# Patient Record
Sex: FEMALE | Race: WHITE | Employment: OTHER | URBAN - NONMETROPOLITAN AREA
[De-identification: names, ages, dates, MRNs, and addresses within clinical notes are randomized per-mention and may not be internally consistent; named-entity substitution may affect disease eponyms.]

---

## 2017-09-15 DIAGNOSIS — M54.12 CERVICAL RADICULOPATHY: ICD-10-CM

## 2017-09-15 DIAGNOSIS — F41.9 ANXIETY: ICD-10-CM

## 2017-09-15 DIAGNOSIS — Z78.0 MENOPAUSE: ICD-10-CM

## 2017-09-15 DIAGNOSIS — N28.1 CYST OF KIDNEY, ACQUIRED: ICD-10-CM

## 2017-09-15 DIAGNOSIS — M54.16 LUMBAR RADICULOPATHY: ICD-10-CM

## 2017-09-15 DIAGNOSIS — M51.36 DDD (DEGENERATIVE DISC DISEASE), LUMBAR: ICD-10-CM

## 2017-09-15 DIAGNOSIS — R73.03 PREDIABETES: ICD-10-CM

## 2017-09-15 DIAGNOSIS — I10 ESSENTIAL HYPERTENSION, BENIGN: ICD-10-CM

## 2017-09-15 DIAGNOSIS — E78.2 MIXED HYPERLIPIDEMIA: ICD-10-CM

## 2017-09-15 PROBLEM — M51.369 DDD (DEGENERATIVE DISC DISEASE), LUMBAR: Status: ACTIVE | Noted: 2017-09-15

## 2017-09-15 PROBLEM — M54.50 LOW BACK PAIN: Status: ACTIVE | Noted: 2017-09-15

## 2017-09-15 PROBLEM — F32.A DEPRESSION: Status: ACTIVE | Noted: 2017-09-15

## 2017-09-15 PROBLEM — M54.40 BILATERAL LOW BACK PAIN WITH SCIATICA: Status: ACTIVE | Noted: 2017-09-15

## 2017-09-15 PROBLEM — E66.9 OBESITY: Status: ACTIVE | Noted: 2017-09-15

## 2017-09-15 PROBLEM — K58.9 IBS (IRRITABLE BOWEL SYNDROME): Status: ACTIVE | Noted: 2017-09-15

## 2017-09-15 PROBLEM — M54.30 SCIATICA: Status: ACTIVE | Noted: 2017-09-15

## 2017-09-15 PROBLEM — K64.9 HEMORRHOID: Status: ACTIVE | Noted: 2017-09-15

## 2017-09-15 PROBLEM — M81.0 OSTEOPOROSIS: Status: ACTIVE | Noted: 2017-09-15

## 2017-09-15 RX ORDER — CELECOXIB 200 MG/1
200 CAPSULE ORAL DAILY
COMMUNITY
End: 2020-12-02

## 2017-09-15 RX ORDER — METAXALONE 800 MG/1
800 TABLET ORAL 3 TIMES DAILY
COMMUNITY
End: 2017-11-27 | Stop reason: ALTCHOICE

## 2017-10-27 RX ORDER — METOPROLOL TARTRATE 50 MG/1
TABLET, FILM COATED ORAL
Qty: 180 TABLET | Refills: 0 | Status: SHIPPED | OUTPATIENT
Start: 2017-10-27 | End: 2017-12-11 | Stop reason: SDUPTHER

## 2017-11-21 ENCOUNTER — TELEPHONE (OUTPATIENT)
Dept: FAMILY MEDICINE CLINIC | Age: 57
End: 2017-11-21

## 2017-11-21 DIAGNOSIS — E78.2 MIXED HYPERLIPIDEMIA: ICD-10-CM

## 2017-11-21 DIAGNOSIS — I10 HYPERTENSION, UNSPECIFIED TYPE: Primary | ICD-10-CM

## 2017-11-21 DIAGNOSIS — R73.02 GLUCOSE INTOLERANCE (IMPAIRED GLUCOSE TOLERANCE): ICD-10-CM

## 2017-11-21 DIAGNOSIS — R73.03 PREDIABETES: ICD-10-CM

## 2017-11-21 NOTE — TELEPHONE ENCOUNTER
----- Message from Carmina Schroeder sent at 11/15/2017  3:31 PM CST -----  Need lab orders for physical

## 2017-11-27 ENCOUNTER — OFFICE VISIT (OUTPATIENT)
Dept: URGENT CARE | Age: 57
End: 2017-11-27
Payer: COMMERCIAL

## 2017-11-27 VITALS
RESPIRATION RATE: 20 BRPM | HEART RATE: 69 BPM | OXYGEN SATURATION: 93 % | TEMPERATURE: 98 F | WEIGHT: 227.6 LBS | SYSTOLIC BLOOD PRESSURE: 122 MMHG | BODY MASS INDEX: 34.49 KG/M2 | HEIGHT: 68 IN | DIASTOLIC BLOOD PRESSURE: 79 MMHG

## 2017-11-27 DIAGNOSIS — R73.03 PREDIABETES: ICD-10-CM

## 2017-11-27 DIAGNOSIS — I10 HYPERTENSION, UNSPECIFIED TYPE: ICD-10-CM

## 2017-11-27 DIAGNOSIS — R73.02 GLUCOSE INTOLERANCE (IMPAIRED GLUCOSE TOLERANCE): ICD-10-CM

## 2017-11-27 DIAGNOSIS — E78.2 MIXED HYPERLIPIDEMIA: ICD-10-CM

## 2017-11-27 DIAGNOSIS — J01.10 ACUTE NON-RECURRENT FRONTAL SINUSITIS: Primary | ICD-10-CM

## 2017-11-27 LAB
ALBUMIN SERPL-MCNC: 4.1 G/DL (ref 3.5–5.2)
ALP BLD-CCNC: 66 U/L (ref 35–104)
ALT SERPL-CCNC: 19 U/L (ref 5–33)
ANION GAP SERPL CALCULATED.3IONS-SCNC: 15 MMOL/L (ref 7–19)
AST SERPL-CCNC: 20 U/L (ref 5–32)
BASOPHILS ABSOLUTE: 0.1 K/UL (ref 0–0.2)
BASOPHILS RELATIVE PERCENT: 0.8 % (ref 0–1)
BILIRUB SERPL-MCNC: <0.2 MG/DL (ref 0.2–1.2)
BILIRUBIN URINE: NEGATIVE
BLOOD, URINE: NEGATIVE
BUN BLDV-MCNC: 20 MG/DL (ref 6–20)
CALCIUM SERPL-MCNC: 9.4 MG/DL (ref 8.6–10)
CHLORIDE BLD-SCNC: 102 MMOL/L (ref 98–111)
CHOLESTEROL, TOTAL: 170 MG/DL (ref 160–199)
CLARITY: CLEAR
CO2: 27 MMOL/L (ref 22–29)
COLOR: YELLOW
CREAT SERPL-MCNC: 1 MG/DL (ref 0.5–0.9)
EOSINOPHILS ABSOLUTE: 0.3 K/UL (ref 0–0.6)
EOSINOPHILS RELATIVE PERCENT: 2.8 % (ref 0–5)
GFR NON-AFRICAN AMERICAN: 57
GLUCOSE BLD-MCNC: 103 MG/DL (ref 74–109)
GLUCOSE URINE: NEGATIVE MG/DL
HBA1C MFR BLD: 5.8 %
HCT VFR BLD CALC: 41.1 % (ref 37–47)
HDLC SERPL-MCNC: 50 MG/DL (ref 65–121)
HEMOGLOBIN: 12.9 G/DL (ref 12–16)
KETONES, URINE: NEGATIVE MG/DL
LDL CHOLESTEROL CALCULATED: 89 MG/DL
LEUKOCYTE ESTERASE, URINE: NEGATIVE
LYMPHOCYTES ABSOLUTE: 2.6 K/UL (ref 1.1–4.5)
LYMPHOCYTES RELATIVE PERCENT: 25.2 % (ref 20–40)
MCH RBC QN AUTO: 26.8 PG (ref 27–31)
MCHC RBC AUTO-ENTMCNC: 31.4 G/DL (ref 33–37)
MCV RBC AUTO: 85.3 FL (ref 81–99)
MONOCYTES ABSOLUTE: 0.8 K/UL (ref 0–0.9)
MONOCYTES RELATIVE PERCENT: 7.4 % (ref 0–10)
NEUTROPHILS ABSOLUTE: 6.6 K/UL (ref 1.5–7.5)
NEUTROPHILS RELATIVE PERCENT: 63.2 % (ref 50–65)
NITRITE, URINE: NEGATIVE
PDW BLD-RTO: 13.6 % (ref 11.5–14.5)
PH UA: 7.5
PLATELET # BLD: 274 K/UL (ref 130–400)
PMV BLD AUTO: 11.6 FL (ref 9.4–12.3)
POTASSIUM SERPL-SCNC: 4.1 MMOL/L (ref 3.5–5)
PROTEIN UA: NEGATIVE MG/DL
RBC # BLD: 4.82 M/UL (ref 4.2–5.4)
SODIUM BLD-SCNC: 144 MMOL/L (ref 136–145)
SPECIFIC GRAVITY UA: 1.02
T4 FREE: 1.2 NG/DL (ref 0.9–1.7)
TOTAL PROTEIN: 6.7 G/DL (ref 6.6–8.7)
TRIGL SERPL-MCNC: 153 MG/DL (ref 0–149)
UROBILINOGEN, URINE: 0.2 E.U./DL
WBC # BLD: 10.4 K/UL (ref 4.8–10.8)

## 2017-11-27 PROCEDURE — 99213 OFFICE O/P EST LOW 20 MIN: CPT | Performed by: PHYSICIAN ASSISTANT

## 2017-11-27 RX ORDER — AZITHROMYCIN 250 MG/1
TABLET, FILM COATED ORAL
Qty: 1 PACKET | Refills: 0 | Status: SHIPPED | OUTPATIENT
Start: 2017-11-27 | End: 2017-12-07

## 2017-11-27 RX ORDER — BENZONATATE 100 MG/1
100 CAPSULE ORAL 3 TIMES DAILY PRN
Qty: 30 CAPSULE | Refills: 1 | Status: SHIPPED | OUTPATIENT
Start: 2017-11-27 | End: 2017-12-07

## 2017-11-27 ASSESSMENT — ENCOUNTER SYMPTOMS
VOMITING: 0
SORE THROAT: 1
NAUSEA: 0
COUGH: 1
DIARRHEA: 0
RHINORRHEA: 1
ABDOMINAL PAIN: 0

## 2017-11-27 NOTE — PROGRESS NOTES
Subjective:      Patient ID: Js Werner is a 62 y.o. female. HPI    Dede Boyd presents today with headaches, sinus pressure, productive cough, and chest congestion. Symptoms developed 4 days ago. No fever noted. Has runny nose and nasal congestion. Has sore throat. Has right ear pain. Having frontal headaches. No abdominal pain or NVD. Has taken Mucinex and Coricidin HBP. Review of Systems   Constitutional: Positive for chills. Negative for fever. HENT: Positive for congestion, ear pain, rhinorrhea and sore throat. Respiratory: Positive for cough. Gastrointestinal: Negative for abdominal pain, diarrhea, nausea and vomiting. Neurological: Positive for headaches. Objective:   Physical Exam   Constitutional: She is oriented to person, place, and time. She appears well-developed and well-nourished. No distress. HENT:   Head: Normocephalic and atraumatic. Right Ear: External ear normal.   Left Ear: External ear normal.   Nose: Nose normal.   Mouth/Throat: No oropharyngeal exudate. Pharynx erythematous with PND   Eyes: Conjunctivae are normal. Right eye exhibits no discharge. Left eye exhibits no discharge. Neck: Normal range of motion. Neck supple. Cardiovascular: Normal rate, regular rhythm and normal heart sounds. No murmur heard. Pulmonary/Chest: Effort normal and breath sounds normal. No respiratory distress. She has no wheezes. She has no rales. Abdominal: Soft. Bowel sounds are normal. She exhibits no distension and no mass. There is no tenderness. There is no rebound and no guarding. Lymphadenopathy:     She has no cervical adenopathy. Neurological: She is alert and oriented to person, place, and time. Skin: Skin is warm and dry. No rash noted. No erythema. No pallor. Psychiatric: She has a normal mood and affect. Her behavior is normal. Judgment and thought content normal.   Nursing note and vitals reviewed.       Assessment:      Acute Sinusitis      Plan:      -

## 2017-12-11 ENCOUNTER — OFFICE VISIT (OUTPATIENT)
Dept: FAMILY MEDICINE CLINIC | Age: 57
End: 2017-12-11
Payer: COMMERCIAL

## 2017-12-11 VITALS
DIASTOLIC BLOOD PRESSURE: 78 MMHG | RESPIRATION RATE: 16 BRPM | SYSTOLIC BLOOD PRESSURE: 140 MMHG | WEIGHT: 231 LBS | OXYGEN SATURATION: 96 % | HEART RATE: 80 BPM | TEMPERATURE: 98.4 F | BODY MASS INDEX: 35.12 KG/M2

## 2017-12-11 DIAGNOSIS — Z12.31 SCREENING MAMMOGRAM, ENCOUNTER FOR: ICD-10-CM

## 2017-12-11 DIAGNOSIS — Z12.11 ENCOUNTER FOR SCREENING COLONOSCOPY: ICD-10-CM

## 2017-12-11 DIAGNOSIS — E78.2 MIXED HYPERLIPIDEMIA: ICD-10-CM

## 2017-12-11 DIAGNOSIS — M54.16 LUMBAR RADICULOPATHY: ICD-10-CM

## 2017-12-11 DIAGNOSIS — R73.03 PREDIABETES: ICD-10-CM

## 2017-12-11 DIAGNOSIS — I10 ESSENTIAL HYPERTENSION, BENIGN: Primary | ICD-10-CM

## 2017-12-11 DIAGNOSIS — K58.0 IRRITABLE BOWEL SYNDROME WITH DIARRHEA: ICD-10-CM

## 2017-12-11 PROCEDURE — 99396 PREV VISIT EST AGE 40-64: CPT | Performed by: NURSE PRACTITIONER

## 2017-12-11 RX ORDER — METOPROLOL TARTRATE 50 MG/1
50 TABLET, FILM COATED ORAL 2 TIMES DAILY
Qty: 180 TABLET | Refills: 1 | Status: SHIPPED | OUTPATIENT
Start: 2017-12-11 | End: 2018-07-23 | Stop reason: SDUPTHER

## 2017-12-11 RX ORDER — TRIAMTERENE AND HYDROCHLOROTHIAZIDE 37.5; 25 MG/1; MG/1
2 TABLET ORAL DAILY
Qty: 180 TABLET | Refills: 1 | Status: SHIPPED | OUTPATIENT
Start: 2017-12-11 | End: 2018-07-23 | Stop reason: SDUPTHER

## 2017-12-11 RX ORDER — SIMVASTATIN 40 MG
40 TABLET ORAL NIGHTLY
Qty: 90 TABLET | Refills: 1 | Status: SHIPPED | OUTPATIENT
Start: 2017-12-11 | End: 2018-03-29 | Stop reason: SDUPTHER

## 2017-12-11 RX ORDER — CHLORDIAZEPOXIDE HYDROCHLORIDE AND CLIDINIUM BROMIDE 5; 2.5 MG/1; MG/1
1 CAPSULE ORAL EVERY 6 HOURS PRN
Qty: 270 CAPSULE | Refills: 1 | Status: SHIPPED | OUTPATIENT
Start: 2017-12-11 | End: 2018-03-07 | Stop reason: SDUPTHER

## 2017-12-11 RX ORDER — DILTIAZEM HYDROCHLORIDE 240 MG/1
240 CAPSULE, COATED, EXTENDED RELEASE ORAL DAILY
Qty: 90 CAPSULE | Refills: 1 | Status: SHIPPED | OUTPATIENT
Start: 2017-12-11 | End: 2018-09-13 | Stop reason: SDUPTHER

## 2017-12-11 ASSESSMENT — ENCOUNTER SYMPTOMS
CHEST TIGHTNESS: 0
SORE THROAT: 0
WHEEZING: 0
NAUSEA: 0
DIARRHEA: 1
BACK PAIN: 1
ABDOMINAL PAIN: 0
SHORTNESS OF BREATH: 0
COUGH: 0

## 2017-12-11 ASSESSMENT — PATIENT HEALTH QUESTIONNAIRE - PHQ9
SUM OF ALL RESPONSES TO PHQ9 QUESTIONS 1 & 2: 0
2. FEELING DOWN, DEPRESSED OR HOPELESS: 0
SUM OF ALL RESPONSES TO PHQ QUESTIONS 1-9: 0
1. LITTLE INTEREST OR PLEASURE IN DOING THINGS: 0

## 2017-12-11 NOTE — PROGRESS NOTES
Ms.Betty Armida Murray is a 62 y.o. female who presents today for   Chief Complaint   Patient presents with    Annual Exam       HPI:  Here for annual exam.      BP has been stable at home on diltiazem and metoprolol. Borderline here today but she had a stressful event at work prior to her appointment. No recent chest pain or sob. She is tolerating her meds without adverse effect. IBS has been stable. She is taking librax daily typically in the morning and at night. Her chronic lumbar back pain is fairly stable. May be gradually worsening but she is able to tolerate the pain with otc ibuprofen. She does not feel she needs to see ortho yet. She has prediabetes and has cut back on carbs, working on diet. A1C is 5.8%. Lipids have improved with total 170, triglycerides 153, hdl 50, ldl 89. CMP and cbc stable. T4 normal.  TSH not drawn it appears. UA normal.  All results reviewed with her. Last mammogram in 2015, benign. She cannot recall last colonoscopy. Declines pap, breast exam today. Review of Systems   Constitutional: Negative for chills and fever. HENT: Negative for congestion, ear pain and sore throat. Respiratory: Negative for cough, chest tightness, shortness of breath and wheezing. Cardiovascular: Negative for chest pain. Gastrointestinal: Positive for diarrhea (chronic, stable). Negative for abdominal pain and nausea. Genitourinary: Negative for dysuria and frequency. Musculoskeletal: Positive for back pain (chronic, stable). Negative for arthralgias and myalgias. Skin: Negative for rash.        Past Medical History:   Diagnosis Date    Anal fissure     Anxiety 9/15/2017    Bronchitis     Cervical radiculopathy 9/15/2017    Chronic back pain     Chronic kidney disease     Cyst of kidney, acquired 9/15/2017    Depression 9/15/2017    Hyperlipidemia     Hypertension     IBS (irritable bowel syndrome) 9/15/2017    Irregular heartbeat     Obesity 9/15/2017  Diabetes Maternal Grandmother     Colon Cancer Maternal Grandmother     Heart Disease Maternal Grandfather     Cancer Paternal Grandmother      colon cancer    Alcohol Abuse Sister     Cirrhosis Sister     Stroke Paternal Aunt     Colon Cancer Maternal Cousin        BP (!) 140/78   Pulse 80   Temp 98.4 °F (36.9 °C) (Temporal)   Resp 16   Wt 231 lb (104.8 kg)   SpO2 96%   BMI 35.12 kg/m²     Physical Exam   Constitutional: She appears well-developed and well-nourished. HENT:   Head: Normocephalic. Right Ear: External ear normal.   Left Ear: External ear normal.   Nose: Nose normal.   Mouth/Throat: Oropharynx is clear and moist. No oropharyngeal exudate. Eyes: Conjunctivae and EOM are normal. Pupils are equal, round, and reactive to light. Neck: Normal range of motion. Neck supple. No JVD present. No tracheal deviation present. No thyromegaly present. Cardiovascular: Normal rate, regular rhythm, normal heart sounds and intact distal pulses. No murmur heard. Pulmonary/Chest: Effort normal and breath sounds normal. No respiratory distress. Abdominal: Soft. Bowel sounds are normal. She exhibits no distension and no mass. There is no tenderness. Musculoskeletal: Normal range of motion. She exhibits no edema. Lymphadenopathy:     She has no cervical adenopathy. Skin: Skin is warm and dry. No rash noted. Psychiatric: She has a normal mood and affect. Vitals reviewed. Assessment:    ICD-10-CM ICD-9-CM    1. Essential hypertension, benign I10 401.1    2. Irritable bowel syndrome with diarrhea K58.0 564.1    3. Lumbar radiculopathy M54.16 724.4    4. Prediabetes R73.03 790.29 Hemoglobin A1C   5. Mixed hyperlipidemia E78.2 272.2 Comprehensive Metabolic Panel      Lipid Panel   6. Screening mammogram, encounter for Z12.31 V76.12 SERAFIN DIGITAL SCREEN BILATERAL       Plan:  -Continue current meds, refilled.   Kevinelder called to pharmacy.  -Order given for mammogram.  She will schedule she questions regarding her treatment. Should her conditions worsen, she should return to office to be reassessed by SORIN Keenan. she Should to go the closest Emergency Department for any emergency. They verbalized understanding the above instructions. Return in about 6 months (around 6/11/2018).

## 2018-03-07 RX ORDER — CHLORDIAZEPOXIDE HYDROCHLORIDE AND CLIDINIUM BROMIDE 5; 2.5 MG/1; MG/1
1 CAPSULE ORAL EVERY 6 HOURS PRN
Qty: 270 CAPSULE | Refills: 1 | Status: SHIPPED | OUTPATIENT
Start: 2018-03-07 | End: 2018-11-26 | Stop reason: SDUPTHER

## 2018-03-29 RX ORDER — SIMVASTATIN 40 MG
40 TABLET ORAL NIGHTLY
Qty: 90 TABLET | Refills: 1 | Status: SHIPPED | OUTPATIENT
Start: 2018-03-29 | End: 2018-12-14 | Stop reason: SDUPTHER

## 2018-04-23 DIAGNOSIS — M54.5 LOW BACK PAIN, UNSPECIFIED BACK PAIN LATERALITY, UNSPECIFIED CHRONICITY, WITH SCIATICA PRESENCE UNSPECIFIED: Primary | ICD-10-CM

## 2018-04-24 ENCOUNTER — TELEPHONE (OUTPATIENT)
Dept: NEUROLOGY | Age: 58
End: 2018-04-24

## 2018-06-28 ENCOUNTER — APPOINTMENT (OUTPATIENT)
Dept: GENERAL RADIOLOGY | Age: 58
End: 2018-06-28
Payer: COMMERCIAL

## 2018-06-28 ENCOUNTER — HOSPITAL ENCOUNTER (EMERGENCY)
Age: 58
Discharge: HOME | End: 2018-06-28
Attending: EMERGENCY MEDICINE
Payer: COMMERCIAL

## 2018-06-28 VITALS
BODY MASS INDEX: 26.37 KG/M2 | HEART RATE: 62 BPM | DIASTOLIC BLOOD PRESSURE: 76 MMHG | HEIGHT: 68 IN | OXYGEN SATURATION: 97 % | WEIGHT: 174 LBS | SYSTOLIC BLOOD PRESSURE: 138 MMHG | RESPIRATION RATE: 18 BRPM | TEMPERATURE: 97.9 F

## 2018-06-28 DIAGNOSIS — S62.002A CLOSED DISPLACED FRACTURE OF SCAPHOID OF LEFT WRIST, UNSPECIFIED PORTION OF SCAPHOID, INITIAL ENCOUNTER: Primary | ICD-10-CM

## 2018-06-28 PROCEDURE — 73110 X-RAY EXAM OF WRIST: CPT

## 2018-06-28 PROCEDURE — 99283 EMERGENCY DEPT VISIT LOW MDM: CPT | Performed by: EMERGENCY MEDICINE

## 2018-06-28 PROCEDURE — 25622 CLTX CARPL SCPHD FX W/O MNPJ: CPT | Performed by: EMERGENCY MEDICINE

## 2018-06-28 PROCEDURE — 99283 EMERGENCY DEPT VISIT LOW MDM: CPT

## 2018-06-28 PROCEDURE — 73130 X-RAY EXAM OF HAND: CPT

## 2018-06-28 PROCEDURE — 29125 APPL SHORT ARM SPLINT STATIC: CPT

## 2018-06-28 ASSESSMENT — PAIN SCALES - GENERAL: PAINLEVEL_OUTOF10: 5

## 2018-06-28 NOTE — ED PROVIDER NOTES
DEPARTMENT COURSE and DIFFERENTIAL DIAGNOSIS/MDM:   Vitals:    Vitals:    06/28/18 1049   BP: (!) 148/84   Pulse: 66   Resp: 18   Temp: 97.9 °F (36.6 °C)   SpO2: 96%   Weight: 174 lb (78.9 kg)   Height: 5' 8\" (1.727 m)       MDM  X-ray indicates what radiologist suspects is likely an old fracture. Patient has tenderness in this area so she was placed in thumb spica. She'll be discharged and instructed follow-up with ortho for further evaluation. Told to return to the ER for change or worsening symptoms or new concerns. Patient agreeable plan. CONSULTS:  None    PROCEDURES:  Unless otherwise noted below, none     Ortho Injury  Date/Time: 6/28/2018 12:14 PM  Performed by: Carlota Leyva  Authorized by: Carlota Leyva   Consent: Verbal consent obtained. Risks and benefits: risks, benefits and alternatives were discussed  Consent given by: patient  Patient identity confirmed: verbally with patient  Injury location: wrist  Location details: left wrist  Injury type: fracture  Fracture type: scaphoid  Pre-procedure neurovascular assessment: neurovascularly intact  Pre-procedure distal perfusion: normal  Pre-procedure neurological function: normal  Pre-procedure range of motion: normal    Anesthesia:  Local anesthesia used: no    Sedation:  Patient sedated: no  Manipulation performed: no  Immobilization: splint  Splint type: thumb spica  Supplies used: Ortho-Glass  Post-procedure neurovascular assessment: post-procedure neurovascularly intact  Post-procedure distal perfusion: normal  Post-procedure neurological function: normal  Post-procedure range of motion: normal  Patient tolerance: Patient tolerated the procedure well with no immediate complications          FINAL IMPRESSION      1.  Closed displaced fracture of scaphoid of left wrist, unspecified portion of scaphoid, initial encounter          DISPOSITION/PLAN   DISPOSITION Decision To Discharge 06/28/2018 11:51:45 AM      PATIENT REFERRED TO:  L
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
## 2018-07-23 RX ORDER — METOPROLOL TARTRATE 50 MG/1
50 TABLET, FILM COATED ORAL 2 TIMES DAILY
Qty: 180 TABLET | Refills: 1 | Status: SHIPPED | OUTPATIENT
Start: 2018-07-23 | End: 2018-12-14 | Stop reason: SDUPTHER

## 2018-07-23 RX ORDER — TRIAMTERENE AND HYDROCHLOROTHIAZIDE 37.5; 25 MG/1; MG/1
2 TABLET ORAL DAILY
Qty: 180 TABLET | Refills: 1 | Status: SHIPPED | OUTPATIENT
Start: 2018-07-23 | End: 2018-12-14 | Stop reason: SDUPTHER

## 2018-07-24 RX ORDER — CHLORDIAZEPOXIDE HYDROCHLORIDE AND CLIDINIUM BROMIDE 5; 2.5 MG/1; MG/1
CAPSULE ORAL
Qty: 270 CAPSULE | Refills: 1 | OUTPATIENT
Start: 2018-07-24

## 2018-07-24 NOTE — TELEPHONE ENCOUNTER
I don't think I can fill her librax until she is seen if it is controlled given last appmt in office was 12/2017 and next appmt is next month.  Please send back the other two refill requests that are not controlled for me to fill

## 2018-09-13 DIAGNOSIS — Z76.0 ENCOUNTER FOR MEDICATION REFILL: Primary | ICD-10-CM

## 2018-09-13 RX ORDER — DILTIAZEM HYDROCHLORIDE 240 MG/1
240 CAPSULE, COATED, EXTENDED RELEASE ORAL DAILY
Qty: 90 CAPSULE | Refills: 1 | Status: SHIPPED | OUTPATIENT
Start: 2018-09-13 | End: 2018-12-14 | Stop reason: SDUPTHER

## 2018-11-08 RX ORDER — CHLORDIAZEPOXIDE HYDROCHLORIDE AND CLIDINIUM BROMIDE 5; 2.5 MG/1; MG/1
CAPSULE ORAL
Qty: 270 CAPSULE | Refills: 1 | OUTPATIENT
Start: 2018-11-08

## 2018-11-21 DIAGNOSIS — K58.2 IRRITABLE BOWEL SYNDROME WITH BOTH CONSTIPATION AND DIARRHEA: Primary | ICD-10-CM

## 2018-11-21 RX ORDER — CHLORDIAZEPOXIDE HYDROCHLORIDE AND CLIDINIUM BROMIDE 5; 2.5 MG/1; MG/1
1 CAPSULE ORAL 4 TIMES DAILY PRN
Qty: 45 CAPSULE | Refills: 0 | OUTPATIENT
Start: 2018-11-21 | End: 2018-12-21

## 2018-12-05 DIAGNOSIS — R73.03 PREDIABETES: ICD-10-CM

## 2018-12-05 DIAGNOSIS — E78.2 MIXED HYPERLIPIDEMIA: ICD-10-CM

## 2018-12-05 LAB
ALBUMIN SERPL-MCNC: 4 G/DL (ref 3.5–5.2)
ALP BLD-CCNC: 55 U/L (ref 35–104)
ALT SERPL-CCNC: 17 U/L (ref 5–33)
ANION GAP SERPL CALCULATED.3IONS-SCNC: 10 MMOL/L (ref 7–19)
AST SERPL-CCNC: 17 U/L (ref 5–32)
BILIRUB SERPL-MCNC: 0.3 MG/DL (ref 0.2–1.2)
BUN BLDV-MCNC: 27 MG/DL (ref 6–20)
CALCIUM SERPL-MCNC: 9.2 MG/DL (ref 8.6–10)
CHLORIDE BLD-SCNC: 104 MMOL/L (ref 98–111)
CHOLESTEROL, TOTAL: 150 MG/DL (ref 160–199)
CO2: 30 MMOL/L (ref 22–29)
CREAT SERPL-MCNC: 1.1 MG/DL (ref 0.5–0.9)
GFR NON-AFRICAN AMERICAN: 51
GLUCOSE BLD-MCNC: 115 MG/DL (ref 74–109)
HBA1C MFR BLD: 5.6 % (ref 4–6)
HDLC SERPL-MCNC: 51 MG/DL (ref 65–121)
LDL CHOLESTEROL CALCULATED: 76 MG/DL
POTASSIUM SERPL-SCNC: 3.8 MMOL/L (ref 3.5–5)
SODIUM BLD-SCNC: 144 MMOL/L (ref 136–145)
TOTAL PROTEIN: 6.3 G/DL (ref 6.6–8.7)
TRIGL SERPL-MCNC: 117 MG/DL (ref 0–149)

## 2018-12-14 ENCOUNTER — OFFICE VISIT (OUTPATIENT)
Dept: FAMILY MEDICINE CLINIC | Age: 58
End: 2018-12-14
Payer: COMMERCIAL

## 2018-12-14 VITALS
TEMPERATURE: 98 F | SYSTOLIC BLOOD PRESSURE: 112 MMHG | DIASTOLIC BLOOD PRESSURE: 72 MMHG | WEIGHT: 215.2 LBS | HEIGHT: 68 IN | HEART RATE: 77 BPM | BODY MASS INDEX: 32.61 KG/M2 | OXYGEN SATURATION: 97 %

## 2018-12-14 DIAGNOSIS — R73.9 HYPERGLYCEMIA: ICD-10-CM

## 2018-12-14 DIAGNOSIS — K58.0 IRRITABLE BOWEL SYNDROME WITH DIARRHEA: ICD-10-CM

## 2018-12-14 DIAGNOSIS — E66.09 CLASS 1 OBESITY DUE TO EXCESS CALORIES WITH SERIOUS COMORBIDITY AND BODY MASS INDEX (BMI) OF 32.0 TO 32.9 IN ADULT: ICD-10-CM

## 2018-12-14 DIAGNOSIS — Z00.00 ANNUAL PHYSICAL EXAM: Primary | ICD-10-CM

## 2018-12-14 DIAGNOSIS — Z12.11 SCREEN FOR COLON CANCER: ICD-10-CM

## 2018-12-14 DIAGNOSIS — E78.2 MIXED HYPERLIPIDEMIA: ICD-10-CM

## 2018-12-14 DIAGNOSIS — Z12.39 BREAST CANCER SCREENING: ICD-10-CM

## 2018-12-14 DIAGNOSIS — M54.40 CHRONIC MIDLINE LOW BACK PAIN WITH SCIATICA, SCIATICA LATERALITY UNSPECIFIED: ICD-10-CM

## 2018-12-14 DIAGNOSIS — G89.29 CHRONIC MIDLINE LOW BACK PAIN WITH SCIATICA, SCIATICA LATERALITY UNSPECIFIED: ICD-10-CM

## 2018-12-14 DIAGNOSIS — Z76.0 ENCOUNTER FOR MEDICATION REFILL: ICD-10-CM

## 2018-12-14 DIAGNOSIS — I10 ESSENTIAL HYPERTENSION, BENIGN: ICD-10-CM

## 2018-12-14 DIAGNOSIS — Z78.0 MENOPAUSE: ICD-10-CM

## 2018-12-14 PROBLEM — E66.811 CLASS 1 OBESITY DUE TO EXCESS CALORIES WITH SERIOUS COMORBIDITY AND BODY MASS INDEX (BMI) OF 32.0 TO 32.9 IN ADULT: Status: ACTIVE | Noted: 2017-09-15

## 2018-12-14 PROCEDURE — 99214 OFFICE O/P EST MOD 30 MIN: CPT | Performed by: INTERNAL MEDICINE

## 2018-12-14 PROCEDURE — 99396 PREV VISIT EST AGE 40-64: CPT | Performed by: INTERNAL MEDICINE

## 2018-12-14 RX ORDER — CHLORDIAZEPOXIDE HYDROCHLORIDE AND CLIDINIUM BROMIDE 5; 2.5 MG/1; MG/1
CAPSULE ORAL
Qty: 360 CAPSULE | Refills: 0 | Status: SHIPPED | OUTPATIENT
Start: 2018-12-14 | End: 2019-03-04 | Stop reason: SDUPTHER

## 2018-12-14 RX ORDER — DILTIAZEM HYDROCHLORIDE 240 MG/1
240 CAPSULE, COATED, EXTENDED RELEASE ORAL DAILY
Qty: 90 CAPSULE | Refills: 1 | Status: SHIPPED | OUTPATIENT
Start: 2018-12-14 | End: 2019-08-20 | Stop reason: SDUPTHER

## 2018-12-14 RX ORDER — IBUPROFEN 400 MG/1
400 TABLET ORAL 2 TIMES DAILY
COMMUNITY
End: 2020-12-02

## 2018-12-14 RX ORDER — SIMVASTATIN 40 MG
40 TABLET ORAL NIGHTLY
Qty: 90 TABLET | Refills: 1 | Status: SHIPPED | OUTPATIENT
Start: 2018-12-14 | End: 2019-09-12 | Stop reason: SDUPTHER

## 2018-12-14 RX ORDER — TRIAMTERENE AND HYDROCHLOROTHIAZIDE 37.5; 25 MG/1; MG/1
2 TABLET ORAL DAILY
Qty: 180 TABLET | Refills: 1 | Status: SHIPPED | OUTPATIENT
Start: 2018-12-14 | End: 2019-08-20 | Stop reason: SDUPTHER

## 2018-12-14 RX ORDER — METOPROLOL TARTRATE 50 MG/1
50 TABLET, FILM COATED ORAL 2 TIMES DAILY
Qty: 180 TABLET | Refills: 1 | Status: SHIPPED | OUTPATIENT
Start: 2018-12-14 | End: 2019-08-05 | Stop reason: SDUPTHER

## 2018-12-14 ASSESSMENT — ENCOUNTER SYMPTOMS
SORE THROAT: 0
EYE REDNESS: 0
EYE DISCHARGE: 0
BACK PAIN: 1
RHINORRHEA: 0
ABDOMINAL PAIN: 0
DIARRHEA: 1
SINUS PRESSURE: 0
WHEEZING: 0
VOICE CHANGE: 0
COLOR CHANGE: 0
CHEST TIGHTNESS: 0
SHORTNESS OF BREATH: 0
COUGH: 0
VOMITING: 0
BLOOD IN STOOL: 0
EYE PAIN: 0

## 2018-12-14 ASSESSMENT — PATIENT HEALTH QUESTIONNAIRE - PHQ9
SUM OF ALL RESPONSES TO PHQ QUESTIONS 1-9: 0
1. LITTLE INTEREST OR PLEASURE IN DOING THINGS: 0
2. FEELING DOWN, DEPRESSED OR HOPELESS: 0
SUM OF ALL RESPONSES TO PHQ9 QUESTIONS 1 & 2: 0
SUM OF ALL RESPONSES TO PHQ QUESTIONS 1-9: 0

## 2018-12-14 NOTE — PATIENT INSTRUCTIONS
your doctor if you are having problems. It's also a good idea to know your test results and keep a list of the medicines you take. How can you care for yourself at home? · Set realistic goals. Many people expect to lose much more weight than is likely. A weight loss of 5% to 10% of your body weight may be enough to improve your health. · Get family and friends involved to provide support. Talk to them about why you are trying to lose weight, and ask them to help. They can help by participating in exercise and having meals with you, even if they may be eating something different. · Find what works best for you. If you do not have time or do not like to cook, a program that offers meal replacement bars or shakes may be better for you. Or if you like to prepare meals, finding a plan that includes daily menus and recipes may be best.  · Ask your doctor about other health professionals who can help you achieve your weight loss goals. ? A dietitian can help you make healthy changes in your diet. ? An exercise specialist or  can help you develop a safe and effective exercise program.  ? A counselor or psychiatrist can help you cope with issues such as depression, anxiety, or family problems that can make it hard to focus on weight loss. · Consider joining a support group for people who are trying to lose weight. Your doctor can suggest groups in your area. Where can you learn more? Go to https://[employer]. org and sign in to your [employer] account. Enter S768 in the [facility] box to learn more about \"Starting a Weight Loss Plan: Care Instructions. \"     If you do not have an account, please click on the \"Sign Up Now\" link. Current as of: June 26, 2018  Content Version: 11.8  © 9061-3507 Healthwise, Incorporated. Care instructions adapted under license by [facility] [place] ([facility]).  If you have questions about a medical condition or this instruction, always ask your healthcare Care instructions adapted under license by [facility] ([facility]). If you have questions about a medical condition or this instruction, always ask your healthcare professional. [address] any warranty or liability for your use of this information. Patient Education        Learning About Breast Cancer Screening  What is breast cancer screening? Breast cancer occurs when cells that are not normal grow in one or both of your breasts. Screening tests can help find breast cancer early. Cancer is easier to treat when it's found early. Having concerns about breast cancer is common. That's why it's important to talk with your doctor about when to start and how often to get screened for breast cancer. How is breast cancer screening done? Several screening tests can be used to check for breast cancer. · Mammograms check for signs of cancer using X-rays. They can show tumors that are too small for you or your doctor to feel. During a mammogram, a machine squeezes your breasts to make them flatter and easier to X-ray. At least two pictures are taken of each breast. One is taken from the top and one from the side. · 3-D mammograms are also called digital breast tomosynthesis. Your breast is positioned on a flat plate. A top plate is pressed against your breast to keep it in position. The X-ray arm then moves in an arc above the breast and takes many pictures. A computer uses these X-rays to create a three-dimensional image. · Clinical breast exams are a doctor's exam. Your doctor carefully feels your breasts and under your arms to check for lumps or other changes. After the screening, your doctor will tell you the results. You will also be told if you need any follow-up tests. When should you get screened? Talk with your doctor about when you should start being tested for breast cancer. How often you get tested and the kind of tests you get will depend on your age and your risk.   The guidelines that follow are for women who have an average risk for breast cancer. If you have a higher risk for breast cancer, such as having a family history of breast cancer in multiple relatives or at a young age, your doctor may recommend different screening for you. · Ages 21 to 44: Some experts recommend that women have a clinical breast exam every 3 years, starting at age 21. Ask your doctor how often you should have this test. If you have a high risk for breast cancer, talk with your doctor about when to start yearly mammograms and other screening tests. · Ages 36 and older: Talk with your doctor about how often you should have mammograms and clinical breast exams. What is your risk for breast cancer? If you don't already know your risk of breast cancer, you can ask your doctor about it. You can also look it up at www.cancer.gov/bcrisktool/. If your doctor says that you have a high or very high risk, ask about ways to reduce your risk. These could include getting extra screening, taking medicine, or having surgery. If you have a strong family history of breast cancer, ask your doctor about genetic testing. What steps can you take to stay healthy? Some things that increase your risk of breast cancer, such as your age and being female, cannot be controlled. But you can do some things to stay as healthy as you can. · Learn what your breasts normally look and feel like. If you notice any changes, tell your doctor. · Drink alcohol wisely. Your risk goes up the more you drink. For the best health, women should have no more than 1 drink a day or 7 drinks a week. · If you smoke, quit. When you quit smoking, you lower your chances of getting many types of cancer. You can also do your best to eat well, be active, and stay at a healthy weight. Eating healthy foods and being active every day, as well as staying at a healthy weight, may help prevent cancer. Where can you learn more?   Go to

## 2018-12-14 NOTE — PROGRESS NOTES
ear normal.   Left Ear: External ear normal.   Nose: Nose normal.   Mouth/Throat: Oropharynx is clear and moist.   Eyes: Pupils are equal, round, and reactive to light. Conjunctivae and EOM are normal. No scleral icterus. Neck: Normal range of motion. Neck supple. No JVD present. No thyromegaly present. Cardiovascular: Normal rate, regular rhythm, normal heart sounds and intact distal pulses. Exam reveals no gallop and no friction rub. No murmur heard. Pulmonary/Chest: Effort normal and breath sounds normal. Right breast exhibits no inverted nipple, no mass, no nipple discharge, no skin change and no tenderness. Left breast exhibits no inverted nipple, no mass, no nipple discharge, no skin change and no tenderness. Abdominal: Soft. Bowel sounds are normal. She exhibits no distension and no mass. There is no tenderness. Musculoskeletal: Normal range of motion. She exhibits no edema. Lymphadenopathy:     She has no cervical adenopathy. Neurological: She is alert and oriented to person, place, and time. She has normal reflexes. No cranial nerve deficit. Coordination normal.   Skin: Skin is warm and dry. No rash noted. Psychiatric: She has a normal mood and affect. Her behavior is normal. Judgment and thought content normal.   Nursing note and vitals reviewed.       Lab Results   Component Value Date    WBC 10.4 11/27/2017    HGB 12.9 11/27/2017    HCT 41.1 11/27/2017    MCV 85.3 11/27/2017     11/27/2017    LABLYMP 3.35 02/22/2014    LYMPHOPCT 25.2 11/27/2017    RBC 4.82 11/27/2017    MCH 26.8 (L) 11/27/2017    MCHC 31.4 (L) 11/27/2017    RDW 13.6 11/27/2017     Lab Results   Component Value Date     12/05/2018    K 3.8 12/05/2018     12/05/2018    CO2 30 12/05/2018    BUN 27 12/05/2018    CREATININE 1.1 12/05/2018    GLUCOSE 115 12/05/2018    CALCIUM 9.2 12/05/2018     Lab Results   Component Value Date    CHOL 150 12/05/2018    TRIG 117 12/05/2018    HDL 51 12/05/2018    LDLCALC Future    Irritable bowel syndrome with diarrhea  -     chlordiazePOXIDE-clidinium (LIBRAX) 5-2.5 MG per capsule; TAKE ONE CAPSULE EVERY 6 HOURS. Hyperglycemia  -     Hemoglobin A1C; Future    Menopause    Class 1 obesity due to excess calories with serious comorbidity and body mass index (BMI) of 32.0 to 32.9 in adult    Chronic midline low back pain with sciatica, sciatica laterality unspecified    Encounter for medication refill    Screen for colon cancer    Breast cancer screening  -     SERAFIN DIGITAL SCREEN W CAD BILATERAL; Future    Problem Based Plannin. Labs reviewed with patient  2. Refills provided  3. HTN and mixed hyperlipidemia well controlled currently. No medication changes today. 4. Refill on librax for IBS-D. The current medical regimen is effective. Continue present plan and medications. UDS (screen negative but will need to be and controlled substance contract updated today. No unusual filling on current [facility] report. Tx continues to be medically necessary. 5. Fasting blood glucose elevated in pre-diabetes range but HbA1C is still normal. Counseled on need for low CHO diet and starting exercise routine or increasing steps/activity level to help with obesity and hyperglycemia. HM Based Plannin. Breast exam normal today. Order given to patient for screening mammogram and encouraged patient to go for test to help with Breast cancer screening. 7. Patient declines DEXA but in , she had moderate osteopenia of the lumbar spine and mild osteopenia of the femoral neck. Discussed increased risk of fractures and patient will consider testing later. Encouraged adequate calcium and vitamin D intake and resistance exercises to help with osteoporosis prevention. 8. Patient declines screening for colon cancer with colonoscopy but encouraged patient to at least complete FIT test/hemoccult cards which she will consider.   Discussed risks/benefits of cancer screening including risk of death tartrate (LOPRESSOR) 50 MG tablet REORDER    diltiazem (CARDIZEM CD) 240 MG extended release capsule REORDER    triamterene-hydrochlorothiazide (MAXZIDE-25) 37.5-25 MG per tablet REORDER    simvastatin (ZOCOR) 40 MG tablet REORDER    chlordiazePOXIDE-clidinium (LIBRAX) 5-2.5 MG per capsule REORDER     Patient Instructions       Patient Education        Well Visit, Women 50 to 72: Care Instructions  Your Care Instructions    Physical exams can help you stay healthy. Your doctor has checked your overall health and may have suggested ways to take good care of yourself. He or she also may have recommended tests. At home, you can help prevent illness with healthy eating, regular exercise, and other steps. Follow-up care is a key part of your treatment and safety. Be sure to make and go to all appointments, and call your doctor if you are having problems. It's also a good idea to know your test results and keep a list of the medicines you take. How can you care for yourself at home? · Reach and stay at a healthy weight. This will lower your risk for many problems, such as obesity, diabetes, heart disease, and high blood pressure. · Get at least 30 minutes of exercise on most days of the week. Walking is a good choice. You also may want to do other activities, such as running, swimming, cycling, or playing tennis or team sports. · Do not smoke. Smoking can make health problems worse. If you need help quitting, talk to your doctor about stop-smoking programs and medicines. These can increase your chances of quitting for good. · Protect your skin from too much sun. When you're outdoors from 10 a.m. to 4 p.m., stay in the shade or cover up with clothing and a hat with a wide brim. Wear sunglasses that block UV rays. Even when it's cloudy, put broad-spectrum sunscreen (SPF 30 or higher) on any exposed skin. · See a dentist one or two times a year for checkups and to have your teeth cleaned.   · Wear a seat belt in whether you have factors that may increase your risk for this disease. You may want to have this test before age 72. · Heart attack and stroke risk. At least every 4 to 6 years, you should have your risk for heart attack and stroke assessed. Your doctor uses factors such as your age, blood pressure, cholesterol, and whether you smoke or have diabetes to show what your risk for a heart attack or stroke is over the next 10 years. When should you call for help? Watch closely for changes in your health, and be sure to contact your doctor if you have any problems or symptoms that concern you. Where can you learn more? Go to https://[employer] technologypepiceweb.[employer]. org and sign in to your [employer]Central account. Enter I900 in the [employer] box to learn more about \"Well Visit, Women 50 to 72: Care Instructions. \"     If you do not have an account, please click on the \"Sign Up Now\" link. Current as of: March 29, 2018  Content Version: 11.8  © 5690-1678 [employer]. Care instructions adapted under license by [facility] ([facility]). If you have questions about a medical condition or this instruction, always ask your healthcare professional. [place] [address] any warranty or liability for your use of this information. Patient Education        Starting a Weight Loss Plan: Care Instructions  Your Care Instructions    If you are thinking about losing weight, it can be hard to know where to start. Your doctor can help you set up a weight loss plan that best meets your needs. You may want to take a class on nutrition or exercise, or join a weight loss support group. If you have questions about how to make changes to your eating or exercise habits, ask your doctor about seeing a registered dietitian or an exercise specialist.  It can be a big challenge to lose weight. But you do not have to make huge changes at once. Make small changes, and stick with them.  When those changes become habit, add a few more changes. If you do not think you are ready to make changes right now, try to pick a date in the future. Make an appointment to see your doctor to discuss whether the time is right for you to start a plan. Follow-up care is a key part of your treatment and safety. Be sure to make and go to all appointments, and call your doctor if you are having problems. It's also a good idea to know your test results and keep a list of the medicines you take. How can you care for yourself at home? · Set realistic goals. Many people expect to lose much more weight than is likely. A weight loss of 5% to 10% of your body weight may be enough to improve your health. · Get family and friends involved to provide support. Talk to them about why you are trying to lose weight, and ask them to help. They can help by participating in exercise and having meals with you, even if they may be eating something different. · Find what works best for you. If you do not have time or do not like to cook, a program that offers meal replacement bars or shakes may be better for you. Or if you like to prepare meals, finding a plan that includes daily menus and recipes may be best.  · Ask your doctor about other health professionals who can help you achieve your weight loss goals. ? A dietitian can help you make healthy changes in your diet. ? An exercise specialist or  can help you develop a safe and effective exercise program.  ? A counselor or psychiatrist can help you cope with issues such as depression, anxiety, or family problems that can make it hard to focus on weight loss. · Consider joining a support group for people who are trying to lose weight. Your doctor can suggest groups in your area. Where can you learn more? Go to https://keren.[employer]. org and sign in to your [employer] account.  Enter N480 in the PopUp box to learn more about \"Starting a Weight Loss Plan: Care week.  · If you smoke, quit. When you quit smoking, you lower your chances of getting many types of cancer. You can also do your best to eat well, be active, and stay at a healthy weight. Eating healthy foods and being active every day, as well as staying at a healthy weight, may help prevent cancer. Where can you learn more? Go to https://[employer] (Astilbe)pepicewResponse Genetics Inc..Flextrip. org and sign in to your [employer] account. Enter B123 in the [employer] box to learn more about \"Learning About Breast Cancer Screening. \"     If you do not have an account, please click on the \"Sign Up Now\" link. Current as of: March 28, 2018  Content Version: 11.8  © 3684-1514 Healthwise, ROSTR. Care instructions adapted under license by [facility] ([facility]). If you have questions about a medical condition or this instruction, always ask your healthcare professional. [place] [address] any warranty or liability for your use of this information. Patient voices understanding and agrees to plans along with risks and benefits of plan. Counseling:  Leidy YBARRA'P case, medications and options werediscussed in detail. Patient was instructed to call the office if she   questions regarding her treatment. Should her conditions worsen, she should return to office to be reassessed byDr. Christopher Menard. she  Should to go the closest Emergency Department for any emergency. They verbalized understanding the above instructions. Return in about 6 months (around 6/14/2019) for HTN, Controlled med refill, high cholesterol.

## 2019-01-13 PROBLEM — Z00.00 ANNUAL PHYSICAL EXAM: Status: RESOLVED | Noted: 2018-12-14 | Resolved: 2019-01-13

## 2019-01-29 ENCOUNTER — HOSPITAL ENCOUNTER (OUTPATIENT)
Dept: GENERAL RADIOLOGY | Age: 59
Discharge: HOME | End: 2019-01-29
Payer: COMMERCIAL

## 2019-01-29 ENCOUNTER — OFFICE VISIT (OUTPATIENT)
Dept: URGENT CARE | Age: 59
End: 2019-01-29
Payer: COMMERCIAL

## 2019-01-29 VITALS
SYSTOLIC BLOOD PRESSURE: 108 MMHG | RESPIRATION RATE: 18 BRPM | TEMPERATURE: 97.8 F | BODY MASS INDEX: 33.15 KG/M2 | HEART RATE: 73 BPM | WEIGHT: 218 LBS | DIASTOLIC BLOOD PRESSURE: 72 MMHG | OXYGEN SATURATION: 96 %

## 2019-01-29 DIAGNOSIS — M25.512 ACUTE PAIN OF LEFT SHOULDER: ICD-10-CM

## 2019-01-29 DIAGNOSIS — S86.911A KNEE STRAIN, RIGHT, INITIAL ENCOUNTER: ICD-10-CM

## 2019-01-29 DIAGNOSIS — M25.561 ACUTE PAIN OF RIGHT KNEE: ICD-10-CM

## 2019-01-29 DIAGNOSIS — S46.012A STRAIN OF MUSCLE(S) AND TENDON(S) OF THE ROTATOR CUFF OF LEFT SHOULDER, INITIAL ENCOUNTER: Primary | ICD-10-CM

## 2019-01-29 PROCEDURE — 99214 OFFICE O/P EST MOD 30 MIN: CPT | Performed by: SPECIALIST

## 2019-01-29 PROCEDURE — 73562 X-RAY EXAM OF KNEE 3: CPT

## 2019-01-29 PROCEDURE — 73030 X-RAY EXAM OF SHOULDER: CPT

## 2019-01-29 RX ORDER — METAXALONE 800 MG/1
800 TABLET ORAL 2 TIMES DAILY PRN
Qty: 20 TABLET | Refills: 0 | Status: SHIPPED | OUTPATIENT
Start: 2019-01-29 | End: 2019-02-08

## 2019-02-14 ENCOUNTER — TELEPHONE (OUTPATIENT)
Dept: FAMILY MEDICINE CLINIC | Age: 59
End: 2019-02-14

## 2019-02-18 ENCOUNTER — OFFICE VISIT (OUTPATIENT)
Dept: URGENT CARE | Age: 59
End: 2019-02-18
Payer: COMMERCIAL

## 2019-02-18 VITALS
WEIGHT: 216 LBS | SYSTOLIC BLOOD PRESSURE: 122 MMHG | OXYGEN SATURATION: 93 % | RESPIRATION RATE: 20 BRPM | BODY MASS INDEX: 32.84 KG/M2 | DIASTOLIC BLOOD PRESSURE: 79 MMHG | TEMPERATURE: 98.6 F | HEART RATE: 77 BPM

## 2019-02-18 DIAGNOSIS — R52 BODY ACHES: ICD-10-CM

## 2019-02-18 DIAGNOSIS — J02.9 SORE THROAT: ICD-10-CM

## 2019-02-18 DIAGNOSIS — J40 BRONCHITIS: Primary | ICD-10-CM

## 2019-02-18 DIAGNOSIS — R05.9 COUGH: ICD-10-CM

## 2019-02-18 LAB
INFLUENZA A ANTIBODY: NEGATIVE
INFLUENZA B ANTIBODY: NEGATIVE
S PYO AG THROAT QL: NORMAL

## 2019-02-18 PROCEDURE — 94640 AIRWAY INHALATION TREATMENT: CPT | Performed by: NURSE PRACTITIONER

## 2019-02-18 PROCEDURE — 96372 THER/PROPH/DIAG INJ SC/IM: CPT | Performed by: NURSE PRACTITIONER

## 2019-02-18 PROCEDURE — 87804 INFLUENZA ASSAY W/OPTIC: CPT | Performed by: NURSE PRACTITIONER

## 2019-02-18 PROCEDURE — 99213 OFFICE O/P EST LOW 20 MIN: CPT | Performed by: NURSE PRACTITIONER

## 2019-02-18 PROCEDURE — 87880 STREP A ASSAY W/OPTIC: CPT | Performed by: NURSE PRACTITIONER

## 2019-02-18 RX ORDER — AZITHROMYCIN 250 MG/1
TABLET, FILM COATED ORAL
Qty: 1 PACKET | Refills: 0 | Status: SHIPPED | OUTPATIENT
Start: 2019-02-18 | End: 2019-02-28

## 2019-02-18 RX ORDER — BENZONATATE 100 MG/1
100 CAPSULE ORAL 3 TIMES DAILY PRN
Qty: 30 CAPSULE | Refills: 0 | Status: SHIPPED | OUTPATIENT
Start: 2019-02-18 | End: 2019-02-25

## 2019-02-18 RX ORDER — DEXAMETHASONE SODIUM PHOSPHATE 10 MG/ML
5 INJECTION INTRAMUSCULAR; INTRAVENOUS ONCE
Status: COMPLETED | OUTPATIENT
Start: 2019-02-18 | End: 2019-02-18

## 2019-02-18 RX ORDER — ALBUTEROL SULFATE 2.5 MG/3ML
2.5 SOLUTION RESPIRATORY (INHALATION) ONCE
Status: COMPLETED | OUTPATIENT
Start: 2019-02-18 | End: 2019-02-18

## 2019-02-18 RX ADMIN — DEXAMETHASONE SODIUM PHOSPHATE 5 MG: 10 INJECTION INTRAMUSCULAR; INTRAVENOUS at 11:51

## 2019-02-18 RX ADMIN — ALBUTEROL SULFATE 2.5 MG: 2.5 SOLUTION RESPIRATORY (INHALATION) at 11:31

## 2019-02-18 ASSESSMENT — ENCOUNTER SYMPTOMS
SORE THROAT: 1
RHINORRHEA: 1
COUGH: 1

## 2019-03-04 DIAGNOSIS — K58.0 IRRITABLE BOWEL SYNDROME WITH DIARRHEA: ICD-10-CM

## 2019-03-04 RX ORDER — CHLORDIAZEPOXIDE HYDROCHLORIDE AND CLIDINIUM BROMIDE 5; 2.5 MG/1; MG/1
CAPSULE ORAL
Qty: 360 CAPSULE | Refills: 0 | Status: SHIPPED | OUTPATIENT
Start: 2019-03-04 | End: 2019-05-20 | Stop reason: SDUPTHER

## 2019-05-20 DIAGNOSIS — K58.0 IRRITABLE BOWEL SYNDROME WITH DIARRHEA: ICD-10-CM

## 2019-05-20 RX ORDER — CHLORDIAZEPOXIDE HYDROCHLORIDE AND CLIDINIUM BROMIDE 5; 2.5 MG/1; MG/1
CAPSULE ORAL
Qty: 360 CAPSULE | Refills: 0 | Status: SHIPPED | OUTPATIENT
Start: 2019-05-20 | End: 2019-08-20 | Stop reason: SDUPTHER

## 2019-08-05 DIAGNOSIS — I10 ESSENTIAL HYPERTENSION, BENIGN: ICD-10-CM

## 2019-08-05 RX ORDER — METOPROLOL TARTRATE 50 MG/1
TABLET, FILM COATED ORAL
Qty: 180 TABLET | Refills: 1 | Status: SHIPPED | OUTPATIENT
Start: 2019-08-05 | End: 2019-09-12 | Stop reason: SDUPTHER

## 2019-09-12 ENCOUNTER — OFFICE VISIT (OUTPATIENT)
Dept: FAMILY MEDICINE CLINIC | Age: 59
End: 2019-09-12
Payer: COMMERCIAL

## 2019-09-12 VITALS
OXYGEN SATURATION: 98 % | WEIGHT: 224 LBS | HEIGHT: 69 IN | SYSTOLIC BLOOD PRESSURE: 120 MMHG | DIASTOLIC BLOOD PRESSURE: 74 MMHG | TEMPERATURE: 97.8 F | BODY MASS INDEX: 33.18 KG/M2 | HEART RATE: 66 BPM

## 2019-09-12 DIAGNOSIS — J40 SINOBRONCHITIS: ICD-10-CM

## 2019-09-12 DIAGNOSIS — Z76.0 ENCOUNTER FOR MEDICATION REFILL: ICD-10-CM

## 2019-09-12 DIAGNOSIS — I10 ESSENTIAL HYPERTENSION, BENIGN: ICD-10-CM

## 2019-09-12 DIAGNOSIS — E78.2 MIXED HYPERLIPIDEMIA: ICD-10-CM

## 2019-09-12 DIAGNOSIS — K58.0 IRRITABLE BOWEL SYNDROME WITH DIARRHEA: ICD-10-CM

## 2019-09-12 DIAGNOSIS — K58.2 IRRITABLE BOWEL SYNDROME WITH BOTH CONSTIPATION AND DIARRHEA: Primary | ICD-10-CM

## 2019-09-12 DIAGNOSIS — J32.9 SINOBRONCHITIS: ICD-10-CM

## 2019-09-12 LAB
AMPHETAMINE SCREEN, URINE: NORMAL
BARBITURATE SCREEN, URINE: NORMAL
BENZODIAZEPINE SCREEN, URINE: NORMAL
BUPRENORPHINE URINE: NORMAL
COCAINE METABOLITE SCREEN URINE: NORMAL
GABAPENTIN SCREEN, URINE: NORMAL
MDMA URINE: NORMAL
METHADONE SCREEN, URINE: NORMAL
METHAMPHETAMINE, URINE: NORMAL
OPIATE SCREEN URINE: NORMAL
OXYCODONE SCREEN URINE: NORMAL
PHENCYCLIDINE SCREEN URINE: NORMAL
PROPOXYPHENE SCREEN, URINE: NORMAL
THC SCREEN, URINE: NORMAL
TRICYCLIC ANTIDEPRESSANTS, UR: NORMAL

## 2019-09-12 PROCEDURE — 99214 OFFICE O/P EST MOD 30 MIN: CPT | Performed by: INTERNAL MEDICINE

## 2019-09-12 PROCEDURE — 96372 THER/PROPH/DIAG INJ SC/IM: CPT | Performed by: INTERNAL MEDICINE

## 2019-09-12 PROCEDURE — 80305 DRUG TEST PRSMV DIR OPT OBS: CPT | Performed by: INTERNAL MEDICINE

## 2019-09-12 RX ORDER — SIMVASTATIN 40 MG
40 TABLET ORAL NIGHTLY
Qty: 90 TABLET | Refills: 3 | Status: SHIPPED | OUTPATIENT
Start: 2019-09-12 | End: 2020-12-18 | Stop reason: SDUPTHER

## 2019-09-12 RX ORDER — METOPROLOL TARTRATE 50 MG/1
TABLET, FILM COATED ORAL
Qty: 180 TABLET | Refills: 3 | Status: SHIPPED | OUTPATIENT
Start: 2019-09-12 | End: 2020-10-29

## 2019-09-12 RX ORDER — TRIAMTERENE AND HYDROCHLOROTHIAZIDE 37.5; 25 MG/1; MG/1
TABLET ORAL
Qty: 180 TABLET | Refills: 3 | Status: ON HOLD | OUTPATIENT
Start: 2019-09-12 | End: 2020-02-23 | Stop reason: SDUPTHER

## 2019-09-12 RX ORDER — CHLORDIAZEPOXIDE HYDROCHLORIDE AND CLIDINIUM BROMIDE 5; 2.5 MG/1; MG/1
CAPSULE ORAL
Qty: 360 CAPSULE | Refills: 1 | Status: SHIPPED | OUTPATIENT
Start: 2019-09-12 | End: 2020-03-04 | Stop reason: SDUPTHER

## 2019-09-12 RX ORDER — DILTIAZEM HYDROCHLORIDE 240 MG/1
CAPSULE, COATED, EXTENDED RELEASE ORAL
Qty: 90 CAPSULE | Refills: 3 | Status: SHIPPED | OUTPATIENT
Start: 2019-09-12 | End: 2020-03-04 | Stop reason: SDUPTHER

## 2019-09-12 RX ORDER — AZITHROMYCIN 250 MG/1
TABLET, FILM COATED ORAL
Qty: 6 TABLET | Refills: 0 | Status: SHIPPED | OUTPATIENT
Start: 2019-09-12 | End: 2019-12-16 | Stop reason: ALTCHOICE

## 2019-09-12 RX ORDER — METHYLPREDNISOLONE ACETATE 80 MG/ML
80 INJECTION, SUSPENSION INTRA-ARTICULAR; INTRALESIONAL; INTRAMUSCULAR; SOFT TISSUE ONCE
Status: COMPLETED | OUTPATIENT
Start: 2019-09-12 | End: 2019-09-12

## 2019-09-12 RX ADMIN — METHYLPREDNISOLONE ACETATE 80 MG: 80 INJECTION, SUSPENSION INTRA-ARTICULAR; INTRALESIONAL; INTRAMUSCULAR; SOFT TISSUE at 16:53

## 2019-09-12 ASSESSMENT — ENCOUNTER SYMPTOMS
SINUS PAIN: 1
SHORTNESS OF BREATH: 0
BLOOD IN STOOL: 0
VOICE CHANGE: 0
RHINORRHEA: 1
SINUS PRESSURE: 1
WHEEZING: 0
SORE THROAT: 1
BACK PAIN: 1
COLOR CHANGE: 0
ABDOMINAL PAIN: 0
EYE REDNESS: 0
CHEST TIGHTNESS: 0
EYE PAIN: 0
DIARRHEA: 1
EYE DISCHARGE: 0
COUGH: 1
VOMITING: 0

## 2019-09-12 NOTE — PROGRESS NOTES
with both constipation and diarrhea K58.2 chlordiazePOXIDE-clidinium (LIBRAX) 5-2.5 MG per capsule   2. Irritable bowel syndrome with diarrhea K58.0    3. Essential hypertension, benign I10 diltiazem (CARDIZEM CD) 240 MG extended release capsule     metoprolol tartrate (LOPRESSOR) 50 MG tablet     triamterene-hydrochlorothiazide (MAXZIDE-25) 37.5-25 MG per tablet   4. Mixed hyperlipidemia E78.2 simvastatin (ZOCOR) 40 MG tablet   5. Sinobronchitis J32.9 methylPREDNISolone acetate (DEPO-MEDROL) injection 80 mg    J40 azithromycin (ZITHROMAX) 250 MG tablet       Plan:  Lily Oates was seen today for follow-up, hypertension and hyperlipidemia. Diagnoses and all orders for this visit:    Irritable bowel syndrome with both constipation and diarrhea  -     chlordiazePOXIDE-clidinium (LIBRAX) 5-2.5 MG per capsule; Take 2 capsules in am and 1-2 capsule at night before bed    Irritable bowel syndrome with diarrhea    Essential hypertension, benign  -     diltiazem (CARDIZEM CD) 240 MG extended release capsule; TAKE 1 CAPSULE BY MOUTH ONE TIME DAILY  -     metoprolol tartrate (LOPRESSOR) 50 MG tablet; TAKE 1 TABLET BY MOUTH TWO TIMES A DAY  -     triamterene-hydrochlorothiazide (MAXZIDE-25) 37.5-25 MG per tablet; TAKE 2 TABLETS BY MOUTH ONE TIME DAILY    Mixed hyperlipidemia  -     simvastatin (ZOCOR) 40 MG tablet; Take 1 tablet by mouth nightly    Sinobronchitis  -     methylPREDNISolone acetate (DEPO-MEDROL) injection 80 mg  -     azithromycin (ZITHROMAX) 250 MG tablet; Take 2 tabs on day 1 and then 1 tab daily on days 2-5.      1. No new labs since last appmt 12/2018  2. Refills provided  3. HTN and mixed hyperlipidemia well controlled on previous lab work. No medication changes today. 4. Refill on librax for IBS-D. The current medical regimen is effective. Continue present plan and medications. UDS and controlled substance contract updated today. No unusual filling on current [facility] report.  Tx continues to be medically 240 MG extended release capsule REORDER    metoprolol tartrate (LOPRESSOR) 50 MG tablet REORDER    simvastatin (ZOCOR) 40 MG tablet REORDER    triamterene-hydrochlorothiazide (MAXZIDE-25) 37.5-25 MG per tablet REORDER     There are no Patient Instructions on file for this visit. Patient voices understanding and agrees to plans along with risks and benefits of plan. Counseling:  Lev NINO'Q case, medications and options werediscussed in detail. Patient was instructed to call the office if she   questions regarding her treatment. Should her conditions worsen, she should return to office to be reassessed byDr. Alina Lemons. she  Should to go the closest Emergency Department for any emergency. They verbalized understanding the above instructions. Return if symptoms worsen or fail to improve.

## 2019-12-12 DIAGNOSIS — R73.9 HYPERGLYCEMIA: ICD-10-CM

## 2019-12-12 DIAGNOSIS — I10 ESSENTIAL HYPERTENSION, BENIGN: ICD-10-CM

## 2019-12-12 DIAGNOSIS — E78.2 MIXED HYPERLIPIDEMIA: ICD-10-CM

## 2019-12-12 DIAGNOSIS — Z00.00 ANNUAL PHYSICAL EXAM: ICD-10-CM

## 2019-12-12 LAB
ALBUMIN SERPL-MCNC: 4.1 G/DL (ref 3.5–5.2)
ALP BLD-CCNC: 62 U/L (ref 35–104)
ALT SERPL-CCNC: 20 U/L (ref 5–33)
ANION GAP SERPL CALCULATED.3IONS-SCNC: 9 MMOL/L (ref 7–19)
AST SERPL-CCNC: 17 U/L (ref 5–32)
BASOPHILS ABSOLUTE: 0.1 K/UL (ref 0–0.2)
BASOPHILS RELATIVE PERCENT: 0.5 % (ref 0–1)
BILIRUB SERPL-MCNC: 0.3 MG/DL (ref 0.2–1.2)
BILIRUBIN URINE: NEGATIVE
BLOOD, URINE: NEGATIVE
BUN BLDV-MCNC: 18 MG/DL (ref 6–20)
CALCIUM SERPL-MCNC: 9.5 MG/DL (ref 8.6–10)
CHLORIDE BLD-SCNC: 99 MMOL/L (ref 98–111)
CHOLESTEROL, TOTAL: 158 MG/DL (ref 160–199)
CLARITY: ABNORMAL
CO2: 30 MMOL/L (ref 22–29)
COLOR: YELLOW
CREAT SERPL-MCNC: 1 MG/DL (ref 0.5–0.9)
EOSINOPHILS ABSOLUTE: 0.2 K/UL (ref 0–0.6)
EOSINOPHILS RELATIVE PERCENT: 2.1 % (ref 0–5)
GFR NON-AFRICAN AMERICAN: 57
GLUCOSE BLD-MCNC: 110 MG/DL (ref 74–109)
GLUCOSE URINE: NEGATIVE MG/DL
HBA1C MFR BLD: 6 % (ref 4–6)
HCT VFR BLD CALC: 42.5 % (ref 37–47)
HDLC SERPL-MCNC: 57 MG/DL (ref 65–121)
HEMOGLOBIN: 12.9 G/DL (ref 12–16)
IMMATURE GRANULOCYTES #: 0 K/UL
KETONES, URINE: NEGATIVE MG/DL
LDL CHOLESTEROL CALCULATED: 66 MG/DL
LEUKOCYTE ESTERASE, URINE: NEGATIVE
LYMPHOCYTES ABSOLUTE: 2.3 K/UL (ref 1.1–4.5)
LYMPHOCYTES RELATIVE PERCENT: 22.6 % (ref 20–40)
MCH RBC QN AUTO: 26.1 PG (ref 27–31)
MCHC RBC AUTO-ENTMCNC: 30.4 G/DL (ref 33–37)
MCV RBC AUTO: 86 FL (ref 81–99)
MONOCYTES ABSOLUTE: 0.8 K/UL (ref 0–0.9)
MONOCYTES RELATIVE PERCENT: 8 % (ref 0–10)
NEUTROPHILS ABSOLUTE: 6.8 K/UL (ref 1.5–7.5)
NEUTROPHILS RELATIVE PERCENT: 66.4 % (ref 50–65)
NITRITE, URINE: NEGATIVE
PDW BLD-RTO: 13.4 % (ref 11.5–14.5)
PH UA: 7.5 (ref 5–8)
PLATELET # BLD: 246 K/UL (ref 130–400)
PMV BLD AUTO: 10.8 FL (ref 9.4–12.3)
POTASSIUM SERPL-SCNC: 4.4 MMOL/L (ref 3.5–5)
PROTEIN UA: NEGATIVE MG/DL
RBC # BLD: 4.94 M/UL (ref 4.2–5.4)
SODIUM BLD-SCNC: 138 MMOL/L (ref 136–145)
SPECIFIC GRAVITY UA: 1.02 (ref 1–1.03)
TOTAL PROTEIN: 6.7 G/DL (ref 6.6–8.7)
TRIGL SERPL-MCNC: 174 MG/DL (ref 0–149)
URINE REFLEX TO CULTURE: ABNORMAL
UROBILINOGEN, URINE: 0.2 E.U./DL
WBC # BLD: 10.2 K/UL (ref 4.8–10.8)

## 2019-12-16 ENCOUNTER — OFFICE VISIT (OUTPATIENT)
Dept: FAMILY MEDICINE CLINIC | Age: 59
End: 2019-12-16
Payer: COMMERCIAL

## 2019-12-16 VITALS
HEIGHT: 69 IN | WEIGHT: 224.6 LBS | DIASTOLIC BLOOD PRESSURE: 82 MMHG | OXYGEN SATURATION: 97 % | BODY MASS INDEX: 33.27 KG/M2 | TEMPERATURE: 98.5 F | HEART RATE: 86 BPM | SYSTOLIC BLOOD PRESSURE: 122 MMHG

## 2019-12-16 DIAGNOSIS — K58.2 IRRITABLE BOWEL SYNDROME WITH BOTH CONSTIPATION AND DIARRHEA: ICD-10-CM

## 2019-12-16 DIAGNOSIS — B37.0 ORAL THRUSH: ICD-10-CM

## 2019-12-16 DIAGNOSIS — M54.40 CHRONIC MIDLINE LOW BACK PAIN WITH SCIATICA, SCIATICA LATERALITY UNSPECIFIED: ICD-10-CM

## 2019-12-16 DIAGNOSIS — I10 ESSENTIAL HYPERTENSION, BENIGN: ICD-10-CM

## 2019-12-16 DIAGNOSIS — Z12.39 BREAST SCREENING: ICD-10-CM

## 2019-12-16 DIAGNOSIS — G89.29 CHRONIC MIDLINE LOW BACK PAIN WITH SCIATICA, SCIATICA LATERALITY UNSPECIFIED: ICD-10-CM

## 2019-12-16 DIAGNOSIS — Z00.00 ANNUAL PHYSICAL EXAM: Primary | ICD-10-CM

## 2019-12-16 DIAGNOSIS — E66.09 CLASS 1 OBESITY DUE TO EXCESS CALORIES WITH SERIOUS COMORBIDITY AND BODY MASS INDEX (BMI) OF 33.0 TO 33.9 IN ADULT: ICD-10-CM

## 2019-12-16 DIAGNOSIS — Z78.0 MENOPAUSE: ICD-10-CM

## 2019-12-16 DIAGNOSIS — M85.88 OSTEOPENIA OF LUMBAR SPINE: ICD-10-CM

## 2019-12-16 DIAGNOSIS — E78.2 MIXED HYPERLIPIDEMIA: ICD-10-CM

## 2019-12-16 PROBLEM — E66.811 CLASS 1 OBESITY DUE TO EXCESS CALORIES WITH SERIOUS COMORBIDITY AND BODY MASS INDEX (BMI) OF 32.0 TO 32.9 IN ADULT: Status: RESOLVED | Noted: 2017-09-15 | Resolved: 2019-12-16

## 2019-12-16 PROBLEM — E66.811 CLASS 1 OBESITY DUE TO EXCESS CALORIES WITH SERIOUS COMORBIDITY AND BODY MASS INDEX (BMI) OF 33.0 TO 33.9 IN ADULT: Status: ACTIVE | Noted: 2019-12-16

## 2019-12-16 PROCEDURE — 99396 PREV VISIT EST AGE 40-64: CPT | Performed by: INTERNAL MEDICINE

## 2019-12-16 RX ORDER — FLUCONAZOLE 100 MG/1
100 TABLET ORAL DAILY
Qty: 14 TABLET | Refills: 0 | Status: SHIPPED | OUTPATIENT
Start: 2019-12-16 | End: 2019-12-30

## 2019-12-16 ASSESSMENT — ENCOUNTER SYMPTOMS
COLOR CHANGE: 0
SINUS PAIN: 0
BACK PAIN: 1
EYE DISCHARGE: 0
SINUS PRESSURE: 0
ABDOMINAL PAIN: 0
VOMITING: 0
WHEEZING: 0
VOICE CHANGE: 0
CHEST TIGHTNESS: 0
EYE PAIN: 0
SHORTNESS OF BREATH: 0
BLOOD IN STOOL: 0
COUGH: 0
EYE REDNESS: 0

## 2019-12-30 ASSESSMENT — ENCOUNTER SYMPTOMS
DIARRHEA: 1
CONSTIPATION: 1

## 2020-02-10 ENCOUNTER — OFFICE VISIT (OUTPATIENT)
Dept: URGENT CARE | Age: 60
End: 2020-02-10
Payer: COMMERCIAL

## 2020-02-10 ENCOUNTER — HOSPITAL ENCOUNTER (EMERGENCY)
Age: 60
Discharge: HOME | End: 2020-02-10
Attending: EMERGENCY MEDICINE
Payer: COMMERCIAL

## 2020-02-10 ENCOUNTER — APPOINTMENT (OUTPATIENT)
Dept: CT IMAGING | Age: 60
End: 2020-02-10
Payer: COMMERCIAL

## 2020-02-10 VITALS
BODY MASS INDEX: 29.86 KG/M2 | HEIGHT: 68 IN | RESPIRATION RATE: 18 BRPM | HEART RATE: 78 BPM | WEIGHT: 197 LBS | OXYGEN SATURATION: 91 % | TEMPERATURE: 98.4 F

## 2020-02-10 VITALS
DIASTOLIC BLOOD PRESSURE: 64 MMHG | RESPIRATION RATE: 18 BRPM | TEMPERATURE: 98.2 F | BODY MASS INDEX: 30.31 KG/M2 | HEART RATE: 72 BPM | HEIGHT: 68 IN | SYSTOLIC BLOOD PRESSURE: 116 MMHG | OXYGEN SATURATION: 96 % | WEIGHT: 200 LBS

## 2020-02-10 LAB
ALBUMIN SERPL-MCNC: 4.2 G/DL (ref 3.5–5.2)
ALP BLD-CCNC: 65 U/L (ref 35–104)
ALT SERPL-CCNC: 19 U/L (ref 5–33)
ANION GAP SERPL CALCULATED.3IONS-SCNC: 16 MMOL/L (ref 7–19)
APPEARANCE FLUID: NORMAL
AST SERPL-CCNC: 19 U/L (ref 5–32)
BASOPHILS ABSOLUTE: 0.1 K/UL (ref 0–0.2)
BASOPHILS RELATIVE PERCENT: 0.4 % (ref 0–1)
BILIRUB SERPL-MCNC: 0.4 MG/DL (ref 0.2–1.2)
BILIRUBIN, POC: NEGATIVE
BLOOD URINE, POC: NEGATIVE
BUN BLDV-MCNC: 13 MG/DL (ref 6–20)
C DIFF TOXIN/ANTIGEN: ABNORMAL
CALCIUM SERPL-MCNC: 9.2 MG/DL (ref 8.6–10)
CHLORIDE BLD-SCNC: 97 MMOL/L (ref 98–111)
CLARITY, POC: CLEAR
CO2: 27 MMOL/L (ref 22–29)
COLOR, POC: YELLOW
CREAT SERPL-MCNC: 1.5 MG/DL (ref 0.5–0.9)
EOSINOPHILS ABSOLUTE: 0.2 K/UL (ref 0–0.6)
EOSINOPHILS RELATIVE PERCENT: 1 % (ref 0–5)
GFR NON-AFRICAN AMERICAN: 36
GLUCOSE BLD-MCNC: 121 MG/DL (ref 74–109)
GLUCOSE URINE, POC: NEGATIVE
HCT VFR BLD CALC: 42.6 % (ref 37–47)
HEMOGLOBIN: 13.4 G/DL (ref 12–16)
IMMATURE GRANULOCYTES #: 0.1 K/UL
INFLUENZA A ANTIBODY: NEGATIVE
INFLUENZA B ANTIBODY: NEGATIVE
INR BLD: 1.08 (ref 0.88–1.18)
KETONES, POC: NEGATIVE
LEUKOCYTE EST, POC: NEGATIVE
LIPASE: 33 U/L (ref 13–60)
LYMPHOCYTES ABSOLUTE: 2.1 K/UL (ref 1.1–4.5)
LYMPHOCYTES RELATIVE PERCENT: 12.4 % (ref 20–40)
MCH RBC QN AUTO: 26.1 PG (ref 27–31)
MCHC RBC AUTO-ENTMCNC: 31.5 G/DL (ref 33–37)
MCV RBC AUTO: 83 FL (ref 81–99)
MONOCYTES ABSOLUTE: 1.2 K/UL (ref 0–0.9)
MONOCYTES RELATIVE PERCENT: 7.1 % (ref 0–10)
NEUTROPHILS ABSOLUTE: 13.4 K/UL (ref 1.5–7.5)
NEUTROPHILS RELATIVE PERCENT: 78.6 % (ref 50–65)
NITRITE, POC: NEGATIVE
PDW BLD-RTO: 13.2 % (ref 11.5–14.5)
PH, POC: 5.5
PLATELET # BLD: 272 K/UL (ref 130–400)
PMV BLD AUTO: 11.2 FL (ref 9.4–12.3)
POTASSIUM SERPL-SCNC: 3.8 MMOL/L (ref 3.5–5)
PROTEIN, POC: NEGATIVE
PROTHROMBIN TIME: 13.4 SEC (ref 12–14.6)
RBC # BLD: 5.13 M/UL (ref 4.2–5.4)
SODIUM BLD-SCNC: 140 MMOL/L (ref 136–145)
SPECIFIC GRAVITY, POC: 1.02
TOTAL PROTEIN: 6.7 G/DL (ref 6.6–8.7)
UROBILINOGEN, POC: 0.2
WBC # BLD: 17.1 K/UL (ref 4.8–10.8)

## 2020-02-10 PROCEDURE — 87324 CLOSTRIDIUM AG IA: CPT

## 2020-02-10 PROCEDURE — 6360000004 HC RX CONTRAST MEDICATION: Performed by: EMERGENCY MEDICINE

## 2020-02-10 PROCEDURE — 87015 SPECIMEN INFECT AGNT CONCNTJ: CPT

## 2020-02-10 PROCEDURE — 96366 THER/PROPH/DIAG IV INF ADDON: CPT

## 2020-02-10 PROCEDURE — 96375 TX/PRO/DX INJ NEW DRUG ADDON: CPT

## 2020-02-10 PROCEDURE — 87045 FECES CULTURE AEROBIC BACT: CPT

## 2020-02-10 PROCEDURE — 81002 URINALYSIS NONAUTO W/O SCOPE: CPT | Performed by: NURSE PRACTITIONER

## 2020-02-10 PROCEDURE — 87449 NOS EACH ORGANISM AG IA: CPT

## 2020-02-10 PROCEDURE — 36415 COLL VENOUS BLD VENIPUNCTURE: CPT

## 2020-02-10 PROCEDURE — 2580000003 HC RX 258: Performed by: EMERGENCY MEDICINE

## 2020-02-10 PROCEDURE — 87899 AGENT NOS ASSAY W/OPTIC: CPT

## 2020-02-10 PROCEDURE — 6360000002 HC RX W HCPCS: Performed by: EMERGENCY MEDICINE

## 2020-02-10 PROCEDURE — 87427 SHIGA-LIKE TOXIN AG IA: CPT

## 2020-02-10 PROCEDURE — 74177 CT ABD & PELVIS W/CONTRAST: CPT

## 2020-02-10 PROCEDURE — 96365 THER/PROPH/DIAG IV INF INIT: CPT

## 2020-02-10 PROCEDURE — 87804 INFLUENZA ASSAY W/OPTIC: CPT | Performed by: NURSE PRACTITIONER

## 2020-02-10 PROCEDURE — 99284 EMERGENCY DEPT VISIT MOD MDM: CPT

## 2020-02-10 PROCEDURE — 83690 ASSAY OF LIPASE: CPT

## 2020-02-10 PROCEDURE — 36415 COLL VENOUS BLD VENIPUNCTURE: CPT | Performed by: NURSE PRACTITIONER

## 2020-02-10 PROCEDURE — 99213 OFFICE O/P EST LOW 20 MIN: CPT | Performed by: NURSE PRACTITIONER

## 2020-02-10 PROCEDURE — 85610 PROTHROMBIN TIME: CPT

## 2020-02-10 RX ORDER — ONDANSETRON 2 MG/ML
4 INJECTION INTRAMUSCULAR; INTRAVENOUS ONCE
Status: COMPLETED | OUTPATIENT
Start: 2020-02-10 | End: 2020-02-10

## 2020-02-10 RX ORDER — SODIUM CHLORIDE, SODIUM LACTATE, POTASSIUM CHLORIDE, CALCIUM CHLORIDE 600; 310; 30; 20 MG/100ML; MG/100ML; MG/100ML; MG/100ML
1000 INJECTION, SOLUTION INTRAVENOUS ONCE
Status: COMPLETED | OUTPATIENT
Start: 2020-02-10 | End: 2020-02-10

## 2020-02-10 RX ORDER — ONDANSETRON 4 MG/1
4 TABLET, ORALLY DISINTEGRATING ORAL EVERY 8 HOURS PRN
Qty: 30 TABLET | Refills: 0 | Status: SHIPPED | OUTPATIENT
Start: 2020-02-10 | End: 2020-12-02

## 2020-02-10 RX ORDER — CIPROFLOXACIN 500 MG/1
500 TABLET, FILM COATED ORAL ONCE
Status: DISCONTINUED | OUTPATIENT
Start: 2020-02-10 | End: 2020-02-10

## 2020-02-10 RX ORDER — METRONIDAZOLE 500 MG/1
500 TABLET ORAL ONCE
Status: DISCONTINUED | OUTPATIENT
Start: 2020-02-10 | End: 2020-02-10

## 2020-02-10 RX ADMIN — SODIUM CHLORIDE, POTASSIUM CHLORIDE, SODIUM LACTATE AND CALCIUM CHLORIDE 1000 ML: 600; 310; 30; 20 INJECTION, SOLUTION INTRAVENOUS at 14:02

## 2020-02-10 RX ADMIN — VANCOMYCIN HYDROCHLORIDE 125 MG: 1 INJECTION, POWDER, LYOPHILIZED, FOR SOLUTION INTRAVENOUS at 15:45

## 2020-02-10 RX ADMIN — ONDANSETRON 4 MG: 2 INJECTION INTRAMUSCULAR; INTRAVENOUS at 10:15

## 2020-02-10 RX ADMIN — SODIUM CHLORIDE, POTASSIUM CHLORIDE, SODIUM LACTATE AND CALCIUM CHLORIDE 1000 ML: 600; 310; 30; 20 INJECTION, SOLUTION INTRAVENOUS at 10:04

## 2020-02-10 RX ADMIN — IOPAMIDOL 90 ML: 755 INJECTION, SOLUTION INTRAVENOUS at 13:04

## 2020-02-10 ASSESSMENT — ENCOUNTER SYMPTOMS
CHEST TIGHTNESS: 0
NAUSEA: 1
SORE THROAT: 0
SHORTNESS OF BREATH: 0
STRIDOR: 0
VOICE CHANGE: 0
EYES NEGATIVE: 1
TROUBLE SWALLOWING: 0
SINUS PRESSURE: 0
RHINORRHEA: 0
WHEEZING: 0
COLOR CHANGE: 0
CHOKING: 0
COUGH: 1

## 2020-02-10 ASSESSMENT — PAIN SCALES - GENERAL: PAINLEVEL_OUTOF10: 3

## 2020-02-10 NOTE — PATIENT INSTRUCTIONS
Advised ER follow up with CBC results of WBC count 17. Pt agreed to plan. Left in stable condition.  Refused ambulance

## 2020-02-10 NOTE — PROGRESS NOTES
Maternal Grandfather     Cancer Paternal Grandmother         colon cancer    Alcohol Abuse Sister     Cirrhosis Sister     Stroke Paternal Aunt     Colon Cancer Maternal Cousin        Social History     Tobacco Use    Smoking status: Never Smoker    Smokeless tobacco: Never Used   Substance Use Topics    Alcohol use: No      Current Outpatient Medications   Medication Sig Dispense Refill    Estriol POWD TAKE 1 CAPSULE BY MOUTH DAILY AT BEDTIME. 30 g 0    diltiazem (CARDIZEM CD) 240 MG extended release capsule TAKE 1 CAPSULE BY MOUTH ONE TIME DAILY 90 capsule 3    metoprolol tartrate (LOPRESSOR) 50 MG tablet TAKE 1 TABLET BY MOUTH TWO TIMES A  tablet 3    simvastatin (ZOCOR) 40 MG tablet Take 1 tablet by mouth nightly 90 tablet 3    triamterene-hydrochlorothiazide (MAXZIDE-25) 37.5-25 MG per tablet TAKE 2 TABLETS BY MOUTH ONE TIME DAILY 180 tablet 3    ibuprofen (ADVIL;MOTRIN) 400 MG tablet Take 400 mg by mouth 2 times daily      Estradiol-Estriol-Progesterone (BIEST/PROGESTERONE TD) Take 1 capsule by mouth nightly      celecoxib (CELEBREX) 200 MG capsule Take 200 mg by mouth daily      Multiple Vitamins-Minerals (WOMENS MULTIVITAMIN PO) Take 1 tablet by mouth daily      calcium-vitamin D (OSCAL-500) 500-200 MG-UNIT per tablet Take 1 tablet by mouth daily      chlordiazePOXIDE-clidinium (LIBRAX) 5-2.5 MG per capsule Take 2 capsules in am and 1-2 capsule at night before bed 360 capsule 1     No current facility-administered medications for this visit.       Allergies   Allergen Reactions    Latex      Other reaction(s): Unknown    Amoxicillin Shortness Of Breath and Nausea And Vomiting    Anaprox [Naproxen Sodium] Shortness Of Breath and Nausea And Vomiting    Aloe Vera      Other reaction(s): Unknown    Augmentin [Amoxicillin-Pot Clavulanate]            Buspar [Buspirone]     Nortriptyline Hcl     Vibramycin [Doxycycline Calcium]        Health Maintenance   Topic Date Due    Procedures    CBC Auto Differential    Comprehensive Metabolic Panel    POCT Influenza A/B    POCT Urinalysis no Micro     No orders of the defined types were placed in this encounter. Patient given educationalmaterials - see patient instructions. Discussed use, benefit, and side effectsof prescribed medications. All patient questions answered. Pt voiced understanding. Reviewed health maintenance. Instructed to continue current medications, diet andexercise. Patient agreed with treatment plan. Follow up as directed. Patient Instructions   Advised ER follow up with CBC results of WBC count 17. Pt agreed to plan. Left in stable condition.  Refused ambulance        Electronically signed by SORIN Roldan CNP on 2/10/2020 at 9:32 AM

## 2020-02-11 ASSESSMENT — ENCOUNTER SYMPTOMS
DIARRHEA: 1
BACK PAIN: 0
SHORTNESS OF BREATH: 0
VOMITING: 0
BLOOD IN STOOL: 0
COUGH: 0
NAUSEA: 1
ABDOMINAL PAIN: 1

## 2020-02-11 NOTE — ED PROVIDER NOTES
[facility]  eMERGENCY dEPARTMENT eNCOUnter      Pt Name: Manuel Diaz  MRN: [id number]  [place] 1960  Date of evaluation: 2/10/2020  Provider: Sukhdeep Allen MD    [address] [place] [address]       Chief Complaint   Patient presents with    Abdominal Pain    Diarrhea         HISTORY OF PRESENT ILLNESS   (Location/Symptom, Timing/Onset,Context/Setting, Quality, Duration, Modifying Factors, Severity)  Note limiting factors. Manuel Diaz is a 61 y.o. female who presents to the emergency department with 1 week of abd pain and diarrhea, watery. Pt works security at [employer]. No fevers, mild nausea, no vomtiing, able to eat, pain is more L sided, had labs done and sent to er, wbc is 17 creat 1.5. Pt is otherwise rather healthy. crampy pain low down in pelvis when has BM as well. No blood in stool. No recent abx use. The history is provided by the patient. NursingNotes were reviewed. REVIEW OF SYSTEMS    (2-9 systems for level 4, 10 or more for level 5)     Review of Systems   Constitutional: Negative for fever. Respiratory: Negative for cough and shortness of breath. Cardiovascular: Negative for chest pain. Gastrointestinal: Positive for abdominal pain, diarrhea and nausea. Negative for blood in stool and vomiting. Genitourinary: Negative for dysuria. Musculoskeletal: Negative for back pain. Neurological: Negative for syncope. Psychiatric/Behavioral: Negative for confusion. A complete review of systems was performed and is negative except as noted above in the HPI.        PAST MEDICAL HISTORY     Past Medical History:   Diagnosis Date    Anal fissure     Anxiety 9/15/2017    Cervical radiculopathy 9/15/2017    Chronic back pain     Chronic kidney disease     Class 1 obesity due to excess calories with serious comorbidity and body mass index (BMI) of 32.0 to 32.9 in adult 9/15/2017    Cyst of kidney, acquired 9/15/2017    Depression 9/15/2017    History of oral surgery     up-to-date on   colonoscopy. 2. No evidence of perforation or abscess formation. 3. Hepatic steatosis. 4. Atherosclerotic disease. Signed by Dr Carolyn Barnhart on 2/10/2020 1:31 PM            ED BEDSIDE ULTRASOUND:   Performed by ED Physician - none    LABS:  Labs Reviewed   C DIFF TOXIN/ANTIGEN - Abnormal; Notable for the following components:       Result Value    C.diff Toxin/Antigen   (*)     Value: Result: POSITIVE for Toxigenic C. difficile by EIA  CONTACT PRECAUTIONS INDICATED  Normal Range: Negative      All other components within normal limits    Narrative:     ORDER#: [id number]                          ORDERED BY: SHEILA VELIZ  SOURCE: Stool Stool                        COLLECTED:  02/10/20 11:13  ANTIBIOTICS AT [facility].:                      RECEIVED :  02/10/20 11:20  Collect in clean container   CULTURE STOOL   LIPASE   PROTIME-INR       All other labs were within normal range or not returned as of this dictation. EMERGENCY DEPARTMENT COURSE and DIFFERENTIALDIAGNOSIS/MDM:   Vitals:    Vitals:    02/10/20 1100 02/10/20 1200 02/10/20 1300 02/10/20 1400   BP: 118/62 114/60 118/62 116/64   Pulse: 72 76 74 72   Resp: 18 17 18 18   Temp:       SpO2: 95% 94% 95% 96%   Weight:       Height:           MDM  Number of Diagnoses or Management Options  C. difficile colitis:   Diagnosis management comments: Very pleasant  at hospital. No abx use recently. Pt stool sent C diff came back + along with colitis on ct scan    c diff colitis    Wbc is 17 abd exam rather benign. 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

## 2020-02-12 LAB
CAMPYLOBACTER ANTIGEN: NORMAL
CULTURE, STOOL: NORMAL
E COLI SHIGA TOXIN ASSAY: NORMAL

## 2020-02-16 ENCOUNTER — APPOINTMENT (OUTPATIENT)
Dept: GENERAL RADIOLOGY | Age: 60
DRG: 372 | End: 2020-02-16
Payer: COMMERCIAL

## 2020-02-16 ENCOUNTER — APPOINTMENT (OUTPATIENT)
Dept: CT IMAGING | Age: 60
DRG: 372 | End: 2020-02-16
Payer: COMMERCIAL

## 2020-02-16 ENCOUNTER — HOSPITAL ENCOUNTER (INPATIENT)
Age: 60
LOS: 7 days | Discharge: HOME | DRG: 372 | End: 2020-02-23
Attending: EMERGENCY MEDICINE | Admitting: HOSPITALIST
Payer: COMMERCIAL

## 2020-02-16 PROBLEM — Z78.9 FAILURE OF OUTPATIENT TREATMENT: Status: ACTIVE | Noted: 2020-02-16

## 2020-02-16 PROBLEM — R11.2 NAUSEA VOMITING AND DIARRHEA: Status: ACTIVE | Noted: 2020-02-16

## 2020-02-16 PROBLEM — E86.0 DEHYDRATION: Status: ACTIVE | Noted: 2020-02-16

## 2020-02-16 PROBLEM — A04.72 CLOSTRIDIUM DIFFICILE COLITIS: Status: ACTIVE | Noted: 2020-02-16

## 2020-02-16 PROBLEM — E87.6 HYPOKALEMIA: Status: ACTIVE | Noted: 2020-02-16

## 2020-02-16 PROBLEM — E87.1 HYPONATREMIA: Status: ACTIVE | Noted: 2020-02-16

## 2020-02-16 PROBLEM — R19.7 NAUSEA VOMITING AND DIARRHEA: Status: ACTIVE | Noted: 2020-02-16

## 2020-02-16 LAB
ALBUMIN SERPL-MCNC: 2.8 G/DL (ref 3.5–5.2)
ALP BLD-CCNC: 88 U/L (ref 35–104)
ALT SERPL-CCNC: 25 U/L (ref 5–33)
ANION GAP SERPL CALCULATED.3IONS-SCNC: 15 MMOL/L (ref 7–19)
AST SERPL-CCNC: 35 U/L (ref 5–32)
BACTERIA: NORMAL /HPF
BASE EXCESS VENOUS: 11 MMOL/L
BASOPHILS ABSOLUTE: 0.1 K/UL (ref 0–0.2)
BASOPHILS RELATIVE PERCENT: 0.6 % (ref 0–1)
BILIRUB SERPL-MCNC: 0.3 MG/DL (ref 0.2–1.2)
BILIRUBIN URINE: NEGATIVE
BLOOD, URINE: ABNORMAL
BUN BLDV-MCNC: 14 MG/DL (ref 6–20)
CALCIUM IONIZED: 0.94 MMOL/L (ref 1.12–1.32)
CALCIUM SERPL-MCNC: 7.7 MG/DL (ref 8.6–10)
CARBOXYHEMOGLOBIN: 2.3 %
CHLORIDE BLD-SCNC: 82 MMOL/L (ref 98–111)
CLARITY: CLEAR
CO2: 30 MMOL/L (ref 22–29)
COLOR: YELLOW
CREAT SERPL-MCNC: 1.1 MG/DL (ref 0.5–0.9)
EOSINOPHILS ABSOLUTE: 0.1 K/UL (ref 0–0.6)
EOSINOPHILS RELATIVE PERCENT: 0.4 % (ref 0–5)
EPITHELIAL CELLS, UA: NORMAL /HPF
GFR NON-AFRICAN AMERICAN: 51
GLUCOSE BLD-MCNC: 140 MG/DL (ref 74–109)
GLUCOSE URINE: NEGATIVE MG/DL
HCO3 VENOUS: 36 MMOL/L (ref 23–29)
HCT VFR BLD CALC: 40.3 % (ref 37–47)
HEMOGLOBIN: 13.8 G/DL (ref 12–16)
IMMATURE GRANULOCYTES #: 0.8 K/UL
INR BLD: 1.25 (ref 0.88–1.18)
KETONES, URINE: NEGATIVE MG/DL
LACTIC ACID: 1.2 MMOL/L (ref 0.5–1.9)
LEUKOCYTE ESTERASE, URINE: NEGATIVE
LIPASE: 40 U/L (ref 13–60)
LYMPHOCYTES ABSOLUTE: 1.9 K/UL (ref 1.1–4.5)
LYMPHOCYTES RELATIVE PERCENT: 8.4 % (ref 20–40)
MAGNESIUM: 1.9 MG/DL (ref 1.6–2.6)
MCH RBC QN AUTO: 26.4 PG (ref 27–31)
MCHC RBC AUTO-ENTMCNC: 34.2 G/DL (ref 33–37)
MCV RBC AUTO: 77.2 FL (ref 81–99)
METHEMOGLOBIN VENOUS: 1.1 %
MONOCYTES ABSOLUTE: 1.5 K/UL (ref 0–0.9)
MONOCYTES RELATIVE PERCENT: 6.7 % (ref 0–10)
NEUTROPHILS ABSOLUTE: 17.9 K/UL (ref 1.5–7.5)
NEUTROPHILS RELATIVE PERCENT: 80.3 % (ref 50–65)
NITRITE, URINE: NEGATIVE
O2 CONTENT, VEN: 10 ML/DL
O2 SAT, VEN: 52 %
PCO2, VEN: 47 MMHG (ref 40–50)
PDW BLD-RTO: 12.6 % (ref 11.5–14.5)
PH UA: 6.5 (ref 5–8)
PH VENOUS: 7.49 (ref 7.35–7.45)
PLATELET # BLD: 404 K/UL (ref 130–400)
PMV BLD AUTO: 9.9 FL (ref 9.4–12.3)
PO2, VEN: 25 MMHG
POTASSIUM SERPL-SCNC: 2.9 MMOL/L (ref 3.5–5)
PROTEIN UA: NEGATIVE MG/DL
PROTHROMBIN TIME: 15.1 SEC (ref 12–14.6)
RBC # BLD: 5.22 M/UL (ref 4.2–5.4)
RBC UA: NORMAL /HPF (ref 0–2)
SODIUM BLD-SCNC: 127 MMOL/L (ref 136–145)
SPECIFIC GRAVITY UA: 1.02 (ref 1–1.03)
TOTAL PROTEIN: 5.9 G/DL (ref 6.6–8.7)
TROPONIN: <0.01 NG/ML (ref 0–0.03)
URINE REFLEX TO CULTURE: ABNORMAL
UROBILINOGEN, URINE: 0.2 E.U./DL
WBC # BLD: 22.3 K/UL (ref 4.8–10.8)
WBC UA: NORMAL /HPF (ref 0–5)

## 2020-02-16 PROCEDURE — 2580000003 HC RX 258: Performed by: EMERGENCY MEDICINE

## 2020-02-16 PROCEDURE — 82330 ASSAY OF CALCIUM: CPT

## 2020-02-16 PROCEDURE — 83735 ASSAY OF MAGNESIUM: CPT

## 2020-02-16 PROCEDURE — 70450 CT HEAD/BRAIN W/O DYE: CPT

## 2020-02-16 PROCEDURE — 87040 BLOOD CULTURE FOR BACTERIA: CPT

## 2020-02-16 PROCEDURE — 96375 TX/PRO/DX INJ NEW DRUG ADDON: CPT

## 2020-02-16 PROCEDURE — 83605 ASSAY OF LACTIC ACID: CPT

## 2020-02-16 PROCEDURE — 74177 CT ABD & PELVIS W/CONTRAST: CPT

## 2020-02-16 PROCEDURE — 99285 EMERGENCY DEPT VISIT HI MDM: CPT

## 2020-02-16 PROCEDURE — 6360000002 HC RX W HCPCS: Performed by: HOSPITALIST

## 2020-02-16 PROCEDURE — 96365 THER/PROPH/DIAG IV INF INIT: CPT

## 2020-02-16 PROCEDURE — 6370000000 HC RX 637 (ALT 250 FOR IP): Performed by: EMERGENCY MEDICINE

## 2020-02-16 PROCEDURE — 6360000002 HC RX W HCPCS: Performed by: EMERGENCY MEDICINE

## 2020-02-16 PROCEDURE — 93005 ELECTROCARDIOGRAM TRACING: CPT | Performed by: EMERGENCY MEDICINE

## 2020-02-16 PROCEDURE — 84484 ASSAY OF TROPONIN QUANT: CPT

## 2020-02-16 PROCEDURE — 82803 BLOOD GASES ANY COMBINATION: CPT

## 2020-02-16 PROCEDURE — 36600 WITHDRAWAL OF ARTERIAL BLOOD: CPT

## 2020-02-16 PROCEDURE — 74022 RADEX COMPL AQT ABD SERIES: CPT

## 2020-02-16 PROCEDURE — 81001 URINALYSIS AUTO W/SCOPE: CPT

## 2020-02-16 PROCEDURE — 6370000000 HC RX 637 (ALT 250 FOR IP): Performed by: HOSPITALIST

## 2020-02-16 PROCEDURE — 85610 PROTHROMBIN TIME: CPT

## 2020-02-16 PROCEDURE — 85025 COMPLETE CBC W/AUTO DIFF WBC: CPT

## 2020-02-16 PROCEDURE — 2140000000 HC CCU INTERMEDIATE R&B

## 2020-02-16 PROCEDURE — 80053 COMPREHEN METABOLIC PANEL: CPT

## 2020-02-16 PROCEDURE — 6360000004 HC RX CONTRAST MEDICATION: Performed by: EMERGENCY MEDICINE

## 2020-02-16 PROCEDURE — 36415 COLL VENOUS BLD VENIPUNCTURE: CPT

## 2020-02-16 PROCEDURE — 2580000003 HC RX 258: Performed by: HOSPITALIST

## 2020-02-16 PROCEDURE — 2500000003 HC RX 250 WO HCPCS: Performed by: HOSPITALIST

## 2020-02-16 PROCEDURE — 83690 ASSAY OF LIPASE: CPT

## 2020-02-16 RX ORDER — PROCHLORPERAZINE EDISYLATE 5 MG/ML
10 INJECTION INTRAMUSCULAR; INTRAVENOUS EVERY 6 HOURS PRN
Status: DISCONTINUED | OUTPATIENT
Start: 2020-02-16 | End: 2020-02-23 | Stop reason: HOSPADM

## 2020-02-16 RX ORDER — MAGNESIUM SULFATE 1 G/100ML
1 INJECTION INTRAVENOUS PRN
Status: DISCONTINUED | OUTPATIENT
Start: 2020-02-16 | End: 2020-02-23 | Stop reason: HOSPADM

## 2020-02-16 RX ORDER — ACETAMINOPHEN 325 MG/1
650 TABLET ORAL EVERY 4 HOURS PRN
Status: DISCONTINUED | OUTPATIENT
Start: 2020-02-16 | End: 2020-02-23 | Stop reason: HOSPADM

## 2020-02-16 RX ORDER — ONDANSETRON 2 MG/ML
4 INJECTION INTRAMUSCULAR; INTRAVENOUS ONCE
Status: COMPLETED | OUTPATIENT
Start: 2020-02-16 | End: 2020-02-16

## 2020-02-16 RX ORDER — SODIUM CHLORIDE, SODIUM LACTATE, POTASSIUM CHLORIDE, CALCIUM CHLORIDE 600; 310; 30; 20 MG/100ML; MG/100ML; MG/100ML; MG/100ML
1000 INJECTION, SOLUTION INTRAVENOUS ONCE
Status: COMPLETED | OUTPATIENT
Start: 2020-02-16 | End: 2020-02-16

## 2020-02-16 RX ORDER — SODIUM CHLORIDE 0.9 % (FLUSH) 0.9 %
10 SYRINGE (ML) INJECTION PRN
Status: DISCONTINUED | OUTPATIENT
Start: 2020-02-16 | End: 2020-02-23 | Stop reason: HOSPADM

## 2020-02-16 RX ORDER — SODIUM CHLORIDE 0.9 % (FLUSH) 0.9 %
10 SYRINGE (ML) INJECTION EVERY 12 HOURS SCHEDULED
Status: DISCONTINUED | OUTPATIENT
Start: 2020-02-16 | End: 2020-02-23 | Stop reason: HOSPADM

## 2020-02-16 RX ORDER — POTASSIUM CHLORIDE 7.45 MG/ML
10 INJECTION INTRAVENOUS ONCE
Status: COMPLETED | OUTPATIENT
Start: 2020-02-16 | End: 2020-02-16

## 2020-02-16 RX ORDER — POTASSIUM CHLORIDE 7.45 MG/ML
10 INJECTION INTRAVENOUS PRN
Status: DISCONTINUED | OUTPATIENT
Start: 2020-02-16 | End: 2020-02-23 | Stop reason: HOSPADM

## 2020-02-16 RX ORDER — POTASSIUM CHLORIDE 20 MEQ/1
40 TABLET, EXTENDED RELEASE ORAL PRN
Status: DISCONTINUED | OUTPATIENT
Start: 2020-02-16 | End: 2020-02-23 | Stop reason: HOSPADM

## 2020-02-16 RX ORDER — SODIUM CHLORIDE AND POTASSIUM CHLORIDE .9; .15 G/100ML; G/100ML
SOLUTION INTRAVENOUS CONTINUOUS
Status: DISCONTINUED | OUTPATIENT
Start: 2020-02-16 | End: 2020-02-21

## 2020-02-16 RX ADMIN — PROCHLORPERAZINE EDISYLATE 10 MG: 5 INJECTION INTRAMUSCULAR; INTRAVENOUS at 19:00

## 2020-02-16 RX ADMIN — IOPAMIDOL 90 ML: 755 INJECTION, SOLUTION INTRAVENOUS at 12:14

## 2020-02-16 RX ADMIN — SODIUM CHLORIDE, PRESERVATIVE FREE 10 ML: 5 INJECTION INTRAVENOUS at 22:04

## 2020-02-16 RX ADMIN — ONDANSETRON 4 MG: 2 INJECTION INTRAMUSCULAR; INTRAVENOUS at 11:10

## 2020-02-16 RX ADMIN — SODIUM CHLORIDE, POTASSIUM CHLORIDE, SODIUM LACTATE AND CALCIUM CHLORIDE 1000 ML: 600; 310; 30; 20 INJECTION, SOLUTION INTRAVENOUS at 11:10

## 2020-02-16 RX ADMIN — FAMOTIDINE 20 MG: 10 INJECTION INTRAVENOUS at 22:04

## 2020-02-16 RX ADMIN — ACETAMINOPHEN 650 MG: 325 TABLET ORAL at 22:04

## 2020-02-16 RX ADMIN — FIDAXOMICIN 200 MG: 200 TABLET, FILM COATED ORAL at 22:57

## 2020-02-16 RX ADMIN — POTASSIUM CHLORIDE AND SODIUM CHLORIDE: 900; 150 INJECTION, SOLUTION INTRAVENOUS at 17:15

## 2020-02-16 RX ADMIN — FIDAXOMICIN 200 MG: 200 TABLET, FILM COATED ORAL at 14:03

## 2020-02-16 RX ADMIN — POTASSIUM CHLORIDE 10 MEQ: 7.46 INJECTION, SOLUTION INTRAVENOUS at 12:47

## 2020-02-16 ASSESSMENT — ENCOUNTER SYMPTOMS
NAUSEA: 1
DIARRHEA: 1
ABDOMINAL PAIN: 1
SHORTNESS OF BREATH: 1

## 2020-02-16 ASSESSMENT — PAIN SCALES - GENERAL
PAINLEVEL_OUTOF10: 0

## 2020-02-16 NOTE — ED PROVIDER NOTES
[facility] EMERGENCY DEPT  eMERGENCY dEPARTMENT eNCOUnter      Pt Name: Raffi Meyer  MRN: [id number]  [place] 1960  Date of evaluation: 2/16/2020  Provider: Jarod Castaneda MD    CHIEF COMPLAINT       Chief Complaint   Patient presents with    Shortness of Breath     presents with SOA and chest pain with N/V/D, recent tx for c diff         HISTORY OF PRESENT ILLNESS   (Location/Symptom, Timing/Onset,Context/Setting, Quality, Duration, Modifying Factors, Severity)  Note limiting factors. Raffi Meyer is a 61 y.o. female who presents to the emergency department with shortness of breath chest pain nausea vomiting diarrhea and known C. difficile infection. She has been on oral vancomycin 4 times a day since Monday. Patient has been drinking fluids but not eating well. She continues to have diarrhea. There is no blood in the stool. Her abdominal pain is not that severe. She seems somewhat confused today the family states that she is weak and worsening. I called the patient to check on her earlier this week she did not answer return my phone call. The patient works in security here at the hospital she had no recent antibiotic use prior to her C. difficile. The history is provided by the patient, medical records and a relative. NursingNotes were reviewed. REVIEW OF SYSTEMS    (2-9 systems for level 4, 10 or more for level 5)     Review of Systems   Constitutional: Positive for fatigue. Negative for fever. Respiratory: Positive for shortness of breath. Cardiovascular: Positive for chest pain. Gastrointestinal: Positive for abdominal pain, diarrhea and nausea. Neurological: Negative for seizures and syncope. Psychiatric/Behavioral: Positive for confusion. A complete review of systems was performed and is negative except as noted above in the HPI.        PAST MEDICAL HISTORY     Past Medical History:   Diagnosis Date    Anal fissure     Anxiety 9/15/2017    Cervical radiculopathy 9/15/2017    Chronic back pain     Chronic kidney disease     Class 1 obesity due to excess calories with serious comorbidity and body mass index (BMI) of 32.0 to 32.9 in adult 9/15/2017    Cyst of kidney, acquired 9/15/2017    Depression 9/15/2017    History of oral surgery     IBS (irritable bowel syndrome) 9/15/2017    Irregular heartbeat     Obesity 9/15/2017    Osteoarthritis     Retinal detachment          SURGICAL HISTORY       Past Surgical History:   Procedure Laterality Date    APPENDECTOMY       SECTION      EYE SURGERY      HYSTERECTOMY      HYSTERECTOMY, TOTAL ABDOMINAL      RETINAL DETACHMENT SURGERY           CURRENT MEDICATIONS       Previous Medications    CALCIUM-VITAMIN D (OSCAL-500) 500-200 MG-UNIT PER TABLET    Take 1 tablet by mouth daily    CELECOXIB (CELEBREX) 200 MG CAPSULE    Take 200 mg by mouth daily    CHLORDIAZEPOXIDE-CLIDINIUM (LIBRAX) 5-2.5 MG PER CAPSULE    Take 2 capsules in am and 1-2 capsule at night before bed    DILTIAZEM (CARDIZEM CD) 240 MG EXTENDED RELEASE CAPSULE    TAKE 1 CAPSULE BY MOUTH ONE TIME DAILY    ESTRADIOL-ESTRIOL-PROGESTERONE (BIEST/PROGESTERONE TD)    Take 1 capsule by mouth nightly    ESTRIOL POWD    TAKE 1 CAPSULE BY MOUTH DAILY AT BEDTIME. IBUPROFEN (ADVIL;MOTRIN) 400 MG TABLET    Take 400 mg by mouth 2 times daily    METOPROLOL TARTRATE (LOPRESSOR) 50 MG TABLET    TAKE 1 TABLET BY MOUTH TWO TIMES A DAY    MULTIPLE VITAMINS-MINERALS (WOMENS MULTIVITAMIN PO)    Take 1 tablet by mouth daily    ONDANSETRON (ZOFRAN ODT) 4 MG DISINTEGRATING TABLET    Take 1 tablet by mouth every 8 hours as needed for Nausea or Vomiting    SIMVASTATIN (ZOCOR) 40 MG TABLET    Take 1 tablet by mouth nightly    TRIAMTERENE-HYDROCHLOROTHIAZIDE (MAXZIDE-25) 37.5-25 MG PER TABLET    TAKE 2 TABLETS BY MOUTH ONE TIME DAILY       ALLERGIES     Latex; Amoxicillin; Anaprox [naproxen sodium]; Aloe vera; Augmentin [amoxicillin-pot clavulanate];  Buspar [buspirone]; Nortriptyline hcl; and Vibramycin [doxycycline calcium]    FAMILY HISTORY       Family History   Problem Relation Age of Onset    Diabetes Mother     High Blood Pressure Mother     Heart Attack Mother     Kidney Disease Father     Stroke Father     Other Father         renal failure    Diabetes Maternal Grandmother     Colon Cancer Maternal Grandmother     Heart Disease Maternal Grandfather     Cancer Paternal Grandmother         colon cancer    Alcohol Abuse Sister     Cirrhosis Sister     Stroke Paternal Aunt     Colon Cancer Maternal Cousin           SOCIAL HISTORY       Social History     Socioeconomic History    Marital status: Single     Spouse name: Not on file    Number of children: Not on file    Years of education: Not on file    Highest education level: Not on file   Occupational History    Not on file   Social Needs    Financial resource strain: Not on file    Food insecurity:     Worry: Not on file     Inability: Not on file    Transportation needs:     Medical: Not on file     Non-medical: Not on file   Tobacco Use    Smoking status: Never Smoker    Smokeless tobacco: Never Used   Substance and Sexual Activity    Alcohol use: No    Drug use: No    Sexual activity: Not on file   Lifestyle    Physical activity:     Days per week: Not on file     Minutes per session: Not on file    Stress: Not on file   Relationships    Social connections:     Talks on phone: Not on file     Gets together: Not on file     Attends Quaker service: Not on file     Active member of club or organization: Not on file     Attends meetings of clubs or organizations: Not on file     Relationship status: Not on file    Intimate partner violence:     Fear of current or ex partner: Not on file     Emotionally abused: Not on file     Physically abused: Not on file     Forced sexual activity: Not on file   Other Topics Concern    Not on file   Social History Narrative    Not on file       SCREENINGS PHYSICAL EXAM    (up to 7 for level 4, 8 or more for level 5)     ED Triage Vitals   BP Temp Temp Source Pulse Resp SpO2 Height Weight   02/16/20 1003 02/16/20 1003 02/16/20 1219 02/16/20 1003 02/16/20 1003 02/16/20 1003 -- --   129/79 98.3 °F (36.8 °C) Oral 102 13 93 %         Physical Exam  Vitals signs and nursing note reviewed. Constitutional:       Appearance: She is ill-appearing. She is not diaphoretic. Comments: Fatigued and weak   HENT:      Head: Normocephalic and atraumatic. Nose: Nose normal.      Mouth/Throat:      Mouth: Mucous membranes are moist.   Eyes:      Extraocular Movements: Extraocular movements intact. Pupils: Pupils are equal, round, and reactive to light. Neck:      Musculoskeletal: Normal range of motion and neck supple. Cardiovascular:      Rate and Rhythm: Regular rhythm. Tachycardia present. Pulses: Normal pulses. Pulmonary:      Effort: Pulmonary effort is normal. No respiratory distress. Comments: Decreased at the right base  Abdominal:      General: Abdomen is flat. Tenderness: Tenderness: mild. There is no guarding or rebound. Musculoskeletal: Normal range of motion. General: No swelling or tenderness. Skin:     General: Skin is warm and dry. Neurological:      Mental Status: She is alert. Comments: Patient is answering all my questions.   She seems slow to respond but she has a GCS of 15   Psychiatric:         Mood and Affect: Mood normal.         Behavior: Behavior normal.         DIAGNOSTIC RESULTS     EKG: All EKG's are interpreted by the Emergency Department Physician who either signs or Co-signs this chart in the absence of a cardiologist.    EKG heart rate 101 sinus tachycardia no acute ischemia     RADIOLOGY:   Non-plain film images such as CT, Ultrasound and MRI are read by the radiologist. Plainradiographic images are visualized and preliminarily interpreted by the emergency physician with the below findings:        Interpretation per the Radiologist below, if available at the time of this note:    CT ABDOMEN PELVIS W IV CONTRAST Additional Contrast? None   Final Result   Impression:   1. Pancolitis compatible with history of C. difficile infection. Reactive mesenteric inflammation and free fluid. This has   significantly progressed when compared to the previous exam. No   evidence of perforation or abscess formation. 2. New small right pleural effusion. 3. Atherosclerotic disease. Signed by Dr Maryanne Wright on 2/16/2020 1:14 PM      CT Head WO Contrast   Final Result   Impression:   No acute intracranial findings. Signed by Dr Maryanne Wright on 2/16/2020 12:42 PM      XR Acute Abd Series Chest 1 VW   Final Result   1. Small right pleural effusion. 2. No acute findings in the abdomen.    Signed by Dr Maryanne Wright on 2/16/2020 12:08 PM            ED BEDSIDE ULTRASOUND:   Performed by ED Physician - none    LABS:  Labs Reviewed   COMPREHENSIVE METABOLIC PANEL - Abnormal; Notable for the following components:       Result Value    Sodium 127 (*)     Potassium 2.9 (*)     Chloride 82 (*)     CO2 30 (*)     Glucose 140 (*)     CREATININE 1.1 (*)     GFR Non-African American 51 (*)     Calcium 7.7 (*)     Total Protein 5.9 (*)     Alb 2.8 (*)     AST 35 (*)     All other components within normal limits   CBC WITH AUTO DIFFERENTIAL - Abnormal; Notable for the following components:    WBC 22.3 (*)     MCV 77.2 (*)     MCH 26.4 (*)     Platelets 661 (*)     Neutrophils % 80.3 (*)     Lymphocytes % 8.4 (*)     Neutrophils Absolute 17.9 (*)     Monocytes Absolute 1.50 (*)     All other components within normal limits   PROTIME-INR - Abnormal; Notable for the following components:    Protime 15.1 (*)     INR 1.25 (*)     All other components within normal limits   URINE RT REFLEX TO CULTURE - Abnormal; Notable for the following components:    Blood, Urine TRACE-LYSED (*)     All other components within normal limits   VENOUS BLD GAS - Abnormal; Notable for the following components:    pH, Maxi 7.49 (*)     HCO3, Venous 36 (*)     All other components within normal limits   CALCIUM, IONIZED - Abnormal; Notable for the following components:    Calcium, Ion 0.94 (*)     All other components within normal limits   CULTURE BLOOD #1   CULTURE BLOOD #2   LIPASE   TROPONIN   LACTIC ACID, PLASMA   MAGNESIUM   MICROSCOPIC URINALYSIS   CALCIUM, IONIZED       All other labs were within normal range or not returned as of this dictation. EMERGENCY DEPARTMENT COURSE and DIFFERENTIALDIAGNOSIS/MDM:   Vitals:    Vitals:    02/16/20 1003 02/16/20 1219 02/16/20 1402 02/16/20 1541   BP: 129/79 125/76 127/78 132/82   Pulse: 102 105 79 78   Resp: 13 20 20 20   Temp: 98.3 °F (36.8 °C) 99.1 °F (37.3 °C) 98 °F (36.7 °C) 98.3 °F (36.8 °C)   TempSrc:  Oral Oral Oral   SpO2: 93% 94% 94%        MDM  Number of Diagnoses or Management Options  C. difficile colitis: new and requires workup  Confusion: new and requires workup  Failure of outpatient treatment:   Hypocalcemia: new and requires workup  Hypokalemia: new and requires workup  Hyponatremia: new and requires workup  Pleural effusion: new and requires workup  Diagnosis management comments: Patient presents with worsening after being treated with oral vancomycin this past week for C. difficile colitis. The patient seems somewhat confused. Head CT was negative. Her drop in sodium may be contributing to this. Is 127 at this time. It was 140. She has been taking and free water being well. The patient has been taking oral vancomycin. Her abdominal exam is really not that severe. However her white counts increased as well. The patient continues to have 3 bowel movements that are nonbloody a day. Patient renal function has improved actually. She does have hypokalemia hyponatremia hypocalcemia. Replete potassium at this time.   IV bolus of lactated Ringer's was given given that she is dehydrated clinically. The patient will be admitted to the medicine service a GI consult was performed. Dr. Lizett Gutiérrez and I discussed the pace. We started new antibiotics dificid. I have updated the family on the plan of care and the patient. Admit to the hospitalist service on the floor at this time. She does have a pleural effusion that is likely reactive as well. She will require supplemental oxygen. Peer somewhat improved after IV fluids and some oxygen. Amount and/or Complexity of Data Reviewed  Clinical lab tests: ordered and reviewed  Tests in the radiology section of CPT®: ordered and reviewed  Decide to obtain previous medical records or to obtain history from someone other than the patient: yes  Obtain history from someone other than the patient: yes  Discuss the patient with other providers: yes  Independent visualization of images, tracings, or specimens: yes    Patient Progress  Patient progress: stable        CONSULTS:  IP CONSULT TO GI    PROCEDURES:  Unless otherwise notedbelow, none     Procedures    FINAL IMPRESSION     1. C. difficile colitis    2. Hyponatremia    3. Hypokalemia    4. Confusion    5. Failure of outpatient treatment    6. Pleural effusion    7.  Hypocalcemia          DISPOSITION/PLAN   DISPOSITION  admit      PATIENT REFERRED TO:  Jerilyn Danielson MD  [address]  [phone]            DISCHARGE MEDICATIONS:  New Prescriptions    No medications on file          (Please note that portions of this note were completed with a voice recognition program.  Efforts were made to edit the dictations butoccasionally words are mis-transcribed.)    Ish Burgos MD (electronically signed)  AttendingEmergency Physician         Ish Burgos MD  02/16/20 [id number]

## 2020-02-16 NOTE — H&P
[address] [facility]  [place], [place], [place] [address]    DEPARTMENT OF HOSPITALIST MEDICINE        HISTORY & PHYSICAL:          REASON FOR ADMISSION:  Chief Complaint   Patient presents with    Shortness of Breath     presents with SOA and chest pain with N/V/D, recent tx for c diff        HISTORY OF PRESENT ILLNESS:  Chase Ma is an 61 y.o. female. 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  She is complaining of weakness tiredness fatigue with nausea and vomiting and diarrhea for the last week. She was seen in the ER 6 days ago, was diagnosed with a C. difficile colitis and was given an option to be admitted or discharged home on p.o. antibiotics. She was discharged home on p.o. vancomycin on GI recommendations. She is brought back to the ER for evaluation as she is getting progressively weak tired and confused as per the family and her weakness is worsening. Work-up was done in the ER which showed dehydration with multiple electrolyte imbalance as well as worsening C. difficile colitis as per the CT scan of the abdomen and pelvis. GI on-call was consulted who started the patient on p.o. Dificid as she has failed p.o. vancomycin and due to her worsening clinical condition. She was started on aggressive IV hydration and potassium management in the ER which we will continue on the floor. She will be admitted for medical management, PO Dificid, IV hydration and close medical management.     PAST MEDICAL HISTORY:  Past Medical History:   Diagnosis Date    Anal fissure     Anxiety 9/15/2017    Cervical radiculopathy 9/15/2017    Chronic back pain     Chronic kidney disease     Class 1 obesity due to excess calories with serious comorbidity and body mass index (BMI) of 32.0 to 32.9 in adult 9/15/2017    Cyst of kidney, acquired 9/15/2017    Depression 9/15/2017    History of oral surgery     IBS (irritable bowel syndrome) 9/15/2017    Irregular heartbeat  Obesity 9/15/2017    Osteoarthritis     Retinal detachment          PAST SURGICAL HISTORY:  Past Surgical History:   Procedure Laterality Date    APPENDECTOMY       SECTION      EYE SURGERY      HYSTERECTOMY      HYSTERECTOMY, TOTAL ABDOMINAL      RETINAL DETACHMENT SURGERY          SOCIAL HISTORY:  Social History     Socioeconomic History    Marital status: Single     Spouse name: Not on file    Number of children: Not on file    Years of education: Not on file    Highest education level: Not on file   Occupational History    Not on file   Social Needs    Financial resource strain: Not on file    Food insecurity:     Worry: Not on file     Inability: Not on file    Transportation needs:     Medical: Not on file     Non-medical: Not on file   Tobacco Use    Smoking status: Never Smoker    Smokeless tobacco: Never Used   Substance and Sexual Activity    Alcohol use: No    Drug use: No    Sexual activity: Not on file   Lifestyle    Physical activity:     Days per week: Not on file     Minutes per session: Not on file    Stress: Not on file   Relationships    Social connections:     Talks on phone: Not on file     Gets together: Not on file     Attends Mosque service: Not on file     Active member of club or organization: Not on file     Attends meetings of clubs or organizations: Not on file     Relationship status: Not on file    Intimate partner violence:     Fear of current or ex partner: Not on file     Emotionally abused: Not on file     Physically abused: Not on file     Forced sexual activity: Not on file   Other Topics Concern    Not on file   Social History Narrative    Not on file        FAMILY HISTORY:  Family History   Problem Relation Age of Onset    Diabetes Mother     High Blood Pressure Mother     Heart Attack Mother     Kidney Disease Father     Stroke Father     Other Father         renal failure    Diabetes Maternal Grandmother     Colon Cancer Ears:  No acute hearing loss, earaches   Nose: No nasal discharge, epistaxis   Neck: No new hoarseness, voice change, or new masses   Lungs:   No hemoptysis, pleurisy   Heart:  No chest pressure with exertion, palpitations,    Abdomen:   No new masses, no bright red blood per rectum, + lower abdominal pain, + foul smelling diarrhea   Extremities: +difficulty while ambulating due to weakness and abdominal pain, no new lesions   Skin: + dry skin, no rashes or lesions   Neurologic: No new motor or sensory changes       PHYSICAL EXAM:  /86   Pulse 86   Temp 97.2 °F (36.2 °C) (Temporal)   Resp 16   Ht 5' 8\" (1.727 m)   Wt 218 lb 9.6 oz (99.2 kg)   SpO2 94%   BMI 33.24 kg/m²   I/O this shift:  In: 100 [IV Piggyback:100]  Out: 100 [Urine:100]      PHYSICAL EXAMINATION:    Vital Signs: Please see the chart   KRYSTLE:  Awake, alert, oriented x 3, patient appears very tired and fatigued   Head/Eyes:  Normocephalic, atraumatic, EOMI and PERRLA bilaterally   ENT: +DRY  mucous membranes, nasal passages clear   Neck: Supple, full range of motion, no carotid bruit, trachea midline   Respiratory:   Bilateral fair air entry in both lung fields, mild B/L crackles, symmetric expansion of chest   Cardiovascular:  Regular rate and rhythm, S1+S2+0, no murmurs/rubs   Urology: No bilateral CVA tenderness, no suprapubic tenderness   Abdomen:   Soft, + abdominal tenderness on palpation, bowel sounds +ve, no organomegaly   Muscle/Joints: Moves all,decreased range of motion, no muscle spasms   Extremities: No clubbing, no cyanosis, no calf tenderness, no edema   Pulses: 2+ bilaterally, symmetrical   Skin: Warm, ++dry skin, no pallor/cyanosis/jaundice, no rashes/lesions   Neurologic: Awake, alert, oriented x 3, cranial nerves II-XII intact, no focal neurological deficits, sensory system intact   Psychiatric: Normal mood, non-suicidal         LABORATORY DATA:        CBC:  Recent Labs     02/16/20  1015   WBC 22.3*   HGB 13.8   HCT was created with electronic voice recognition which does have occasional errors. If you have any questions regarding the content within the note please do not hesitate to contact me. .. Thanks.

## 2020-02-17 LAB
ANION GAP SERPL CALCULATED.3IONS-SCNC: 13 MMOL/L (ref 7–19)
BASOPHILS ABSOLUTE: 0.1 K/UL (ref 0–0.2)
BASOPHILS RELATIVE PERCENT: 0.8 % (ref 0–1)
BUN BLDV-MCNC: 11 MG/DL (ref 6–20)
CALCIUM IONIZED: 1.02 MMOL/L (ref 1.12–1.32)
CALCIUM SERPL-MCNC: 7.8 MG/DL (ref 8.6–10)
CHLORIDE BLD-SCNC: 87 MMOL/L (ref 98–111)
CO2: 30 MMOL/L (ref 22–29)
CREAT SERPL-MCNC: 0.9 MG/DL (ref 0.5–0.9)
EOSINOPHILS ABSOLUTE: 0.1 K/UL (ref 0–0.6)
EOSINOPHILS RELATIVE PERCENT: 0.7 % (ref 0–5)
GFR NON-AFRICAN AMERICAN: >60
GLUCOSE BLD-MCNC: 105 MG/DL (ref 74–109)
HCT VFR BLD CALC: 37.3 % (ref 37–47)
HEMOGLOBIN: 12.1 G/DL (ref 12–16)
IMMATURE GRANULOCYTES #: 1 K/UL
LYMPHOCYTES ABSOLUTE: 1.4 K/UL (ref 1.1–4.5)
LYMPHOCYTES RELATIVE PERCENT: 7.8 % (ref 20–40)
MAGNESIUM: 2 MG/DL (ref 1.6–2.6)
MCH RBC QN AUTO: 25.6 PG (ref 27–31)
MCHC RBC AUTO-ENTMCNC: 32.4 G/DL (ref 33–37)
MCV RBC AUTO: 79 FL (ref 81–99)
MONOCYTES ABSOLUTE: 1.6 K/UL (ref 0–0.9)
MONOCYTES RELATIVE PERCENT: 8.7 % (ref 0–10)
NEUTROPHILS ABSOLUTE: 14 K/UL (ref 1.5–7.5)
NEUTROPHILS RELATIVE PERCENT: 76.5 % (ref 50–65)
PDW BLD-RTO: 12.9 % (ref 11.5–14.5)
PHOSPHORUS: 2 MG/DL (ref 2.5–4.5)
PLATELET # BLD: 370 K/UL (ref 130–400)
PMV BLD AUTO: 9.9 FL (ref 9.4–12.3)
POTASSIUM SERPL-SCNC: 2.9 MMOL/L (ref 3.5–5)
RBC # BLD: 4.72 M/UL (ref 4.2–5.4)
SODIUM BLD-SCNC: 130 MMOL/L (ref 136–145)
WBC # BLD: 18.2 K/UL (ref 4.8–10.8)

## 2020-02-17 PROCEDURE — 36415 COLL VENOUS BLD VENIPUNCTURE: CPT

## 2020-02-17 PROCEDURE — 6360000002 HC RX W HCPCS: Performed by: HOSPITALIST

## 2020-02-17 PROCEDURE — 82330 ASSAY OF CALCIUM: CPT

## 2020-02-17 PROCEDURE — 6370000000 HC RX 637 (ALT 250 FOR IP): Performed by: HOSPITALIST

## 2020-02-17 PROCEDURE — 80048 BASIC METABOLIC PNL TOTAL CA: CPT

## 2020-02-17 PROCEDURE — 87449 NOS EACH ORGANISM AG IA: CPT

## 2020-02-17 PROCEDURE — 99253 IP/OBS CNSLTJ NEW/EST LOW 45: CPT | Performed by: INTERNAL MEDICINE

## 2020-02-17 PROCEDURE — 2140000000 HC CCU INTERMEDIATE R&B

## 2020-02-17 PROCEDURE — 83735 ASSAY OF MAGNESIUM: CPT

## 2020-02-17 PROCEDURE — 87324 CLOSTRIDIUM AG IA: CPT

## 2020-02-17 PROCEDURE — 2500000003 HC RX 250 WO HCPCS: Performed by: HOSPITALIST

## 2020-02-17 PROCEDURE — 85025 COMPLETE CBC W/AUTO DIFF WBC: CPT

## 2020-02-17 PROCEDURE — 84100 ASSAY OF PHOSPHORUS: CPT

## 2020-02-17 PROCEDURE — 2580000003 HC RX 258: Performed by: HOSPITALIST

## 2020-02-17 RX ADMIN — SODIUM CHLORIDE, PRESERVATIVE FREE 10 ML: 5 INJECTION INTRAVENOUS at 20:23

## 2020-02-17 RX ADMIN — PROCHLORPERAZINE EDISYLATE 10 MG: 5 INJECTION INTRAMUSCULAR; INTRAVENOUS at 18:02

## 2020-02-17 RX ADMIN — SODIUM CHLORIDE, PRESERVATIVE FREE 10 ML: 5 INJECTION INTRAVENOUS at 09:02

## 2020-02-17 RX ADMIN — ACETAMINOPHEN 650 MG: 325 TABLET ORAL at 14:22

## 2020-02-17 RX ADMIN — PROCHLORPERAZINE EDISYLATE 10 MG: 5 INJECTION INTRAMUSCULAR; INTRAVENOUS at 06:30

## 2020-02-17 RX ADMIN — ACETAMINOPHEN 650 MG: 325 TABLET ORAL at 06:54

## 2020-02-17 RX ADMIN — FIDAXOMICIN 200 MG: 200 TABLET, FILM COATED ORAL at 20:26

## 2020-02-17 RX ADMIN — POTASSIUM CHLORIDE 10 MEQ: 7.46 INJECTION, SOLUTION INTRAVENOUS at 09:02

## 2020-02-17 RX ADMIN — POTASSIUM CHLORIDE AND SODIUM CHLORIDE: 900; 150 INJECTION, SOLUTION INTRAVENOUS at 17:59

## 2020-02-17 RX ADMIN — POTASSIUM PHOSPHATE, MONOBASIC AND POTASSIUM PHOSPHATE, DIBASIC 30 MMOL: 224; 236 INJECTION, SOLUTION, CONCENTRATE INTRAVENOUS at 14:22

## 2020-02-17 RX ADMIN — FIDAXOMICIN 200 MG: 200 TABLET, FILM COATED ORAL at 10:46

## 2020-02-17 RX ADMIN — POTASSIUM CHLORIDE AND SODIUM CHLORIDE: 900; 150 INJECTION, SOLUTION INTRAVENOUS at 05:47

## 2020-02-17 RX ADMIN — FAMOTIDINE 20 MG: 10 INJECTION INTRAVENOUS at 09:02

## 2020-02-17 RX ADMIN — FAMOTIDINE 20 MG: 10 INJECTION INTRAVENOUS at 20:22

## 2020-02-17 ASSESSMENT — PAIN SCALES - GENERAL
PAINLEVEL_OUTOF10: 5
PAINLEVEL_OUTOF10: 2
PAINLEVEL_OUTOF10: 3
PAINLEVEL_OUTOF10: 0
PAINLEVEL_OUTOF10: 5

## 2020-02-17 ASSESSMENT — PAIN DESCRIPTION - LOCATION
LOCATION: BACK
LOCATION: HEAD
LOCATION: HEAD
LOCATION: BACK

## 2020-02-17 ASSESSMENT — PAIN DESCRIPTION - PAIN TYPE
TYPE: ACUTE PAIN
TYPE: CHRONIC PAIN
TYPE: CHRONIC PAIN
TYPE: ACUTE PAIN

## 2020-02-17 NOTE — PROGRESS NOTES
Nutrition Assessment    Type and Reason for Visit: Initial, Positive Nutrition Screen    Nutrition Recommendations: follow for increasing diet and po intake and tolerance    Nutrition Assessment: Pt appears adequately nourished AEB subcutaneous fat and muscle mass. However pt is at risk for nutritional compromise d/t n/v/d and decreased po intake on liquid diet. Malnutrition Assessment:  · Malnutrition Status: No malnutrition  · Context: Acute illness or injury  · Findings of the 6 clinical characteristics of malnutrition (Minimum of 2 out of 6 clinical characteristics is required to make the diagnosis of moderate or severe Protein Calorie Malnutrition based on AND/ASPEN Guidelines):  1. Energy Intake-Less than or equal to 50% of estimated energy requirement, Greater than or equal to 5 days    2. Weight Loss- ,    3. Fat Loss-No significant subcutaneous fat loss,    4. Muscle Loss-No significant muscle mass loss,    5. Fluid Accumulation-No significant fluid accumulation,    6.  Strength-Not measured    Nutrition Risk Level:  Moderate    Nutrition Diagnosis:   · Problem: Inadequate oral intake  · Etiology: related to Acute injury/trauma, Alteration in GI function     Signs and symptoms:  as evidenced by Intake 0-25%, Nausea, Vomiting, Diarrhea    Objective Information:  · Nutrition-Focused Physical Findings: well nourished  · Wound Type: None  · Current Nutrition Therapies:  · Oral Diet Orders: Clear Liquid   · Oral Diet intake: 1-25%  · Oral Nutrition Supplement (ONS) Orders: None    · Anthropometric Measures:  · Ht: 5' 8\" (172.7 cm)   · Current Body Wt: 220 lb 4 oz (99.9 kg)  · Admission Body Wt: 218 lb 9 oz (99.1 kg)  · Usual Body Wt: 224 lb (101.6 kg)(9/2019)  · Ideal Body Wt: 140 lb (63.5 kg), % Ideal Body 157.4%  · BMI Classification: BMI 30.0 - 34.9 Obese Class I    Nutrition Interventions:   Continue current diet  Continued Inpatient Monitoring    Nutrition Evaluation:   · Evaluation: Goals set

## 2020-02-17 NOTE — CONSULTS
Alcohol use: No     Family History:   Family History   Problem Relation Age of Onset    Diabetes Mother     High Blood Pressure Mother     Heart Attack Mother     Kidney Disease Father     Stroke Father     Other Father         renal failure    Diabetes Maternal Grandmother     Colon Cancer Maternal Grandmother     Heart Disease Maternal Grandfather     Cancer Paternal Grandmother         colon cancer    Alcohol Abuse Sister     Cirrhosis Sister     Stroke Paternal Aunt     Colon Cancer Maternal Cousin      Home Meds:  Prior to Admission medications    Medication Sig Start Date End Date Taking?  Authorizing Provider   Charlie BOURNE TAKE 1 CAPSULE BY MOUTH DAILY AT BEDTIME. 1/23/20  Yes SORIN Nguyen   diltiazem (CARDIZEM CD) 240 MG extended release capsule TAKE 1 CAPSULE BY MOUTH ONE TIME DAILY 9/12/19  Yes Sarah Vera MD   metoprolol tartrate (LOPRESSOR) 50 MG tablet TAKE 1 TABLET BY MOUTH TWO TIMES A DAY 9/12/19  Yes Sarah Vera MD   simvastatin (ZOCOR) 40 MG tablet Take 1 tablet by mouth nightly 9/12/19 6/10/20 Yes Sarah Vera MD   triamterene-hydrochlorothiazide (MAXZIDE-25) 37.5-25 MG per tablet TAKE 2 TABLETS BY MOUTH ONE TIME DAILY 9/12/19  Yes Sarah Vera MD   ibuprofen (ADVIL;MOTRIN) 400 MG tablet Take 400 mg by mouth 2 times daily   Yes Historical Provider, MD   Estradiol-Estriol-Progesterone (BIEST/PROGESTERONE TD) Take 1 capsule by mouth nightly   Yes Historical Provider, MD   Multiple Vitamins-Minerals (WOMENS MULTIVITAMIN PO) Take 1 tablet by mouth daily   Yes Historical Provider, MD   calcium-vitamin D (OSCAL-500) 500-200 MG-UNIT per tablet Take 1 tablet by mouth daily   Yes Historical Provider, MD   ondansetron (ZOFRAN ODT) 4 MG disintegrating tablet Take 1 tablet by mouth every 8 hours as needed for Nausea or Vomiting 2/10/20   Snidy Vann MD   chlordiazePOXIDE-clidinium (LIBRAX) 5-2.5 MG per capsule Take 2 capsules in am and Allergy/Immunological/Lymphatic/Endocrine: [] anemia, [] rashes, [] polyuria, [] polydypsia      Physical Exam:  Vitals:    02/17/20 0730 02/17/20 0740 02/17/20 1325 02/17/20 1501   BP:  124/70  114/62   Pulse:  101  95   Resp:  16  16   Temp:  99.4 °F (37.4 °C) 96.8 °F (36 °C) 98.9 °F (37.2 °C)   TempSrc:  Oral  Oral   SpO2:  91%  92%   Weight: 220 lb 4 oz (99.9 kg)      Height:           Constitutional: [x] NAD, [x] of stated age, [x] weak appearing  Eyes: [x] conjunctiva clear, [x] Non inflamed irises, [x] no scleral icterus  ENT/Mouth: [x]  Nares patent with pink mucosa, [x] oropharynx clear without exudates or erythema, [x] hearing grossly normal  Head/Neck: [x] symmetrical, [x] supple  Lungs: [x] respirations non labored with good effort, [x] CTA bilaterally, [x] no respiratory distress  Heart: [x] normal S1S2, [x]  Regular rate, [x] pedal pulses preserved 2/4 bilaterally, [x] no LE edema  Abdomen: [x] +BSx4, [x] ND, [x] soft, [x] no guarding, [x] no peritoneal signs, [x] generalized tenderness to palpation   Muscuoskeletal: [x] Normal gait and station, [x]  Normal nails and digits bilaterally, [x] no muscle atrophy  Skin/SubQ: [x] No jaundice, [x] warm, dry skin, [x] no rashes on inspection  Neurologic: [x]  Sensation grossly intact, [x] no slurred speech, [x]  No focal deficits  Psychiatric: [x]  Orientated to person, place, and time; [x] mood and affect unremarkable, [x] memory recent and remote intact    Labs:     Recent Labs     02/16/20  1015 02/17/20  0624   WBC 22.3* 18.2*   RBC 5.22 4.72   HGB 13.8 12.1   HCT 40.3 37.3   MCV 77.2* 79.0*   MCH 26.4* 25.6*   MCHC 34.2 32.4*   * 370     Recent Labs     02/16/20  1015 02/17/20  0624   * 130*   K 2.9* 2.9*   ANIONGAP 15 13   CL 82* 87*   CO2 30* 30*   BUN 14 11   CREATININE 1.1* 0.9   GLUCOSE 140* 105   CALCIUM 7.7* 7.8*     Recent Labs     02/16/20  1015 02/17/20  0624   MG 1.9 2.0   PHOS  --  2.0*     Recent Labs     02/16/20  1015   AST 35*   ALT 25   BILITOT 0.3   ALKPHOS 88     HgBA1c:  No components found for: HGBA1C  FLP:    Lab Results   Component Value Date    TRIG 174 12/12/2019    HDL 57 12/12/2019    LDLCALC 66 12/12/2019     TSH:    Lab Results   Component Value Date    TSH 1.32 02/22/2014     Troponin T:   Recent Labs     02/16/20  1015   TROPONINI <0.01     INR:   Recent Labs     02/16/20  1015   INR 1.25*       Recent Labs     02/16/20  1015   LIPASE 40       Radiology:  Xr Acute Abd Series Chest 1 Vw    Result Date: 2/16/2020  EXAMINATION: XR ACUTE ABD SERIES CHEST 1 VW 2/16/2020 12:07 PM HISTORY: Worsening C. difficile COMPARISON: 10/1/2010 FINDINGS: There is elevation of the right hemidiaphragm. There is atelectasis in the right lung base. There is trace pleural fluid on the right. No intraperitoneal free air is identified. The bowel gas pattern is nonspecific and nonobstructive. No pathologic calcifications are identified. There is sclerosis at the pubic symphysis. 1. Small right pleural effusion. 2. No acute findings in the abdomen. Signed by Dr Meghan Lawton on 2/16/2020 12:08 PM    Ct Head Wo Contrast    Result Date: 2/16/2020  EXAMINATION: CT HEAD WO CONTRAST 2/16/2020 12:41 PM HISTORY: Confusion Comparison: None Technique: Sequential imaging was performed from the vertex through the base of the skull without the use of IV contrast. Sagittal and coronal reformations were made from the original source data and reviewed. Automated exposure control was utilized for radiation dose reduction. Radiation dose:  mGy-cm Findings: There is no evidence of acute intracranial hemorrhage, mass, or midline shift. There is no evidence of acute territorial infarct. Ventricles appear normal in configuration. Basilar cisterns are patent. Posterior fossa appears grossly normal. Calvarium is intact. Visualized paranasal sinuses and mastoid air cells appear clear. Impression: No acute intracranial findings.  Signed by Dr Meghan Lawton on hyperemia with surrounding inflammatory change and reactive free fluid. This extends from cecum to rectum. There is no evidence of perforation or abscess formation. No free air is seen in the abdomen. Urinary bladder is grossly normal in appearance. The uterus is not visualized and may be surgically absent. No pelvic or inguinal lymphadenopathy is identified. Reactive free fluid extends into the pelvis. Review of the visualized osseous structures demonstrates no acute or aggressive lesions. Degenerative changes are noted in the spine. There is sclerosis of the pubic symphysis. Impression: 1. Pancolitis compatible with history of C. difficile infection. Reactive mesenteric inflammation and free fluid. This has significantly progressed when compared to the previous exam. No evidence of perforation or abscess formation. 2. New small right pleural effusion. 3. Atherosclerotic disease. Signed by Dr Meghan Lawton on 2/16/2020 1:14 PM    Ct Abdomen Pelvis W Iv Contrast Additional Contrast? Oral    Result Date: 2/10/2020  EXAMINATION: CT ABDOMEN PELVIS W IV CONTRAST 2/10/2020 1:19 PM HISTORY: Severe abdominal pain and diarrhea Comparison: None Technique: Sequential imaging was performed from the lung bases through the pubic symphysis after the administration of IV contrast. Sagittal and coronal reformations were made from the original source data and reviewed. Automated exposure control was utilized for radiation dose reduction. Radiation dose: DLP 1676 mGy-cm Findings: The visualized heart appears normal in size. Calcified granulomas are noted in the lung bases. There is diffuse fatty infiltration of the liver, which is otherwise unremarkable. The gallbladder, spleen, pancreas, and adrenal glands appear grossly normal. Small hypodense lesions are noted in the kidneys which are too small to accurately characterize. Large right renal cysts are present. The ureters are decompressed bilaterally.  The main portal and splenic contacted by the ER physician, recommended immediate initiation of Dificid therapy. Today, the patient's abdominal pain has improved and her leukocytosis has also improved as well. Will need 10 day regimen of Dificid. Pt is tolerating a liquid diet. Will consider advancement in two days if continued improvement.  Counseled patient and her family ar bedside that all contacts should wash their hands with soap and water before and after contact with each other as well as before and after eating.  - will continue to monitor closely      Avila Magallanes DO

## 2020-02-18 PROBLEM — E83.39 HYPOPHOSPHATEMIA: Status: ACTIVE | Noted: 2020-02-18

## 2020-02-18 LAB
ANION GAP SERPL CALCULATED.3IONS-SCNC: 13 MMOL/L (ref 7–19)
BASOPHILS ABSOLUTE: 0.1 K/UL (ref 0–0.2)
BASOPHILS RELATIVE PERCENT: 0.6 % (ref 0–1)
BUN BLDV-MCNC: 8 MG/DL (ref 6–20)
C DIFF TOXIN/ANTIGEN: NORMAL
CALCIUM SERPL-MCNC: 7.7 MG/DL (ref 8.6–10)
CHLORIDE BLD-SCNC: 91 MMOL/L (ref 98–111)
CO2: 27 MMOL/L (ref 22–29)
CREAT SERPL-MCNC: 0.8 MG/DL (ref 0.5–0.9)
EKG P AXIS: 57 DEGREES
EKG P-R INTERVAL: 100 MS
EKG Q-T INTERVAL: 366 MS
EKG QRS DURATION: 96 MS
EKG QTC CALCULATION (BAZETT): 437 MS
EKG T AXIS: 52 DEGREES
EOSINOPHILS ABSOLUTE: 0.2 K/UL (ref 0–0.6)
EOSINOPHILS RELATIVE PERCENT: 0.9 % (ref 0–5)
GFR NON-AFRICAN AMERICAN: >60
GLUCOSE BLD-MCNC: 123 MG/DL (ref 70–99)
GLUCOSE BLD-MCNC: 128 MG/DL (ref 74–109)
HCT VFR BLD CALC: 33.3 % (ref 37–47)
HEMOGLOBIN: 10.8 G/DL (ref 12–16)
IMMATURE GRANULOCYTES #: 1.2 K/UL
LYMPHOCYTES ABSOLUTE: 1.2 K/UL (ref 1.1–4.5)
LYMPHOCYTES RELATIVE PERCENT: 6 % (ref 20–40)
MCH RBC QN AUTO: 25.8 PG (ref 27–31)
MCHC RBC AUTO-ENTMCNC: 32.4 G/DL (ref 33–37)
MCV RBC AUTO: 79.7 FL (ref 81–99)
MONOCYTES ABSOLUTE: 1.7 K/UL (ref 0–0.9)
MONOCYTES RELATIVE PERCENT: 8.3 % (ref 0–10)
NEUTROPHILS ABSOLUTE: 16.1 K/UL (ref 1.5–7.5)
NEUTROPHILS RELATIVE PERCENT: 78.4 % (ref 50–65)
PDW BLD-RTO: 13.2 % (ref 11.5–14.5)
PERFORMED ON: ABNORMAL
PHOSPHORUS: 3.1 MG/DL (ref 2.5–4.5)
PLATELET # BLD: 345 K/UL (ref 130–400)
PMV BLD AUTO: 10.1 FL (ref 9.4–12.3)
POTASSIUM SERPL-SCNC: 3.1 MMOL/L (ref 3.5–5)
RBC # BLD: 4.18 M/UL (ref 4.2–5.4)
SODIUM BLD-SCNC: 131 MMOL/L (ref 136–145)
WBC # BLD: 20.6 K/UL (ref 4.8–10.8)

## 2020-02-18 PROCEDURE — 87427 SHIGA-LIKE TOXIN AG IA: CPT

## 2020-02-18 PROCEDURE — 93010 ELECTROCARDIOGRAM REPORT: CPT | Performed by: INTERNAL MEDICINE

## 2020-02-18 PROCEDURE — 84100 ASSAY OF PHOSPHORUS: CPT

## 2020-02-18 PROCEDURE — 2580000003 HC RX 258: Performed by: HOSPITALIST

## 2020-02-18 PROCEDURE — 6360000002 HC RX W HCPCS: Performed by: HOSPITALIST

## 2020-02-18 PROCEDURE — 99233 SBSQ HOSP IP/OBS HIGH 50: CPT | Performed by: INTERNAL MEDICINE

## 2020-02-18 PROCEDURE — 36415 COLL VENOUS BLD VENIPUNCTURE: CPT

## 2020-02-18 PROCEDURE — 87045 FECES CULTURE AEROBIC BACT: CPT

## 2020-02-18 PROCEDURE — 87899 AGENT NOS ASSAY W/OPTIC: CPT

## 2020-02-18 PROCEDURE — 2500000003 HC RX 250 WO HCPCS: Performed by: HOSPITALIST

## 2020-02-18 PROCEDURE — 82948 REAGENT STRIP/BLOOD GLUCOSE: CPT

## 2020-02-18 PROCEDURE — 2140000000 HC CCU INTERMEDIATE R&B

## 2020-02-18 PROCEDURE — 87015 SPECIMEN INFECT AGNT CONCNTJ: CPT

## 2020-02-18 PROCEDURE — 6370000000 HC RX 637 (ALT 250 FOR IP): Performed by: HOSPITALIST

## 2020-02-18 PROCEDURE — 80048 BASIC METABOLIC PNL TOTAL CA: CPT

## 2020-02-18 PROCEDURE — 85025 COMPLETE CBC W/AUTO DIFF WBC: CPT

## 2020-02-18 RX ORDER — POTASSIUM CHLORIDE 7.45 MG/ML
10 INJECTION INTRAVENOUS
Status: DISPENSED | OUTPATIENT
Start: 2020-02-18 | End: 2020-02-18

## 2020-02-18 RX ADMIN — SODIUM CHLORIDE, PRESERVATIVE FREE 10 ML: 5 INJECTION INTRAVENOUS at 23:00

## 2020-02-18 RX ADMIN — ACETAMINOPHEN 650 MG: 325 TABLET ORAL at 01:41

## 2020-02-18 RX ADMIN — FIDAXOMICIN 200 MG: 200 TABLET, FILM COATED ORAL at 22:59

## 2020-02-18 RX ADMIN — SODIUM CHLORIDE, PRESERVATIVE FREE 10 ML: 5 INJECTION INTRAVENOUS at 08:41

## 2020-02-18 RX ADMIN — FAMOTIDINE 20 MG: 10 INJECTION INTRAVENOUS at 23:00

## 2020-02-18 RX ADMIN — POTASSIUM CHLORIDE 10 MEQ: 7.46 INJECTION, SOLUTION INTRAVENOUS at 16:32

## 2020-02-18 RX ADMIN — PROCHLORPERAZINE EDISYLATE 10 MG: 5 INJECTION INTRAMUSCULAR; INTRAVENOUS at 22:57

## 2020-02-18 RX ADMIN — ACETAMINOPHEN 650 MG: 325 TABLET ORAL at 17:28

## 2020-02-18 RX ADMIN — POTASSIUM CHLORIDE 10 MEQ: 7.46 INJECTION, SOLUTION INTRAVENOUS at 15:12

## 2020-02-18 RX ADMIN — FIDAXOMICIN 200 MG: 200 TABLET, FILM COATED ORAL at 08:41

## 2020-02-18 RX ADMIN — ACETAMINOPHEN 650 MG: 325 TABLET ORAL at 22:59

## 2020-02-18 RX ADMIN — POTASSIUM CHLORIDE AND SODIUM CHLORIDE: 900; 150 INJECTION, SOLUTION INTRAVENOUS at 12:07

## 2020-02-18 RX ADMIN — ACETAMINOPHEN 650 MG: 325 TABLET ORAL at 09:06

## 2020-02-18 RX ADMIN — POTASSIUM CHLORIDE 40 MEQ: 1500 TABLET, EXTENDED RELEASE ORAL at 09:06

## 2020-02-18 RX ADMIN — FAMOTIDINE 20 MG: 10 INJECTION INTRAVENOUS at 08:41

## 2020-02-18 ASSESSMENT — PAIN DESCRIPTION - LOCATION
LOCATION: HEAD

## 2020-02-18 ASSESSMENT — PAIN DESCRIPTION - PAIN TYPE
TYPE: ACUTE PAIN

## 2020-02-18 ASSESSMENT — PAIN SCALES - GENERAL
PAINLEVEL_OUTOF10: 0
PAINLEVEL_OUTOF10: 9
PAINLEVEL_OUTOF10: 2
PAINLEVEL_OUTOF10: 4
PAINLEVEL_OUTOF10: 0

## 2020-02-18 ASSESSMENT — PAIN - FUNCTIONAL ASSESSMENT
PAIN_FUNCTIONAL_ASSESSMENT: ACTIVITIES ARE NOT PREVENTED
PAIN_FUNCTIONAL_ASSESSMENT: ACTIVITIES ARE NOT PREVENTED

## 2020-02-18 ASSESSMENT — PAIN DESCRIPTION - ONSET: ONSET: ON-GOING

## 2020-02-18 ASSESSMENT — PAIN DESCRIPTION - DESCRIPTORS
DESCRIPTORS: ACHING;DULL
DESCRIPTORS: DULL;CONSTANT
DESCRIPTORS: DULL;CONSTANT

## 2020-02-18 ASSESSMENT — PAIN DESCRIPTION - ORIENTATION
ORIENTATION: MID
ORIENTATION: RIGHT

## 2020-02-18 ASSESSMENT — PAIN DESCRIPTION - FREQUENCY: FREQUENCY: CONTINUOUS

## 2020-02-18 ASSESSMENT — PAIN DESCRIPTION - PROGRESSION: CLINICAL_PROGRESSION: GRADUALLY WORSENING

## 2020-02-18 NOTE — PROGRESS NOTES
appears tired and fatigued   Head/Eyes:  Normocephalic, atraumatic, EOMI and PERRLA bilaterally   Respiratory:   Bilateral fair air entry in both lung fields, mild B/L crackles, symmetric expansion of chest   Cardiovascular:  Regular rate and rhythm, S1+S2+0, no murmurs/rubs   Abdomen:   Soft, + abdominal tenderness on palpation, bowel sounds +ve, no organomegaly   Extremities: Moves all, decreased range of motion, no edema   Neurologic: Awake, alert, oriented x 3, cranial nerves II-XII intact, no focal neurological deficits, sensory system intact   Psychiatric: Normal mood, non-suicidal       CURRENT MEDICATIONS:  Scheduled:   Fidaxomicin  200 mg Oral BID    sodium chloride flush  10 mL Intravenous 2 times per day    famotidine (PEPCID) injection  20 mg Intravenous BID        PRN:  sodium chloride flush, 10 mL, PRN  potassium chloride, 40 mEq, PRN    Or  potassium alternative oral replacement, 40 mEq, PRN    Or  potassium chloride, 10 mEq, PRN  magnesium sulfate, 1 g, PRN  sodium phosphate IVPB, 0.16 mmol/kg, PRN    Or  sodium phosphate IVPB, 0.32 mmol/kg, PRN  acetaminophen, 650 mg, Q4H PRN  prochlorperazine, 10 mg, Q6H PRN        Infusions:   0.9% NaCl with KCl 20 mEq 100 mL/hr at 02/17/20 1759       Laboratory Data:  Recent Labs     02/16/20  1015 02/17/20  0624   WBC 22.3* 18.2*   HGB 13.8 12.1   * 370     Recent Labs     02/16/20  1015 02/17/20  0624   * 130*   K 2.9* 2.9*   CL 82* 87*   CO2 30* 30*   BUN 14 11   CREATININE 1.1* 0.9   GLUCOSE 140* 105     Recent Labs     02/16/20  1015   AST 35*   ALT 25   BILITOT 0.3   ALKPHOS 88     Troponin T:   Recent Labs     02/16/20  1015   TROPONINI <0.01     Pro-BNP: No results for input(s): BNP in the last 72 hours.   INR:   Recent Labs     02/16/20  1015   INR 1.25*     UA:  Recent Labs     02/16/20  1212   COLORU Yellow   PHUR 6.5   WBCUA 2-4   RBCUA 2-4   BACTERIA NONE   CLARITYU Clear   SPECGRAV 1.020   LEUKOCYTESUR Negative   UROBILINOGEN 0.2 BILIRUBINUR Negative   BLOODU TRACE-LYSED*   GLUCOSEU Negative     A1C: No results for input(s): LABA1C in the last 72 hours. ABG:No results for input(s): PHART, PBK8SZF, PO2ART, VIC3HZQ, BEART, HGBAE, K8IVOKJW, CARBOXHGBART in the last 72 hours. Imaging:    Xr Acute Abd Series Chest 1 Vw    Result Date: 2/16/2020  1. Small right pleural effusion. 2. No acute findings in the abdomen. Signed by Dr Segundo Ayala on 2/16/2020 12:08 PM    Ct Head Wo Contrast    Result Date: 2/16/2020  Impression: No acute intracranial findings. Signed by Dr Segundo Ayala on 2/16/2020 12:42 PM    Ct Abdomen Pelvis W Iv Contrast Additional Contrast? None    Result Date: 2/16/2020  Impression: 1. Pancolitis compatible with history of C. difficile infection. Reactive mesenteric inflammation and free fluid. This has significantly progressed when compared to the previous exam. No evidence of perforation or abscess formation. 2. New small right pleural effusion. 3. Atherosclerotic disease. Signed by Dr Segundo Ayala on 2/16/2020 1:14 PM       ASSESSMENT & PLAN:    Principal Problem:    Clostridium difficile colitis  Active Problems:    Nausea vomiting and diarrhea    Failure of outpatient treatment    Dehydration    Hypokalemia    Hyponatremia    Obesity due to excess calories    Anxiety    DDD (degenerative disc disease), lumbar    Depression    Essential hypertension, benign    IBS (irritable bowel syndrome)    Lumbar radiculopathy    Mixed hyperlipidemia    Osteoporosis  Resolved Problems:    * No resolved hospital problems. *    Principal Problem:    Clostridium difficile colitis  Active Problems:    Nausea vomiting and diarrhea    Failure of outpatient treatment  Patient is admitted to PCU under full inpatient status   Patient started on p.o.  Dificid by GI which we will continue o  Patient had positive C. difficile antigen stool test from last week  Patient failed outpatient vancomycin p.o. therapy  Continue with C. difficile

## 2020-02-18 NOTE — PROGRESS NOTES
Patient spiked fever of 100.6 oral. Prn Tylenol given to patient per orders. Will continue to assess. Patient resting in bed with eyes closed and son at bedside at this time.

## 2020-02-18 NOTE — PROGRESS NOTES
GI  - PROGRESS NOTE    Subjective:   Admit Date: 2/16/2020  PCP: Micah Mcdaniels MD    CC: C. Diff colitis with failed vancomycin treatment    Pt seen and examined. Pt's abdominal pain has improved and her diarrhea has slightly improved as well. She denies hematemesis, melena, hematochezia, and BRBPR. Medications:  Scheduled Meds:   Fidaxomicin  200 mg Oral BID    sodium chloride flush  10 mL Intravenous 2 times per day    famotidine (PEPCID) injection  20 mg Intravenous BID       Continuous Infusions:   0.9% NaCl with KCl 20 mEq 100 mL/hr at 02/17/20 1759       PRN Meds:.sodium chloride flush, potassium chloride **OR** potassium alternative oral replacement **OR** potassium chloride, magnesium sulfate, sodium phosphate IVPB **OR** sodium phosphate IVPB, acetaminophen, prochlorperazine    Allergies: Latex; Amoxicillin; Anaprox [naproxen sodium]; Aloe vera; Augmentin [amoxicillin-pot clavulanate]; Buspar [buspirone];  Nortriptyline hcl; and Vibramycin [doxycycline calcium]    Labs:     Recent Labs     02/16/20  1015 02/17/20  0624 02/18/20  0303   WBC 22.3* 18.2* 20.6*   RBC 5.22 4.72 4.18*   HGB 13.8 12.1 10.8*   HCT 40.3 37.3 33.3*   MCV 77.2* 79.0* 79.7*   MCH 26.4* 25.6* 25.8*   MCHC 34.2 32.4* 32.4*   * 370 345     Recent Labs     02/16/20  1015 02/17/20  0624 02/18/20  0303   * 130* 131*   K 2.9* 2.9* 3.1*   ANIONGAP 15 13 13   CL 82* 87* 91*   CO2 30* 30* 27   BUN 14 11 8   CREATININE 1.1* 0.9 0.8   GLUCOSE 140* 105 128*   CALCIUM 7.7* 7.8* 7.7*     Recent Labs     02/16/20  1015 02/17/20  0624 02/18/20  0303   MG 1.9 2.0  --    PHOS  --  2.0* 3.1     Recent Labs     02/16/20  1015   AST 35*   ALT 25   BILITOT 0.3   ALKPHOS 88     HgBA1c:  No components found for: HGBA1C  FLP:    Lab Results   Component Value Date    TRIG 174 12/12/2019    HDL 57 12/12/2019    LDLCALC 66 12/12/2019     TSH:    Lab Results   Component Value Date    TSH 1.32 02/22/2014     Troponin T:   Recent Labs     02/16/20  1015   TROPONINI <0.01     INR:   Recent Labs     02/16/20  1015   INR 1.25*       Recent Labs     02/16/20  1015   LIPASE 40     -----------------------------------------------------------------  RAD:   Xr Acute Abd Series Chest 1 Vw    Result Date: 2/16/2020  EXAMINATION: XR ACUTE ABD SERIES CHEST 1 VW 2/16/2020 12:07 PM HISTORY: Worsening C. difficile COMPARISON: 10/1/2010 FINDINGS: There is elevation of the right hemidiaphragm. There is atelectasis in the right lung base. There is trace pleural fluid on the right. No intraperitoneal free air is identified. The bowel gas pattern is nonspecific and nonobstructive. No pathologic calcifications are identified. There is sclerosis at the pubic symphysis. 1. Small right pleural effusion. 2. No acute findings in the abdomen. Signed by Dr Ruben Tello on 2/16/2020 12:08 PM    Ct Head Wo Contrast    Result Date: 2/16/2020  EXAMINATION: CT HEAD WO CONTRAST 2/16/2020 12:41 PM HISTORY: Confusion Comparison: None Technique: Sequential imaging was performed from the vertex through the base of the skull without the use of IV contrast. Sagittal and coronal reformations were made from the original source data and reviewed. Automated exposure control was utilized for radiation dose reduction. Radiation dose:  mGy-cm Findings: There is no evidence of acute intracranial hemorrhage, mass, or midline shift. There is no evidence of acute territorial infarct. Ventricles appear normal in configuration. Basilar cisterns are patent. Posterior fossa appears grossly normal. Calvarium is intact. Visualized paranasal sinuses and mastoid air cells appear clear. Impression: No acute intracranial findings. Signed by Dr Ruben Tello on 2/16/2020 12:42 PM    Ct Abdomen Pelvis W Iv Contrast Additional Contrast? None    Result Date: 2/16/2020  EXAMINATION: CT ABDOMEN PELVIS W IV CONTRAST 2/16/2020 12:43 PM HISTORY: C.  Difficile infection, worsening white count, evaluate for abscess or perforation Comparison: CT abdomen and pelvis contrast 2/10/2020 Technique: Sequential imaging was performed from the lung bases through the pubic symphysis after the administration of IV contrast. Sagittal and coronal reformations were made from the original source data and reviewed. Automated exposure control was utilized for radiation dose reduction. Radiation dose: DLP 2874 mGy-cm Findings: The visualized heart appears normal in size. A right pleural effusion is present. A large calcified granuloma is seen in the right lung base. Fluid tracks along the right major fissure. The liver, gallbladder, spleen, pancreas, and adrenal glands are unremarkable. Multiple tiny hypodensities are seen in the kidneys which are too small to characterize. A large septated cyst versus 2 adjacent cysts are noted in the right kidney. The kidneys otherwise enhance symmetrically and appear grossly normal. The ureters are decompressed bilaterally. The main portal and splenic veins and IVC appear grossly normal. The abdominal aorta appears normal in caliber. The origins of the celiac axis, superior mesenteric artery, and inferior mesenteric artery enhance normally. There are scattered atherosclerotic calcifications of the aorta and its branch vessels. No pathologically enlarged central mesenteric or retroperitoneal lymph nodes are identified. Numerous tiny reactive lymph nodes are evident. There is a small hiatal hernia. The stomach is otherwise unremarkable. The small bowel is decompressed without evidence of obstruction. There is a markedly abnormal appearance diffusely throughout the large bowel, compatible with history of C. difficile infection. There is marked wall thickening and hyperemia with surrounding inflammatory change and reactive free fluid. This extends from cecum to rectum. There is no evidence of perforation or abscess formation. No free air is seen in the abdomen.  Urinary bladder is grossly normal in appearance. The uterus is not visualized and may be surgically absent. No pelvic or inguinal lymphadenopathy is identified. Reactive free fluid extends into the pelvis. Review of the visualized osseous structures demonstrates no acute or aggressive lesions. Degenerative changes are noted in the spine. There is sclerosis of the pubic symphysis. Impression: 1. Pancolitis compatible with history of C. difficile infection. Reactive mesenteric inflammation and free fluid. This has significantly progressed when compared to the previous exam. No evidence of perforation or abscess formation. 2. New small right pleural effusion. 3. Atherosclerotic disease. Signed by Dr Cecile Castañeda on 2/16/2020 1:14 PM    Ct Abdomen Pelvis W Iv Contrast Additional Contrast? Oral    Result Date: 2/10/2020  EXAMINATION: CT ABDOMEN PELVIS W IV CONTRAST 2/10/2020 1:19 PM HISTORY: Severe abdominal pain and diarrhea Comparison: None Technique: Sequential imaging was performed from the lung bases through the pubic symphysis after the administration of IV contrast. Sagittal and coronal reformations were made from the original source data and reviewed. Automated exposure control was utilized for radiation dose reduction. Radiation dose: DLP 1676 mGy-cm Findings: The visualized heart appears normal in size. Calcified granulomas are noted in the lung bases. There is diffuse fatty infiltration of the liver, which is otherwise unremarkable. The gallbladder, spleen, pancreas, and adrenal glands appear grossly normal. Small hypodense lesions are noted in the kidneys which are too small to accurately characterize. Large right renal cysts are present. The ureters are decompressed bilaterally. The main portal and splenic veins and IVC appear grossly normal. The abdominal aorta appears normal in caliber. The origins of the celiac axis, superior mesenteric artery, and inferior mesenteric artery enhance normally.  Minimal scattered atherosclerotic ml   Net 140 ml     Constitutional: [x] NAD, [x] of stated age, [x] weak appearing  Eyes: [x] conjunctiva clear, [x] Non inflamed irises, [x] no scleral icterus  ENT/Mouth: [x]  Nares patent with pink mucosa, [x] oropharynx clear without exudates or erythema, [x] hearing grossly normal  Head/Neck: [x] symmetrical, [x] supple  Lungs: [x] respirations non labored with good effort, [x] CTA bilaterally, [x] no respiratory distress  Heart: [x] normal S1S2, [x]  Regular rate, [x] pedal pulses preserved 2/4 bilaterally, [x] no LE edema  Abdomen: [x] +BSx4, [x] ND, [x] soft, [x] no guarding, [x] no peritoneal signs, [x] generalized tenderness to palpation   Muscuoskeletal: [x] Normal gait and station, [x]  Normal nails and digits bilaterally, [x] no muscle atrophy  Skin/SubQ: [x] No jaundice, [x] warm, dry skin, [x] no rashes on inspection  Psychiatric: [x]  Orientated to person, place, and time; [x] mood and affect unremarkable, [x] memory recent and remote intact      Impression:       1. C-diff colitis  2. IBS  3. Diarrhea    Plan:   - Pt's most recent c-diff test was negative on 2/17/20, though on 2/10/20 she did indeed test positive. In addition, with the significant inflammatory changes found on CT abdomen continue to recommend treatment with Dificid. Her BMs have a characteristic C. Diff odor as well. On stool collected on 2/10/20, the normal stool cultures were negative. A repeat stool culture ordered for today to ensure other pathogens are not involved. Pt clinically looks improved and her abdominal tenderness has improved as well. Will monitor closely for improvement and advance diet when she can tolerate it.      Jannie Gill,

## 2020-02-19 LAB
ANION GAP SERPL CALCULATED.3IONS-SCNC: 13 MMOL/L (ref 7–19)
BASOPHILS ABSOLUTE: 0.1 K/UL (ref 0–0.2)
BASOPHILS RELATIVE PERCENT: 0.5 % (ref 0–1)
BUN BLDV-MCNC: 7 MG/DL (ref 6–20)
CALCIUM SERPL-MCNC: 8 MG/DL (ref 8.6–10)
CHLORIDE BLD-SCNC: 94 MMOL/L (ref 98–111)
CO2: 27 MMOL/L (ref 22–29)
CREAT SERPL-MCNC: 0.8 MG/DL (ref 0.5–0.9)
EOSINOPHILS ABSOLUTE: 0.3 K/UL (ref 0–0.6)
EOSINOPHILS RELATIVE PERCENT: 1.1 % (ref 0–5)
GFR NON-AFRICAN AMERICAN: >60
GLUCOSE BLD-MCNC: 107 MG/DL (ref 74–109)
HCT VFR BLD CALC: 34.5 % (ref 37–47)
HEMOGLOBIN: 11.2 G/DL (ref 12–16)
IMMATURE GRANULOCYTES #: 1.6 K/UL
LYMPHOCYTES ABSOLUTE: 1.3 K/UL (ref 1.1–4.5)
LYMPHOCYTES RELATIVE PERCENT: 5.6 % (ref 20–40)
MCH RBC QN AUTO: 26 PG (ref 27–31)
MCHC RBC AUTO-ENTMCNC: 32.5 G/DL (ref 33–37)
MCV RBC AUTO: 80.2 FL (ref 81–99)
MONOCYTES ABSOLUTE: 1.7 K/UL (ref 0–0.9)
MONOCYTES RELATIVE PERCENT: 7.3 % (ref 0–10)
NEUTROPHILS ABSOLUTE: 18.8 K/UL (ref 1.5–7.5)
NEUTROPHILS RELATIVE PERCENT: 78.9 % (ref 50–65)
PDW BLD-RTO: 13.5 % (ref 11.5–14.5)
PLATELET # BLD: 346 K/UL (ref 130–400)
PMV BLD AUTO: 9.9 FL (ref 9.4–12.3)
POTASSIUM SERPL-SCNC: 3.6 MMOL/L (ref 3.5–5)
RBC # BLD: 4.3 M/UL (ref 4.2–5.4)
SODIUM BLD-SCNC: 134 MMOL/L (ref 136–145)
WBC # BLD: 23.9 K/UL (ref 4.8–10.8)

## 2020-02-19 PROCEDURE — 2500000003 HC RX 250 WO HCPCS: Performed by: HOSPITALIST

## 2020-02-19 PROCEDURE — 97165 OT EVAL LOW COMPLEX 30 MIN: CPT

## 2020-02-19 PROCEDURE — 6360000002 HC RX W HCPCS: Performed by: INTERNAL MEDICINE

## 2020-02-19 PROCEDURE — 99233 SBSQ HOSP IP/OBS HIGH 50: CPT | Performed by: INTERNAL MEDICINE

## 2020-02-19 PROCEDURE — 97161 PT EVAL LOW COMPLEX 20 MIN: CPT

## 2020-02-19 PROCEDURE — 97535 SELF CARE MNGMENT TRAINING: CPT

## 2020-02-19 PROCEDURE — 97530 THERAPEUTIC ACTIVITIES: CPT

## 2020-02-19 PROCEDURE — 80048 BASIC METABOLIC PNL TOTAL CA: CPT

## 2020-02-19 PROCEDURE — 85025 COMPLETE CBC W/AUTO DIFF WBC: CPT

## 2020-02-19 PROCEDURE — 36415 COLL VENOUS BLD VENIPUNCTURE: CPT

## 2020-02-19 PROCEDURE — 6370000000 HC RX 637 (ALT 250 FOR IP): Performed by: HOSPITALIST

## 2020-02-19 PROCEDURE — 2580000003 HC RX 258: Performed by: HOSPITALIST

## 2020-02-19 PROCEDURE — 2580000003 HC RX 258: Performed by: INTERNAL MEDICINE

## 2020-02-19 PROCEDURE — 97116 GAIT TRAINING THERAPY: CPT

## 2020-02-19 PROCEDURE — 2140000000 HC CCU INTERMEDIATE R&B

## 2020-02-19 RX ORDER — AZITHROMYCIN 250 MG/1
500 TABLET, FILM COATED ORAL DAILY
Status: DISCONTINUED | OUTPATIENT
Start: 2020-02-19 | End: 2020-02-19

## 2020-02-19 RX ADMIN — SODIUM CHLORIDE, PRESERVATIVE FREE 10 ML: 5 INJECTION INTRAVENOUS at 10:17

## 2020-02-19 RX ADMIN — FIDAXOMICIN 200 MG: 200 TABLET, FILM COATED ORAL at 10:15

## 2020-02-19 RX ADMIN — FAMOTIDINE 20 MG: 10 INJECTION INTRAVENOUS at 21:19

## 2020-02-19 RX ADMIN — FAMOTIDINE 20 MG: 10 INJECTION INTRAVENOUS at 10:17

## 2020-02-19 RX ADMIN — AZITHROMYCIN MONOHYDRATE 500 MG: 500 INJECTION, POWDER, LYOPHILIZED, FOR SOLUTION INTRAVENOUS at 11:37

## 2020-02-19 RX ADMIN — POTASSIUM CHLORIDE AND SODIUM CHLORIDE: 900; 150 INJECTION, SOLUTION INTRAVENOUS at 11:37

## 2020-02-19 RX ADMIN — ACETAMINOPHEN 650 MG: 325 TABLET ORAL at 21:18

## 2020-02-19 RX ADMIN — SODIUM CHLORIDE, PRESERVATIVE FREE 10 ML: 5 INJECTION INTRAVENOUS at 21:19

## 2020-02-19 RX ADMIN — FIDAXOMICIN 200 MG: 200 TABLET, FILM COATED ORAL at 21:18

## 2020-02-19 ASSESSMENT — PAIN SCALES - GENERAL
PAINLEVEL_OUTOF10: 0

## 2020-02-19 NOTE — PROGRESS NOTES
Spoke with Dr Clau Miramontes and Dr Field Forward regarding patient. WBC count trending up. Respirations 32. HR 120s. Running low grade temp. MEWS score 5. Campylobacter positive. Patient states she feels better than yesterday; just really tired. Will continue to monitor patient.    Electronically signed by Alyse Huffman RN on 2/19/2020 at 10:09 AM

## 2020-02-19 NOTE — PROGRESS NOTES
Occupational Therapy   Occupational Therapy Initial Assessment  Date: 2020   Patient Name: Elizabeth Rodriguez  MRN: [id number]     : 1960    Date of Service: 2020    Discharge Recommendations:  Home independently       Assessment   Performance deficits / Impairments: Decreased functional mobility   Assessment: Pt has been Supervision for functional mobility, and ADL tasks with pt able to use toilet frequently. Educated pt on getting up and to be cautious of dizziness due to weakness. Physical Therapy to follow pt for mobility and balance considerations at this time. Pt demonstrates Supervision for all ADL tasks with plan to discontinue OT this date. Please re-consult OT if change in status. Prognosis: Good  Decision Making: Low Complexity  REQUIRES OT FOLLOW UP: No  Activity Tolerance  Activity Tolerance: Patient Tolerated treatment well  Safety Devices  Safety Devices in place: Yes  Type of devices: Call light within reach;Nurse notified           Patient Diagnosis(es): The primary encounter diagnosis was C. difficile colitis. Diagnoses of Hyponatremia, Hypokalemia, Confusion, Failure of outpatient treatment, Pleural effusion, and Hypocalcemia were also pertinent to this visit. has a past medical history of Anal fissure, Anxiety, Cervical radiculopathy, Chronic back pain, Chronic kidney disease, Class 1 obesity due to excess calories with serious comorbidity and body mass index (BMI) of 32.0 to 32.9 in adult, Cyst of kidney, acquired, Depression, History of oral surgery, IBS (irritable bowel syndrome), Irregular heartbeat, Obesity, Osteoarthritis, and Retinal detachment. has a past surgical history that includes  section; Appendectomy; Hysterectomy; eye surgery; Hysterectomy, total abdominal; and Retinal detachment surgery.          Restrictions  Restrictions/Precautions  Restrictions/Precautions: Fall Risk, Contact Precautions    Subjective   General  Additional Pertinent Hx: Plan  Times per week: 1x treatment and evaluation   Current Treatment Recommendations: Self-Care / ADL        Goals  Short term goals  Time Frame for Short term goals: 1 week  Short term goal 1: Pt will be Supervision for toileting task/transfer-  met   Patient Goals   Patient goals : to go home       Electronically signed by Mag Thomas OT on 2/19/2020 at [address], OT

## 2020-02-19 NOTE — PROGRESS NOTES
Progress Note    Date:2/19/2020       Room:0714/714-02  Patient Name:Viviana Connor     YOB: 1960     Age:59 y.o. Subjective   Interval History Status: Not changed    Patient was seen this morning her room, denies any acute overnight event, says she has a bowel movement today and changes between watery and formed stool. She denies any nausea or vomiting. Denies any chest pain or shortness of breath. Review of Systems   Review of Systems  12 point system reviewed and negative except listed above. Medications   Scheduled Meds:    azithromycin  500 mg Intravenous Q24H    Fidaxomicin  200 mg Oral BID    sodium chloride flush  10 mL Intravenous 2 times per day    famotidine (PEPCID) injection  20 mg Intravenous BID     Continuous Infusions:    0.9% NaCl with KCl 20 mEq 60 mL/hr at 02/19/20 1137     PRN Meds: sodium chloride flush, potassium chloride **OR** potassium alternative oral replacement **OR** potassium chloride, magnesium sulfate, sodium phosphate IVPB **OR** sodium phosphate IVPB, acetaminophen, prochlorperazine    Past History    Past Medical History:   has a past medical history of Anal fissure, Anxiety, Cervical radiculopathy, Chronic back pain, Chronic kidney disease, Class 1 obesity due to excess calories with serious comorbidity and body mass index (BMI) of 32.0 to 32.9 in adult, Cyst of kidney, acquired, Depression, History of oral surgery, IBS (irritable bowel syndrome), Irregular heartbeat, Obesity, Osteoarthritis, and Retinal detachment. Social History:   reports that she has never smoked. She has never used smokeless tobacco. She reports that she does not drink alcohol or use drugs.      Family History:   Family History   Problem Relation Age of Onset    Diabetes Mother     High Blood Pressure Mother     Heart Attack Mother     Kidney Disease Father     Stroke Father     Other Father         renal failure    Diabetes Maternal Grandmother     Colon Cancer difficile colitis 2/16/2020 Yes    Hypophosphatemia 2/18/2020 Yes    Anxiety 2/16/2020 Yes    DDD (degenerative disc disease), lumbar 2/16/2020 Yes    Depression 2/16/2020 Yes    Essential hypertension, benign 2/16/2020 Yes    IBS (irritable bowel syndrome) 2/16/2020 Yes    Lumbar radiculopathy 2/16/2020 Yes    Mixed hyperlipidemia 2/16/2020 Yes    Osteoporosis 2/16/2020 Yes    Obesity due to excess calories 2/16/2020 Yes    Nausea vomiting and diarrhea 2/16/2020 Yes    Failure of outpatient treatment 2/16/2020 Yes    Dehydration 2/16/2020 Yes    Hypokalemia 2/16/2020 Yes    Hyponatremia 2/16/2020 Yes          Plan:          Clostridium difficile colitis with Failure of outpatient treatment  Campylobacter jejuni antigen positive  Continue with p.o. Dificid by GI   Patient had positive C. difficile antigen stool test from last week  Stool cultures positive for Campylobacter jejuni  Azithromycin 500 mg IV daily. Continue with C. difficile precautions on the floor     Dehydration; Hypokalemia; Hyponatremia  Decrease fluids to 60 cc/h.   Strict I's and O's  Monitor electrolytes in a.m. and replace as warranted     Hypophosphatemia; repleted  Resolved with aggressive IV potassium phosphate supplementation     Obesity due to excess calories  Strictly advised patient to watch diet, exercise regularly and lose a few pounds     Anxiety  DDD (degenerative disc disease), lumbar  Depression  Essential hypertension, benign  IBS (irritable bowel syndrome)  Lumbar radiculopathy  Mixed hyperlipidemia  Osteoporosis        Code: Full  Diet: Full liquid advance as tolerated  DVT prophylaxis: Ambulate  Disposition: Pending improvement in clinical status.           Electronically signed by Nessa King MD on 2/19/20 at 2:59 PM

## 2020-02-19 NOTE — PROGRESS NOTES
Physical Therapy    Facility/Department: [facility] PROGRESSIVE CARE  Initial Assessment    NAME: Enedelia Brady  : 1960  MRN: [id number]    Date of Service: 2020    Discharge Recommendations:  Continue to assess pending progress        Assessment   Body structures, Functions, Activity limitations: Decreased functional mobility ; Decreased ADL status; Decreased balance;Decreased endurance  Assessment: Pt ON CDIFF PRECAUTIONS, AMB IN ROOM SLOWLY WITH CLOSE SUPERVISION. UNSTEADY OVERALL, WILL BENEFIT FROM PROGRESSIVE AMB WHILE INPATIENT. PT Education: PT Role;General Safety  REQUIRES PT FOLLOW UP: Yes  Activity Tolerance  Activity Tolerance: Patient limited by fatigue       Patient Diagnosis(es): The primary encounter diagnosis was C. difficile colitis. Diagnoses of Hyponatremia, Hypokalemia, Confusion, Failure of outpatient treatment, Pleural effusion, and Hypocalcemia were also pertinent to this visit. has a past medical history of Anal fissure, Anxiety, Cervical radiculopathy, Chronic back pain, Chronic kidney disease, Class 1 obesity due to excess calories with serious comorbidity and body mass index (BMI) of 32.0 to 32.9 in adult, Cyst of kidney, acquired, Depression, History of oral surgery, IBS (irritable bowel syndrome), Irregular heartbeat, Obesity, Osteoarthritis, and Retinal detachment. has a past surgical history that includes  section; Appendectomy; Hysterectomy; eye surgery; Hysterectomy, total abdominal; and Retinal detachment surgery.     Restrictions  Restrictions/Precautions  Restrictions/Precautions: Fall Risk, Contact Precautions  Vision/Hearing        Subjective  General  Diagnosis: CDIFF  Subjective  Subjective: Pt WILLING TO WALK IN ROOM, THEN NEEDED BR          Orientation     Social/Functional History  Social/Functional History  Lives With: Alone  Type of Home: House  ADL Assistance: Independent  Homemaking Assistance: Independent  Ambulation Assistance: Independent  Transfer Assistance: Independent  Active : Yes  Mode of Transportation: Car  Cognition        Objective          AROM RLE (degrees)  RLE AROM: WNL  AROM LLE (degrees)  LLE AROM : WNL  Strength RLE  Comment: GROSSLY +4/5  Strength LLE  Comment: GROSSLY +4/5        Bed mobility  Supine to Sit: Supervision  Sit to Supine: Supervision  Transfers  Sit to Stand: Contact guard assistance  Stand to sit: Contact guard assistance  Bed to Chair: Contact guard assistance  Ambulation 1  Device: No Device  Assistance: Contact guard assistance  Quality of Gait: SLIGHTLY FLEXED POSTURE, UNSTEADY OVERALL  Gait Deviations: Slow Lorenza;Decreased step length;Decreased step height  Distance: 25'     Balance  Sitting - Dynamic: Good  Standing - Dynamic: Fair;+        Plan   Plan  Times per week: AT LEAST 5-6  Current Treatment Recommendations: Strengthening, Balance Training, Functional Mobility Training, Transfer Training, Gait Training, Patient/Caregiver Education & Training, Safety Education & Training  Safety Devices  Type of devices: (LEFT IN BR WITH NSG)    G-Code       OutComes Score                                                  AM-PAC Score             Goals  Short term goals  Time Frame for Short term goals: 14 DAYS  Short term goal 1: BED MOB INDEPENDENT  Short term goal 2: TRANSFERS INDEPENDENT  Short term goal 3: ' SUP-IND       Therapy Time   Individual Concurrent Group Co-treatment   Time In           Time Out           Minutes                   Chaya Mart, PT

## 2020-02-19 NOTE — PROGRESS NOTES
GI  - PROGRESS NOTE    Subjective:   Admit Date: 2/16/2020  PCP: Leonidas Zuleta MD    CC: C. Diff colitis with failed vancomycin treatment    Pt seen and examined. Pt states that she continues to feel better with less diarrhea, though she did have fever today. She denies hematemesis, melena, hematochezia, and BRBPR. Medications:  Scheduled Meds:   azithromycin  500 mg Intravenous Q24H    Fidaxomicin  200 mg Oral BID    sodium chloride flush  10 mL Intravenous 2 times per day    famotidine (PEPCID) injection  20 mg Intravenous BID       Continuous Infusions:   0.9% NaCl with KCl 20 mEq 60 mL/hr at 02/19/20 1137       PRN Meds:.sodium chloride flush, potassium chloride **OR** potassium alternative oral replacement **OR** potassium chloride, magnesium sulfate, sodium phosphate IVPB **OR** sodium phosphate IVPB, acetaminophen, prochlorperazine    Allergies: Latex; Amoxicillin; Anaprox [naproxen sodium]; Aloe vera; Augmentin [amoxicillin-pot clavulanate]; Buspar [buspirone]; Nortriptyline hcl; and Vibramycin [doxycycline calcium]    Labs:     Recent Labs     02/17/20 0624 02/18/20  0303 02/19/20  0432   WBC 18.2* 20.6* 23.9*   RBC 4.72 4.18* 4.30   HGB 12.1 10.8* 11.2*   HCT 37.3 33.3* 34.5*   MCV 79.0* 79.7* 80.2*   MCH 25.6* 25.8* 26.0*   MCHC 32.4* 32.4* 32.5*    345 346     Recent Labs     02/17/20 0624 02/18/20  0303 02/19/20  0432   * 131* 134*   K 2.9* 3.1* 3.6   ANIONGAP 13 13 13   CL 87* 91* 94*   CO2 30* 27 27   BUN 11 8 7   CREATININE 0.9 0.8 0.8   GLUCOSE 105 128* 107   CALCIUM 7.8* 7.7* 8.0*     Recent Labs     02/17/20 0624 02/18/20  0303   MG 2.0  --    PHOS 2.0* 3.1     No results for input(s): AST, ALT, ALB, BILITOT, ALKPHOS, ALB in the last 72 hours.   HgBA1c:  No components found for: HGBA1C  FLP:    Lab Results   Component Value Date    TRIG 174 12/12/2019    HDL 57 12/12/2019    LDLCALC 66 12/12/2019     TSH:    Lab Results   Component Value Date    TSH 1.32 02/22/2014     Troponin T:   No results for input(s): TROPONINI in the last 72 hours. INR:   No results for input(s): INR in the last 72 hours. No results for input(s): LIPASE in the last 72 hours. -----------------------------------------------------------------  RAD:   Xr Acute Abd Series Chest 1 Vw    Result Date: 2/16/2020  EXAMINATION: XR ACUTE ABD SERIES CHEST 1 VW 2/16/2020 12:07 PM HISTORY: Worsening C. difficile COMPARISON: 10/1/2010 FINDINGS: There is elevation of the right hemidiaphragm. There is atelectasis in the right lung base. There is trace pleural fluid on the right. No intraperitoneal free air is identified. The bowel gas pattern is nonspecific and nonobstructive. No pathologic calcifications are identified. There is sclerosis at the pubic symphysis. 1. Small right pleural effusion. 2. No acute findings in the abdomen. Signed by Dr Harrison Puente on 2/16/2020 12:08 PM    Ct Head Wo Contrast    Result Date: 2/16/2020  EXAMINATION: CT HEAD WO CONTRAST 2/16/2020 12:41 PM HISTORY: Confusion Comparison: None Technique: Sequential imaging was performed from the vertex through the base of the skull without the use of IV contrast. Sagittal and coronal reformations were made from the original source data and reviewed. Automated exposure control was utilized for radiation dose reduction. Radiation dose:  mGy-cm Findings: There is no evidence of acute intracranial hemorrhage, mass, or midline shift. There is no evidence of acute territorial infarct. Ventricles appear normal in configuration. Basilar cisterns are patent. Posterior fossa appears grossly normal. Calvarium is intact. Visualized paranasal sinuses and mastoid air cells appear clear. Impression: No acute intracranial findings.  Signed by Dr Harrison Puente on 2/16/2020 12:42 PM    Ct Abdomen Pelvis W Iv Contrast Additional Contrast? None    Result Date: 2/16/2020  EXAMINATION: CT ABDOMEN PELVIS W IV CONTRAST 2/16/2020 12:43 PM HISTORY: C. Difficile infection, worsening white count, evaluate for abscess or perforation Comparison: CT abdomen and pelvis contrast 2/10/2020 Technique: Sequential imaging was performed from the lung bases through the pubic symphysis after the administration of IV contrast. Sagittal and coronal reformations were made from the original source data and reviewed. Automated exposure control was utilized for radiation dose reduction. Radiation dose: DLP 2874 mGy-cm Findings: The visualized heart appears normal in size. A right pleural effusion is present. A large calcified granuloma is seen in the right lung base. Fluid tracks along the right major fissure. The liver, gallbladder, spleen, pancreas, and adrenal glands are unremarkable. Multiple tiny hypodensities are seen in the kidneys which are too small to characterize. A large septated cyst versus 2 adjacent cysts are noted in the right kidney. The kidneys otherwise enhance symmetrically and appear grossly normal. The ureters are decompressed bilaterally. The main portal and splenic veins and IVC appear grossly normal. The abdominal aorta appears normal in caliber. The origins of the celiac axis, superior mesenteric artery, and inferior mesenteric artery enhance normally. There are scattered atherosclerotic calcifications of the aorta and its branch vessels. No pathologically enlarged central mesenteric or retroperitoneal lymph nodes are identified. Numerous tiny reactive lymph nodes are evident. There is a small hiatal hernia. The stomach is otherwise unremarkable. The small bowel is decompressed without evidence of obstruction. There is a markedly abnormal appearance diffusely throughout the large bowel, compatible with history of C. difficile infection. There is marked wall thickening and hyperemia with surrounding inflammatory change and reactive free fluid. This extends from cecum to rectum. There is no evidence of perforation or abscess formation.  No free air is seen in the abdomen. Urinary bladder is grossly normal in appearance. The uterus is not visualized and may be surgically absent. No pelvic or inguinal lymphadenopathy is identified. Reactive free fluid extends into the pelvis. Review of the visualized osseous structures demonstrates no acute or aggressive lesions. Degenerative changes are noted in the spine. There is sclerosis of the pubic symphysis. Impression: 1. Pancolitis compatible with history of C. difficile infection. Reactive mesenteric inflammation and free fluid. This has significantly progressed when compared to the previous exam. No evidence of perforation or abscess formation. 2. New small right pleural effusion. 3. Atherosclerotic disease. Signed by Dr Carolyn Barnhart on 2/16/2020 1:14 PM    Ct Abdomen Pelvis W Iv Contrast Additional Contrast? Oral    Result Date: 2/10/2020  EXAMINATION: CT ABDOMEN PELVIS W IV CONTRAST 2/10/2020 1:19 PM HISTORY: Severe abdominal pain and diarrhea Comparison: None Technique: Sequential imaging was performed from the lung bases through the pubic symphysis after the administration of IV contrast. Sagittal and coronal reformations were made from the original source data and reviewed. Automated exposure control was utilized for radiation dose reduction. Radiation dose: DLP 1676 mGy-cm Findings: The visualized heart appears normal in size. Calcified granulomas are noted in the lung bases. There is diffuse fatty infiltration of the liver, which is otherwise unremarkable. The gallbladder, spleen, pancreas, and adrenal glands appear grossly normal. Small hypodense lesions are noted in the kidneys which are too small to accurately characterize. Large right renal cysts are present. The ureters are decompressed bilaterally. The main portal and splenic veins and IVC appear grossly normal. The abdominal aorta appears normal in caliber.  The origins of the celiac axis, superior mesenteric artery, and inferior mesenteric artery enhance normally. Minimal scattered atherosclerotic calcifications are seen within the aorta and its branch vessels. No pathologically enlarged central mesenteric or retroperitoneal lymph nodes are identified. There is a small hiatal hernia. The stomach and small bowel appear normal in caliber. There is colonic wall thickening and surrounding inflammatory change beginning at the splenic flexure and extending to the rectum. There is a focal area of abnormal wall thickening in the large bowel at the level of the splenic flexure. Apparent wall thickening at the cecum is felt to be related to underdistention, as there is no surrounding inflammatory change. No free air or free fluid is seen in the abdomen. Urinary bladder is grossly normal in appearance. The uterus is not visualized and may be surgically absent. No pelvic mass is appreciated. No pelvic or inguinal lymphadenopathy is identified. Review of the visualized osseous structures demonstrates no acute or aggressive lesions. There is grade 1 anterolisthesis of L4 on L5. Impression: 1. Evidence of colitis extending from the splenic flexure through the rectum with surrounding inflammatory change and wall thickening. Focal area of abnormal thickening is noted at the splenic flexure. Underlying mass not excluded. Please ensure patient is up-to-date on colonoscopy. 2. No evidence of perforation or abscess formation. 3. Hepatic steatosis. 4. Atherosclerotic disease.  Signed by Dr Jorge Luis Manzo on 2/10/2020 1:31 PM      Physical Exam:     Vitals:    02/19/20 0636 02/19/20 0637 02/19/20 0931 02/19/20 1059   BP: (!) 156/88  136/86 130/78   Pulse: 125  122 108   Resp: 16  (!) 32 16   Temp: 100 °F (37.8 °C)  100.5 °F (38.1 °C) 99.8 °F (37.7 °C)   TempSrc: Oral  Oral Oral   SpO2: 91%  92% 94%   Weight:  224 lb 2 oz (101.7 kg)     Height:         24HR INTAKE/OUTPUT:      Intake/Output Summary (Last 24 hours) at 2/19/2020 1241  Last data filed at 2/19/2020 1043  Gross per 24

## 2020-02-19 NOTE — PROGRESS NOTES
Pro-BNP: No results for input(s): BNP in the last 72 hours. INR:   Recent Labs     02/16/20  1015   INR 1.25*     UA:  Recent Labs     02/16/20  1212   COLORU Yellow   PHUR 6.5   WBCUA 2-4   RBCUA 2-4   BACTERIA NONE   CLARITYU Clear   SPECGRAV 1.020   LEUKOCYTESUR Negative   UROBILINOGEN 0.2   BILIRUBINUR Negative   BLOODU TRACE-LYSED*   GLUCOSEU Negative     A1C: No results for input(s): LABA1C in the last 72 hours. ABG:No results for input(s): PHART, NOR5HBH, PO2ART, OQI7YQH, BEART, HGBAE, L2AOEKUT, CARBOXHGBART in the last 72 hours. Imaging:    Xr Acute Abd Series Chest 1 Vw    Result Date: 2/16/2020  1. Small right pleural effusion. 2. No acute findings in the abdomen. Signed by Dr Leanne Sever on 2/16/2020 12:08 PM    Ct Head Wo Contrast    Result Date: 2/16/2020  Impression: No acute intracranial findings. Signed by Dr Leanne Sever on 2/16/2020 12:42 PM    Ct Abdomen Pelvis W Iv Contrast Additional Contrast? None    Result Date: 2/16/2020  Impression: 1. Pancolitis compatible with history of C. difficile infection. Reactive mesenteric inflammation and free fluid. This has significantly progressed when compared to the previous exam. No evidence of perforation or abscess formation. 2. New small right pleural effusion. 3. Atherosclerotic disease. Signed by Dr Leanne Sever on 2/16/2020 1:14 PM       ASSESSMENT & PLAN:    Principal Problem:    Clostridium difficile colitis  Active Problems:    Nausea vomiting and diarrhea    Failure of outpatient treatment    Dehydration    Hypokalemia    Hyponatremia    Hypophosphatemia    Obesity due to excess calories    Anxiety    DDD (degenerative disc disease), lumbar    Depression    Essential hypertension, benign    IBS (irritable bowel syndrome)    Lumbar radiculopathy    Mixed hyperlipidemia    Osteoporosis  Resolved Problems:    * No resolved hospital problems.  *    Principal Problem:    Clostridium difficile colitis  Active Problems:    Nausea vomiting

## 2020-02-20 LAB
ANION GAP SERPL CALCULATED.3IONS-SCNC: 15 MMOL/L (ref 7–19)
BUN BLDV-MCNC: 7 MG/DL (ref 6–20)
CALCIUM SERPL-MCNC: 8.2 MG/DL (ref 8.6–10)
CAMPYLOBACTER ANTIGEN: ABNORMAL
CAMPYLOBACTER ANTIGEN: ABNORMAL
CHLORIDE BLD-SCNC: 92 MMOL/L (ref 98–111)
CO2: 26 MMOL/L (ref 22–29)
CREAT SERPL-MCNC: 0.8 MG/DL (ref 0.5–0.9)
CULTURE, STOOL: ABNORMAL
E COLI SHIGA TOXIN ASSAY: ABNORMAL
GFR NON-AFRICAN AMERICAN: >60
GLUCOSE BLD-MCNC: 103 MG/DL (ref 74–109)
HCT VFR BLD CALC: 35.7 % (ref 37–47)
HEMOGLOBIN: 11.6 G/DL (ref 12–16)
MCH RBC QN AUTO: 26.1 PG (ref 27–31)
MCHC RBC AUTO-ENTMCNC: 32.5 G/DL (ref 33–37)
MCV RBC AUTO: 80.2 FL (ref 81–99)
ORGANISM: ABNORMAL
PDW BLD-RTO: 13.8 % (ref 11.5–14.5)
PLATELET # BLD: 356 K/UL (ref 130–400)
PMV BLD AUTO: 9.2 FL (ref 9.4–12.3)
POTASSIUM REFLEX MAGNESIUM: 3.9 MMOL/L (ref 3.5–5)
RBC # BLD: 4.45 M/UL (ref 4.2–5.4)
SODIUM BLD-SCNC: 133 MMOL/L (ref 136–145)
WBC # BLD: 27 K/UL (ref 4.8–10.8)

## 2020-02-20 PROCEDURE — 6370000000 HC RX 637 (ALT 250 FOR IP): Performed by: HOSPITALIST

## 2020-02-20 PROCEDURE — 36415 COLL VENOUS BLD VENIPUNCTURE: CPT

## 2020-02-20 PROCEDURE — 80048 BASIC METABOLIC PNL TOTAL CA: CPT

## 2020-02-20 PROCEDURE — 6360000002 HC RX W HCPCS: Performed by: INTERNAL MEDICINE

## 2020-02-20 PROCEDURE — 2500000003 HC RX 250 WO HCPCS: Performed by: HOSPITALIST

## 2020-02-20 PROCEDURE — 85027 COMPLETE CBC AUTOMATED: CPT

## 2020-02-20 PROCEDURE — 87040 BLOOD CULTURE FOR BACTERIA: CPT

## 2020-02-20 PROCEDURE — 2140000000 HC CCU INTERMEDIATE R&B

## 2020-02-20 PROCEDURE — 2580000003 HC RX 258: Performed by: HOSPITALIST

## 2020-02-20 PROCEDURE — 2580000003 HC RX 258: Performed by: INTERNAL MEDICINE

## 2020-02-20 RX ADMIN — SODIUM CHLORIDE, PRESERVATIVE FREE 10 ML: 5 INJECTION INTRAVENOUS at 21:27

## 2020-02-20 RX ADMIN — BENZOCAINE 6 MG-MENTHOL 10 MG LOZENGES 1 LOZENGE: at 16:02

## 2020-02-20 RX ADMIN — ACETAMINOPHEN 650 MG: 325 TABLET ORAL at 15:01

## 2020-02-20 RX ADMIN — FAMOTIDINE 20 MG: 10 INJECTION INTRAVENOUS at 21:26

## 2020-02-20 RX ADMIN — ACETAMINOPHEN 650 MG: 325 TABLET ORAL at 10:24

## 2020-02-20 RX ADMIN — ACETAMINOPHEN 650 MG: 325 TABLET ORAL at 21:26

## 2020-02-20 RX ADMIN — FAMOTIDINE 20 MG: 10 INJECTION INTRAVENOUS at 08:46

## 2020-02-20 RX ADMIN — POTASSIUM CHLORIDE AND SODIUM CHLORIDE: 900; 150 INJECTION, SOLUTION INTRAVENOUS at 08:46

## 2020-02-20 RX ADMIN — FIDAXOMICIN 200 MG: 200 TABLET, FILM COATED ORAL at 21:26

## 2020-02-20 RX ADMIN — AZITHROMYCIN MONOHYDRATE 500 MG: 500 INJECTION, POWDER, LYOPHILIZED, FOR SOLUTION INTRAVENOUS at 10:18

## 2020-02-20 RX ADMIN — SODIUM CHLORIDE, PRESERVATIVE FREE 10 ML: 5 INJECTION INTRAVENOUS at 08:46

## 2020-02-20 RX ADMIN — FIDAXOMICIN 200 MG: 200 TABLET, FILM COATED ORAL at 08:46

## 2020-02-20 ASSESSMENT — PAIN DESCRIPTION - ORIENTATION: ORIENTATION: UPPER

## 2020-02-20 ASSESSMENT — PAIN DESCRIPTION - ONSET: ONSET: PROGRESSIVE

## 2020-02-20 ASSESSMENT — PAIN DESCRIPTION - PAIN TYPE
TYPE: ACUTE PAIN

## 2020-02-20 ASSESSMENT — PAIN SCALES - GENERAL
PAINLEVEL_OUTOF10: 3
PAINLEVEL_OUTOF10: 0
PAINLEVEL_OUTOF10: 0
PAINLEVEL_OUTOF10: 3
PAINLEVEL_OUTOF10: 1
PAINLEVEL_OUTOF10: 3

## 2020-02-20 ASSESSMENT — PAIN - FUNCTIONAL ASSESSMENT: PAIN_FUNCTIONAL_ASSESSMENT: ACTIVITIES ARE NOT PREVENTED

## 2020-02-20 ASSESSMENT — PAIN DESCRIPTION - PROGRESSION: CLINICAL_PROGRESSION: GRADUALLY WORSENING

## 2020-02-20 ASSESSMENT — PAIN DESCRIPTION - LOCATION
LOCATION: THROAT
LOCATION: HEAD
LOCATION: THROAT

## 2020-02-20 ASSESSMENT — PAIN DESCRIPTION - FREQUENCY: FREQUENCY: CONTINUOUS

## 2020-02-20 ASSESSMENT — PAIN DESCRIPTION - DESCRIPTORS
DESCRIPTORS: ACHING;BURNING
DESCRIPTORS: HEADACHE

## 2020-02-20 NOTE — PROGRESS NOTES
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 HEENT: Atraumatic normocephalic  Cardiac: Normal S1-S2 no murmurs rub or gallop. Respiratory: clear To auscultation bilaterally, no rhonchi or rales  Abdomen: nontender to palpation, no organomegaly noted. Extremities: no tenderness, + lower extremity edema and hand swelling, moves all extremities  Psych: Affect normal and good eye contact        Labs/Imaging/Diagnostics   Labs:  CBC:  Recent Labs     20  0432 20  0349   WBC 20.6* 23.9* 27.0*   RBC 4.18* 4.30 4.45   HGB 10.8* 11.2* 11.6*   HCT 33.3* 34.5* 35.7*   MCV 79.7* 80.2* 80.2*   RDW 13.2 13.5 13.8    346 356     CHEMISTRIES:  Recent Labs     20  0432 20  0349   * 134* 133*   K 3.1* 3.6 3.9   CL 91* 94* 92*   CO2 27 27 26   BUN 8 7 7   CREATININE 0.8 0.8 0.8   GLUCOSE 128* 107 103   PHOS 3.1  --   --      PT/INR:No results for input(s): PROTIME, INR in the last 72 hours. APTT:No results for input(s): APTT in the last 72 hours.   LIVER PROFILE:No results for input(s): AST, ALT, BILIDIR, BILITOT, ALKPHOS in the last 72 hours. Imaging Last 24 Hours:  No results found. Assessment        Hospital Problems           Last Modified POA    * (Principal) Clostridium difficile colitis 2/16/2020 Yes    Hypophosphatemia 2/18/2020 Yes    Anxiety 2/16/2020 Yes    DDD (degenerative disc disease), lumbar 2/16/2020 Yes    Depression 2/16/2020 Yes    Essential hypertension, benign 2/16/2020 Yes    IBS (irritable bowel syndrome) 2/16/2020 Yes    Lumbar radiculopathy 2/16/2020 Yes    Mixed hyperlipidemia 2/16/2020 Yes    Osteoporosis 2/16/2020 Yes    Obesity due to excess calories 2/16/2020 Yes    Nausea vomiting and diarrhea 2/16/2020 Yes    Failure of outpatient treatment 2/16/2020 Yes    Dehydration 2/16/2020 Yes    Hypokalemia 2/16/2020 Yes    Hyponatremia 2/16/2020 Yes          Plan:          Clostridium difficile colitis with Failure of outpatient treatment  Campylobacter jejuni antigen positive  Continue with p.o. Dificid by GI   Patient had positive C. difficile antigen stool test from last week  Stool cultures positive for Campylobacter jejuni  Azithromycin 500 mg IV daily for 7 days total  Continue with C. difficile precautions on the floor     Dehydration; Hypokalemia; Hyponatremia  Decrease fluids to 60 cc/h.   Strict I's and O's  Monitor electrolytes in a.m. and replace as warranted     Hypophosphatemia; repleted  Resolved with aggressive IV potassium phosphate supplementation     Obesity due to excess calories  Strictly advised patient to watch diet, exercise regularly and lose a few pounds       Anxiety  DDD (degenerative disc disease), lumbar  Depression  Essential hypertension, benign  IBS (irritable bowel syndrome)  Lumbar radiculopathy  Mixed hyperlipidemia  Osteoporosis        Code: Full  Diet: Full liquid advance as tolerated  DVT prophylaxis: Ambulate  Disposition: Pending improvement in clinical status.           Electronically signed by   Sahil Bobo

## 2020-02-20 NOTE — CONSULTS
26   ANIONGAP 13 13 15   GLUCOSE 128* 107 103   BUN 8 7 7   CREATININE 0.8 0.8 0.8   LABGLOM >60 >60 >60   CALCIUM 7.7* 8.0* 8.2*          Culture:   Recent Labs     02/18/20  1011   ORG Campylobacter Antigen DETECTED*             IMAGING:   Xr Acute Abd Series Chest 1 Vw    Result Date: 2/16/2020  EXAMINATION: XR ACUTE ABD SERIES CHEST 1 VW 2/16/2020 12:07 PM HISTORY: Worsening C. difficile COMPARISON: 10/1/2010 FINDINGS: There is elevation of the right hemidiaphragm. There is atelectasis in the right lung base. There is trace pleural fluid on the right. No intraperitoneal free air is identified. The bowel gas pattern is nonspecific and nonobstructive. No pathologic calcifications are identified. There is sclerosis at the pubic symphysis. 1. Small right pleural effusion. 2. No acute findings in the abdomen. Signed by Dr Ashley Cage on 2/16/2020 12:08 PM    Ct Head Wo Contrast    Result Date: 2/16/2020  EXAMINATION: CT HEAD WO CONTRAST 2/16/2020 12:41 PM HISTORY: Confusion Comparison: None Technique: Sequential imaging was performed from the vertex through the base of the skull without the use of IV contrast. Sagittal and coronal reformations were made from the original source data and reviewed. Automated exposure control was utilized for radiation dose reduction. Radiation dose:  mGy-cm Findings: There is no evidence of acute intracranial hemorrhage, mass, or midline shift. There is no evidence of acute territorial infarct. Ventricles appear normal in configuration. Basilar cisterns are patent. Posterior fossa appears grossly normal. Calvarium is intact. Visualized paranasal sinuses and mastoid air cells appear clear. Impression: No acute intracranial findings. Signed by Dr Ashley Cage on 2/16/2020 12:42 PM    Ct Abdomen Pelvis W Iv Contrast Additional Contrast? None    Result Date: 2/16/2020  EXAMINATION: CT ABDOMEN PELVIS W IV CONTRAST 2/16/2020 12:43 PM HISTORY: C.  Difficile infection, worsening white count, evaluate for abscess or perforation Comparison: CT abdomen and pelvis contrast 2/10/2020 Technique: Sequential imaging was performed from the lung bases through the pubic symphysis after the administration of IV contrast. Sagittal and coronal reformations were made from the original source data and reviewed. Automated exposure control was utilized for radiation dose reduction. Radiation dose: DLP 2874 mGy-cm Findings: The visualized heart appears normal in size. A right pleural effusion is present. A large calcified granuloma is seen in the right lung base. Fluid tracks along the right major fissure. The liver, gallbladder, spleen, pancreas, and adrenal glands are unremarkable. Multiple tiny hypodensities are seen in the kidneys which are too small to characterize. A large septated cyst versus 2 adjacent cysts are noted in the right kidney. The kidneys otherwise enhance symmetrically and appear grossly normal. The ureters are decompressed bilaterally. The main portal and splenic veins and IVC appear grossly normal. The abdominal aorta appears normal in caliber. The origins of the celiac axis, superior mesenteric artery, and inferior mesenteric artery enhance normally. There are scattered atherosclerotic calcifications of the aorta and its branch vessels. No pathologically enlarged central mesenteric or retroperitoneal lymph nodes are identified. Numerous tiny reactive lymph nodes are evident. There is a small hiatal hernia. The stomach is otherwise unremarkable. The small bowel is decompressed without evidence of obstruction. There is a markedly abnormal appearance diffusely throughout the large bowel, compatible with history of C. difficile infection. There is marked wall thickening and hyperemia with surrounding inflammatory change and reactive free fluid. This extends from cecum to rectum. There is no evidence of perforation or abscess formation. No free air is seen in the abdomen.  Urinary bladder is grossly normal in appearance. The uterus is not visualized and may be surgically absent. No pelvic or inguinal lymphadenopathy is identified. Reactive free fluid extends into the pelvis. Review of the visualized osseous structures demonstrates no acute or aggressive lesions. Degenerative changes are noted in the spine. There is sclerosis of the pubic symphysis. Impression: 1. Pancolitis compatible with history of C. difficile infection. Reactive mesenteric inflammation and free fluid. This has significantly progressed when compared to the previous exam. No evidence of perforation or abscess formation. 2. New small right pleural effusion. 3. Atherosclerotic disease. Signed by Dr Maryanne Wright on 2/16/2020 1:14 PM          ASSESSMENT AND PLAN     Patient Active Problem List   Diagnosis    Anxiety    Cervical radiculopathy    Cyst of kidney, acquired    DDD (degenerative disc disease), lumbar    Depression    Essential hypertension, benign    Hemorrhoid    IBS (irritable bowel syndrome)    Bilateral low back pain with sciatica    Lumbar radiculopathy    Menopause    Mixed hyperlipidemia    Osteoporosis    Prediabetes    Sciatica    Obesity due to excess calories    Clostridium difficile colitis    Nausea vomiting and diarrhea    Failure of outpatient treatment    Dehydration    Hypokalemia    Hyponatremia    Hypophosphatemia   Leukocytosis-trending up in a patient who reports she is clinically getting somewhat better from the standpoint of decreased abdominal pain, tolerating a regular diet, decreased volume of stool and less pain with defecation. Patient has not received steroids this admission. Review of systems essentially unremarkable for any additional or healthcare associated processes, however did not get to examine patient well because she was in the bathroom.      Repeat CBC in a.m. with a manual differential    C. difficile colitis based on testing February 2 appear to failed outpatient treatment with oral vancomycin. She does report taking the appropriate dose 4 times a day. She did have a significant worsening in her CT scan. Her repeat C. difficile was negative. This begs the question of whether the initial C. difficile was a false positive and the patient reports no previous antibiotic use, this C. difficile is negative as patients will intermittently excrete the antigen or if there is another process such as a Campylobacter caused her to worsen. The dayshift for micro is gone so it is unclear to me if a stool culture has been set up for Campylobacter is there are definitely false positives associated with a Campylobacter antigen testing. Agree with the treatment of fidaxomicin (pancolitis secondary to Campylobacter would be unlikely)         Agree with treatment for Campylobacter given patient's clinical scenario.          We will check with microbiology tomorrow to make sure that they are setting up stool culture to verify Campylobacter      Discussed  with Dr. Osvaldo Light    Thank you Nate Marie MD for allowing me to participate in this patient's care  Electronically signed by Bozena Medina MD on 2/20/2020 at 3:13 PM

## 2020-02-21 LAB
ANION GAP SERPL CALCULATED.3IONS-SCNC: 12 MMOL/L (ref 7–19)
BANDED NEUTROPHILS RELATIVE PERCENT: 3 % (ref 0–5)
BASOPHILS ABSOLUTE: 0 K/UL (ref 0–0.2)
BASOPHILS RELATIVE PERCENT: 0 % (ref 0–1)
BLOOD CULTURE, ROUTINE: NORMAL
BUN BLDV-MCNC: 8 MG/DL (ref 6–20)
CALCIUM SERPL-MCNC: 8 MG/DL (ref 8.6–10)
CHLORIDE BLD-SCNC: 97 MMOL/L (ref 98–111)
CO2: 27 MMOL/L (ref 22–29)
CREAT SERPL-MCNC: 0.8 MG/DL (ref 0.5–0.9)
CULTURE, BLOOD 2: NORMAL
EOSINOPHILS ABSOLUTE: 0.22 K/UL (ref 0–0.6)
EOSINOPHILS RELATIVE PERCENT: 1 % (ref 0–5)
GFR NON-AFRICAN AMERICAN: >60
GLUCOSE BLD-MCNC: 118 MG/DL (ref 74–109)
HCT VFR BLD CALC: 32.6 % (ref 37–47)
HEMOGLOBIN: 10.5 G/DL (ref 12–16)
LYMPHOCYTES ABSOLUTE: 2.2 K/UL (ref 1.1–4.5)
LYMPHOCYTES RELATIVE PERCENT: 10 % (ref 20–40)
MCH RBC QN AUTO: 26.1 PG (ref 27–31)
MCHC RBC AUTO-ENTMCNC: 32.2 G/DL (ref 33–37)
MCV RBC AUTO: 80.9 FL (ref 81–99)
MONOCYTES ABSOLUTE: 1.8 K/UL (ref 0–0.9)
MONOCYTES RELATIVE PERCENT: 8 % (ref 0–10)
NEUTROPHILS ABSOLUTE: 17.8 K/UL (ref 1.5–7.5)
NEUTROPHILS RELATIVE PERCENT: 78 % (ref 50–65)
OVALOCYTES: ABNORMAL
PDW BLD-RTO: 14.1 % (ref 11.5–14.5)
PLATELET # BLD: 351 K/UL (ref 130–400)
PLATELET SLIDE REVIEW: ABNORMAL
PMV BLD AUTO: 9.2 FL (ref 9.4–12.3)
POTASSIUM REFLEX MAGNESIUM: 3.9 MMOL/L (ref 3.5–5)
RBC # BLD: 4.03 M/UL (ref 4.2–5.4)
SODIUM BLD-SCNC: 136 MMOL/L (ref 136–145)
WBC # BLD: 22 K/UL (ref 4.8–10.8)

## 2020-02-21 PROCEDURE — 2500000003 HC RX 250 WO HCPCS: Performed by: HOSPITALIST

## 2020-02-21 PROCEDURE — 6370000000 HC RX 637 (ALT 250 FOR IP): Performed by: HOSPITALIST

## 2020-02-21 PROCEDURE — 2580000003 HC RX 258: Performed by: HOSPITALIST

## 2020-02-21 PROCEDURE — 6360000002 HC RX W HCPCS: Performed by: INTERNAL MEDICINE

## 2020-02-21 PROCEDURE — 1210000000 HC MED SURG R&B

## 2020-02-21 PROCEDURE — 85027 COMPLETE CBC AUTOMATED: CPT

## 2020-02-21 PROCEDURE — 85007 BL SMEAR W/DIFF WBC COUNT: CPT

## 2020-02-21 PROCEDURE — 80048 BASIC METABOLIC PNL TOTAL CA: CPT

## 2020-02-21 PROCEDURE — 2580000003 HC RX 258: Performed by: INTERNAL MEDICINE

## 2020-02-21 PROCEDURE — 36415 COLL VENOUS BLD VENIPUNCTURE: CPT

## 2020-02-21 RX ORDER — AZITHROMYCIN 250 MG/1
500 TABLET, FILM COATED ORAL DAILY
Status: DISCONTINUED | OUTPATIENT
Start: 2020-02-22 | End: 2020-02-23 | Stop reason: HOSPADM

## 2020-02-21 RX ADMIN — AZITHROMYCIN MONOHYDRATE 500 MG: 500 INJECTION, POWDER, LYOPHILIZED, FOR SOLUTION INTRAVENOUS at 11:14

## 2020-02-21 RX ADMIN — FIDAXOMICIN 200 MG: 200 TABLET, FILM COATED ORAL at 21:13

## 2020-02-21 RX ADMIN — SODIUM CHLORIDE, PRESERVATIVE FREE 10 ML: 5 INJECTION INTRAVENOUS at 09:20

## 2020-02-21 RX ADMIN — FAMOTIDINE 20 MG: 10 INJECTION INTRAVENOUS at 09:20

## 2020-02-21 RX ADMIN — POTASSIUM CHLORIDE AND SODIUM CHLORIDE: 900; 150 INJECTION, SOLUTION INTRAVENOUS at 11:14

## 2020-02-21 RX ADMIN — SODIUM CHLORIDE, PRESERVATIVE FREE 10 ML: 5 INJECTION INTRAVENOUS at 21:14

## 2020-02-21 RX ADMIN — FIDAXOMICIN 200 MG: 200 TABLET, FILM COATED ORAL at 09:20

## 2020-02-21 RX ADMIN — FAMOTIDINE 20 MG: 10 INJECTION INTRAVENOUS at 21:13

## 2020-02-21 RX ADMIN — ACETAMINOPHEN 650 MG: 325 TABLET ORAL at 07:16

## 2020-02-21 ASSESSMENT — PAIN DESCRIPTION - PAIN TYPE: TYPE: ACUTE PAIN

## 2020-02-21 ASSESSMENT — PAIN DESCRIPTION - DESCRIPTORS: DESCRIPTORS: HEADACHE

## 2020-02-21 ASSESSMENT — PAIN SCALES - GENERAL
PAINLEVEL_OUTOF10: 5
PAINLEVEL_OUTOF10: 0

## 2020-02-21 ASSESSMENT — PAIN DESCRIPTION - LOCATION: LOCATION: HEAD

## 2020-02-21 NOTE — PROGRESS NOTES
Nutrition Assessment    Type and Reason for Visit: Reassess    Nutrition Recommendations: continue current POC    Nutrition Assessment: PO intake remains the same 25-75% on low fiber diet. Aware diarrhea episodes are decreasing. Malnutrition Assessment:  · Malnutrition Status: No malnutrition  · Context: Acute illness or injury  · Findings of the 6 clinical characteristics of malnutrition (Minimum of 2 out of 6 clinical characteristics is required to make the diagnosis of moderate or severe Protein Calorie Malnutrition based on AND/ASPEN Guidelines):  1. Energy Intake-Less than or equal to 75% of estimated energy requirement, Greater than or equal to 5 days    2. Weight Loss-1-2% loss, (5 days)  3. Fat Loss-No significant subcutaneous fat loss,    4. Muscle Loss-No significant muscle mass loss,    5. Fluid Accumulation-Moderate to severe fluid accumulation, Extremities  6.  Strength-Not measured    Nutrition Risk Level:  Moderate    Nutrition Diagnosis:   · Problem: Inadequate oral intake  · Etiology: related to Acute injury/trauma, Alteration in GI function     Signs and symptoms:  as evidenced by Intake 0-25%, Intake 25-50%, Diarrhea, Nausea    Objective Information:  · Nutrition-Focused Physical Findings: well nourished  · Wound Type: None  · Current Nutrition Therapies:  · Oral Diet Orders: Low Fiber   · Oral Diet intake: 26-50%, 51-75%  · Oral Nutrition Supplement (ONS) Orders: Standard High Calorie Oral Supplement  · ONS intake: 26-50%, 51-75%, %     · Anthropometric Measures:  · Ht: 5' 8\" (172.7 cm)   · Current Body Wt: 225 lb 3 oz (102.1 kg)  · Admission Body Wt: 218 lb 9 oz (99.1 kg)  · Usual Body Wt: 224 lb (101.6 kg)(9/2019)  · Ideal Body Wt: 140 lb (63.5 kg), % Ideal Body 157.4%  · BMI Classification: BMI 30.0 - 34.9 Obese Class I    Nutrition Interventions:   Continue current diet, Continue current ONS  Continued Inpatient Monitoring    Nutrition Evaluation:   · Evaluation: Progressing toward goals   · Goals: decreased d/n/v, po intake 50% or greater of advanced diet    · Monitoring: Meal Intake, Diet Tolerance, Skin Integrity, Diarrhea, Weight, Pertinent Labs      Electronically signed by Tony Wright MS, RD, LD on 2/21/20 at 1:28 PM    Contact Number: [phone]

## 2020-02-21 NOTE — PROGRESS NOTES
Called and spoke with microbiology. They verified that stool sent for Campylobacter culture had an antigen that was not positive.

## 2020-02-21 NOTE — PROGRESS NOTES
11:13 AM [address] [facility] [place] [facility]   Result: POSITIVE for Toxigenic C. difficile by EIA   CONTACT PRECAUTIONS INDICATED   Normal Range: Negative          RADIOLOGY      Xr Acute Abd Series Chest 1 Vw    Result Date: 2/16/2020  EXAMINATION: XR ACUTE ABD SERIES CHEST 1 VW 2/16/2020 12:07 PM HISTORY: Worsening C. difficile COMPARISON: 10/1/2010 FINDINGS: There is elevation of the right hemidiaphragm. There is atelectasis in the right lung base. There is trace pleural fluid on the right. No intraperitoneal free air is identified. The bowel gas pattern is nonspecific and nonobstructive. No pathologic calcifications are identified. There is sclerosis at the pubic symphysis. 1. Small right pleural effusion. 2. No acute findings in the abdomen. Signed by Dr Maryanne Wright on 2/16/2020 12:08 PM    Ct Head Wo Contrast    Result Date: 2/16/2020  EXAMINATION: CT HEAD WO CONTRAST 2/16/2020 12:41 PM HISTORY: Confusion Comparison: None Technique: Sequential imaging was performed from the vertex through the base of the skull without the use of IV contrast. Sagittal and coronal reformations were made from the original source data and reviewed. Automated exposure control was utilized for radiation dose reduction. Radiation dose:  mGy-cm Findings: There is no evidence of acute intracranial hemorrhage, mass, or midline shift. There is no evidence of acute territorial infarct. Ventricles appear normal in configuration. Basilar cisterns are patent. Posterior fossa appears grossly normal. Calvarium is intact. Visualized paranasal sinuses and mastoid air cells appear clear. Impression: No acute intracranial findings. Signed by Dr Maryanne Wright on 2/16/2020 12:42 PM    Ct Abdomen Pelvis W Iv Contrast Additional Contrast? None    Result Date: 2/16/2020  EXAMINATION: CT ABDOMEN PELVIS W IV CONTRAST 2/16/2020 12:43 PM HISTORY: C.  Difficile infection, worsening white count, evaluate for abscess or perforation Comparison: CT abdomen and pelvis contrast 2/10/2020 Technique: Sequential imaging was performed from the lung bases through the pubic symphysis after the administration of IV contrast. Sagittal and coronal reformations were made from the original source data and reviewed. Automated exposure control was utilized for radiation dose reduction. Radiation dose: DLP 2874 mGy-cm Findings: The visualized heart appears normal in size. A right pleural effusion is present. A large calcified granuloma is seen in the right lung base. Fluid tracks along the right major fissure. The liver, gallbladder, spleen, pancreas, and adrenal glands are unremarkable. Multiple tiny hypodensities are seen in the kidneys which are too small to characterize. A large septated cyst versus 2 adjacent cysts are noted in the right kidney. The kidneys otherwise enhance symmetrically and appear grossly normal. The ureters are decompressed bilaterally. The main portal and splenic veins and IVC appear grossly normal. The abdominal aorta appears normal in caliber. The origins of the celiac axis, superior mesenteric artery, and inferior mesenteric artery enhance normally. There are scattered atherosclerotic calcifications of the aorta and its branch vessels. No pathologically enlarged central mesenteric or retroperitoneal lymph nodes are identified. Numerous tiny reactive lymph nodes are evident. There is a small hiatal hernia. The stomach is otherwise unremarkable. The small bowel is decompressed without evidence of obstruction. There is a markedly abnormal appearance diffusely throughout the large bowel, compatible with history of C. difficile infection. There is marked wall thickening and hyperemia with surrounding inflammatory change and reactive free fluid. This extends from cecum to rectum. There is no evidence of perforation or abscess formation. No free air is seen in the abdomen. Urinary bladder is grossly normal in appearance.  The uterus is not visualized and may be surgically absent. No pelvic or inguinal lymphadenopathy is identified. Reactive free fluid extends into the pelvis. Review of the visualized osseous structures demonstrates no acute or aggressive lesions. Degenerative changes are noted in the spine. There is sclerosis of the pubic symphysis. Impression: 1. Pancolitis compatible with history of C. difficile infection. Reactive mesenteric inflammation and free fluid. This has significantly progressed when compared to the previous exam. No evidence of perforation or abscess formation. 2. New small right pleural effusion. 3. Atherosclerotic disease. Signed by Dr Justine Willis on 2/16/2020 1:14 PM                  Patient Active Problem List   Diagnosis    Anxiety    Cervical radiculopathy    Cyst of kidney, acquired    DDD (degenerative disc disease), lumbar    Depression    Essential hypertension, benign    Hemorrhoid    IBS (irritable bowel syndrome)    Bilateral low back pain with sciatica    Lumbar radiculopathy    Menopause    Mixed hyperlipidemia    Osteoporosis    Prediabetes    Sciatica    Obesity due to excess calories    Clostridium difficile colitis    Nausea vomiting and diarrhea    Failure of outpatient treatment    Dehydration    Hypokalemia    Hyponatremia    Hypophosphatemia       IMPRESSION:    Leukocytosis-improved      C. difficile colitis based on testing February 2 appear to failed outpatient treatment with oral vancomycin. She does report taking the appropriate dose 4 times a day. She did have a significant worsening in her CT scan. Her repeat C. difficile was negative. This begs the question of whether the initial C. difficile was a false positive and the patient reports no previous antibiotic use, this C. difficile is negative as patients will intermittently excrete the antigen or if there is another process such as a Campylobacter caused her to worsen.   The dayshift for micro is gone so it is unclear to me if a more recent states not detected. It is not clear. Discontinue IV fluids. Explained to patient that she will definitely have to maintain hydration with p.o. intake in order to be off IV antibiotics      D/w Ronald Leggett RN re: change to po azithro and dc IVF at patient requests  Will see again Monday.       Maira Rick MD

## 2020-02-21 NOTE — PROGRESS NOTES
4 Eyes Skin Assessment    Siobhan Row is being assessed upon: Transfer to New Unit    I agree that I, Bethene Free, along with Homa Romreo (either 2 RN's or 1 LPN and 1 RN) have performed a thorough Head to Toe Skin Assessment on the patient. ALL assessment sites listed below have been assessed. Areas assessed by both nurses:     [x]   Head, Face, and Ears   [x]   Shoulders, Back, and Chest  [x]   Arms, Elbows, and Hands   [x]   Coccyx, Sacrum, and Ischium  [x]   Legs, Feet, and Heels    Does the Patient have Skin Breakdown?  No    Chandler Prevention initiated: Yes  Wound Care Orders initiated: No    WOC nurse consulted for Pressure Injury (Stage 3,4, Unstageable, DTI, NWPT, and Complex wounds) and New or Established Ostomies: NA        Primary Nurse eSignature: Sharlene Schrader RN on 2/21/2020 at 11:49 AM      Co-Signer eSignature: Electronically signed by Homa Romero RN on 2/21/20 at 1:53 PM

## 2020-02-22 LAB
ANION GAP SERPL CALCULATED.3IONS-SCNC: 13 MMOL/L (ref 7–19)
BUN BLDV-MCNC: 6 MG/DL (ref 6–20)
CALCIUM SERPL-MCNC: 8.1 MG/DL (ref 8.6–10)
CHLORIDE BLD-SCNC: 99 MMOL/L (ref 98–111)
CO2: 26 MMOL/L (ref 22–29)
CREAT SERPL-MCNC: 0.7 MG/DL (ref 0.5–0.9)
GFR NON-AFRICAN AMERICAN: >60
GLUCOSE BLD-MCNC: 107 MG/DL (ref 74–109)
HCT VFR BLD CALC: 33.2 % (ref 37–47)
HEMOGLOBIN: 10.9 G/DL (ref 12–16)
MAGNESIUM: 1.4 MG/DL (ref 1.6–2.6)
MCH RBC QN AUTO: 26.2 PG (ref 27–31)
MCHC RBC AUTO-ENTMCNC: 32.8 G/DL (ref 33–37)
MCV RBC AUTO: 79.8 FL (ref 81–99)
PDW BLD-RTO: 14 % (ref 11.5–14.5)
PLATELET # BLD: 358 K/UL (ref 130–400)
PMV BLD AUTO: 9.7 FL (ref 9.4–12.3)
POTASSIUM REFLEX MAGNESIUM: 3.5 MMOL/L (ref 3.5–5)
RBC # BLD: 4.16 M/UL (ref 4.2–5.4)
SODIUM BLD-SCNC: 138 MMOL/L (ref 136–145)
WBC # BLD: 19.6 K/UL (ref 4.8–10.8)

## 2020-02-22 PROCEDURE — 36415 COLL VENOUS BLD VENIPUNCTURE: CPT

## 2020-02-22 PROCEDURE — 85027 COMPLETE CBC AUTOMATED: CPT

## 2020-02-22 PROCEDURE — 80048 BASIC METABOLIC PNL TOTAL CA: CPT

## 2020-02-22 PROCEDURE — 6370000000 HC RX 637 (ALT 250 FOR IP): Performed by: INTERNAL MEDICINE

## 2020-02-22 PROCEDURE — 99232 SBSQ HOSP IP/OBS MODERATE 35: CPT | Performed by: INTERNAL MEDICINE

## 2020-02-22 PROCEDURE — 1210000000 HC MED SURG R&B

## 2020-02-22 PROCEDURE — 2580000003 HC RX 258: Performed by: HOSPITALIST

## 2020-02-22 PROCEDURE — 2500000003 HC RX 250 WO HCPCS: Performed by: HOSPITALIST

## 2020-02-22 PROCEDURE — 6370000000 HC RX 637 (ALT 250 FOR IP): Performed by: HOSPITALIST

## 2020-02-22 PROCEDURE — 83735 ASSAY OF MAGNESIUM: CPT

## 2020-02-22 RX ADMIN — FAMOTIDINE 20 MG: 10 INJECTION INTRAVENOUS at 20:52

## 2020-02-22 RX ADMIN — SODIUM CHLORIDE, PRESERVATIVE FREE 10 ML: 5 INJECTION INTRAVENOUS at 09:00

## 2020-02-22 RX ADMIN — FAMOTIDINE 20 MG: 10 INJECTION INTRAVENOUS at 09:24

## 2020-02-22 RX ADMIN — SODIUM CHLORIDE, PRESERVATIVE FREE 10 ML: 5 INJECTION INTRAVENOUS at 20:53

## 2020-02-22 RX ADMIN — ACETAMINOPHEN 650 MG: 325 TABLET ORAL at 17:45

## 2020-02-22 RX ADMIN — AZITHROMYCIN MONOHYDRATE 500 MG: 250 TABLET ORAL at 11:47

## 2020-02-22 RX ADMIN — FIDAXOMICIN 200 MG: 200 TABLET, FILM COATED ORAL at 20:52

## 2020-02-22 RX ADMIN — FIDAXOMICIN 200 MG: 200 TABLET, FILM COATED ORAL at 09:00

## 2020-02-22 ASSESSMENT — PAIN DESCRIPTION - DESCRIPTORS
DESCRIPTORS: HEADACHE
DESCRIPTORS: HEADACHE

## 2020-02-22 ASSESSMENT — PAIN DESCRIPTION - ONSET
ONSET: ON-GOING
ONSET: ON-GOING

## 2020-02-22 ASSESSMENT — PAIN DESCRIPTION - LOCATION
LOCATION: HEAD
LOCATION: HEAD

## 2020-02-22 ASSESSMENT — PAIN DESCRIPTION - PROGRESSION
CLINICAL_PROGRESSION: GRADUALLY IMPROVING
CLINICAL_PROGRESSION: GRADUALLY IMPROVING

## 2020-02-22 ASSESSMENT — PAIN DESCRIPTION - PAIN TYPE
TYPE: ACUTE PAIN
TYPE: ACUTE PAIN

## 2020-02-22 ASSESSMENT — PAIN SCALES - GENERAL
PAINLEVEL_OUTOF10: 4
PAINLEVEL_OUTOF10: 3

## 2020-02-22 ASSESSMENT — PAIN DESCRIPTION - FREQUENCY
FREQUENCY: INTERMITTENT
FREQUENCY: INTERMITTENT

## 2020-02-22 NOTE — PROGRESS NOTES
Disease Maternal Grandfather     Cancer Paternal Grandmother         colon cancer    Alcohol Abuse Sister     Cirrhosis Sister     Stroke Paternal Aunt     Colon Cancer Maternal Cousin        Physical Examination      Vitals:  /72   Pulse 108   Temp 97.8 °F (36.6 °C) (Temporal)   Resp 15   Ht 5' 8\" (1.727 m)   Wt 225 lb 3.2 oz (102.2 kg)   SpO2 96%   BMI 34.24 kg/m²   Temp (24hrs), Av.3 °F (36.3 °C), Min:96.4 °F (35.8 °C), Max:97.8 °F (36.6 °C)      I/O (24Hr): No intake or output data in the 24 hours ending 20 1255    Physical Exam  General: No acute distress, sitting on the edge of the bed. HEENT: Atraumatic normocephalic  Cardiac: Normal S1-S2 no murmurs rub or gallop. Respiratory: clear To auscultation bilaterally, no rhonchi or rales  Abdomen: nontender to palpation, no organomegaly noted. Extremities: no tenderness, + lower extremity edema and hand swelling, moves all extremities  Psych: Affect normal and good eye contact        Labs/Imaging/Diagnostics   Labs:  CBC:  Recent Labs     20  0504   WBC 27.0* 22.0* 19.6*   RBC 4.45 4.03* 4.16*   HGB 11.6* 10.5* 10.9*   HCT 35.7* 32.6* 33.2*   MCV 80.2* 80.9* 79.8*   RDW 13.8 14.1 14.0    351 358     CHEMISTRIES:  Recent Labs     20  03420  02320  0504   * 136 138   K 3.9 3.9 3.5   CL 92* 97* 99   CO2 26 27 26   BUN 7 8 6   CREATININE 0.8 0.8 0.7   GLUCOSE 103 118* 107   MG  --   --  1.4*     PT/INR:No results for input(s): PROTIME, INR in the last 72 hours. APTT:No results for input(s): APTT in the last 72 hours. LIVER PROFILE:No results for input(s): AST, ALT, BILIDIR, BILITOT, ALKPHOS in the last 72 hours. Imaging Last 24 Hours:  No results found.       Assessment        Hospital Problems           Last Modified POA    * (Principal) Clostridium difficile colitis 2020 Yes    Hypophosphatemia 2020 Yes    Anxiety 2020 Yes    DDD (degenerative

## 2020-02-23 VITALS
WEIGHT: 229 LBS | RESPIRATION RATE: 18 BRPM | HEART RATE: 106 BPM | TEMPERATURE: 97.7 F | HEIGHT: 68 IN | BODY MASS INDEX: 34.71 KG/M2 | DIASTOLIC BLOOD PRESSURE: 70 MMHG | SYSTOLIC BLOOD PRESSURE: 117 MMHG | OXYGEN SATURATION: 91 %

## 2020-02-23 LAB
ANION GAP SERPL CALCULATED.3IONS-SCNC: 10 MMOL/L (ref 7–19)
BUN BLDV-MCNC: 6 MG/DL (ref 6–20)
CALCIUM SERPL-MCNC: 8.2 MG/DL (ref 8.6–10)
CHLORIDE BLD-SCNC: 99 MMOL/L (ref 98–111)
CO2: 28 MMOL/L (ref 22–29)
CREAT SERPL-MCNC: 0.8 MG/DL (ref 0.5–0.9)
GFR NON-AFRICAN AMERICAN: >60
GLUCOSE BLD-MCNC: 148 MG/DL (ref 74–109)
HCT VFR BLD CALC: 31.8 % (ref 37–47)
HEMOGLOBIN: 10.2 G/DL (ref 12–16)
MCH RBC QN AUTO: 25.9 PG (ref 27–31)
MCHC RBC AUTO-ENTMCNC: 32.1 G/DL (ref 33–37)
MCV RBC AUTO: 80.7 FL (ref 81–99)
PDW BLD-RTO: 13.8 % (ref 11.5–14.5)
PLATELET # BLD: 373 K/UL (ref 130–400)
PMV BLD AUTO: 8.8 FL (ref 9.4–12.3)
POTASSIUM REFLEX MAGNESIUM: 3.7 MMOL/L (ref 3.5–5)
RBC # BLD: 3.94 M/UL (ref 4.2–5.4)
SODIUM BLD-SCNC: 137 MMOL/L (ref 136–145)
WBC # BLD: 17.7 K/UL (ref 4.8–10.8)

## 2020-02-23 PROCEDURE — 80048 BASIC METABOLIC PNL TOTAL CA: CPT

## 2020-02-23 PROCEDURE — 6370000000 HC RX 637 (ALT 250 FOR IP): Performed by: INTERNAL MEDICINE

## 2020-02-23 PROCEDURE — 36415 COLL VENOUS BLD VENIPUNCTURE: CPT

## 2020-02-23 PROCEDURE — 6370000000 HC RX 637 (ALT 250 FOR IP): Performed by: HOSPITALIST

## 2020-02-23 PROCEDURE — 85027 COMPLETE CBC AUTOMATED: CPT

## 2020-02-23 RX ORDER — AZITHROMYCIN 500 MG/1
500 TABLET, FILM COATED ORAL DAILY
Qty: 3 TABLET | Refills: 0 | Status: SHIPPED | OUTPATIENT
Start: 2020-02-23 | End: 2020-02-26

## 2020-02-23 RX ORDER — TRIAMTERENE AND HYDROCHLOROTHIAZIDE 37.5; 25 MG/1; MG/1
TABLET ORAL
Qty: 180 TABLET | Refills: 3
Start: 2020-02-23 | End: 2020-03-04 | Stop reason: SDUPTHER

## 2020-02-23 RX ORDER — AZITHROMYCIN 500 MG/1
500 TABLET, FILM COATED ORAL DAILY
Qty: 3 TABLET | Refills: 0 | Status: SHIPPED | OUTPATIENT
Start: 2020-02-23 | End: 2020-02-23

## 2020-02-23 RX ORDER — UREA 10 %
2 LOTION (ML) TOPICAL NIGHTLY PRN
Status: DISCONTINUED | OUTPATIENT
Start: 2020-02-23 | End: 2020-02-23 | Stop reason: SDUPTHER

## 2020-02-23 RX ORDER — UREA 10 %
2 LOTION (ML) TOPICAL NIGHTLY PRN
Status: DISCONTINUED | OUTPATIENT
Start: 2020-02-23 | End: 2020-02-23 | Stop reason: HOSPADM

## 2020-02-23 RX ADMIN — Medication 2 MG: at 01:54

## 2020-02-23 RX ADMIN — FIDAXOMICIN 200 MG: 200 TABLET, FILM COATED ORAL at 10:03

## 2020-02-23 RX ADMIN — AZITHROMYCIN MONOHYDRATE 500 MG: 250 TABLET ORAL at 10:03

## 2020-02-23 ASSESSMENT — PAIN DESCRIPTION - PROGRESSION
CLINICAL_PROGRESSION: GRADUALLY IMPROVING

## 2020-02-23 NOTE — DISCHARGE SUMMARY
02/23/2020     02/23/2020     BMP:    Lab Results   Component Value Date    GLUCOSE 148 02/23/2020     02/23/2020    K 3.7 02/23/2020    CL 99 02/23/2020    CO2 28 02/23/2020    ANIONGAP 10 02/23/2020    BUN 6 02/23/2020    CREATININE 0.8 02/23/2020    CALCIUM 8.2 02/23/2020    LABGLOM >60 02/23/2020       Radiology Last 7 Days:  Xr Acute Abd Series Chest 1 Vw    Result Date: 2/16/2020  1. Small right pleural effusion. 2. No acute findings in the abdomen. Signed by Dr Carolyn Barnhart on 2/16/2020 12:08 PM    Ct Head Wo Contrast    Result Date: 2/16/2020  Impression: No acute intracranial findings. Signed by Dr Carolyn Barnhart on 2/16/2020 12:42 PM    Ct Abdomen Pelvis W Iv Contrast Additional Contrast? None    Result Date: 2/16/2020  Impression: 1. Pancolitis compatible with history of C. difficile infection. Reactive mesenteric inflammation and free fluid. This has significantly progressed when compared to the previous exam. No evidence of perforation or abscess formation. 2. New small right pleural effusion. 3. Atherosclerotic disease. Signed by Dr Carolyn Barnhart on 2/16/2020 1:14 PM      Discharge Plan   Disposition: Home    Provider Follow-Up:   Kristin Ellison MD  [address] [address]    Call on 2/24/2020  Call and schedule a hospital follow up for one week.      Brigitte Ignacio MD  [address] [place] [address]  [phone]      As needed       Patient Instructions   Diet: regular diet    Activity: activity as tolerated      Discharge Medications         Medication List      START taking these medications    azithromycin 500 MG tablet  Commonly known as:  ZITHROMAX  Take 1 tablet by mouth daily for 3 days     Fidaxomicin 200 MG Tabs tablet  Commonly known as:  DIFICID  Take 200 mg by mouth 2 times daily for 3 days        CHANGE how you take these medications    triamterene-hydrochlorothiazide 37.5-25 MG per tablet  Commonly known as: MAXZIDE-25  TAKE 2 TABLETS BY MOUTH ONE TIME DAILY.  DO NOT RESUME IF BP LESS THAN 100/70  What changed:  additional instructions        CONTINUE taking these medications    BIEST/PROGESTERONE TD     calcium-vitamin D 500-200 MG-UNIT per tablet  Commonly known as:  OSCAL-500     celecoxib 200 MG capsule  Commonly known as:  CELEBREX     chlordiazePOXIDE-clidinium 5-2.5 MG per capsule  Commonly known as:  LIBRAX  Take 2 capsules in am and 1-2 capsule at night before bed     dilTIAZem 240 MG extended release capsule  Commonly known as:  CARDIZEM CD  TAKE 1 CAPSULE BY MOUTH ONE TIME DAILY     Estriol Powd  TAKE 1 CAPSULE BY MOUTH DAILY AT BEDTIME.     ibuprofen 400 MG tablet  Commonly known as:  ADVIL;MOTRIN     metoprolol tartrate 50 MG tablet  Commonly known as:  LOPRESSOR  TAKE 1 TABLET BY MOUTH TWO TIMES A DAY     ondansetron 4 MG disintegrating tablet  Commonly known as:  Zofran ODT  Take 1 tablet by mouth every 8 hours as needed for Nausea or Vomiting     simvastatin 40 MG tablet  Commonly known as:  ZOCOR  Take 1 tablet by mouth nightly     WOMENS MULTIVITAMIN PO           Where to Get Your Medications      These medications were sent to [facility] [address] [place] [address]    Phone:  [phone]   · azithromycin 500 MG tablet  · Fidaxomicin 200 MG Tabs tablet     Information about where to get these medications is not yet available    Ask your nurse or doctor about these medications  · triamterene-hydrochlorothiazide 37.5-25 MG per tablet         Electronically signed by Gabino Lewis MD on 2/23/20 at 10:45 AM

## 2020-02-23 NOTE — PROGRESS NOTES
Pt was given discharge papers. She was also provided a 1x dose of Dificid to take tonight since her pharmacy will not be able to get it in stock before tomorrow. A price check from [employer] said her medications were just over $50 and the pt states that she can afford that. She is currently waiting on a wheelchair for discharge.

## 2020-02-24 ENCOUNTER — CARE COORDINATION (OUTPATIENT)
Dept: CASE MANAGEMENT | Age: 60
End: 2020-02-24

## 2020-02-24 NOTE — CARE COORDINATION
Alayna 45 Transitions Initial Follow Up Call    Call within 2 business days of discharge: Yes    Patient: Tobi Morris Patient : 1960   MRN: [id number]    Discharge Date: 20 RARS: Readmission Risk Score: 12      Last Discharge [facility]       Complaint Diagnosis Description Type Department Provider    20 Shortness of Breath C. difficile colitis . .. ED to Hosp-Admission (Discharged) (ADMITTED) ALYSE Vega MD; Rose Holden,... Spoke with: N/A    Facility: [place] [address]      Non-face-to-face services provided:  Reviewed encounter information for continuity of care prior to follow up phone call - chart notes, consults, progress notes, test results, med list, appointments, AVS, other information. Care Transitions 24 Hour Call    Care Transitions Interventions                                 Follow Up: Attempted to make contact with patient for an initial follow up call post discharge without success. Left a message regarding intent of call and call back information. Will try again at a later time.         Future Appointments   Date Time Provider Jeb Cm   2020  2:30 PM SORIN Rudd [facility]   3/24/2020  2:40 PM Marco Zabala MD [facility] Agusto Tesfaye [facility]   2020  2:30 PM [address] MD [facility]       Dandre Davis RN

## 2020-02-25 ENCOUNTER — CARE COORDINATION (OUTPATIENT)
Dept: CASE MANAGEMENT | Age: 60
End: 2020-02-25

## 2020-02-25 LAB — BLOOD CULTURE, ROUTINE: NORMAL

## 2020-02-25 NOTE — CARE COORDINATION
Alayna 45 Transitions Initial Follow Up Call    Call within 2 business days of discharge: Yes    Patient: Flip Zavaleta Patient : 1960   MRN: [id number]    Discharge Date: 20 RARS: Readmission Risk Score: 12      Last Discharge [facility]       Complaint Diagnosis Description Type Department Provider    20 Shortness of Breath C. difficile colitis . .. ED to Hosp-Admission (Discharged) (ADMITTED) ALYSE Dael MD; Bia Pearce,... Spoke with: [address] [facility] [address] [facility] [address]: Kaleb Kaern Galarza [address]    Non-face-to-face services provided:  Reviewed encounter information for continuity of care prior to follow up phone call - chart notes, consults, progress notes, test results, med list, appointments, AVS, other information. Care Transitions 24 Hour Call    Do you have any ongoing symptoms?:  No  Do you have a copy of your discharge instructions?:  Yes  Do you have all of your prescriptions and are they filled?:  Yes  Have you been contacted by a [employer] [address]?:  No  Have you scheduled your follow up appointment?:  Yes  How are you going to get to your appointment?:  Car - drive self  Were you discharged with any Home Care or Post Acute Services:  No  Do you feel like you have everything you need to keep you well at home?:  Yes  Care Transitions Interventions                                 Follow Up: Placed a call to the patient for a follow up post discharge. She reported that she is doing some better. She said she is still quite weak and tires out easily. She reported that she is taking her antibiotics and they are causing some ongoing diarrhea. She said it is not often, but enough to be noticed. She is eating small, frequent meals, snack like meals. She said she is drinking plenty. Denied any pain, nausea or other symptoms. Did a med review. She is aware of her hospital follow up appointments.   She did say that she has been having some swelling in her arms and lower legs. She said the arms have gone down, but she is still having some in her legs. Did encourage her to keep her legs elevated above the level of her heart when sitting or lying. Voiced understanding. Denied any shortness of breath, cough, congestion or other symptoms. To call prn any issues. Instructed on further follow up as indicated. Voiced understanding.      Plan for next call - assess overall status, CP status, edema, GI status, bowel patterns, appetite, abnormal signs and symptoms, effectiveness of medications, activity level and tolerance, falls/other unexpected events, findings from follow up appointments, medication/treatment changes, any additional teaching needs, etc.      Future Appointments   Date Time Provider Jeb Cm   2/27/2020  2:30 PM SORIN Blackman [facility]-[place]   3/24/2020  2:40 PM Ivelisse Cooper MD [facility] Tawana Deluna [facility]   6/17/2020  2:30 PM Alyce Cordova MD [facility]       Patricia Luke RN

## 2020-02-27 ENCOUNTER — OFFICE VISIT (OUTPATIENT)
Dept: FAMILY MEDICINE CLINIC | Age: 60
End: 2020-02-27
Payer: COMMERCIAL

## 2020-02-27 VITALS
SYSTOLIC BLOOD PRESSURE: 122 MMHG | HEART RATE: 99 BPM | BODY MASS INDEX: 32.54 KG/M2 | OXYGEN SATURATION: 99 % | TEMPERATURE: 99 F | DIASTOLIC BLOOD PRESSURE: 86 MMHG | WEIGHT: 214 LBS

## 2020-02-27 DIAGNOSIS — A04.72 CLOSTRIDIUM DIFFICILE COLITIS: ICD-10-CM

## 2020-02-27 LAB
ALBUMIN SERPL-MCNC: 3.1 G/DL (ref 3.5–5.2)
ALP BLD-CCNC: 64 U/L (ref 35–104)
ALT SERPL-CCNC: 52 U/L (ref 5–33)
ANION GAP SERPL CALCULATED.3IONS-SCNC: 18 MMOL/L (ref 7–19)
AST SERPL-CCNC: 34 U/L (ref 5–32)
BASOPHILS ABSOLUTE: 0.1 K/UL (ref 0–0.2)
BASOPHILS RELATIVE PERCENT: 0.9 % (ref 0–1)
BILIRUB SERPL-MCNC: 0.3 MG/DL (ref 0.2–1.2)
BUN BLDV-MCNC: 11 MG/DL (ref 6–20)
CALCIUM SERPL-MCNC: 9.2 MG/DL (ref 8.6–10)
CHLORIDE BLD-SCNC: 88 MMOL/L (ref 98–111)
CO2: 23 MMOL/L (ref 22–29)
CREAT SERPL-MCNC: 0.8 MG/DL (ref 0.5–0.9)
EOSINOPHILS ABSOLUTE: 0.1 K/UL (ref 0–0.6)
EOSINOPHILS RELATIVE PERCENT: 0.9 % (ref 0–5)
GFR NON-AFRICAN AMERICAN: >60
GLUCOSE BLD-MCNC: 109 MG/DL (ref 74–109)
HCT VFR BLD CALC: 36.6 % (ref 37–47)
HEMOGLOBIN: 11.4 G/DL (ref 12–16)
IMMATURE GRANULOCYTES #: 0.1 K/UL
LYMPHOCYTES ABSOLUTE: 1.8 K/UL (ref 1.1–4.5)
LYMPHOCYTES RELATIVE PERCENT: 18 % (ref 20–40)
MCH RBC QN AUTO: 25.9 PG (ref 27–31)
MCHC RBC AUTO-ENTMCNC: 31.1 G/DL (ref 33–37)
MCV RBC AUTO: 83 FL (ref 81–99)
MONOCYTES ABSOLUTE: 1.1 K/UL (ref 0–0.9)
MONOCYTES RELATIVE PERCENT: 10.9 % (ref 0–10)
NEUTROPHILS ABSOLUTE: 6.7 K/UL (ref 1.5–7.5)
NEUTROPHILS RELATIVE PERCENT: 68.7 % (ref 50–65)
PDW BLD-RTO: 14 % (ref 11.5–14.5)
PLATELET # BLD: 407 K/UL (ref 130–400)
PMV BLD AUTO: 11.1 FL (ref 9.4–12.3)
POTASSIUM SERPL-SCNC: 3.7 MMOL/L (ref 3.5–5)
RBC # BLD: 4.41 M/UL (ref 4.2–5.4)
SODIUM BLD-SCNC: 129 MMOL/L (ref 136–145)
TOTAL PROTEIN: 6.7 G/DL (ref 6.6–8.7)
WBC # BLD: 9.8 K/UL (ref 4.8–10.8)

## 2020-02-27 PROCEDURE — 99496 TRANSJ CARE MGMT HIGH F2F 7D: CPT | Performed by: CLINICAL NURSE SPECIALIST

## 2020-02-27 ASSESSMENT — ENCOUNTER SYMPTOMS
WHEEZING: 0
EYE REDNESS: 0
FACIAL SWELLING: 0
VOMITING: 0
DIARRHEA: 1
ABDOMINAL DISTENTION: 0
ABDOMINAL PAIN: 0
EYE DISCHARGE: 0
TROUBLE SWALLOWING: 0
CONSTIPATION: 0
SORE THROAT: 0
BACK PAIN: 0
COLOR CHANGE: 0
COUGH: 0
NAUSEA: 0
SINUS PRESSURE: 0
CHEST TIGHTNESS: 0
SHORTNESS OF BREATH: 0
EYE PAIN: 0

## 2020-02-27 NOTE — PROGRESS NOTES
Family History   Problem Relation Age of Onset    Diabetes Mother     High Blood Pressure Mother     Heart Attack Mother     Kidney Disease Father     Stroke Father     Other Father         renal failure    Diabetes Maternal Grandmother     Colon Cancer Maternal Grandmother     Heart Disease Maternal Grandfather     Cancer Paternal Grandmother         colon cancer    Alcohol Abuse Sister     Cirrhosis Sister     Stroke Paternal Aunt     Colon Cancer Maternal Cousin      Social History     Tobacco Use    Smoking status: Never Smoker    Smokeless tobacco: Never Used   Substance Use Topics    Alcohol use: No     Current Outpatient Medications   Medication Sig Dispense Refill    triamterene-hydrochlorothiazide (MAXZIDE-25) 37.5-25 MG per tablet TAKE 2 TABLETS BY MOUTH ONE TIME DAILY. DO NOT RESUME IF BP LESS THAN 100/70 180 tablet 3    ondansetron (ZOFRAN ODT) 4 MG disintegrating tablet Take 1 tablet by mouth every 8 hours as needed for Nausea or Vomiting 30 tablet 0    Estriol POWD TAKE 1 CAPSULE BY MOUTH DAILY AT BEDTIME.  30 g 0    diltiazem (CARDIZEM CD) 240 MG extended release capsule TAKE 1 CAPSULE BY MOUTH ONE TIME DAILY 90 capsule 3    metoprolol tartrate (LOPRESSOR) 50 MG tablet TAKE 1 TABLET BY MOUTH TWO TIMES A  tablet 3    simvastatin (ZOCOR) 40 MG tablet Take 1 tablet by mouth nightly 90 tablet 3    ibuprofen (ADVIL;MOTRIN) 400 MG tablet Take 400 mg by mouth 2 times daily      Estradiol-Estriol-Progesterone (BIEST/PROGESTERONE TD) Take 1 capsule by mouth nightly      celecoxib (CELEBREX) 200 MG capsule Take 200 mg by mouth daily      Multiple Vitamins-Minerals (WOMENS MULTIVITAMIN PO) Take 1 tablet by mouth daily      calcium-vitamin D (OSCAL-500) 500-200 MG-UNIT per tablet Take 1 tablet by mouth daily      chlordiazePOXIDE-clidinium (LIBRAX) 5-2.5 MG per capsule Take 2 capsules in am and 1-2 capsule at night before bed 360 capsule 1     No current Right eye: No discharge. Left eye: No discharge. Conjunctiva/sclera: Conjunctivae normal.      Pupils: Pupils are equal, round, and reactive to light. Neck:      Musculoskeletal: Normal range of motion and neck supple. Thyroid: No thyromegaly. Trachea: No tracheal deviation. Cardiovascular:      Rate and Rhythm: Normal rate and regular rhythm. Heart sounds: No murmur. Pulmonary:      Effort: Pulmonary effort is normal. No respiratory distress. Breath sounds: Normal breath sounds. No wheezing or rales. Abdominal:      General: Bowel sounds are normal. There is no distension. Palpations: Abdomen is soft. Tenderness: There is no abdominal tenderness. There is no rebound. Genitourinary:     Vagina: Normal.   Musculoskeletal: Normal range of motion. General: No swelling, tenderness or deformity. Lymphadenopathy:      Cervical: No cervical adenopathy. Skin:     General: Skin is warm and dry. Findings: No erythema or rash. Neurological:      General: No focal deficit present. Mental Status: She is alert and oriented to person, place, and time. Mental status is at baseline. Psychiatric:         Mood and Affect: Mood normal.         Behavior: Behavior normal.         Thought Content: Thought content normal.         ASSESSMENT/PLAN:  1. Clostridium difficile colitis  Improved  Hospital records reviewed  She has completed rx, has follow up with GI  DIPTI, activity as tolerated  Plan to return to work light duty 3/9  - CBC Auto Differential; Future  - Comprehensive Metabolic Panel; Future          Return for already scheduled.

## 2020-02-28 ENCOUNTER — CARE COORDINATION (OUTPATIENT)
Dept: OTHER | Facility: CLINIC | Age: 60
End: 2020-02-28

## 2020-02-28 ENCOUNTER — TELEPHONE (OUTPATIENT)
Dept: FAMILY MEDICINE CLINIC | Age: 60
End: 2020-02-28

## 2020-02-28 NOTE — CARE COORDINATION
Alayna 45 Transitions Follow Up Call    2020    Patient: Ash Martinez  Patient : 1960   MRN: [id number]  Reason for Admission: C Difficile   Discharge Date: 20 RARS: Readmission Risk Score: 12         Spoke with: Timo Fournier Transitions Subsequent and Final Call    Subsequent and Final Calls  Have your medications changed?:  No  Do you have any questions related to your medications?:  No  Do you have any needs or concerns that I can assist you with?:  No  Care Transitions Interventions                          Other Interventions: Follow Up : Spoke briefly with patient she was napping when I called. Pt said she is doing better today and she did have her follow up appt yesterday. Pt agreed for me to call her next week.    Future Appointments   Date Time Provider Jeb Cm   3/24/2020  2:40 PM Humberto Matute MD [facility] Cindy [facility]   2020  2:30 PM Dave Lee MD [facility] JAMIL HORN [facility]       Alfred Lal, RN

## 2020-02-28 NOTE — TELEPHONE ENCOUNTER
Result Notes for Comprehensive Metabolic Panel     Notes recorded by Stephani Bautista LPN on 2/03/8258 at 90:34 AM CST  Notified patient of these results and treatment plan. Patient voiced understanding.  ------    Notes recorded by SORIN Call on 2/28/2020 at 8:51 AM CST  Her white count is back to normal and anemia improved.  Her sodium is slightly low, I would just make sure not overly hydrating with water.  If she develops any tremors or muscle weakness or other neuro symptoms let us know, but this will most likely come up with improved diet and fluid intake back to normal ratio.

## 2020-03-03 ENCOUNTER — TELEPHONE (OUTPATIENT)
Dept: FAMILY MEDICINE CLINIC | Age: 60
End: 2020-03-03

## 2020-03-03 NOTE — TELEPHONE ENCOUNTER
Attempted to return call to lucia. Enoc Crocker states that she will be out of the office for the next 4-6 weeks and that Aquilino Batista will be covering for her. Sent an email to Murali Brown requesting a return call regarding an employees return to work form.

## 2020-03-03 NOTE — TELEPHONE ENCOUNTER
Lola Bryan with home health is needing clarification on the patients return to work form with light duty restrictions. Lola Bryan needs to know if there is a limit on the number of pounds the patient can push/pull or lift and/or if the patient could participate in a code violet or not. Please advise. Lola Bryan can be reached at ext. [phone].

## 2020-03-03 NOTE — TELEPHONE ENCOUNTER
Clay Whaley called to request a refill on her medication. Last office visit : 2/27/2020   Next office visit : 6/17/2020     Requested Prescriptions     Pending Prescriptions Disp Refills    chlordiazePOXIDE-clidinium (LIBRAX) 5-2.5 MG per capsule 360 capsule 1     Sig: Take 2 capsules in am and 1-2 capsule at night before bed    dilTIAZem (CARDIZEM CD) 240 MG extended release capsule 90 capsule 3     Sig: TAKE 1 CAPSULE BY MOUTH ONE TIME DAILY    triamterene-hydrochlorothiazide (MAXZIDE-25) 37.5-25 MG per tablet 180 tablet 3     Sig: TAKE 2 TABLETS BY MOUTH ONE TIME DAILY.  DO NOT RESUME IF BP LESS THAN 100/70            Stevan Williamson LPN

## 2020-03-03 NOTE — TELEPHONE ENCOUNTER
I would recommend while on light duty, no lifting or pushing or pulling over 20lb, no code violet while on light duty

## 2020-03-04 ENCOUNTER — CARE COORDINATION (OUTPATIENT)
Dept: OTHER | Facility: CLINIC | Age: 60
End: 2020-03-04

## 2020-03-04 ENCOUNTER — TELEPHONE (OUTPATIENT)
Dept: FAMILY MEDICINE CLINIC | Age: 60
End: 2020-03-04

## 2020-03-04 RX ORDER — TRIAMTERENE AND HYDROCHLOROTHIAZIDE 37.5; 25 MG/1; MG/1
TABLET ORAL
Qty: 180 TABLET | Refills: 3 | Status: SHIPPED | OUTPATIENT
Start: 2020-03-04 | End: 2021-03-30 | Stop reason: SDUPTHER

## 2020-03-04 RX ORDER — CHLORDIAZEPOXIDE HYDROCHLORIDE AND CLIDINIUM BROMIDE 5; 2.5 MG/1; MG/1
CAPSULE ORAL
Qty: 360 CAPSULE | Refills: 1 | Status: SHIPPED | OUTPATIENT
Start: 2020-03-04 | End: 2020-08-06

## 2020-03-04 RX ORDER — DILTIAZEM HYDROCHLORIDE 240 MG/1
CAPSULE, COATED, EXTENDED RELEASE ORAL
Qty: 90 CAPSULE | Refills: 3 | Status: SHIPPED | OUTPATIENT
Start: 2020-03-04 | End: 2021-02-23 | Stop reason: SDUPTHER

## 2020-03-04 NOTE — TELEPHONE ENCOUNTER
Patient called to ask if her short term disability papers have been faxed here to the office and if her \"hormone pill\" had been called in. I returned the call to inform her that the medication was called in yesterday and I let her know that nothing was received on her short term disability.

## 2020-03-06 ENCOUNTER — TELEPHONE (OUTPATIENT)
Dept: FAMILY MEDICINE CLINIC | Age: 60
End: 2020-03-06

## 2020-03-06 NOTE — TELEPHONE ENCOUNTER
The patient had left a voicemail stating that she had been released to go back to work but her release form needed information about her restrictions. I spoke with the patient and she said that the employee nurse called and she could return to work on 3/09/2020 so this was resolved.

## 2020-03-10 ENCOUNTER — CARE COORDINATION (OUTPATIENT)
Dept: OTHER | Facility: CLINIC | Age: 60
End: 2020-03-10

## 2020-03-10 NOTE — CARE COORDINATION
ACM attempted to reach patient for Care Transitions call. HIPAA compliant message left requesting a return phone call at patients convenience. Will continue to follow.      Misty MEZA, RN- [facility]   Associate Care Manager  [phone]

## 2020-03-14 ENCOUNTER — OFFICE VISIT (OUTPATIENT)
Dept: URGENT CARE | Age: 60
End: 2020-03-14
Payer: COMMERCIAL

## 2020-03-14 VITALS
WEIGHT: 210 LBS | RESPIRATION RATE: 16 BRPM | BODY MASS INDEX: 31.83 KG/M2 | DIASTOLIC BLOOD PRESSURE: 72 MMHG | SYSTOLIC BLOOD PRESSURE: 105 MMHG | HEART RATE: 85 BPM | HEIGHT: 68 IN | TEMPERATURE: 99.9 F | OXYGEN SATURATION: 96 %

## 2020-03-14 LAB
INFLUENZA A ANTIBODY: NORMAL
INFLUENZA B ANTIBODY: NEGATIVE

## 2020-03-14 PROCEDURE — 99213 OFFICE O/P EST LOW 20 MIN: CPT | Performed by: PHYSICIAN ASSISTANT

## 2020-03-14 PROCEDURE — 87804 INFLUENZA ASSAY W/OPTIC: CPT | Performed by: PHYSICIAN ASSISTANT

## 2020-03-14 RX ORDER — AZITHROMYCIN 250 MG/1
TABLET, FILM COATED ORAL
Qty: 6 TABLET | Refills: 0 | Status: SHIPPED | OUTPATIENT
Start: 2020-03-14 | End: 2020-03-14 | Stop reason: SDUPTHER

## 2020-03-14 RX ORDER — AZITHROMYCIN 250 MG/1
TABLET, FILM COATED ORAL
Qty: 6 TABLET | Refills: 0 | Status: SHIPPED | OUTPATIENT
Start: 2020-03-14 | End: 2020-07-22 | Stop reason: ALTCHOICE

## 2020-03-14 RX ORDER — BENZONATATE 100 MG/1
100-200 CAPSULE ORAL 3 TIMES DAILY PRN
Qty: 60 CAPSULE | Refills: 0 | Status: SHIPPED | OUTPATIENT
Start: 2020-03-14 | End: 2020-03-24

## 2020-03-14 RX ORDER — BENZONATATE 100 MG/1
100-200 CAPSULE ORAL 3 TIMES DAILY PRN
Qty: 60 CAPSULE | Refills: 0 | Status: SHIPPED | OUTPATIENT
Start: 2020-03-14 | End: 2020-03-14 | Stop reason: SDUPTHER

## 2020-03-14 NOTE — LETTER
[id number] [facility]  [address] [place] [id number]  Phone: [phone]    Gurwinder Guthrie        March 14, 2020     Patient: Lizeth Farfan   YOB: 1960   Date of Visit: 3/14/2020       To Whom it May Concern:    Beena Del Rio was seen in my clinic on 3/14/2020. She may return to work on 3/18/2020. If you have any questions or concerns, please don't hesitate to call.     Sincerely,         Israel Morgan PA-C

## 2020-03-17 ENCOUNTER — TELEPHONE (OUTPATIENT)
Dept: FAMILY MEDICINE CLINIC | Age: 60
End: 2020-03-17

## 2020-03-17 PROBLEM — E86.0 DEHYDRATION: Status: RESOLVED | Noted: 2020-02-16 | Resolved: 2020-03-17

## 2020-03-18 ASSESSMENT — ENCOUNTER SYMPTOMS
SINUS PRESSURE: 1
SINUS PAIN: 1
SORE THROAT: 1
RHINORRHEA: 1
COUGH: 1

## 2020-03-18 NOTE — PROGRESS NOTES
Subjective:      Patient ID: Isabel Acevedo is a 61 y.o. female. HPI  Ms. Bruner presents with cough, fever, nasal congestion for the last few days. She states a coworker came in with the same symptoms and was coughing and sneezing everywhere prior to her getting sick. Results for orders placed or performed in visit on 03/14/20   POCT Influenza A/B   Result Value Ref Range    Influenza A Ab negtive     Influenza B Ab negative      Isabel Acevedo is a 61 y.o. female with the following history as recorded in [facility]:  Patient Active Problem List    Diagnosis Date Noted    Hypophosphatemia 02/18/2020     Priority: Medium    Clostridium difficile colitis 02/16/2020    Nausea vomiting and diarrhea 02/16/2020    Failure of outpatient treatment 02/16/2020    Hypokalemia 02/16/2020    Hyponatremia 02/16/2020    Obesity due to excess calories 12/16/2019    Anxiety 09/15/2017    Cervical radiculopathy 09/15/2017    Cyst of kidney, acquired 09/15/2017    DDD (degenerative disc disease), lumbar 09/15/2017    Depression 09/15/2017    Essential hypertension, benign 09/15/2017    Hemorrhoid 09/15/2017    IBS (irritable bowel syndrome) 09/15/2017    Bilateral low back pain with sciatica 09/15/2017    Lumbar radiculopathy 09/15/2017    Menopause 09/15/2017    Mixed hyperlipidemia 09/15/2017    Osteoporosis 09/15/2017    Prediabetes 09/15/2017    Sciatica 09/15/2017     Current Outpatient Medications   Medication Sig Dispense Refill    benzonatate (TESSALON) 100 MG capsule Take 1-2 capsules by mouth 3 times daily as needed for Cough 60 capsule 0    azithromycin (ZITHROMAX) 250 MG tablet Take 2 tablets by mouth day one.   Then take 1 po daily for 4 days 6 tablet 0    chlordiazePOXIDE-clidinium (LIBRAX) 5-2.5 MG per capsule Take 2 capsules in am and 1-2 capsule at night before bed 360 capsule 1    dilTIAZem (CARDIZEM CD) 240 MG extended release capsule TAKE 1 CAPSULE BY MOUTH ONE TIME DAILY 90 capsule 3 Family History   Problem Relation Age of Onset    Diabetes Mother     High Blood Pressure Mother     Heart Attack Mother     Kidney Disease Father     Stroke Father     Other Father         renal failure    Diabetes Maternal Grandmother     Colon Cancer Maternal Grandmother     Heart Disease Maternal Grandfather     Cancer Paternal Grandmother         colon cancer    Alcohol Abuse Sister     Cirrhosis Sister     Stroke Paternal [de-identified]     Colon Cancer Maternal Cousin      Social History     Tobacco Use    Smoking status: Never Smoker    Smokeless tobacco: Never Used   Substance Use Topics    Alcohol use: No       Review of Systems   Constitutional: Positive for fever. HENT: Positive for congestion, postnasal drip, rhinorrhea, sinus pressure, sinus pain, sneezing and sore throat. Respiratory: Positive for cough. All other systems reviewed and are negative. Objective:   Physical Exam  Vitals signs reviewed. Constitutional:       Appearance: She is ill-appearing. HENT:      Right Ear: Tympanic membrane, ear canal and external ear normal.      Left Ear: Tympanic membrane, ear canal and external ear normal.      Nose: Congestion present. Mouth/Throat:      Mouth: Mucous membranes are moist.      Pharynx: Posterior oropharyngeal erythema present. Eyes:      Extraocular Movements: Extraocular movements intact. Conjunctiva/sclera: Conjunctivae normal.      Pupils: Pupils are equal, round, and reactive to light. Cardiovascular:      Rate and Rhythm: Normal rate and regular rhythm. Neurological:      General: No focal deficit present. Mental Status: She is alert and oriented to person, place, and time. Psychiatric:         Mood and Affect: Mood normal.         Behavior: Behavior normal.         Thought Content:  Thought content normal.         Judgment: Judgment normal.         Assessment:      Likely viral nasopharyngitis      Plan:      I encouraged increased fluids and rest as well as OTC cold medications. I prescribed tessalon for her cough. Pt requested a z pack. I explained to her this is likely viral.  I did prescribe a z pack, but instructed her not to start it unless her symptoms persisted more than a week. I instructed her to return if her symptoms worsened or failed to improve.           Maris Dubon PA-C

## 2020-03-24 ENCOUNTER — CARE COORDINATION (OUTPATIENT)
Dept: OTHER | Facility: CLINIC | Age: 60
End: 2020-03-24

## 2020-03-31 ENCOUNTER — CARE COORDINATION (OUTPATIENT)
Dept: OTHER | Facility: CLINIC | Age: 60
End: 2020-03-31

## 2020-03-31 SDOH — ECONOMIC STABILITY: TRANSPORTATION INSECURITY
IN THE PAST 12 MONTHS, HAS THE LACK OF TRANSPORTATION KEPT YOU FROM MEDICAL APPOINTMENTS OR FROM GETTING MEDICATIONS?: NO

## 2020-03-31 SDOH — ECONOMIC STABILITY: TRANSPORTATION INSECURITY
IN THE PAST 12 MONTHS, HAS LACK OF TRANSPORTATION KEPT YOU FROM MEETINGS, WORK, OR FROM GETTING THINGS NEEDED FOR DAILY LIVING?: NO

## 2020-03-31 SDOH — ECONOMIC STABILITY: INCOME INSECURITY: HOW HARD IS IT FOR YOU TO PAY FOR THE VERY BASICS LIKE FOOD, HOUSING, MEDICAL CARE, AND HEATING?: NOT VERY HARD

## 2020-03-31 NOTE — CARE COORDINATION
Ambulatory Care Coordination Note  3/31/2020  CM Risk Score: 4  Charlson 10 Year Mortality Risk Score: 4%     ACC: Semaj Mejia, RN    Summary Note: Spoke with patient who is back to work now. She is taking a daily probiotic and said that has really helped with her bowel movements being more normal. Pt said she is feeling pretty good, and said she did not complete the round of Z pack because it caused diarrhea and she has no fever, or cough or sore throat. Pt is agreeable to participate in the Associate Care Program. Welcome letter mailed to patient. Will develop our goals over the next several calls. Ambulatory Care Coordination Assessment    Care Coordination Protocol  Program Enrollment:  Complex Care  Referral from Primary Care Provider:  No  Week 1 - Initial Assessment     Do you have all of your prescriptions and are they filled?:  Yes     Do you have Home O2 Therapy?:  No      Ability to seek help/take action for Emergent Urgent situations i.e. fire, crime, inclement weather or health crisis. :  Independent  Ability to ambulate to restroom:  Independent  Ability handle personal hygeine needs (bathing/dressing/grooming): Independent  Ability to manage Medications: Independent  Ability to prepare Food Preparation:  Independent  Ability to maintain home (clean home, laundry): Independent  Ability to drive and/or has transportation:  Independent  Ability to do shopping:  Independent  Ability to manage finances: Independent  Is patient able to live independently?:  Yes     Current Housing:  Private Residence        Per the Fall Risk Screening, did the patient have 2 or more falls or 1 fall with injury in the past year?:  No           Suggested Interventions and Community Resources                  Prior to Admission medications    Medication Sig Start Date End Date Taking?  Authorizing Provider   Charlie BOURNE TAKE 1 CAPSULE BY MOUTH AT BEDTIME. 3/30/20   Ryan Longoria MD   azithromycin

## 2020-03-31 NOTE — LETTER
If you have any questions or concerns, please don't hesitate to call.     Sincerely,        Juana Stinson RN

## 2020-04-16 ENCOUNTER — CARE COORDINATION (OUTPATIENT)
Dept: OTHER | Facility: CLINIC | Age: 60
End: 2020-04-16

## 2020-05-01 ENCOUNTER — CARE COORDINATION (OUTPATIENT)
Dept: OTHER | Facility: CLINIC | Age: 60
End: 2020-05-01

## 2020-05-01 NOTE — CARE COORDINATION
Ambulatory Care Coordination Note  5/1/2020  CM Risk Score: 4  Charlson 10 Year Mortality Risk Score: 4%     ACC: Susan Vo, RN    Summary Note: Spoke briefly with patient who said she was just getting off work but she is checking her blood pressure at home like she is supposed to, she did not have the readings with her as she was not home but agreed for us to review on next call. Pt said work is good but a little stressful, they are all wearing masks and gloves. Denies any problem with her bowels. Will follow up in more detail on next call    Elia Brooks BSN, [address]  [phone]          Care Coordination Interventions    Program Enrollment:  Complex Care  Referral from Primary Care Provider:  No  Suggested Interventions and Community Resources         Goals Addressed    None         Prior to Admission medications    Medication Sig Start Date End Date Taking? Authorizing Provider   Charlie BOURNE TAKE 1 CAPSULE BY MOUTH AT BEDTIME. 3/30/20   Ty Walls MD   azithromycin (ZITHROMAX) 250 MG tablet Take 2 tablets by mouth day one. Then take 1 po daily for 4 days 3/14/20   Carol Garcia PA-C   chlordiazePOXIDE-clidinium (LIBRAX) 5-2.5 MG per capsule Take 2 capsules in am and 1-2 capsule at night before bed 3/4/20 6/6/20  Anice Dines, APRFRANKI   dilTIAZem (CARDIZEM CD) 240 MG extended release capsule TAKE 1 CAPSULE BY MOUTH ONE TIME DAILY 3/4/20   Anice Dines, APRN   triamterene-hydrochlorothiazide (MAXZIDE-25) 37.5-25 MG per tablet TAKE 2 TABLETS BY MOUTH ONE TIME DAILY.  DO NOT RESUME IF BP LESS THAN 100/70 3/4/20   Anice Dines, APRN   ondansetron (ZOFRAN ODT) 4 MG disintegrating tablet Take 1 tablet by mouth every 8 hours as needed for Nausea or Vomiting 2/10/20   Justin Welch MD   metoprolol tartrate (LOPRESSOR) 50 MG tablet TAKE 1 TABLET BY MOUTH TWO TIMES A DAY 9/12/19   Ty Walls MD   simvastatin (ZOCOR) 40 MG tablet Take 1 tablet by

## 2020-05-15 ENCOUNTER — CARE COORDINATION (OUTPATIENT)
Dept: OTHER | Facility: CLINIC | Age: 60
End: 2020-05-15

## 2020-05-15 NOTE — CARE COORDINATION
Ambulatory Care Coordination Note  5/15/2020  CM Risk Score: 4  Charlson 10 Year Mortality Risk Score: 4%     ACC: Susan Vo, RN    Summary Note: ACM attempted to reach patient for Care Transitions call. HIPAA compliant message left requesting a return phone call at patients convenience. Will continue to follow. Care Coordination Interventions    Program Enrollment:  Complex Care  Referral from Primary Care Provider:  No  Suggested Interventions and Community Resources         Goals Addressed    None         Prior to Admission medications    Medication Sig Start Date End Date Taking? Authorizing Provider   Charlie BOURNE TAKE 1 CAPSULE BY MOUTH AT BEDTIME. 3/30/20   Ty Walls MD   azithromycin (ZITHROMAX) 250 MG tablet Take 2 tablets by mouth day one. Then take 1 po daily for 4 days 3/14/20   Carol Garcia PA-C   chlordiazePOXIDE-clidinium (LIBRAX) 5-2.5 MG per capsule Take 2 capsules in am and 1-2 capsule at night before bed 3/4/20 6/6/20  Anice Dines, APRN   dilTIAZem (CARDIZEM CD) 240 MG extended release capsule TAKE 1 CAPSULE BY MOUTH ONE TIME DAILY 3/4/20   Anice Dines, APRN   triamterene-hydrochlorothiazide (MAXZIDE-25) 37.5-25 MG per tablet TAKE 2 TABLETS BY MOUTH ONE TIME DAILY.  DO NOT RESUME IF BP LESS THAN 100/70 3/4/20   Anice Dines, APRN   ondansetron (ZOFRAN ODT) 4 MG disintegrating tablet Take 1 tablet by mouth every 8 hours as needed for Nausea or Vomiting 2/10/20   Justin Welch MD   metoprolol tartrate (LOPRESSOR) 50 MG tablet TAKE 1 TABLET BY MOUTH TWO TIMES A DAY 9/12/19   Ty Walls MD   simvastatin (ZOCOR) 40 MG tablet Take 1 tablet by mouth nightly 9/12/19 6/10/20  Ty Walls MD   ibuprofen (ADVIL;MOTRIN) 400 MG tablet Take 400 mg by mouth 2 times daily    Historical Provider, MD   Estradiol-Estriol-Progesterone (BIEST/PROGESTERONE TD) Take 1 capsule by mouth nightly    Historical Provider, MD   celecoxib (CELEBREX) 200 MG capsule Take 200 mg by mouth daily    Historical Provider, MD   Multiple Vitamins-Minerals (WOMENS MULTIVITAMIN PO) Take 1 tablet by mouth daily    Historical Provider, MD   calcium-vitamin D (OSCAL-500) 500-200 MG-UNIT per tablet Take 1 tablet by mouth daily    Historical Provider, MD       Future Appointments   Date Time Provider Jeb Cm   6/17/2020  2:30 PM [facility]

## 2020-06-02 ENCOUNTER — CARE COORDINATION (OUTPATIENT)
Dept: OTHER | Facility: CLINIC | Age: 60
End: 2020-06-02

## 2020-06-02 NOTE — PATIENT INSTRUCTIONS
\"reduced-sodium\" and \"light sodium\" may still have too much sodium. ? Flavor your food with garlic, lemon juice, onion, vinegar, herbs, and spices instead of salt. Do not use soy sauce, steak sauce, onion salt, garlic salt, mustard, or ketchup on your food. ? Use less salt (or none) when recipes call for it. You can often use half the salt a recipe calls for without losing flavor. · Be physically active. Get at least 30 minutes of exercise on most days of the week. Walking is a good choice. You also may want to do other activities, such as running, swimming, cycling, or playing tennis or team sports. · Limit alcohol to 2 drinks a day for men and 1 drink a day for women. · Eat plenty of fruits, vegetables, and low-fat dairy products. Eat less saturated and total fats. How is high blood pressure treated? · Your doctor will suggest making lifestyle changes to help your heart. For example, your doctor may ask you to eat healthy foods, quit smoking, lose extra weight, and be more active. · If lifestyle changes don't help enough, your doctor may recommend that you take medicine. · When blood pressure is very high, medicines are needed to lower it. Follow-up care is a key part of your treatment and safety. Be sure to make and go to all appointments, and call your doctor if you are having problems. It's also a good idea to know your test results and keep a list of the medicines you take. Where can you learn more? Go to [email or web address] org and sign in to your [employer] account. Enter P501 in the Chimerix box to learn more about \"Learning About High Blood Pressure. \"     If you do not have an account, please click on the \"Sign Up Now\" link. Current as of: December 16, 2019               Content Version: 12.5  © 8224-9461 Healthwise, Incorporated. Care instructions adapted under license by [facility] ([place]).  If you have questions about a medical condition or this instruction, always ask your healthcare professional. [place] [address] any warranty or liability for your use of this information.

## 2020-06-11 ENCOUNTER — HOSPITAL ENCOUNTER (OUTPATIENT)
Dept: WOMENS IMAGING | Age: 60
Discharge: HOME | End: 2020-06-11
Payer: COMMERCIAL

## 2020-06-11 PROCEDURE — 77080 DXA BONE DENSITY AXIAL: CPT

## 2020-06-16 ENCOUNTER — TELEPHONE (OUTPATIENT)
Dept: FAMILY MEDICINE CLINIC | Age: 60
End: 2020-06-16

## 2020-06-17 ENCOUNTER — CARE COORDINATION (OUTPATIENT)
Dept: OTHER | Facility: CLINIC | Age: 60
End: 2020-06-17

## 2020-06-17 NOTE — CARE COORDINATION
Ambulatory Care Coordination Note  6/17/2020  CM Risk Score: 4  Charlson 10 Year Mortality Risk Score: 4%     ACC: Stacey Zuñiga, RN    Summary Note: Spoke briefly with patient, said she was still at work, said she wants me to call her one day next week so she has longer to talk, said she had her dexascan done and said she has been feeling pretty good lately. We did not have time to review goals and b/p but will discuss on future call. Care Coordination Interventions    Program Enrollment:  Complex Care  Referral from Primary Care Provider:  No  Suggested Interventions and Community Resources         Goals Addressed    None         Prior to Admission medications    Medication Sig Start Date End Date Taking? Authorizing Provider   Charlie BOURNE TAKE 1 CAPSULE BY MOUTH AT BEDTIME. 3/30/20   Colette Hernadez MD   azithromycin (ZITHROMAX) 250 MG tablet Take 2 tablets by mouth day one. Then take 1 po daily for 4 days 3/14/20   Deirdre Garcia PA-C   chlordiazePOXIDE-clidinium (LIBRAX) 5-2.5 MG per capsule Take 2 capsules in am and 1-2 capsule at night before bed 3/4/20 6/6/20  SORIN Hernandez   dilTIAZem (CARDIZEM CD) 240 MG extended release capsule TAKE 1 CAPSULE BY MOUTH ONE TIME DAILY 3/4/20   SORIN Hernandez   triamterene-hydrochlorothiazide (MAXZIDE-25) 37.5-25 MG per tablet TAKE 2 TABLETS BY MOUTH ONE TIME DAILY.  DO NOT RESUME IF BP LESS THAN 100/70 3/4/20   SORIN Hernandez   ondansetron (ZOFRAN ODT) 4 MG disintegrating tablet Take 1 tablet by mouth every 8 hours as needed for Nausea or Vomiting 2/10/20   Leo Bolanos MD   metoprolol tartrate (LOPRESSOR) 50 MG tablet TAKE 1 TABLET BY MOUTH TWO TIMES A DAY 9/12/19   Colette Hernadez MD   simvastatin (ZOCOR) 40 MG tablet Take 1 tablet by mouth nightly 9/12/19 6/10/20  Colette Hernadez MD   ibuprofen (ADVIL;MOTRIN) 400 MG tablet Take 400 mg by mouth 2 times daily    Historical Provider, MD Estradiol-Estriol-Progesterone (BIEST/PROGESTERONE TD) Take 1 capsule by mouth nightly    Historical Provider, MD   celecoxib (CELEBREX) 200 MG capsule Take 200 mg by mouth daily    Historical Provider, MD   Multiple Vitamins-Minerals (WOMENS MULTIVITAMIN PO) Take 1 tablet by mouth daily    Historical Provider, MD   calcium-vitamin D (OSCAL-500) 500-200 MG-UNIT per tablet Take 1 tablet by mouth daily    Historical Provider, MD       No future appointments.

## 2020-06-23 ENCOUNTER — CARE COORDINATION (OUTPATIENT)
Dept: OTHER | Facility: CLINIC | Age: 60
End: 2020-06-23

## 2020-06-23 NOTE — CARE COORDINATION
daily    Historical Provider, MD   Estradiol-Estriol-Progesterone (BIEST/PROGESTERONE TD) Take 1 capsule by mouth nightly    Historical Provider, MD   celecoxib (CELEBREX) 200 MG capsule Take 200 mg by mouth daily    Historical Provider, MD   Multiple Vitamins-Minerals (WOMENS MULTIVITAMIN PO) Take 1 tablet by mouth daily    Historical Provider, MD   calcium-vitamin D (OSCAL-500) 500-200 MG-UNIT per tablet Take 1 tablet by mouth daily    Historical Provider, MD       No future appointments. Care Coordination Interventions    Program Enrollment:  Complex Care  Referral from Primary Care Provider:  No  Suggested Interventions and Community Resources         Goals Addressed    None         Prior to Admission medications    Medication Sig Start Date End Date Taking? Authorizing Provider   Charlie AISHWARYAD TAKE 1 CAPSULE BY MOUTH AT BEDTIME. 3/30/20   Lisandro Lemos MD   azithromycin (ZITHROMAX) 250 MG tablet Take 2 tablets by mouth day one. Then take 1 po daily for 4 days 3/14/20   Pablo Garcia PA-C   chlordiazePOXIDE-clidinium (LIBRAX) 5-2.5 MG per capsule Take 2 capsules in am and 1-2 capsule at night before bed 3/4/20 6/6/20  SORIN Langston   dilTIAZem (CARDIZEM CD) 240 MG extended release capsule TAKE 1 CAPSULE BY MOUTH ONE TIME DAILY 3/4/20   SORIN Langston   triamterene-hydrochlorothiazide (MAXZIDE-25) 37.5-25 MG per tablet TAKE 2 TABLETS BY MOUTH ONE TIME DAILY.  DO NOT RESUME IF BP LESS THAN 100/70 3/4/20   SORIN Langston   ondansetron (ZOFRAN ODT) 4 MG disintegrating tablet Take 1 tablet by mouth every 8 hours as needed for Nausea or Vomiting 2/10/20   Jeremias España MD   metoprolol tartrate (LOPRESSOR) 50 MG tablet TAKE 1 TABLET BY MOUTH TWO TIMES A DAY 9/12/19   Lisandro Lemos MD   simvastatin (ZOCOR) 40 MG tablet Take 1 tablet by mouth nightly 9/12/19 6/10/20  Lisandro Lemos MD   ibuprofen (ADVIL;MOTRIN) 400 MG tablet Take 400 mg by mouth 2 times

## 2020-07-08 ENCOUNTER — CARE COORDINATION (OUTPATIENT)
Dept: OTHER | Facility: CLINIC | Age: 60
End: 2020-07-08

## 2020-07-08 NOTE — CARE COORDINATION
celecoxib (CELEBREX) 200 MG capsule Take 200 mg by mouth daily    Historical Provider, MD   Multiple Vitamins-Minerals (WOMENS MULTIVITAMIN PO) Take 1 tablet by mouth daily    Historical Provider, MD   calcium-vitamin D (OSCAL-500) 500-200 MG-UNIT per tablet Take 1 tablet by mouth daily    Historical Provider, MD       No future appointments.

## 2020-07-20 ENCOUNTER — TELEPHONE (OUTPATIENT)
Dept: FAMILY MEDICINE CLINIC | Age: 60
End: 2020-07-20

## 2020-07-20 NOTE — TELEPHONE ENCOUNTER
The patient called and said that she would like an order for an MRI of her back. She said that she has been seen in this office for back pain previously.

## 2020-07-21 NOTE — TELEPHONE ENCOUNTER
I have not done a visit with her since 12/2019. Either me or one of the other providers will need to do a VV for this issue so that we can get insurance to cover the MRI.

## 2020-07-22 ENCOUNTER — VIRTUAL VISIT (OUTPATIENT)
Dept: FAMILY MEDICINE CLINIC | Age: 60
End: 2020-07-22
Payer: COMMERCIAL

## 2020-07-22 ENCOUNTER — CARE COORDINATION (OUTPATIENT)
Dept: OTHER | Facility: CLINIC | Age: 60
End: 2020-07-22

## 2020-07-22 PROCEDURE — 99213 OFFICE O/P EST LOW 20 MIN: CPT | Performed by: INTERNAL MEDICINE

## 2020-07-22 ASSESSMENT — ENCOUNTER SYMPTOMS
COLOR CHANGE: 0
CHEST TIGHTNESS: 0
VOMITING: 0
BLOOD IN STOOL: 0
ABDOMINAL PAIN: 0
DIARRHEA: 0
WHEEZING: 0
VOICE CHANGE: 0
SINUS PRESSURE: 0
SORE THROAT: 0
EYE PAIN: 0
EYE DISCHARGE: 0
EYE REDNESS: 0
RHINORRHEA: 0
COUGH: 0
SHORTNESS OF BREATH: 0
BACK PAIN: 1

## 2020-07-22 NOTE — CARE COORDINATION
Ambulatory Care Coordination Note  7/22/2020  CM Risk Score: 4  Charlson 10 Year Mortality Risk Score: 4%     ACC: Marylin Campa, RN    Summary Note: ACM attempted 4th and final call to reach patient for follow Associate Care Management up call. HIPAA compliant message left requesting a return phone call at patients convenience. Lost to follow up letter mailed to patient's home     Patient will be discharged if no return call. Care Coordination Interventions    Program Enrollment:  Complex Care  Referral from Primary Care Provider:  No  Suggested Interventions and Community Resources         Goals Addressed    None         Prior to Admission medications    Medication Sig Start Date End Date Taking? Authorizing Provider   Charlie BOURNE TAKE 1 CAPSULE BY MOUTH AT BEDTIME. 3/30/20   May Eugene MD   azithromycin (ZITHROMAX) 250 MG tablet Take 2 tablets by mouth day one. Then take 1 po daily for 4 days 3/14/20   Kristina Garcia PA-C   chlordiazePOXIDE-clidinium (LIBRAX) 5-2.5 MG per capsule Take 2 capsules in am and 1-2 capsule at night before bed 3/4/20 6/6/20  SORIN Hernandez   dilTIAZem (CARDIZEM CD) 240 MG extended release capsule TAKE 1 CAPSULE BY MOUTH ONE TIME DAILY 3/4/20   SORIN Hernandez   triamterene-hydrochlorothiazide (MAXZIDE-25) 37.5-25 MG per tablet TAKE 2 TABLETS BY MOUTH ONE TIME DAILY.  DO NOT RESUME IF BP LESS THAN 100/70 3/4/20   SORIN Hernandez   ondansetron (ZOFRAN ODT) 4 MG disintegrating tablet Take 1 tablet by mouth every 8 hours as needed for Nausea or Vomiting 2/10/20   Sarita Isaac MD   metoprolol tartrate (LOPRESSOR) 50 MG tablet TAKE 1 TABLET BY MOUTH TWO TIMES A DAY 9/12/19   May Eugene MD   simvastatin (ZOCOR) 40 MG tablet Take 1 tablet by mouth nightly 9/12/19 6/10/20  May Eugene MD   ibuprofen (ADVIL;MOTRIN) 400 MG tablet Take 400 mg by mouth 2 times daily    Historical Provider, MD

## 2020-07-22 NOTE — PROGRESS NOTES
see if she has any signs of spinal stenosis or herniated disc. 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 Last MRI was in 2016 of her lumbar spine that showed 2 level degenerative disc disease with mild bilateral inferior neural foraminal narrowing in several levels. She also had a slight anterolisthesis of L4 on L5 and L5 on S1 and bulging of the disc at the L4-L5 level. Review of Systems   Constitutional: Negative for appetite change, chills, fatigue and fever. HENT: Negative for ear pain, rhinorrhea, sinus pressure, sore throat and voice change. Eyes: Negative for pain, discharge and redness. Respiratory: Negative for cough, chest tightness, shortness of breath and wheezing. Cardiovascular: Negative for chest pain and palpitations. Gastrointestinal: Negative for abdominal pain, blood in stool, diarrhea and vomiting. Endocrine: Negative for cold intolerance, heat intolerance and polydipsia. Genitourinary: Negative for dysuria, enuresis and hematuria. Musculoskeletal: Positive for back pain, gait problem, neck pain and neck stiffness. Negative for arthralgias and myalgias. See HPI   Skin: Negative for color change and rash. Neurological: Positive for weakness and numbness. Negative for dizziness, tremors, syncope, speech difficulty and headaches. See HPI   Hematological: Negative for adenopathy. Does not bruise/bleed easily. Psychiatric/Behavioral: Negative for confusion, dysphoric mood and sleep disturbance. The patient is not nervous/anxious. All other systems reviewed and are negative. Prior to Visit Medications    Medication Sig Taking? Authorizing Provider   Charlie BOURNE TAKE 1 CAPSULE BY MOUTH AT BEDTIME.   Atul Espinosa MD   chlordiazePOXIDE-clidinium (LIBRAX) 5-2.5 MG per capsule Take 2 capsules in am and 1-2 capsule at night before bed  SORIN Fritz   dilTIAZem (CARDIZEM CD) 240 MG extended release capsule TAKE 1 CAPSULE BY MOUTH ONE TIME DAILY  SORIN Lopez   triamterene-hydrochlorothiazide (DWHJEFK-05) 37.5-25 MG per tablet TAKE 2 TABLETS BY MOUTH ONE TIME DAILY.  DO NOT RESUME IF BP LESS THAN 100/70  SORIN Lopez   ondansetron (ZOFRAN ODT) 4 MG disintegrating tablet Take 1 tablet by mouth every 8 hours as needed for Nausea or Vomiting  Citlali Monroe MD   metoprolol tartrate (LOPRESSOR) 50 MG tablet TAKE 1 TABLET BY MOUTH TWO TIMES A DAY  Archuleta Dose, MD   simvastatin (ZOCOR) 40 MG tablet Take 1 tablet by mouth nightly  Archuleta Dose, MD   ibuprofen (ADVIL;MOTRIN) 400 MG tablet Take 400 mg by mouth 2 times daily  Historical Provider, MD   Estradiol-Estriol-Progesterone (BIEST/PROGESTERONE TD) Take 1 capsule by mouth nightly  Historical Provider, MD   celecoxib (CELEBREX) 200 MG capsule Take 200 mg by mouth daily  Historical Provider, MD   Multiple Vitamins-Minerals (WOMENS MULTIVITAMIN PO) Take 1 tablet by mouth daily  Historical Provider, MD   calcium-vitamin D (OSCAL-500) 500-200 MG-UNIT per tablet Take 1 tablet by mouth daily  Historical Provider, MD       Allergies   Allergen Reactions    Latex      Other reaction(s): Unknown    Amoxicillin Shortness Of Breath and Nausea And Vomiting    Anaprox [Naproxen Sodium] Shortness Of Breath and Nausea And Vomiting    Aloe Vera      Other reaction(s): Unknown    Augmentin [Amoxicillin-Pot Clavulanate]            Buspar [Buspirone]     Nortriptyline Hcl     Vibramycin [Doxycycline Calcium]    ,   Past Medical History:   Diagnosis Date    Anal fissure     Anxiety 9/15/2017    Cervical radiculopathy 9/15/2017    Chronic back pain     Chronic kidney disease     Class 1 obesity due to excess calories with serious comorbidity and body mass index (BMI) of 32.0 to 32.9 in adult 9/15/2017    Cyst of kidney, acquired 9/15/2017    approves. No orders of the defined types were placed in this encounter. Return if symptoms worsen or fail to improve. The time that was spent with the family/patient addressing care on this video call was 20 minutes. An  electronic signature was used to authenticate this note. --Filiberto Fabian MD on 7/22/2020 at 4:46 PM    Over 50% of the total visit time of 20 minutes was spent on counseling and/or coordination of care of:   1. Chronic midline low back pain with bilateral sciatica    2. DDD (degenerative disc disease), lumbar    3. Balance problems      19}    I affirm this is a Patient Initiated Episode with an Established Patient who has not had a related appointment within my department in the past 7 days or scheduled within the next 24 hours. Pursuant to the emergency declaration under the [facility], [facility] waiver authority and the [employer] and [employer] Act, this Virtual  Visit was conducted, with patient's consent, to reduce the patient's risk of exposure to COVID-19 and provide continuity of care for an established patient. Services were provided through a video synchronous discussion virtually to substitute for in-person clinic visit.

## 2020-07-22 NOTE — LETTER
[facility]  No information on file. Edita Corbin RN        July 22, 2020    [address]      Dear Mary Tobias:    I have been trying to reach you for a follow up call for services with our Associate Care Management Program. Your wellbeing is very important to us. With continued partnership in the [facility] program, we hope to work with you to optimize your health and increase your quality of life. I look forward to continuing to work with you. Please contact me at your convenience and we can schedule a time that works best for you. Sincerely,    Omar MEZA, RN- [facility]   Associate Care Manager  [phone]      If you have any questions or concerns, please don't hesitate to call.     Sincerely,        Edita Corbin, RN

## 2020-07-22 NOTE — PATIENT INSTRUCTIONS
Patient Education        Learning About Relief for Back Pain  What is back strain? Back strain is an injury that happens when you overstretch, or pull, a muscle in your back. You may hurt your back in an accident or when you exercise or lift something. Most back pain gets better with rest and time. You can take care of yourself at home to help your back heal.  What can you do first to relieve back pain? When you first feel back pain, try these steps:  · Walk. Take a short walk (10 to 20 minutes) on a level surface (no slopes, hills, or stairs) every 2 to 3 hours. Walk only distances you can manage without pain, especially leg pain. · Relax. Find a comfortable position for rest. Some people are comfortable on the floor or a medium-firm bed with a small pillow under their head and another under their knees. Some people prefer to lie on their side with a pillow between their knees. Don't stay in one position for too long. · Try heat or ice. Try using a heating pad on a low or medium setting, or take a warm shower, for 15 to 20 minutes every 2 to 3 hours. Or you can buy single-use heat wraps that last up to 8 hours. You can also try an ice pack for 10 to 15 minutes every 2 to 3 hours. You can use an ice pack or a bag of frozen vegetables wrapped in a thin towel. There is not strong evidence that either heat or ice will help, but you can try them to see if they help. You may also want to try switching between heat and cold. · Take pain medicine exactly as directed. ? If the doctor gave you a prescription medicine for pain, take it as prescribed. ? If you are not taking a prescription pain medicine, ask your doctor if you can take an over-the-counter medicine. What else can you do? · Stretch and exercise. Exercises that increase flexibility may relieve your pain and make it easier for your muscles to keep your spine in a good, neutral position. And don't forget to keep walking. · Do self-massage.  You can use self-massage to unwind after work or school or to energize yourself in the morning. You can easily massage your feet, hands, or neck. Self-massage works best if you are in comfortable clothes and are sitting or lying in a comfortable position. Use oil or lotion to massage bare skin. · Reduce stress. Back pain can lead to a vicious [place]: Distress about the pain tenses the muscles in your back, which in turn causes more pain. Learn how to relax your mind and your muscles to lower your stress. Where can you learn more? Go to https://VISuppeXiu.comeb.Sendmail. org and sign in to your MindJolt account. Enter H124 in the Jumpido box to learn more about \"Learning About Relief for Back Pain. \"     If you do not have an account, please click on the \"Sign Up Now\" link. Current as of: March 2, 2020               Content Version: 12.5  © 4835-4110 [employer]. Care instructions adapted under license by [address] If you have questions about a medical condition or this instruction, always ask your healthcare professional. [place] [address] any warranty or liability for your use of this information. Patient Education        Low Back Pain: Exercises  Introduction  Here are some examples of exercises for you to try. The exercises may be suggested for a condition or for rehabilitation. Start each exercise slowly. Ease off the exercises if you start to have pain. You will be told when to start these exercises and which ones will work best for you. How to do the exercises  Press-up   1. Lie on your stomach, supporting your body with your forearms. 2. Press your elbows down into the floor to raise your upper back. As you do this, relax your stomach muscles and allow your back to arch without using your back muscles. As your press up, do not let your hips or pelvis come off the floor. 3. Hold for 15 to 30 seconds, then relax. 4. Repeat 2 to 4 times.     Alternate arm and leg (bird dog) exercise   Do this exercise slowly. Try to keep your body straight at all times, and do not let one hip drop lower than the other. 1. Start on the floor, on your hands and knees. 2. Tighten your belly muscles. 3. Raise one leg off the floor, and hold it straight out behind you. Be careful not to let your hip drop down, because that will twist your trunk. 4. Hold for about 6 seconds, then lower your leg and switch to the other leg. 5. Repeat 8 to 12 times on each leg. 6. Over time, work up to holding for 10 to 30 seconds each time. 7. If you feel stable and secure with your leg raised, try raising the opposite arm straight out in front of you at the same time. Knee-to-chest exercise   1. Lie on your back with your knees bent and your feet flat on the floor. 2. Bring one knee to your chest, keeping the other foot flat on the floor (or keeping the other leg straight, whichever feels better on your lower back). 3. Keep your lower back pressed to the floor. Hold for at least 15 to 30 seconds. 4. Relax, and lower the knee to the starting position. 5. Repeat with the other leg. Repeat 2 to 4 times with each leg. 6. To get more stretch, put your other leg flat on the floor while pulling your knee to your chest.    Curl-ups   1. Lie on the floor on your back with your knees bent at a 90-degree angle. Your feet should be flat on the floor, about 12 inches from your buttocks. 2. Cross your arms over your chest. If this bothers your neck, try putting your hands behind your neck (not your head), with your elbows spread apart. 3. Slowly tighten your belly muscles and raise your shoulder blades off the floor. 4. Keep your head in line with your body, and do not press your chin to your chest.  5. Hold this position for 1 or 2 seconds, then slowly lower yourself back down to the floor. 6. Repeat 8 to 12 times. Pelvic tilt exercise   1. Lie on your back with your knees bent. 2.  \"Brace\" your stomach. This means to tighten your muscles by pulling in and imagining your belly button moving toward your spine. You should feel like your back is pressing to the floor and your hips and pelvis are rocking back. 3. Hold for about 6 seconds while you breathe smoothly. 4. Repeat 8 to 12 times. Heel dig bridging   1. Lie on your back with both knees bent and your ankles bent so that only your heels are digging into the floor. Your knees should be bent about 90 degrees. 2. Then push your heels into the floor, squeeze your buttocks, and lift your hips off the floor until your shoulders, hips, and knees are all in a straight line. 3. Hold for about 6 seconds as you continue to breathe normally, and then slowly lower your hips back down to the floor and rest for up to 10 seconds. 4. Do 8 to 12 repetitions. Hamstring stretch in doorway   1. Lie on your back in a doorway, with one leg through the open door. 2. Slide your leg up the wall to straighten your knee. You should feel a gentle stretch down the back of your leg. 3. Hold the stretch for at least 15 to 30 seconds. Do not arch your back, point your toes, or bend either knee. Keep one heel touching the floor and the other heel touching the wall. 4. Repeat with your other leg. 5. Do 2 to 4 times for each leg. Hip flexor stretch   1. Kneel on the floor with one knee bent and one leg behind you. Place your forward knee over your foot. Keep your other knee touching the floor. 2. Slowly push your hips forward until you feel a stretch in the upper thigh of your rear leg. 3. Hold the stretch for at least 15 to 30 seconds. Repeat with your other leg. 4. Do 2 to 4 times on each side. Wall sit   1. Stand with your back 10 to 12 inches away from a wall. 2. Lean into the wall until your back is flat against it. 3. Slowly slide down until your knees are slightly bent, pressing your lower back into the wall.   4. Hold for about 6 seconds, then slide back up the wall. 5. Repeat 8 to 12 times. Follow-up care is a key part of your treatment and safety. Be sure to make and go to all appointments, and call your doctor if you are having problems. It's also a good idea to know your test results and keep a list of the medicines you take. Where can you learn more? Go to https://chpepiceweb.Common Curriculum. org and sign in to your Wee Web account. Enter D601 in the En[employer] Systems box to learn more about \"Low Back Pain: Exercises. \"     If you do not have an account, please click on the \"Sign Up Now\" link. Current as of: March 2, 2020               Content Version: 12.5  © 2006-2020 [employer]. Care instructions adapted under license by [facility] ([facility]). If you have questions about a medical condition or this instruction, always ask your healthcare professional. [place] [address] any warranty or liability for your use of this information. Patient Education        Back Pain, Emergency or Urgent Symptoms: Care Instructions  Your Care Instructions     Many people have back pain at one time or another. In most cases, pain gets better with self-care that includes over-the-counter pain medicine, ice, heat, and exercises. Unless you have symptoms of a severe injury or heart attack, you may be able to give yourself a few days before you call a doctor. But some back problems are very serious. Do not ignore symptoms that need to be checked right away. Follow-up care is a key part of your treatment and safety. Be sure to make and go to all appointments, and call your doctor if you are having problems. It's also a good idea to know your test results and keep a list of the medicines you take. How can you care for yourself at home? · Sit or lie in positions that are most comfortable and that reduce your pain. Try one of these positions when you lie down:  ?  Lie on your back with your knees bent and supported by large pillows. ? Lie on the floor with your legs on the seat of a sofa or chair. ? Lie on your side with your knees and hips bent and a pillow between your legs. ? Lie on your stomach if it does not make pain worse. · Do not sit up in bed, and avoid soft couches and twisted positions. Bed rest can help relieve pain at first, but it delays healing. Avoid bed rest after the first day. · Change positions every 30 minutes. If you must sit for long periods of time, take breaks from sitting. Get up and walk around, or lie flat. · Try using a heating pad on a low or medium setting, for 15 to 20 minutes every 2 or 3 hours. Try a warm shower in place of one session with the heating pad. You can also buy single-use heat wraps that last up to 8 hours. You can also try ice or cold packs on your back for 10 to 20 minutes at a time, several times a day. (Put a thin cloth between the ice pack and your skin.) This reduces pain and makes it easier to be active and exercise. · Take pain medicines exactly as directed. ? If the doctor gave you a prescription medicine for pain, take it as prescribed. ? If you are not taking a prescription pain medicine, ask your doctor if you can take an over-the-counter medicine. When should you call for help? [id number] anytime you think you may need emergency care. For example, call if:  · You are unable to move a leg at all. · You have back pain with severe belly pain. · You have symptoms of a heart attack. These may include:  ? Chest pain or pressure, or a strange feeling in the chest.  ? Sweating. ? Shortness of breath. ? Nausea or vomiting. ? Pain, pressure, or a strange feeling in the back, neck, jaw, or upper belly or in one or both shoulders or arms. ? Lightheadedness or sudden weakness. ? A fast or irregular heartbeat. After you call 911, the  may tell you to chew 1 adult-strength or 2 to 4 low-dose aspirin. Wait for an ambulance. Do not try to drive yourself.   Call your

## 2020-07-23 ENCOUNTER — CARE COORDINATION (OUTPATIENT)
Dept: OTHER | Facility: CLINIC | Age: 60
End: 2020-07-23

## 2020-07-23 NOTE — CARE COORDINATION
Ambulatory Care Coordination Note  7/23/2020  CM Risk Score: 4  Charlson 10 Year Mortality Risk Score: 4%     ACC: Edita Corbin, RN    Summary Note: Patient returned my call from earlier voice message. She said she is checking her blood pressure, she did not provide me with results said she is writing them down, but she was waiting on a call back from her PCP about getting an MRI for her back. Pt said her back has been bothering her a lot lately. Pt did not want to talk long, said she was actually going to call the pcp office so she needed to go. Pt is agreeable to continued calls. Care Coordination Interventions    Program Enrollment:  Complex Care  Referral from Primary Care Provider:  No  Suggested Interventions and Community Resources         Goals Addressed                 This Visit's Progress     Patient Stated (pt-stated)   On track     Pt will continue to monitor blood pressure and take her medication accordingly     Barriers: none  Plan for overcoming my barriers: n/a  Confidence: 7/10  Anticipated Goal Completion Date: 4/1/2020 and ongoing       Patient Stated (pt-stated)   On track     Pt will attend pcp follow visits and annual physical assessments     Barriers: none  Plan for overcoming my barriers: N/A  Confidence: 7/10  Anticipated Goal Completion Date: 7/1/2020 and ongoing            Prior to Admission medications    Medication Sig Start Date End Date Taking? Authorizing Provider   Charlie BOURNE TAKE 1 CAPSULE BY MOUTH AT BEDTIME. 3/30/20   Mildred Gilbert MD   chlordiazePOXIDE-clidinium (LIBRAX) 5-2.5 MG per capsule Take 2 capsules in am and 1-2 capsule at night before bed 3/4/20 6/6/20  SORIN Ospina   dilTIAZem (CARDIZEM CD) 240 MG extended release capsule TAKE 1 CAPSULE BY MOUTH ONE TIME DAILY 3/4/20   SORIN Lopez   triamterene-hydrochlorothiazide (MAXZIDE-25) 37.5-25 MG per tablet TAKE 2 TABLETS BY MOUTH ONE TIME DAILY.  DO NOT RESUME IF BP LESS THAN 100/70 3/4/20   SORIN Pak   ondansetron (ZOFRAN ODT) 4 MG disintegrating tablet Take 1 tablet by mouth every 8 hours as needed for Nausea or Vomiting 2/10/20   Keisha Jessica MD   metoprolol tartrate (LOPRESSOR) 50 MG tablet TAKE 1 TABLET BY MOUTH TWO TIMES A DAY 9/12/19   Jerry Camarillo MD   simvastatin (ZOCOR) 40 MG tablet Take 1 tablet by mouth nightly 9/12/19 6/10/20  Jerry Camarillo MD   ibuprofen (ADVIL;MOTRIN) 400 MG tablet Take 400 mg by mouth 2 times daily    Historical Provider, MD   Estradiol-Estriol-Progesterone (BIEST/PROGESTERONE TD) Take 1 capsule by mouth nightly    Historical Provider, MD   celecoxib (CELEBREX) 200 MG capsule Take 200 mg by mouth daily    Historical Provider, MD   Multiple Vitamins-Minerals (WOMENS MULTIVITAMIN PO) Take 1 tablet by mouth daily    Historical Provider, MD   calcium-vitamin D (OSCAL-500) 500-200 MG-UNIT per tablet Take 1 tablet by mouth daily    Historical Provider, MD       No future appointments.

## 2020-07-24 ENCOUNTER — TELEPHONE (OUTPATIENT)
Dept: FAMILY MEDICINE CLINIC | Age: 60
End: 2020-07-24

## 2020-07-24 NOTE — TELEPHONE ENCOUNTER
I called the patient and informed her about her MRI appointment at the [address] [place] [address]. August 7th, 2020 at 4:15.

## 2020-08-04 ENCOUNTER — HOSPITAL ENCOUNTER (OUTPATIENT)
Dept: MRI IMAGING | Age: 60
Discharge: HOME | End: 2020-08-04
Payer: COMMERCIAL

## 2020-08-04 PROCEDURE — 72148 MRI LUMBAR SPINE W/O DYE: CPT

## 2020-08-06 RX ORDER — CHLORDIAZEPOXIDE HYDROCHLORIDE AND CLIDINIUM BROMIDE 5; 2.5 MG/1; MG/1
CAPSULE ORAL
Qty: 360 CAPSULE | Refills: 1 | Status: SHIPPED | OUTPATIENT
Start: 2020-08-06 | End: 2021-01-11

## 2020-08-11 ENCOUNTER — CARE COORDINATION (OUTPATIENT)
Dept: OTHER | Facility: CLINIC | Age: 60
End: 2020-08-11

## 2020-08-11 NOTE — CARE COORDINATION
Ambulatory Care Coordination Note  8/11/2020  CM Risk Score: 4  Charlson 10 Year Mortality Risk Score: 4%     ACC: Carolina Samayoa, MERLY    Summary Note: ACM attempted to reach patient for follow up call regarding follow up on MRI and blood pressures . HIPAA compliant message left requesting a return phone call at patients convenience. Will continue to follow. Care Coordination Interventions    Program Enrollment:  Complex Care  Referral from Primary Care Provider:  No  Suggested Interventions and Community Resources         Goals Addressed    None         Prior to Admission medications    Medication Sig Start Date End Date Taking? Authorizing Provider   chlordiazePOXIDE-clidinium (LIBRAX) 5-2.5 MG per capsule TAKE TWO CAPSULES EVERY MORNING AND TAKE 1 TO 2 CAPSULE BY MOUTH AT BEDTIME 8/6/20   Tamiko Hou MD   Estriol POWD TAKE 1 CAPSULE BY MOUTH AT BEDTIME. 3/30/20   Tamiko Hou MD   dilTIAZem (CARDIZEM CD) 240 MG extended release capsule TAKE 1 CAPSULE BY MOUTH ONE TIME DAILY 3/4/20   SORIN Lopez   triamterene-hydrochlorothiazide (KYNBDOU-11) 37.5-25 MG per tablet TAKE 2 TABLETS BY MOUTH ONE TIME DAILY.  DO NOT RESUME IF BP LESS THAN 100/70 3/4/20   SORIN Mijares   ondansetron (ZOFRAN ODT) 4 MG disintegrating tablet Take 1 tablet by mouth every 8 hours as needed for Nausea or Vomiting 2/10/20   Felisha Luo MD   metoprolol tartrate (LOPRESSOR) 50 MG tablet TAKE 1 TABLET BY MOUTH TWO TIMES A DAY 9/12/19   Tamiko Hou MD   simvastatin (ZOCOR) 40 MG tablet Take 1 tablet by mouth nightly 9/12/19 6/10/20  Tamiko Hou MD   ibuprofen (ADVIL;MOTRIN) 400 MG tablet Take 400 mg by mouth 2 times daily    Historical Provider, MD   Estradiol-Estriol-Progesterone (BIEST/PROGESTERONE TD) Take 1 capsule by mouth nightly    Historical Provider, MD   celecoxib (CELEBREX) 200 MG capsule Take 200 mg by mouth daily    Historical Provider, MD   Multiple Vitamins-Minerals (WOMENS MULTIVITAMIN PO) Take 1 tablet by mouth daily    Historical Provider, MD   calcium-vitamin D (OSCAL-500) 500-200 MG-UNIT per tablet Take 1 tablet by mouth daily    Historical Provider, MD       No future appointments.

## 2020-08-20 ENCOUNTER — CARE COORDINATION (OUTPATIENT)
Dept: OTHER | Facility: CLINIC | Age: 60
End: 2020-08-20

## 2020-08-20 NOTE — CARE COORDINATION
PO) Take 1 tablet by mouth daily    Historical Provider, MD   calcium-vitamin D (OSCAL-500) 500-200 MG-UNIT per tablet Take 1 tablet by mouth daily    Historical Provider, MD       No future appointments.

## 2020-08-31 ENCOUNTER — TELEPHONE (OUTPATIENT)
Dept: NEUROSURGERY | Age: 60
End: 2020-08-31

## 2020-08-31 NOTE — TELEPHONE ENCOUNTER
Called and left VM to reschedule the patient. The provider will not be in clinic the morning of 9/8/2020. Advised patient to call the office back as soon as she can.

## 2020-09-11 ENCOUNTER — TELEPHONE (OUTPATIENT)
Dept: NEUROSURGERY | Age: 60
End: 2020-09-11

## 2020-09-14 ENCOUNTER — TELEPHONE (OUTPATIENT)
Dept: NEUROSURGERY | Age: 60
End: 2020-09-14

## 2020-09-14 ENCOUNTER — OFFICE VISIT (OUTPATIENT)
Dept: NEUROSURGERY | Age: 60
End: 2020-09-14
Payer: COMMERCIAL

## 2020-09-14 ENCOUNTER — HOSPITAL ENCOUNTER (OUTPATIENT)
Dept: GENERAL RADIOLOGY | Age: 60
Discharge: HOME | End: 2020-09-14
Payer: COMMERCIAL

## 2020-09-14 VITALS
BODY MASS INDEX: 30.16 KG/M2 | WEIGHT: 199 LBS | DIASTOLIC BLOOD PRESSURE: 88 MMHG | SYSTOLIC BLOOD PRESSURE: 127 MMHG | HEIGHT: 68 IN | OXYGEN SATURATION: 94 % | HEART RATE: 76 BPM

## 2020-09-14 PROCEDURE — 99204 OFFICE O/P NEW MOD 45 MIN: CPT | Performed by: NURSE PRACTITIONER

## 2020-09-14 PROCEDURE — 72110 X-RAY EXAM L-2 SPINE 4/>VWS: CPT

## 2020-09-14 ASSESSMENT — ENCOUNTER SYMPTOMS
BACK PAIN: 1
GASTROINTESTINAL NEGATIVE: 1
RESPIRATORY NEGATIVE: 1
EYES NEGATIVE: 1

## 2020-09-14 NOTE — LETTER
[facility]    DO Tisha Ball APRN  [address] [place] [address], [place] [address] [place] [address], [address] [facility]  Drake Arora: [phone]  F: [phone]      09/14/20      To Whom This May Concern: It is my medical recommendation that Cielo Calixto must remain off of work at this time. 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 We will see her back in our clinic in 2-3 weeks and the treatment plan will be discussed at that time. Please do not hesitate to contact the office with any questions or concerns regarding this letter.         Sincerely,        DO Tisha Butler APRN

## 2020-09-14 NOTE — PROGRESS NOTES
History:   Procedure Laterality Date    APPENDECTOMY       SECTION      EYE SURGERY      HYSTERECTOMY      HYSTERECTOMY, TOTAL ABDOMINAL      RETINAL DETACHMENT SURGERY         Current Outpatient Medications   Medication Sig Dispense Refill    Probiotic Product (PROBIOTIC-10 PO) Take 1 capsule by mouth daily      chlordiazePOXIDE-clidinium (LIBRAX) 5-2.5 MG per capsule TAKE TWO CAPSULES EVERY MORNING AND TAKE 1 TO 2 CAPSULE BY MOUTH AT BEDTIME 360 capsule 1    Estriol POWD TAKE 1 CAPSULE BY MOUTH AT BEDTIME. 30 g 5    dilTIAZem (CARDIZEM CD) 240 MG extended release capsule TAKE 1 CAPSULE BY MOUTH ONE TIME DAILY 90 capsule 3    triamterene-hydrochlorothiazide (MAXZIDE-25) 37.5-25 MG per tablet TAKE 2 TABLETS BY MOUTH ONE TIME DAILY. DO NOT RESUME IF BP LESS THAN 100/70 180 tablet 3    metoprolol tartrate (LOPRESSOR) 50 MG tablet TAKE 1 TABLET BY MOUTH TWO TIMES A  tablet 3    simvastatin (ZOCOR) 40 MG tablet Take 1 tablet by mouth nightly 90 tablet 3    ibuprofen (ADVIL;MOTRIN) 400 MG tablet Take 400 mg by mouth 2 times daily      Estradiol-Estriol-Progesterone (BIEST/PROGESTERONE TD) Take 1 capsule by mouth nightly      Multiple Vitamins-Minerals (WOMENS MULTIVITAMIN PO) Take 1 tablet by mouth daily      calcium-vitamin D (OSCAL-500) 500-200 MG-UNIT per tablet Take 1 tablet by mouth daily      ondansetron (ZOFRAN ODT) 4 MG disintegrating tablet Take 1 tablet by mouth every 8 hours as needed for Nausea or Vomiting (Patient not taking: Reported on 2020) 30 tablet 0    celecoxib (CELEBREX) 200 MG capsule Take 200 mg by mouth daily       No current facility-administered medications for this visit. Allergies:  Latex; Amoxicillin; Anaprox [naproxen sodium]; Aloe vera; Augmentin [amoxicillin-pot clavulanate]; Buspar [buspirone];  Nortriptyline hcl; and Vibramycin [doxycycline calcium]    Social History:   Social History     Tobacco Use   Smoking Status Never Smoker   Smokeless Tobacco Never Used     Social History     Substance and Sexual Activity   Alcohol Use No         Family History:   Family History   Problem Relation Age of Onset    Diabetes Mother     High Blood Pressure Mother     Heart Attack Mother     Kidney Disease Father     Stroke Father     Other Father         renal failure    Diabetes Maternal Grandmother     Colon Cancer Maternal Grandmother     Heart Disease Maternal Grandfather     Cancer Paternal Grandmother         colon cancer    Alcohol Abuse Sister     Cirrhosis Sister     Stroke Paternal Aunt     Colon Cancer Maternal Cousin        REVIEW OF SYSTEMS:  Constitutional: Negative. HENT: Negative. Eyes: Negative. Respiratory: Negative. Cardiovascular: Negative. Gastrointestinal: Negative. Genitourinary: Negative. Musculoskeletal: Positive for back pain and myalgias. Skin: Negative. Neurological: Positive for tingling and weakness. Endo/Heme/Allergies: Negative. Psychiatric/Behavioral: Negative.         PHYSICAL EXAM:  Vitals:    09/14/20 0908   BP: 127/88   Pulse: 76   SpO2: 94%     Constitutional: appears well-developed and well-nourished. Eyes - conjunctiva normal.  Pupils react to light  Ear, nose, throat - hearing intact to finger rub, No scars, masses, or lesions over external nose or ears, no atrophy oftongue  Neck- symmetric, no masses noted, no jugular vein distension  Respiration- chest wall appears symmetric, good expansion, normal effort without use of accessory muscles  Musculoskeletal - no significant wasting of muscles noted, no bony deformities, gait no gross ataxia  Extremities- no clubbing, cyanosis oredema  Skin - warm, dry, and intact. No rash, erythema, or pallor.   Psychiatric - mood, affect, and behavior appear normal.     Neurologic Examination  Awake, Alert and oriented x 4  Normal speech pattern, following commands    Motor:  RIGHT:    iliopsoas 5/5    knee flexor 5/5    knee extension 5/5 EHL/dorsiflexion 5/5    plantar flexion 5/5    LEFT:       iliopsoas 5/5    knee flexor 5/5    knee extension 5/5    EHL/dorsiflexion 5/5    plantar flexion 5/5   (generalized weakness, may be poor effort on exam)    Decrease to pinprick sensation throughout BUE, BLE  Reflexes are 2+ and symmetric BLE  No myofacial tenderness to palpation  Slight antalgic Gait pattern        DATA and IMAGING:    Nursing/pcp notes, imaging, labs, and vitals reviewed. PT,OT and/or speech notes reviewed    Lab Results   Component Value Date    WBC 9.8 02/27/2020    HGB 11.4 (L) 02/27/2020    HCT 36.6 (L) 02/27/2020    MCV 83.0 02/27/2020     (H) 02/27/2020     Lab Results   Component Value Date     (L) 02/27/2020    K 3.7 02/27/2020    CL 88 (L) 02/27/2020    CO2 23 02/27/2020    BUN 11 02/27/2020    CREATININE 0.8 02/27/2020    GLUCOSE 109 02/27/2020    CALCIUM 9.2 02/27/2020    PROT 6.7 02/27/2020    LABALBU 3.1 (L) 02/27/2020    BILITOT 0.3 02/27/2020    ALKPHOS 64 02/27/2020    AST 34 (H) 02/27/2020    ALT 52 (H) 02/27/2020    LABGLOM >60 02/27/2020     Lab Results   Component Value Date    INR 1.25 (H) 02/16/2020    INR 1.08 02/10/2020    PROTIME 15.1 (H) 02/16/2020    PROTIME 13.4 02/10/2020     Narrative    EXAM: MRI LUMBAR SPINE WO CONTRAST -- 8/4/2020 3:49 PM    HISTORY: 61 years, Female, M54.41, chronic midline low back pain with    bilateral sciatica, degenerative disc disease, balance problems    COMPARISON: 6/24/2016    TECHNIQUE: Routine MRI of the lumbar spine was performed without    intravenous contrast.    FINDINGS:    Five nonrib-bearing, lumbar-type vertebral bodies.  There is grade 1    anterolisthesis L4 on L5 and L5 on S1, similar compared to 6/24/2016.     Normal vertebral body height. No evidence of a marrow replacing    process. No focally suspicious bony edema. Hemangiomata in the L4, L2    and L1 vertebral bodies. Mild disc height loss and disc desiccation at L4-5 and L5-S1.  Conus terminates at L1-2. Normal appearance of the distal thoracic cord and    cauda equina nerve roots. L1-2: No disc bulge, spinal canal or foraminal stenosis. L2-3: No disc bulge, spinal canal or foraminal stenosis. L3-4: Minimal disc bulge. No spinal canal stenosis. Mild bilateral    foraminal stenosis. L4-5: Uncovering of the disc with facet hypertrophy and small    bilateral facet joint effusions. No spinal canal stenosis. Mild to    moderate bilateral foraminal stenosis. L5-S1: Uncovering of the disc with facet hypertrophy. No spinal canal    stenosis. Mild bilateral foraminal stenosis. Fluid intensity bilateral renal cortical lesions, which were also    present 6/24/2016, incompletely visualized and evaluated on this exam,    but most commonly renal cysts.         Impression    1. No acute finding of the lumbar spine. 2. Degenerative changes greatest at L4-5 and L5-S1, similar to    6/24/2016. Signed by Dr Kristina Espino on 8/5/2020 8:45 AM    I have personally reviewed these images and my interpretation is:  DDD L4-S1  L4-5 there is an anterolisthesis that measures 4mm, there is facet arthropathy on the right that results in possible impingement of the right L4 nerve root. Mild bilateral foraminal stenosis  L5-S1 there is an anterolisthesis that measures 4mm as well. ASSESSMENT:    Aure De La Paz is a 61 y.o. female with complaints of low back and right leg pain. She does have a spondylolisthesis of L4 on L5 and L5 on S1. ICD-10-CM    1. Spondylolisthesis at L4-L5 level  M43.16 XR LUMBAR SPINE (MIN 4 VIEWS)   2. Spondylolisthesis at L5-S1 level  M43.17    3. DDD (degenerative disc disease), lumbar  M51.36    4. DDD (degenerative disc disease), lumbosacral  M51.37    5. Chronic midline low back pain with right-sided sciatica  M54.41     G89.29    6. Antalgic gait  R26.89        PLAN:  I have discussed and reviewed the results of the MRI lumbar spine with Mrs. Brittney Brower at

## 2020-09-14 NOTE — TELEPHONE ENCOUNTER
XR lumbar flex/ex results reviewed with pt. Explained that the spondylolisthesis at L4-5 and L5-S1 measure about 4mm on lying MRI and about 10mm when standing. This is spinal instability. We recommend she stay off work at this time due to the physical demands of her job. Letter typed, printed, signed, and handed to the . Please schedule her for the week Dr. Kimmy Gaviria returns Preferrably October 1 (anytime) and let her know. Thank you.

## 2020-10-01 ENCOUNTER — OFFICE VISIT (OUTPATIENT)
Dept: NEUROSURGERY | Age: 60
End: 2020-10-01
Payer: COMMERCIAL

## 2020-10-01 VITALS
WEIGHT: 197 LBS | HEIGHT: 68 IN | SYSTOLIC BLOOD PRESSURE: 122 MMHG | HEART RATE: 74 BPM | DIASTOLIC BLOOD PRESSURE: 80 MMHG | BODY MASS INDEX: 29.86 KG/M2 | OXYGEN SATURATION: 96 %

## 2020-10-01 PROCEDURE — 99214 OFFICE O/P EST MOD 30 MIN: CPT | Performed by: NURSE PRACTITIONER

## 2020-10-01 ASSESSMENT — ENCOUNTER SYMPTOMS
RESPIRATORY NEGATIVE: 1
EYES NEGATIVE: 1
GASTROINTESTINAL NEGATIVE: 1

## 2020-10-01 NOTE — PROGRESS NOTES
[address] [facility] Neurosurgery  Office Visit      Chief Complaint   Patient presents with    Follow-up    Leg Pain    Swelling     10/01/2020: Ms. Silviano Ng returns to clinic today to discuss her treatments options. She continues to have low back and RLE pain. She states if she walks 100 yards \"I would probably be ready to fall down\". She does have numbness in her RLE. She doesn't really recall any alleviating factors at this point. 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 The pain does radiate into bilateral legs R>L and will radiate into the posterior legs. Her pain is mostly located in the low back. The patient complains of numbness of the bilateral feet. Any bending exacerbates the pain. Will have pain in the right hip when lying down. Her pain is worsened when going from a seated to standing position. Her pain is worsened with walking. Her pain is improved when lying flat. Overall, indicative that the patient does have a mechanical nature to their pain. She states that 50% of her pain is located in the back and 50% is leg pain. The patient has underwent a non-operative treatment course that has included:  NSAIDs (ibuprofen)  Muscle Relaxers (skelaxin)  Opiates (Norco in the past - does not like)  Physical Therapy with core strengthening (did not help)      Of note she does not use tobacco and does not take blood thinning medications.                Past Medical History:   Diagnosis Date    Anal fissure     Anxiety 9/15/2017    C. difficile colitis     Cervical radiculopathy 9/15/2017    Chronic back pain     Chronic kidney disease     Class 1 obesity due to excess calories with serious comorbidity and body mass index (BMI) of 32.0 to 32.9 in adult 9/15/2017    Cyst of kidney, acquired 9/15/2017    Depression 9/15/2017    History of oral surgery     IBS (irritable bowel syndrome) 9/15/2017    Irregular heartbeat     Obesity 9/15/2017    Osteoarthritis     Retinal detachment        Past Surgical History:   Procedure Laterality Date    APPENDECTOMY       SECTION      EYE SURGERY      HYSTERECTOMY      HYSTERECTOMY, TOTAL ABDOMINAL      RETINAL DETACHMENT SURGERY         Current Outpatient Medications   Medication Sig Dispense Refill    Probiotic Product (PROBIOTIC-10 PO) Take 1 capsule by mouth daily      chlordiazePOXIDE-clidinium (LIBRAX) 5-2.5 MG per capsule TAKE TWO CAPSULES EVERY MORNING AND TAKE 1 TO 2 CAPSULE BY MOUTH AT BEDTIME 360 capsule 1    Estriol POWD TAKE 1 CAPSULE BY MOUTH AT BEDTIME. 30 g 5    dilTIAZem (CARDIZEM CD) 240 MG extended release capsule TAKE 1 CAPSULE BY MOUTH ONE TIME DAILY 90 capsule 3    triamterene-hydrochlorothiazide (MAXZIDE-25) 37.5-25 MG per tablet TAKE 2 TABLETS BY MOUTH ONE TIME DAILY. DO NOT RESUME IF BP LESS THAN 100/70 180 tablet 3    ondansetron (ZOFRAN ODT) 4 MG disintegrating tablet Take 1 tablet by mouth every 8 hours as needed for Nausea or Vomiting 30 tablet 0    metoprolol tartrate (LOPRESSOR) 50 MG tablet TAKE 1 TABLET BY MOUTH TWO TIMES A  tablet 3    ibuprofen (ADVIL;MOTRIN) 400 MG tablet Take 400 mg by mouth 2 times daily      Estradiol-Estriol-Progesterone (BIEST/PROGESTERONE TD) Take 1 capsule by mouth nightly      celecoxib (CELEBREX) 200 MG capsule Take 200 mg by mouth daily      Multiple Vitamins-Minerals (WOMENS MULTIVITAMIN PO) Take 1 tablet by mouth daily      calcium-vitamin D (OSCAL-500) 500-200 MG-UNIT per tablet Take 1 tablet by mouth daily      simvastatin (ZOCOR) 40 MG tablet Take 1 tablet by mouth nightly 90 tablet 3     No current facility-administered medications for this visit. Allergies:  Latex; Amoxicillin; Anaprox [naproxen sodium]; Aloe vera; Augmentin [amoxicillin-pot clavulanate]; Buspar [buspirone];  Nortriptyline hcl; and Hemangiomata in the L4, L2    and L1 vertebral bodies. Mild disc height loss and disc desiccation at L4-5 and L5-S1. Conus    terminates at L1-2. Normal appearance of the distal thoracic cord and    cauda equina nerve roots. L1-2: No disc bulge, spinal canal or foraminal stenosis. L2-3: No disc bulge, spinal canal or foraminal stenosis. L3-4: Minimal disc bulge. No spinal canal stenosis. Mild bilateral    foraminal stenosis. L4-5: Uncovering of the disc with facet hypertrophy and small    bilateral facet joint effusions. No spinal canal stenosis. Mild to    moderate bilateral foraminal stenosis. L5-S1: Uncovering of the disc with facet hypertrophy. No spinal canal    stenosis. Mild bilateral foraminal stenosis. Fluid intensity bilateral renal cortical lesions, which were also    present 6/24/2016, incompletely visualized and evaluated on this exam,    but most commonly renal cysts.         Impression    1. No acute finding of the lumbar spine. 2. Degenerative changes greatest at L4-5 and L5-S1, similar to    6/24/2016. Signed by Dr Merline Lolling on 8/5/2020 8:45 AM    I have personally reviewed these images and my interpretation is:  DDD L4-S1  L4-5 there is an anterolisthesis that measures 4mm, there is facet arthropathy on the right that results in possible impingement of the right L4 nerve root. Mild bilateral foraminal stenosis  L5-S1 there is an anterolisthesis that measures 4mm as well. Narrative    EXAMINATION:  XR LUMBAR SPINE (MIN 4 VIEWS)  9/14/2020 11:41 AM    HISTORY: Spondylolisthesis at L4-5. Low back pain. COMPARISON: 5/11/2015. TECHNIQUE: 4 views were obtained. FINDINGS: There is about 1.1 cm of anterior subluxation of L4 compared    to L5 that does not change on the flexion and extension views. There    is minimal narrowing of the L4-5 disc space.  There is about 1 cm of    anterior subluxation of L5 compared to S1 that does not change on the    flexion and extension views. There is moderate narrowing of the L5-S1    disc space. There is facet arthropathy at L4-5 and L5-S1.         Impression    1. Subluxations at L4-5 and L5-S1, as described above that do not    change on the flexion and extension views. There was only minimal    subluxation at both levels on the 2015 study. 2. Degenerative changes, as described. Signed by Dr Román Albert on 9/14/2020 11:47 AM    I have personally reviewed the images and my interpretation is: There is a spondylolisthesis at L4-5 that measures 10-11mm in extension, 12-13mm in flexion,   There is a spondylolisthesis at L5-S1 that measures 11-12mm in extension, 12-13 in flexion  When the XR is compared to the MRI there is significant subluxation. This is consisdered spinal instability. ASSESSMENT:    Polo Rodriguez is a 61 y.o. female with complaints of low back and right leg pain. She does have a spondylolisthesis of L4 on L5 and L5 on S1. ICD-10-CM    1. Spondylolisthesis at L4-L5 level  M43.16    2. Spondylolisthesis at L5-S1 level  M43.17    3. Spinal instability of lumbar region  M53.2X6    4. Lumbar foraminal stenosis  M48.061    5. DDD (degenerative disc disease), lumbar  M51.36    6. DDD (degenerative disc disease), lumbosacral  M51.37    7. Chronic midline low back pain with right-sided sciatica  M54.41     G89.29        PLAN:  - We have discussed and reviewed the results of the MRI and XR lumbar spine with Ms. Valadez Severance at length. We explained that she does have the spondylolisthesis at L4-5 and L5-S1 that show significant instability when going from lying position to standing. We explained that the treatment for the instability would be a lumbar fusion. We discussed the surgical procedure. We feel that her back pain and RLE pain would improve dramatically. We educated her on how she would still have some form of low back ache, however, the severe pain will likely improve.   She verbalizes

## 2020-10-29 RX ORDER — METOPROLOL TARTRATE 50 MG/1
TABLET, FILM COATED ORAL
Qty: 180 TABLET | Refills: 3 | Status: SHIPPED | OUTPATIENT
Start: 2020-10-29 | End: 2021-04-18 | Stop reason: DRUGHIGH

## 2020-10-29 NOTE — TELEPHONE ENCOUNTER
Kym Inés called to request a refill on her medication.       Last office visit : 7/22/2020   Next office visit : 12/10/2020     Requested Prescriptions     Pending Prescriptions Disp Refills    metoprolol tartrate (LOPRESSOR) 50 MG tablet [Pharmacy Med Name: METOPROLOL TARTRATE 50MG TABS] 180 tablet 3     Sig: TAKE 1 TABLET BY MOUTH 2 TIMES A DAY            Bety Grande MA

## 2020-10-30 ENCOUNTER — TELEPHONE (OUTPATIENT)
Dept: NEUROSURGERY | Age: 60
End: 2020-10-30

## 2020-10-30 NOTE — TELEPHONE ENCOUNTER
Spoke with Talha Nelson about her previous surgery date. We originally had her down \"tentatively\" for November 9th 2020, however, we had not heard anything from her regarding her wanting to proceed with surgery. Dr. Padmini Eugene will actually be out of the office that day, therefore, we had to move the surgery date. I informed her of the new date of surgery:  December 7th 2020 at 7:30am.  Informed her that she needs to arrive to registration at 6:00am.      She verbalized understanding and informed me that she will need her FMLA/Short term disability forms redone. She will drop these off or have them faxed. Once you receive these please let me know and I will complete them.       Thank you

## 2020-11-11 ENCOUNTER — TELEPHONE (OUTPATIENT)
Dept: NEUROSURGERY | Age: 60
End: 2020-11-11

## 2020-11-17 ENCOUNTER — TELEPHONE (OUTPATIENT)
Dept: NEUROSURGERY | Age: 60
End: 2020-11-17

## 2020-12-01 ENCOUNTER — TELEPHONE (OUTPATIENT)
Dept: NEUROSURGERY | Age: 60
End: 2020-12-01

## 2020-12-01 NOTE — TELEPHONE ENCOUNTER
I contacted [facility] and I spoke to  Talisha Portillo. She then  Transferred me over to [facility]. They looked in patients chart and states they did not receive the clinicals. ( this was faxed on Nov 17)  I was told to resubmit all clinicals to fax # [phone]. I have also written expedite on page to get this done quickly,  Ref # is [id number].

## 2020-12-02 ENCOUNTER — HOSPITAL ENCOUNTER (OUTPATIENT)
Dept: GENERAL RADIOLOGY | Age: 60
Discharge: HOME | End: 2020-12-02
Payer: COMMERCIAL

## 2020-12-02 ENCOUNTER — HOSPITAL ENCOUNTER (OUTPATIENT)
Dept: PREADMISSION TESTING | Age: 60
Discharge: HOME | End: 2020-12-06
Payer: COMMERCIAL

## 2020-12-02 VITALS — HEIGHT: 68 IN | WEIGHT: 189 LBS | BODY MASS INDEX: 28.64 KG/M2

## 2020-12-02 LAB
ABO/RH: NORMAL
ALBUMIN SERPL-MCNC: 4.1 G/DL (ref 3.5–5.2)
ALP BLD-CCNC: 60 U/L (ref 35–104)
ALT SERPL-CCNC: 21 U/L (ref 5–33)
ANION GAP SERPL CALCULATED.3IONS-SCNC: 12 MMOL/L (ref 7–19)
ANTIBODY SCREEN: NORMAL
APTT: 24.7 SEC (ref 26–36.2)
AST SERPL-CCNC: 23 U/L (ref 5–32)
BILIRUB SERPL-MCNC: 0.3 MG/DL (ref 0.2–1.2)
BILIRUBIN URINE: NEGATIVE
BLOOD, URINE: NEGATIVE
BUN BLDV-MCNC: 19 MG/DL (ref 8–23)
CALCIUM SERPL-MCNC: 9.4 MG/DL (ref 8.8–10.2)
CHLORIDE BLD-SCNC: 104 MMOL/L (ref 98–111)
CLARITY: ABNORMAL
CO2: 25 MMOL/L (ref 22–29)
COLOR: YELLOW
CREAT SERPL-MCNC: 0.9 MG/DL (ref 0.5–0.9)
EKG P AXIS: 52 DEGREES
EKG P-R INTERVAL: 150 MS
EKG Q-T INTERVAL: 390 MS
EKG QRS DURATION: 94 MS
EKG QTC CALCULATION (BAZETT): 425 MS
EKG T AXIS: 48 DEGREES
GFR AFRICAN AMERICAN: >59
GFR NON-AFRICAN AMERICAN: >60
GLUCOSE BLD-MCNC: 107 MG/DL (ref 74–109)
GLUCOSE URINE: NEGATIVE MG/DL
HCT VFR BLD CALC: 41.5 % (ref 37–47)
HEMOGLOBIN: 13.1 G/DL (ref 12–16)
INR BLD: 1.01 (ref 0.88–1.18)
KETONES, URINE: NEGATIVE MG/DL
LEUKOCYTE ESTERASE, URINE: NEGATIVE
MCH RBC QN AUTO: 26.1 PG (ref 27–31)
MCHC RBC AUTO-ENTMCNC: 31.6 G/DL (ref 33–37)
MCV RBC AUTO: 82.7 FL (ref 81–99)
NITRITE, URINE: NEGATIVE
PDW BLD-RTO: 13.7 % (ref 11.5–14.5)
PH UA: 5.5 (ref 5–8)
PLATELET # BLD: 237 K/UL (ref 130–400)
PMV BLD AUTO: 11.3 FL (ref 9.4–12.3)
POTASSIUM SERPL-SCNC: 3.9 MMOL/L (ref 3.5–5)
PROTEIN UA: ABNORMAL MG/DL
PROTHROMBIN TIME: 13.2 SEC (ref 12–14.6)
RBC # BLD: 5.02 M/UL (ref 4.2–5.4)
SODIUM BLD-SCNC: 141 MMOL/L (ref 136–145)
SPECIFIC GRAVITY UA: 1.02 (ref 1–1.03)
TOTAL PROTEIN: 6.8 G/DL (ref 6.6–8.7)
UROBILINOGEN, URINE: 1 E.U./DL
WBC # BLD: 9.2 K/UL (ref 4.8–10.8)

## 2020-12-02 PROCEDURE — 86850 RBC ANTIBODY SCREEN: CPT

## 2020-12-02 PROCEDURE — 86901 BLOOD TYPING SEROLOGIC RH(D): CPT

## 2020-12-02 PROCEDURE — 81003 URINALYSIS AUTO W/O SCOPE: CPT

## 2020-12-02 PROCEDURE — 85730 THROMBOPLASTIN TIME PARTIAL: CPT

## 2020-12-02 PROCEDURE — 85610 PROTHROMBIN TIME: CPT

## 2020-12-02 PROCEDURE — 93005 ELECTROCARDIOGRAM TRACING: CPT

## 2020-12-02 PROCEDURE — 86900 BLOOD TYPING SEROLOGIC ABO: CPT

## 2020-12-02 PROCEDURE — 71046 X-RAY EXAM CHEST 2 VIEWS: CPT

## 2020-12-02 PROCEDURE — 80053 COMPREHEN METABOLIC PANEL: CPT

## 2020-12-02 PROCEDURE — 85027 COMPLETE CBC AUTOMATED: CPT

## 2020-12-03 ENCOUNTER — TELEPHONE (OUTPATIENT)
Dept: NEUROSURGERY | Age: 60
End: 2020-12-03

## 2020-12-03 NOTE — TELEPHONE ENCOUNTER
I have once again  Submitted this pa thru [facility] this time as I was instructed by antonio. The auth # for the pending case is [id number].

## 2020-12-04 ENCOUNTER — TELEPHONE (OUTPATIENT)
Dept: NEUROSURGERY | Age: 60
End: 2020-12-04

## 2020-12-04 LAB — SARS-COV-2, NAA: NOT DETECTED

## 2020-12-04 NOTE — TELEPHONE ENCOUNTER
Called and spoke with the patient. Reminding her of her surgery on Monday 12/7/2020 at 7:30 am with an arrival time of 6:00 am to register. Patient voiced understanding to all.

## 2020-12-07 ENCOUNTER — ANESTHESIA EVENT (OUTPATIENT)
Dept: OPERATING ROOM | Age: 60
DRG: 460 | End: 2020-12-07
Payer: COMMERCIAL

## 2020-12-07 ENCOUNTER — ANESTHESIA (OUTPATIENT)
Dept: OPERATING ROOM | Age: 60
DRG: 460 | End: 2020-12-07
Payer: COMMERCIAL

## 2020-12-07 ENCOUNTER — HOSPITAL ENCOUNTER (INPATIENT)
Age: 60
LOS: 2 days | Discharge: HOME | DRG: 460 | End: 2020-12-09
Attending: NEUROLOGICAL SURGERY | Admitting: NEUROLOGICAL SURGERY
Payer: COMMERCIAL

## 2020-12-07 ENCOUNTER — APPOINTMENT (OUTPATIENT)
Dept: GENERAL RADIOLOGY | Age: 60
DRG: 460 | End: 2020-12-07
Attending: NEUROLOGICAL SURGERY
Payer: COMMERCIAL

## 2020-12-07 VITALS
TEMPERATURE: 100.2 F | RESPIRATION RATE: 21 BRPM | SYSTOLIC BLOOD PRESSURE: 111 MMHG | DIASTOLIC BLOOD PRESSURE: 57 MMHG | OXYGEN SATURATION: 96 %

## 2020-12-07 PROBLEM — M43.17 SPONDYLOLISTHESIS AT L5-S1 LEVEL: Status: ACTIVE | Noted: 2020-12-07

## 2020-12-07 LAB
ABO/RH: NORMAL
ANTIBODY SCREEN: NORMAL

## 2020-12-07 PROCEDURE — 7100000000 HC PACU RECOVERY - FIRST 15 MIN: Performed by: NEUROLOGICAL SURGERY

## 2020-12-07 PROCEDURE — 2709999900 HC NON-CHARGEABLE SUPPLY: Performed by: NEUROLOGICAL SURGERY

## 2020-12-07 PROCEDURE — 2580000003 HC RX 258: Performed by: NEUROLOGICAL SURGERY

## 2020-12-07 PROCEDURE — 86850 RBC ANTIBODY SCREEN: CPT

## 2020-12-07 PROCEDURE — 3209999900 FLUORO FOR SURGICAL PROCEDURES

## 2020-12-07 PROCEDURE — 94761 N-INVAS EAR/PLS OXIMETRY MLT: CPT

## 2020-12-07 PROCEDURE — 22842 INSERT SPINE FIXATION DEVICE: CPT | Performed by: NEUROLOGICAL SURGERY

## 2020-12-07 PROCEDURE — 6360000002 HC RX W HCPCS: Performed by: NEUROLOGICAL SURGERY

## 2020-12-07 PROCEDURE — 86900 BLOOD TYPING SEROLOGIC ABO: CPT

## 2020-12-07 PROCEDURE — 6370000000 HC RX 637 (ALT 250 FOR IP): Performed by: NEUROLOGICAL SURGERY

## 2020-12-07 PROCEDURE — 22630 ARTHRD PST TQ 1NTRSPC LUM: CPT | Performed by: NEUROLOGICAL SURGERY

## 2020-12-07 PROCEDURE — 0SG00AJ FUSION OF LUMBAR VERTEBRAL JOINT WITH INTERBODY FUSION DEVICE, POSTERIOR APPROACH, ANTERIOR COLUMN, OPEN APPROACH: ICD-10-PCS | Performed by: NEUROLOGICAL SURGERY

## 2020-12-07 PROCEDURE — 6360000002 HC RX W HCPCS: Performed by: NURSE ANESTHETIST, CERTIFIED REGISTERED

## 2020-12-07 PROCEDURE — 22632 ARTHRD PST TQ 1NTRSPC LM EA: CPT | Performed by: NEUROLOGICAL SURGERY

## 2020-12-07 PROCEDURE — 1210000000 HC MED SURG R&B

## 2020-12-07 PROCEDURE — 3600000004 HC SURGERY LEVEL 4 BASE: Performed by: NEUROLOGICAL SURGERY

## 2020-12-07 PROCEDURE — 00NY0ZZ RELEASE LUMBAR SPINAL CORD, OPEN APPROACH: ICD-10-PCS | Performed by: NEUROLOGICAL SURGERY

## 2020-12-07 PROCEDURE — C1713 ANCHOR/SCREW BN/BN,TIS/BN: HCPCS | Performed by: NEUROLOGICAL SURGERY

## 2020-12-07 PROCEDURE — 3600000014 HC SURGERY LEVEL 4 ADDTL 15MIN: Performed by: NEUROLOGICAL SURGERY

## 2020-12-07 PROCEDURE — C1769 GUIDE WIRE: HCPCS | Performed by: NEUROLOGICAL SURGERY

## 2020-12-07 PROCEDURE — 2700000000 HC OXYGEN THERAPY PER DAY

## 2020-12-07 PROCEDURE — 2720000010 HC SURG SUPPLY STERILE: Performed by: NEUROLOGICAL SURGERY

## 2020-12-07 PROCEDURE — 36415 COLL VENOUS BLD VENIPUNCTURE: CPT

## 2020-12-07 PROCEDURE — 01NB0ZZ RELEASE LUMBAR NERVE, OPEN APPROACH: ICD-10-PCS | Performed by: NEUROLOGICAL SURGERY

## 2020-12-07 PROCEDURE — 22853 INSJ BIOMECHANICAL DEVICE: CPT | Performed by: NEUROLOGICAL SURGERY

## 2020-12-07 PROCEDURE — 01NR0ZZ RELEASE SACRAL NERVE, OPEN APPROACH: ICD-10-PCS | Performed by: NEUROLOGICAL SURGERY

## 2020-12-07 PROCEDURE — 2780000010 HC IMPLANT OTHER: Performed by: NEUROLOGICAL SURGERY

## 2020-12-07 PROCEDURE — 3700000000 HC ANESTHESIA ATTENDED CARE: Performed by: NEUROLOGICAL SURGERY

## 2020-12-07 PROCEDURE — 86901 BLOOD TYPING SEROLOGIC RH(D): CPT

## 2020-12-07 PROCEDURE — 3700000001 HC ADD 15 MINUTES (ANESTHESIA): Performed by: NEUROLOGICAL SURGERY

## 2020-12-07 PROCEDURE — 0SG30AJ FUSION OF LUMBOSACRAL JOINT WITH INTERBODY FUSION DEVICE, POSTERIOR APPROACH, ANTERIOR COLUMN, OPEN APPROACH: ICD-10-PCS | Performed by: NEUROLOGICAL SURGERY

## 2020-12-07 PROCEDURE — 72100 X-RAY EXAM L-S SPINE 2/3 VWS: CPT

## 2020-12-07 PROCEDURE — 7100000001 HC PACU RECOVERY - ADDTL 15 MIN: Performed by: NEUROLOGICAL SURGERY

## 2020-12-07 PROCEDURE — C1762 CONN TISS, HUMAN(INC FASCIA): HCPCS | Performed by: NEUROLOGICAL SURGERY

## 2020-12-07 PROCEDURE — 2500000003 HC RX 250 WO HCPCS: Performed by: NURSE ANESTHETIST, CERTIFIED REGISTERED

## 2020-12-07 PROCEDURE — 3E0U0GB INTRODUCTION OF RECOMBINANT BONE MORPHOGENETIC PROTEIN INTO JOINTS, OPEN APPROACH: ICD-10-PCS | Performed by: NEUROLOGICAL SURGERY

## 2020-12-07 PROCEDURE — 6370000000 HC RX 637 (ALT 250 FOR IP): Performed by: ANESTHESIOLOGY

## 2020-12-07 DEVICE — ROD 641000070 70MM PERC ROD 4.75MM CCM
Type: IMPLANTABLE DEVICE | Status: FUNCTIONAL
Brand: CD HORIZON® SOLERA® SPINAL SYSTEM

## 2020-12-07 DEVICE — SPACER 7770728 ELEVATE X-LOR 28X7MM
Type: IMPLANTABLE DEVICE | Site: SPINE LUMBAR | Status: FUNCTIONAL
Brand: ELEVATE™ SPINAL SYSTEM

## 2020-12-07 DEVICE — GRAFT BNE CRUSH 30 CC OSCF200330] ZIMMER BIOMET INC]: Type: IMPLANTABLE DEVICE | Site: SPINE LUMBAR | Status: FUNCTIONAL

## 2020-12-07 DEVICE — MAS 54850017545 4.75 EXT TAB CANN 7.5X45
Type: IMPLANTABLE DEVICE | Status: FUNCTIONAL
Brand: CD HORIZON® SOLERA® SPINAL SYSTEM

## 2020-12-07 DEVICE — SET SCR SPNL DIA4.75MM POST THORLUM SACR TI PERC APPRCH CDH: Type: IMPLANTABLE DEVICE | Status: FUNCTIONAL

## 2020-12-07 DEVICE — MAS 54850016555 4.75 EXT TAB CANN 6.5X55
Type: IMPLANTABLE DEVICE | Status: FUNCTIONAL
Brand: CD HORIZON® SOLERA® SPINAL SYSTEM

## 2020-12-07 DEVICE — BONE GRAFT KIT 7510200 INFUSE SMALL
Type: IMPLANTABLE DEVICE | Site: SPINE LUMBAR | Status: FUNCTIONAL
Brand: INFUSE® BONE GRAFT

## 2020-12-07 DEVICE — ROD 641000075 75MM PERC ROD 4.75MM CCM
Type: IMPLANTABLE DEVICE | Status: FUNCTIONAL
Brand: CD HORIZON® SOLERA® SPINAL SYSTEM

## 2020-12-07 DEVICE — MAS 54850016550 4.75 EXT TAB CANN 6.5X50
Type: IMPLANTABLE DEVICE | Status: FUNCTIONAL
Brand: CD HORIZON® SOLERA® SPINAL SYSTEM

## 2020-12-07 RX ORDER — MORPHINE SULFATE/PF 50 MG/50ML
PATIENT CONTROLLED ANALGESIA SYRINGE INTRAVENOUS CONTINUOUS
Status: DISCONTINUED | OUTPATIENT
Start: 2020-12-07 | End: 2020-12-08

## 2020-12-07 RX ORDER — METHOCARBAMOL 750 MG/1
750 TABLET, FILM COATED ORAL EVERY 6 HOURS
Status: DISCONTINUED | OUTPATIENT
Start: 2020-12-07 | End: 2020-12-09 | Stop reason: HOSPADM

## 2020-12-07 RX ORDER — HYDROMORPHONE HYDROCHLORIDE 1 MG/ML
0.25 INJECTION, SOLUTION INTRAMUSCULAR; INTRAVENOUS; SUBCUTANEOUS EVERY 5 MIN PRN
Status: DISCONTINUED | OUTPATIENT
Start: 2020-12-07 | End: 2020-12-07 | Stop reason: HOSPADM

## 2020-12-07 RX ORDER — MORPHINE SULFATE 4 MG/ML
2 INJECTION, SOLUTION INTRAMUSCULAR; INTRAVENOUS EVERY 5 MIN PRN
Status: DISCONTINUED | OUTPATIENT
Start: 2020-12-07 | End: 2020-12-07 | Stop reason: HOSPADM

## 2020-12-07 RX ORDER — DEXAMETHASONE SODIUM PHOSPHATE 10 MG/ML
INJECTION, SOLUTION INTRAMUSCULAR; INTRAVENOUS PRN
Status: DISCONTINUED | OUTPATIENT
Start: 2020-12-07 | End: 2020-12-07 | Stop reason: SDUPTHER

## 2020-12-07 RX ORDER — METOCLOPRAMIDE HYDROCHLORIDE 5 MG/ML
10 INJECTION INTRAMUSCULAR; INTRAVENOUS
Status: DISCONTINUED | OUTPATIENT
Start: 2020-12-07 | End: 2020-12-07 | Stop reason: HOSPADM

## 2020-12-07 RX ORDER — ROCURONIUM BROMIDE 10 MG/ML
INJECTION, SOLUTION INTRAVENOUS PRN
Status: DISCONTINUED | OUTPATIENT
Start: 2020-12-07 | End: 2020-12-07 | Stop reason: SDUPTHER

## 2020-12-07 RX ORDER — PROMETHAZINE HYDROCHLORIDE 12.5 MG/1
12.5 TABLET ORAL EVERY 6 HOURS PRN
Status: DISCONTINUED | OUTPATIENT
Start: 2020-12-07 | End: 2020-12-09 | Stop reason: HOSPADM

## 2020-12-07 RX ORDER — PROPOFOL 10 MG/ML
INJECTION, EMULSION INTRAVENOUS PRN
Status: DISCONTINUED | OUTPATIENT
Start: 2020-12-07 | End: 2020-12-07 | Stop reason: SDUPTHER

## 2020-12-07 RX ORDER — POLYVINYL ALCOHOL 14 MG/ML
1 SOLUTION/ DROPS OPHTHALMIC PRN
Status: DISCONTINUED | OUTPATIENT
Start: 2020-12-07 | End: 2020-12-09 | Stop reason: HOSPADM

## 2020-12-07 RX ORDER — MIDAZOLAM HYDROCHLORIDE 1 MG/ML
INJECTION INTRAMUSCULAR; INTRAVENOUS PRN
Status: DISCONTINUED | OUTPATIENT
Start: 2020-12-07 | End: 2020-12-07 | Stop reason: SDUPTHER

## 2020-12-07 RX ORDER — MORPHINE SULFATE 4 MG/ML
4 INJECTION, SOLUTION INTRAMUSCULAR; INTRAVENOUS
Status: DISCONTINUED | OUTPATIENT
Start: 2020-12-07 | End: 2020-12-09 | Stop reason: HOSPADM

## 2020-12-07 RX ORDER — FENTANYL CITRATE 50 UG/ML
INJECTION, SOLUTION INTRAMUSCULAR; INTRAVENOUS PRN
Status: DISCONTINUED | OUTPATIENT
Start: 2020-12-07 | End: 2020-12-07 | Stop reason: SDUPTHER

## 2020-12-07 RX ORDER — KETOROLAC TROMETHAMINE 30 MG/ML
INJECTION, SOLUTION INTRAMUSCULAR; INTRAVENOUS PRN
Status: DISCONTINUED | OUTPATIENT
Start: 2020-12-07 | End: 2020-12-07 | Stop reason: SDUPTHER

## 2020-12-07 RX ORDER — ATORVASTATIN CALCIUM 20 MG/1
20 TABLET, FILM COATED ORAL DAILY
Status: DISCONTINUED | OUTPATIENT
Start: 2020-12-07 | End: 2020-12-09 | Stop reason: HOSPADM

## 2020-12-07 RX ORDER — SUCCINYLCHOLINE CHLORIDE 20 MG/ML
INJECTION INTRAMUSCULAR; INTRAVENOUS PRN
Status: DISCONTINUED | OUTPATIENT
Start: 2020-12-07 | End: 2020-12-07 | Stop reason: SDUPTHER

## 2020-12-07 RX ORDER — LIDOCAINE HYDROCHLORIDE 10 MG/ML
1 INJECTION, SOLUTION EPIDURAL; INFILTRATION; INTRACAUDAL; PERINEURAL
Status: DISCONTINUED | OUTPATIENT
Start: 2020-12-07 | End: 2020-12-07 | Stop reason: HOSPADM

## 2020-12-07 RX ORDER — MIDAZOLAM HYDROCHLORIDE 1 MG/ML
2 INJECTION INTRAMUSCULAR; INTRAVENOUS
Status: DISCONTINUED | OUTPATIENT
Start: 2020-12-07 | End: 2020-12-07 | Stop reason: HOSPADM

## 2020-12-07 RX ORDER — SODIUM CHLORIDE, SODIUM LACTATE, POTASSIUM CHLORIDE, CALCIUM CHLORIDE 600; 310; 30; 20 MG/100ML; MG/100ML; MG/100ML; MG/100ML
INJECTION, SOLUTION INTRAVENOUS CONTINUOUS
Status: DISCONTINUED | OUTPATIENT
Start: 2020-12-07 | End: 2020-12-07

## 2020-12-07 RX ORDER — METOPROLOL TARTRATE 50 MG/1
50 TABLET, FILM COATED ORAL 2 TIMES DAILY
Status: DISCONTINUED | OUTPATIENT
Start: 2020-12-07 | End: 2020-12-09 | Stop reason: HOSPADM

## 2020-12-07 RX ORDER — ONDANSETRON 2 MG/ML
4 INJECTION INTRAMUSCULAR; INTRAVENOUS EVERY 6 HOURS PRN
Status: DISCONTINUED | OUTPATIENT
Start: 2020-12-07 | End: 2020-12-09 | Stop reason: HOSPADM

## 2020-12-07 RX ORDER — HYDRALAZINE HYDROCHLORIDE 20 MG/ML
5 INJECTION INTRAMUSCULAR; INTRAVENOUS EVERY 10 MIN PRN
Status: DISCONTINUED | OUTPATIENT
Start: 2020-12-07 | End: 2020-12-07 | Stop reason: HOSPADM

## 2020-12-07 RX ORDER — SODIUM CHLORIDE 0.9 % (FLUSH) 0.9 %
10 SYRINGE (ML) INJECTION PRN
Status: DISCONTINUED | OUTPATIENT
Start: 2020-12-07 | End: 2020-12-07 | Stop reason: HOSPADM

## 2020-12-07 RX ORDER — SODIUM CHLORIDE 9 MG/ML
INJECTION, SOLUTION INTRAVENOUS CONTINUOUS
Status: DISCONTINUED | OUTPATIENT
Start: 2020-12-07 | End: 2020-12-09 | Stop reason: HOSPADM

## 2020-12-07 RX ORDER — ONDANSETRON 2 MG/ML
INJECTION INTRAMUSCULAR; INTRAVENOUS PRN
Status: DISCONTINUED | OUTPATIENT
Start: 2020-12-07 | End: 2020-12-07 | Stop reason: SDUPTHER

## 2020-12-07 RX ORDER — FENTANYL CITRATE 50 UG/ML
50 INJECTION, SOLUTION INTRAMUSCULAR; INTRAVENOUS
Status: DISCONTINUED | OUTPATIENT
Start: 2020-12-07 | End: 2020-12-07 | Stop reason: HOSPADM

## 2020-12-07 RX ORDER — SCOLOPAMINE TRANSDERMAL SYSTEM 1 MG/1
1 PATCH, EXTENDED RELEASE TRANSDERMAL ONCE
Status: DISCONTINUED | OUTPATIENT
Start: 2020-12-07 | End: 2020-12-07

## 2020-12-07 RX ORDER — MORPHINE SULFATE 4 MG/ML
2 INJECTION, SOLUTION INTRAMUSCULAR; INTRAVENOUS
Status: DISCONTINUED | OUTPATIENT
Start: 2020-12-07 | End: 2020-12-09 | Stop reason: HOSPADM

## 2020-12-07 RX ORDER — LIDOCAINE HYDROCHLORIDE 10 MG/ML
INJECTION, SOLUTION EPIDURAL; INFILTRATION; INTRACAUDAL; PERINEURAL PRN
Status: DISCONTINUED | OUTPATIENT
Start: 2020-12-07 | End: 2020-12-07 | Stop reason: SDUPTHER

## 2020-12-07 RX ORDER — SODIUM CHLORIDE 0.9 % (FLUSH) 0.9 %
10 SYRINGE (ML) INJECTION EVERY 12 HOURS SCHEDULED
Status: DISCONTINUED | OUTPATIENT
Start: 2020-12-07 | End: 2020-12-09 | Stop reason: HOSPADM

## 2020-12-07 RX ORDER — CHLORDIAZEPOXIDE HYDROCHLORIDE AND CLIDINIUM BROMIDE 5; 2.5 MG/1; MG/1
1 CAPSULE ORAL
Status: DISCONTINUED | OUTPATIENT
Start: 2020-12-07 | End: 2020-12-09 | Stop reason: HOSPADM

## 2020-12-07 RX ORDER — HYDROCODONE BITARTRATE AND ACETAMINOPHEN 7.5; 325 MG/1; MG/1
1 TABLET ORAL EVERY 4 HOURS PRN
Status: DISCONTINUED | OUTPATIENT
Start: 2020-12-07 | End: 2020-12-09 | Stop reason: HOSPADM

## 2020-12-07 RX ORDER — DIPHENHYDRAMINE HYDROCHLORIDE 50 MG/ML
25 INJECTION INTRAMUSCULAR; INTRAVENOUS EVERY 6 HOURS PRN
Status: DISCONTINUED | OUTPATIENT
Start: 2020-12-07 | End: 2020-12-08

## 2020-12-07 RX ORDER — MEPERIDINE HYDROCHLORIDE 50 MG/ML
12.5 INJECTION INTRAMUSCULAR; INTRAVENOUS; SUBCUTANEOUS EVERY 5 MIN PRN
Status: DISCONTINUED | OUTPATIENT
Start: 2020-12-07 | End: 2020-12-07 | Stop reason: HOSPADM

## 2020-12-07 RX ORDER — HYDROMORPHONE HYDROCHLORIDE 1 MG/ML
0.5 INJECTION, SOLUTION INTRAMUSCULAR; INTRAVENOUS; SUBCUTANEOUS EVERY 5 MIN PRN
Status: DISCONTINUED | OUTPATIENT
Start: 2020-12-07 | End: 2020-12-07 | Stop reason: HOSPADM

## 2020-12-07 RX ORDER — TRIAMTERENE AND HYDROCHLOROTHIAZIDE 37.5; 25 MG/1; MG/1
1 TABLET ORAL DAILY
Status: DISCONTINUED | OUTPATIENT
Start: 2020-12-08 | End: 2020-12-09 | Stop reason: HOSPADM

## 2020-12-07 RX ORDER — PROMETHAZINE HYDROCHLORIDE 25 MG/ML
6.25 INJECTION, SOLUTION INTRAMUSCULAR; INTRAVENOUS
Status: DISCONTINUED | OUTPATIENT
Start: 2020-12-07 | End: 2020-12-07 | Stop reason: HOSPADM

## 2020-12-07 RX ORDER — DILTIAZEM HYDROCHLORIDE 240 MG/1
240 CAPSULE, COATED, EXTENDED RELEASE ORAL DAILY
Status: DISCONTINUED | OUTPATIENT
Start: 2020-12-08 | End: 2020-12-09 | Stop reason: HOSPADM

## 2020-12-07 RX ORDER — MORPHINE SULFATE 4 MG/ML
4 INJECTION, SOLUTION INTRAMUSCULAR; INTRAVENOUS EVERY 5 MIN PRN
Status: DISCONTINUED | OUTPATIENT
Start: 2020-12-07 | End: 2020-12-07 | Stop reason: HOSPADM

## 2020-12-07 RX ORDER — LABETALOL HYDROCHLORIDE 5 MG/ML
5 INJECTION, SOLUTION INTRAVENOUS EVERY 10 MIN PRN
Status: DISCONTINUED | OUTPATIENT
Start: 2020-12-07 | End: 2020-12-07 | Stop reason: HOSPADM

## 2020-12-07 RX ORDER — SODIUM CHLORIDE 0.9 % (FLUSH) 0.9 %
10 SYRINGE (ML) INJECTION PRN
Status: DISCONTINUED | OUTPATIENT
Start: 2020-12-07 | End: 2020-12-09 | Stop reason: HOSPADM

## 2020-12-07 RX ORDER — SODIUM CHLORIDE 0.9 % (FLUSH) 0.9 %
10 SYRINGE (ML) INJECTION EVERY 12 HOURS SCHEDULED
Status: DISCONTINUED | OUTPATIENT
Start: 2020-12-07 | End: 2020-12-07 | Stop reason: HOSPADM

## 2020-12-07 RX ORDER — DIPHENHYDRAMINE HYDROCHLORIDE 50 MG/ML
12.5 INJECTION INTRAMUSCULAR; INTRAVENOUS
Status: DISCONTINUED | OUTPATIENT
Start: 2020-12-07 | End: 2020-12-07 | Stop reason: HOSPADM

## 2020-12-07 RX ORDER — NALOXONE HYDROCHLORIDE 0.4 MG/ML
0.4 INJECTION, SOLUTION INTRAMUSCULAR; INTRAVENOUS; SUBCUTANEOUS PRN
Status: DISCONTINUED | OUTPATIENT
Start: 2020-12-07 | End: 2020-12-08

## 2020-12-07 RX ORDER — ENALAPRILAT 2.5 MG/2ML
1.25 INJECTION INTRAVENOUS
Status: DISCONTINUED | OUTPATIENT
Start: 2020-12-07 | End: 2020-12-07 | Stop reason: HOSPADM

## 2020-12-07 RX ORDER — KETAMINE HYDROCHLORIDE 50 MG/ML
INJECTION, SOLUTION, CONCENTRATE INTRAMUSCULAR; INTRAVENOUS PRN
Status: DISCONTINUED | OUTPATIENT
Start: 2020-12-07 | End: 2020-12-07 | Stop reason: SDUPTHER

## 2020-12-07 RX ADMIN — LIDOCAINE HYDROCHLORIDE 5 ML: 10 INJECTION, SOLUTION EPIDURAL; INFILTRATION; INTRACAUDAL; PERINEURAL at 07:35

## 2020-12-07 RX ADMIN — FENTANYL CITRATE 100 MCG: 50 INJECTION INTRAMUSCULAR; INTRAVENOUS at 07:35

## 2020-12-07 RX ADMIN — Medication 1 TABLET: at 14:28

## 2020-12-07 RX ADMIN — ROCURONIUM BROMIDE 20 MG: 10 INJECTION, SOLUTION INTRAVENOUS at 08:50

## 2020-12-07 RX ADMIN — SODIUM CHLORIDE, SODIUM LACTATE, POTASSIUM CHLORIDE, AND CALCIUM CHLORIDE: 600; 310; 30; 20 INJECTION, SOLUTION INTRAVENOUS at 07:28

## 2020-12-07 RX ADMIN — KETOROLAC TROMETHAMINE 30 MG: 30 INJECTION, SOLUTION INTRAMUSCULAR; INTRAVENOUS at 07:35

## 2020-12-07 RX ADMIN — ROCURONIUM BROMIDE 10 MG: 10 INJECTION, SOLUTION INTRAVENOUS at 07:35

## 2020-12-07 RX ADMIN — METHOCARBAMOL TABLETS 750 MG: 750 TABLET, COATED ORAL at 20:28

## 2020-12-07 RX ADMIN — ROCURONIUM BROMIDE 10 MG: 10 INJECTION, SOLUTION INTRAVENOUS at 11:32

## 2020-12-07 RX ADMIN — ROCURONIUM BROMIDE 20 MG: 10 INJECTION, SOLUTION INTRAVENOUS at 07:56

## 2020-12-07 RX ADMIN — SUGAMMADEX 200 MG: 100 INJECTION, SOLUTION INTRAVENOUS at 12:04

## 2020-12-07 RX ADMIN — MIDAZOLAM 2 MG: 1 INJECTION INTRAMUSCULAR; INTRAVENOUS at 07:28

## 2020-12-07 RX ADMIN — FENTANYL CITRATE 50 MCG: 50 INJECTION INTRAMUSCULAR; INTRAVENOUS at 09:05

## 2020-12-07 RX ADMIN — SODIUM CHLORIDE, SODIUM LACTATE, POTASSIUM CHLORIDE, AND CALCIUM CHLORIDE: 600; 310; 30; 20 INJECTION, SOLUTION INTRAVENOUS at 07:02

## 2020-12-07 RX ADMIN — CEFAZOLIN SODIUM 2 G: 10 INJECTION, POWDER, FOR SOLUTION INTRAVENOUS at 07:41

## 2020-12-07 RX ADMIN — SUCCINYLCHOLINE CHLORIDE 100 MG: 20 INJECTION, SOLUTION INTRAMUSCULAR; INTRAVENOUS at 07:35

## 2020-12-07 RX ADMIN — SODIUM CHLORIDE: 9 INJECTION, SOLUTION INTRAVENOUS at 14:10

## 2020-12-07 RX ADMIN — ATORVASTATIN CALCIUM 20 MG: 20 TABLET, FILM COATED ORAL at 14:28

## 2020-12-07 RX ADMIN — Medication: at 13:37

## 2020-12-07 RX ADMIN — ONDANSETRON HYDROCHLORIDE 4 MG: 2 INJECTION, SOLUTION INTRAMUSCULAR; INTRAVENOUS at 11:51

## 2020-12-07 RX ADMIN — ROCURONIUM BROMIDE 40 MG: 10 INJECTION, SOLUTION INTRAVENOUS at 07:40

## 2020-12-07 RX ADMIN — SODIUM CHLORIDE, PRESERVATIVE FREE 10 ML: 5 INJECTION INTRAVENOUS at 21:39

## 2020-12-07 RX ADMIN — PROPOFOL 200 MG: 10 INJECTION, EMULSION INTRAVENOUS at 07:35

## 2020-12-07 RX ADMIN — SODIUM CHLORIDE, SODIUM LACTATE, POTASSIUM CHLORIDE, AND CALCIUM CHLORIDE: 600; 310; 30; 20 INJECTION, SOLUTION INTRAVENOUS at 09:45

## 2020-12-07 RX ADMIN — HYDROMORPHONE HYDROCHLORIDE 1 MG: 1 INJECTION, SOLUTION INTRAMUSCULAR; INTRAVENOUS; SUBCUTANEOUS at 12:04

## 2020-12-07 RX ADMIN — ONDANSETRON HYDROCHLORIDE 4 MG: 2 INJECTION, SOLUTION INTRAMUSCULAR; INTRAVENOUS at 07:35

## 2020-12-07 RX ADMIN — FENTANYL CITRATE 50 MCG: 50 INJECTION INTRAMUSCULAR; INTRAVENOUS at 11:36

## 2020-12-07 RX ADMIN — ROCURONIUM BROMIDE 20 MG: 10 INJECTION, SOLUTION INTRAVENOUS at 10:15

## 2020-12-07 RX ADMIN — DEXAMETHASONE SODIUM PHOSPHATE 10 MG: 10 INJECTION, SOLUTION INTRAMUSCULAR; INTRAVENOUS at 07:35

## 2020-12-07 RX ADMIN — METHOCARBAMOL TABLETS 750 MG: 750 TABLET, COATED ORAL at 14:28

## 2020-12-07 RX ADMIN — ROCURONIUM BROMIDE 10 MG: 10 INJECTION, SOLUTION INTRAVENOUS at 08:15

## 2020-12-07 RX ADMIN — Medication 50 MG: at 07:35

## 2020-12-07 RX ADMIN — FENTANYL CITRATE 50 MCG: 50 INJECTION INTRAMUSCULAR; INTRAVENOUS at 11:05

## 2020-12-07 ASSESSMENT — LIFESTYLE VARIABLES: SMOKING_STATUS: 0

## 2020-12-07 ASSESSMENT — PAIN - FUNCTIONAL ASSESSMENT: PAIN_FUNCTIONAL_ASSESSMENT: 0-10

## 2020-12-07 ASSESSMENT — PAIN SCALES - GENERAL: PAINLEVEL_OUTOF10: 10

## 2020-12-07 NOTE — H&P
[facility] Neurosurgery  H&P            Chief Complaint   Patient presents with    Follow-up    Leg Pain    Swelling      10/01/2020: Ms. Mireille Santoro returns to clinic today to discuss her treatments options. She continues to have low back and RLE pain. She states if she walks 100 yards \"I would probably be ready to fall down\". She does have numbness in her RLE. She doesn't really recall any alleviating factors at this point.          HISTORY OF PRESENT ILLNESS:     Satinder Smith is a 61 y.o. female [facility], [place]  who presents with low back pain and right leg pain that has been ongoing for about 1 year. The pain does radiate into bilateral legs R>L and will radiate into the posterior legs. Her pain is mostly located in the low back. The patient complains of numbness of the bilateral feet. Any bending exacerbates the pain. Will have pain in the right hip when lying down.       Her pain is worsened when going from a seated to standing position. Her pain is worsened with walking. Her pain is improved when lying flat. Overall, indicative that the patient does have a mechanical nature to their pain.  She states that 50% of her pain is located in the back and 50% is leg pain.     The patient has underwent a non-operative treatment course that has included:  NSAIDs (ibuprofen)  Muscle Relaxers (skelaxin)  Opiates (Norco in the past - does not like)  Physical Therapy with core strengthening (did not help)        Of note she does not use tobacco and does not take blood thinning medications.                Past Medical History        Past Medical History:   Diagnosis Date    Anal fissure      Anxiety 9/15/2017    C. difficile colitis      Cervical radiculopathy 9/15/2017    Chronic back pain      Chronic kidney disease      Class 1 obesity due to excess calories with serious comorbidity and body mass index (BMI) of 32.0 to 32.9 in adult 9/15/2017    Cyst of kidney, acquired 9/15/2017    Depression 9/15/2017    History of oral surgery      IBS (irritable bowel syndrome) 9/15/2017    Irregular heartbeat      Obesity 9/15/2017    Osteoarthritis      Retinal detachment             Past Surgical History         Past Surgical History:   Procedure Laterality Date    APPENDECTOMY         SECTION        EYE SURGERY        HYSTERECTOMY        HYSTERECTOMY, TOTAL ABDOMINAL        RETINAL DETACHMENT SURGERY               Current Facility-Administered Medications          Current Outpatient Medications   Medication Sig Dispense Refill    Probiotic Product (PROBIOTIC-10 PO) Take 1 capsule by mouth daily        chlordiazePOXIDE-clidinium (LIBRAX) 5-2.5 MG per capsule TAKE TWO CAPSULES EVERY MORNING AND TAKE 1 TO 2 CAPSULE BY MOUTH AT BEDTIME 360 capsule 1    Estriol POWD TAKE 1 CAPSULE BY MOUTH AT BEDTIME. 30 g 5    dilTIAZem (CARDIZEM CD) 240 MG extended release capsule TAKE 1 CAPSULE BY MOUTH ONE TIME DAILY 90 capsule 3    triamterene-hydrochlorothiazide (MAXZIDE-25) 37.5-25 MG per tablet TAKE 2 TABLETS BY MOUTH ONE TIME DAILY. DO NOT RESUME IF BP LESS THAN 100/70 180 tablet 3    ondansetron (ZOFRAN ODT) 4 MG disintegrating tablet Take 1 tablet by mouth every 8 hours as needed for Nausea or Vomiting 30 tablet 0    metoprolol tartrate (LOPRESSOR) 50 MG tablet TAKE 1 TABLET BY MOUTH TWO TIMES A  tablet 3    ibuprofen (ADVIL;MOTRIN) 400 MG tablet Take 400 mg by mouth 2 times daily        Estradiol-Estriol-Progesterone (BIEST/PROGESTERONE TD) Take 1 capsule by mouth nightly        celecoxib (CELEBREX) 200 MG capsule Take 200 mg by mouth daily        Multiple Vitamins-Minerals (WOMENS MULTIVITAMIN PO) Take 1 tablet by mouth daily        calcium-vitamin D (OSCAL-500) 500-200 MG-UNIT per tablet Take 1 tablet by mouth daily        simvastatin (ZOCOR) 40 MG tablet Take 1 tablet by mouth nightly 90 tablet 3      No current facility-administered medications for this visit.    Allergies:  Latex; Amoxicillin; Anaprox [naproxen sodium]; Aloe vera; Augmentin [amoxicillin-pot clavulanate]; Buspar [buspirone]; Nortriptyline hcl; and Vibramycin [doxycycline calcium]     Social History:   Social History          Tobacco Use   Smoking Status Never Smoker   Smokeless Tobacco Never Used      Social History          Substance and Sexual Activity   Alcohol Use No            Family History:   Family History         Family History   Problem Relation Age of Onset    Diabetes Mother      High Blood Pressure Mother      Heart Attack Mother      Kidney Disease Father      Stroke Father      Other Father           renal failure    Diabetes Maternal Grandmother      Colon Cancer Maternal Grandmother      Heart Disease Maternal Grandfather      Cancer Paternal Grandmother           colon cancer    Alcohol Abuse Sister      Cirrhosis Sister      Stroke Paternal Aunt      Colon Cancer Maternal Cousin             REVIEW OF SYSTEMS:  Constitutional: Negative.    HENT: Negative.    Eyes: Negative.    Respiratory: Negative.    Cardiovascular: Negative.    Gastrointestinal: Negative.    Genitourinary: Negative.    Musculoskeletal: Positive for back pain and myalgias. Skin: Negative.    Neurological: Positive for tingling and weakness. Endo/Heme/Allergies: Negative.    Psychiatric/Behavioral: Negative.          PHYSICAL EXAM:      Vitals:     10/01/20 1424   BP: 122/80   Pulse: 74   SpO2: 96%      Constitutional: appears well-developed and well-nourished.    Eyes - conjunctiva normal.  Pupils react to light  Ear, nose, throat - hearing intact to finger rub, No scars, masses, or lesions over external nose or ears, no atrophy oftongue  Neck- symmetric, no masses noted, no jugular vein distension  Respiration- chest wall appears symmetric, good expansion, normal effort without use of accessory muscles  Musculoskeletal - no significant wasting of muscles noted, no bony deformities, gait no gross ataxia  Extremities- no clubbing, cyanosis oredema  Skin - warm, dry, and intact. No rash, erythema, or pallor.   Psychiatric - mood, affect, and behavior appear normal.      Neurologic Examination  Awake, Alert and oriented x 4  Normal speech pattern, following commands     Motor:  RIGHT:               iliopsoas 5/5               knee flexor 5/5               knee extension 5/5               EHL/dorsiflexion 5/5               plantar flexion 5/5     LEFT:                  iliopsoas 5/5               knee flexor 5/5               knee extension 5/5               EHL/dorsiflexion 5/5               plantar flexion 5/5   (generalized weakness, may be poor effort on exam)     Decrease to pinprick sensation throughout BUE, BLE  Reflexes are 2+ and symmetric BLE  No myofacial tenderness to palpation  Slight antalgic Gait pattern           DATA and IMAGING:     Nursing/pcp notes, imaging, labs, and vitals reviewed.      PT,OT and/or speech notes reviewed           Lab Results   Component Value Date     WBC 9.8 02/27/2020     HGB 11.4 (L) 02/27/2020     HCT 36.6 (L) 02/27/2020     MCV 83.0 02/27/2020      (H) 02/27/2020            Lab Results   Component Value Date      (L) 02/27/2020     K 3.7 02/27/2020     CL 88 (L) 02/27/2020     CO2 23 02/27/2020     BUN 11 02/27/2020     CREATININE 0.8 02/27/2020     GLUCOSE 109 02/27/2020     CALCIUM 9.2 02/27/2020     PROT 6.7 02/27/2020     LABALBU 3.1 (L) 02/27/2020     BILITOT 0.3 02/27/2020     ALKPHOS 64 02/27/2020     AST 34 (H) 02/27/2020     ALT 52 (H) 02/27/2020     LABGLOM >60 02/27/2020            Lab Results   Component Value Date     INR 1.25 (H) 02/16/2020     INR 1.08 02/10/2020     PROTIME 15.1 (H) 02/16/2020     PROTIME 13.4 02/10/2020      Narrative    EXAM: MRI LUMBAR SPINE WO CONTRAST -- 8/4/2020 3:49 PM    HISTORY: 59 years, Female, M54.41, chronic midline low back pain with    bilateral sciatica, degenerative disc disease, balance problems COMPARISON: 6/24/2016    TECHNIQUE: Routine MRI of the lumbar spine was performed without    intravenous contrast.    FINDINGS:    Five nonrib-bearing, lumbar-type vertebral bodies.  There is grade 1    anterolisthesis L4 on L5 and L5 on S1, similar compared to 6/24/2016.     Normal vertebral body height. No evidence of a marrow replacing    process. No focally suspicious bony edema. Hemangiomata in the L4, L2    and L1 vertebral bodies. Mild disc height loss and disc desiccation at L4-5 and L5-S1. Conus    terminates at L1-2. Normal appearance of the distal thoracic cord and    cauda equina nerve roots. L1-2: No disc bulge, spinal canal or foraminal stenosis. L2-3: No disc bulge, spinal canal or foraminal stenosis. L3-4: Minimal disc bulge. No spinal canal stenosis. Mild bilateral    foraminal stenosis. L4-5: Uncovering of the disc with facet hypertrophy and small    bilateral facet joint effusions. No spinal canal stenosis. Mild to    moderate bilateral foraminal stenosis. L5-S1: Uncovering of the disc with facet hypertrophy. No spinal canal    stenosis. Mild bilateral foraminal stenosis. Fluid intensity bilateral renal cortical lesions, which were also    present 6/24/2016, incompletely visualized and evaluated on this exam,    but most commonly renal cysts.         Impression    1. No acute finding of the lumbar spine. 2. Degenerative changes greatest at L4-5 and L5-S1, similar to    6/24/2016. Signed by Dr Merline Lolling on 8/5/2020 8:45 AM    I have personally reviewed these images and my interpretation is:  DDD L4-S1  L4-5 there is an anterolisthesis that measures 4mm, there is facet arthropathy on the right that results in possible impingement of the right L4 nerve root.   Mild bilateral foraminal stenosis  L5-S1 there is an anterolisthesis that measures 4mm as well.             Narrative    EXAMINATION:  XR LUMBAR SPINE (MIN 4 VIEWS)  9/14/2020 11:41 AM    HISTORY: Spondylolisthesis at L4-5. Low back pain. COMPARISON: 5/11/2015. TECHNIQUE: 4 views were obtained. FINDINGS: There is about 1.1 cm of anterior subluxation of L4 compared    to L5 that does not change on the flexion and extension views. There    is minimal narrowing of the L4-5 disc space. There is about 1 cm of    anterior subluxation of L5 compared to S1 that does not change on the    flexion and extension views. There is moderate narrowing of the L5-S1    disc space. There is facet arthropathy at L4-5 and L5-S1.         Impression    1. Subluxations at L4-5 and L5-S1, as described above that do not    change on the flexion and extension views. There was only minimal    subluxation at both levels on the 2015 study. 2. Degenerative changes, as described. Signed by Dr Yurdiia Hernandez on 9/14/2020 11:47 AM    I have personally reviewed the images and my interpretation is: There is a spondylolisthesis at L4-5 that measures 10-11mm in extension, 12-13mm in flexion,   There is a spondylolisthesis at L5-S1 that measures 11-12mm in extension, 12-13 in flexion  When the XR is compared to the MRI there is significant subluxation. This is consisdered spinal instability.             ASSESSMENT:     Wyatt Jones is a 61 y.o. female with complaints of low back and right leg pain. She does have a spondylolisthesis of L4 on L5 and L5 on S1.           ICD-10-CM     1. Spondylolisthesis at L4-L5 level  M43.16     2. Spondylolisthesis at L5-S1 level  M43.17     3. Spinal instability of lumbar region  M53.2X6     4. Lumbar foraminal stenosis  M48.061     5. DDD (degenerative disc disease), lumbar  M51.36     6. DDD (degenerative disc disease), lumbosacral  M51.37     7. Chronic midline low back pain with right-sided sciatica  M54.41       G89.29           PLAN:  - We have discussed and reviewed the results of the MRI and XR lumbar spine with Ms. Ayde Agrawal at length.   We explained that she does have the spondylolisthesis at L4-5 and L5-S1 that show significant instability when going from lying position to standing. We explained that the treatment for the instability would be a lumbar fusion. We discussed the surgical procedure. We feel that her back pain and RLE pain would improve dramatically. We educated her on how she would still have some form of low back ache, however, the severe pain will likely improve. She verbalizes understanding. She is concerned with not being able to return to her job. She would like to go home and think about this before scheduling surgery.       She will need a TLIF of L4-5 and L5-S1 using minimally invasive technique; using sold cecilia will jelly rolls      We discussed risks, complications, and expectations, including but not limited to infection, paralysis, bowel and bladder dysfunction, possible need for revision procedure, persistent pain, spinal fluid leak, stroke and death. In addition, the benefits of the surgery were thoroughly discussed and the patient demonstrated a deep understanding. The patient wishes to proceed with surgical intervention.

## 2020-12-07 NOTE — BRIEF OP NOTE
Brief Postoperative Note      Patient: Jin Braswell  YOB: 1960  MRN: [id number]    Date of Procedure: 12/7/2020    Pre-Op Diagnosis:  Spondylolisthesis L4-5 and L5-S1    Post-Op Diagnosis: Same       Procedure:  TLIF L4-5, L5-S1    Surgeon(s):  Delbert La DO    Assistant:  * No surgical staff found *    Anesthesia: General    Estimated Blood Loss (mL): 425     Complications: None    Specimens:   * No specimens in log *    Implants:  Implant Name Type Inv. Item Serial No.  Lot No. LRB No. Used Action   KIT BNE GRFT SM RHBMP-2 4.2MG INJ 5ML CONTAIN NDL 20GA  KIT BNE GRFT SM RHBMP-2 4.2MG INJ 5ML CONTAIN NDL 20GA  [employer] SOFAMOR DANEK-WD [id number] N/A 1 Implanted   GRAFT BNE CRUSH 30 CC [[id number]] [[employer]]  GRAFT BNE CRUSH 30 CC [[id number]] [[employer]]  [employer]-WD [id number] N/A 1 Implanted   SPACER SPNL [id number] LUM SACR PEEK INTBDY FUS W/ TANT  SPACER SPNL [id number] LUM SACR PEEK INTBDY FUS W/ TANT  MED[employer] SOFAMOR DANEK-WD [id number] N/A 1 Implanted   SPACER SPNL [id number] LUM SACR PEEK INTBDY FUS W/ TANT  SPACER SPNL [id number] LUM SACR PEEK INTBDY FUS W/ TANT  [employer] SOFAMOR DANEK-WD [id number] N/A 1 Implanted   SCREW SPNL L50MM OD65MM POST THORACOLUMBOSACRAL MARGRET  SCREW SPNL L50MM OD65MM POST THORACOLUMBOSACRAL MARGRET  [employer] [place] [employer]-WD  N/A 3 Implanted   SCREW SOLERA VOYAGER 6.5X55MM Spine SCREW SOLERA VOYAGER 6.5X55MM  [employer] [place] [employer]-PMM  N/A 1 Implanted   SCREW SPNL L45MM OD75MM POST THORACOLUMBOSACRAL MARGRET  SCREW SPNL L45MM OD75MM POST THORACOLUMBOSACRAL MARGRET  [employer] [place] [employer]-WD  N/A 2 Implanted   BINTA SPNL 4.75X70 MM PERC CCM CD HORZ SOLERA  BINTA SPNL 4.75X70 MM PERC CCM CD HORZ SOLERA  [employer] [place] [employer]-WD  N/A 1 Implanted   SET SCR SPNL DIA4. 75MM POST THORLUM SACR TI PERC [facility]  SET SCR SPNL DIA4. 75MM POST THORLUM SACR TI PERC [facility]  MED[employer] SOFAMOR DANEK-WD  N/A 6 Implanted   SPACER SPNL [id number] LUM SACR PEEK INTBDY FUS W/ TANT  SPACER SPNL [id number] LUM SACR PEEK INTBDY FUS W/ TANT  MED[employer] SOFAMOR DANEK-WD  N/A 2 Implanted   BINTA SPNL L75MM [id number] CO CHROME MOLYBDENUM POST  BINTA SPNL L75MM [id number] CO CHROME MOLYBDENUM POST  [employer] [place] [employer]-WD  N/A 1 Implanted         Drains:   [REMOVED] Urethral Catheter Double-lumen;Non-latex;Straight-tip 16 fr (Removed)       Findings: Softer bone. No major issues.       Electronically signed by Antonella Ashley DO on 12/7/2020 at 12:36 PM

## 2020-12-07 NOTE — OP NOTE
Operative Note  NEUROSURGICAL DEPARTMENT OPERATIVE REPORT    NAME OF SURGEON:  NINA KIFAZALDO    DATE OF SERVICE: 12/7/2020    PREOPERATIVE DIAGNOSES   1. L4- L5 and L5-S1 spondylolisthesis with spinal instability and intractable low back pain. POSTOPERATIVE DIAGNOSES   SAME AS ABOVE    OPERATIVE PROCEDURES  1. Right L4 and L5 sheng-laminotomies with right L4- L5 complete facetectomy and  right foraminotomy for decompression of the dural sac in the nerve  root utilizing the operative microscope, the tubular retractor, and  microdisection technique. 2. Right L5 and S1 sheng-laminotomies with right L5- S1 complete facetectomy and  right foraminotomy for decompression of the dural sac in the nerve  root utilizing the operative microscope, the tubular retractor, and  microsection technique. 3. L4- L5 and L5- S1 posterior lumbar interbody fusion performed via the transforaminal route utilizing autograft bone, allograft bone, and autologous bone marrow aspirate. 4.  Insertion of the intervertebral biochemical device, L4- L5 andL5- S1 . It is a PEEK interbody spacer, Elevate. 5.  L4,L5 and S1 internal fixation utilizing percutaneous pedicle screws and rods. 6.  Aspiration of autologous bone marrow utilizing bone marrow needle and  syringe. 7.  Open reduction of spondylolisthesis, L4- L5 and L5-S1. SURGEON  Navid Denis. Mitchell,     FIRST ASSIST   Ryanne Thompson CST    Estimated blood loss:  200 ml    DESCRIPTION OF PROCEDURE   The patient was brought to the operating room, where general endotracheal  anesthesia was induced. The patient was positioned on the operating table prone. The  solid Alex-like table was utilized for positioning. All pressure points were  carefully checked and padded. The C-arm fluoroscope was positioned and  draped. The operating microscope was prepositioned and draped.  The  lumbosacral region was prepped for a full 10 minutes with Betadine scrub,  Betadine paint, and DuraPrep and curettes. The cartilaginous portion of the endplates were removed utilizing reverse-angle curettes. The distractor was then placed into the disk space, and the disk space was elevated. Moving over to the contralateral right side, the screws were placed. The  screws were inserted over the previously placed K-wires. A 6.5 x 50 mm pedicle screw was place at L4, a 6.5 x 55 mm screw was placed at L5 and a 7.5 x 45 mm pedicle screw was placed at S1. Using the guillermo placement device a 70 mm guillermo was placed. This guillermo was temporarily secured while the distractor was in place. Going back to the original side, the distractor was removed and additional disc space work was completed. The Elevate trial was then placed. The top loading nut on the other side was loosened and the trial was then opened and expanded to 12 mm. The screw on the contralateral side was then firmly tightened. The Elevate trial  was then closed and subsequently removed. At this point, the wound was  copiously irrigated with antibiotic solution. The previously harvested  autograft was packed into the 28 mm x 7 mm Elevate interbody spacer. Crushed  cancellous mixed with the previously harvested bone marrow aspirate and BMP was then  impacted into the anterior portion of the interspace. The Elevate implant was then placed into the interspace without any issues. Once the fluoroscopic analysis proved the implant to be in excellent  position, the nerve roots were reexplored and found to be free from any  residual compression. Additional bone was placed. The combination of distraction and placement of the interbody graft resulted  in a very nice open reduction to the patient's L5-S1 deformity. At this point attention was drawn to the L4- L5 level. Using the exact same techniques as before the dural sac and roots were decompressed, the discectomy was completed and a 28 mm  x 7 mm elevate implant placed.       Once the fluoroscopic analysis proved the second implant to be in excellent  position, the nerve roots were reexplored and found to be free from any  residual compression. Additional bone was placed. At this point, the pedicle screws on the right side were then subsequently placed using the previously positioned K-wires. A 6.5 mm  x 50 mm pedicle screw was placed at L4. A 6.5 mm  x 50 mm pedicle screw was placed at L5. A 7.5 mm  x 45 mm pedicle screw was placed at S1. Using the guillermo placement device a 75 mm guillermo was placed. The guillermo was secured down with top-loading nuts and was final tightened. Final tightening was also done on the right side as well. Again the wound was copiously irrigated with antibiotic, and meticulous  hemostasis was assured with bipolar cautery and thrombin-soaked Gelfoam. The tubular retractor was subsequently removed, and all wounds were closed in layered fashion with 2-0 Vicryl and 3-0 Vicryl suture. Dressings were applied. The patient was  returned to the stretcher in the supine position, and was extubated relatively easy. The patient returned to the recovery room in stable condition and was noted to be  moving all extremities well after surgery.

## 2020-12-07 NOTE — ANESTHESIA POSTPROCEDURE EVALUATION
Department of Anesthesiology  Postprocedure Note    Patient: Beckie Espinal  MRN: [id number]  YOB: 1960  Date of evaluation: 12/7/2020  Time:  12:20 PM     Procedure Summary     Date:  12/07/20 Room / Location:  [facility] OR [address] [place] [address]    Anesthesia Start:  6149 Anesthesia Stop:  4729    Procedure:  TLIF OF L4/5. L5-S1 (N/A ) Diagnosis:  (BACK PAIN)    Surgeon:  Kat Oakes DO Responsible Provider:  SORIN Newsome CRNA    Anesthesia Type:  general ASA Status:  2          Anesthesia Type: general    Maurice Phase I: Maurice Score: 6    Maurice Phase II:      Last vitals: Reviewed and per EMR flowsheets.        Anesthesia Post Evaluation    Patient location during evaluation: bedside  Patient participation: complete - patient participated  Level of consciousness: awake and alert  Pain score: 0  Airway patency: patent  Nausea & Vomiting: no nausea  Complications: no  Cardiovascular status: hemodynamically stable  Respiratory status: acceptable  Hydration status: euvolemic

## 2020-12-07 NOTE — LETTER
Pt is in room 502 and ready for DC today  Bridger Anthony,  at [facility]  [phone] phone  [phone] fax  [phone] CELL (KRISTINA Owen [address])

## 2020-12-07 NOTE — ANESTHESIA PRE PROCEDURE
Department of Anesthesiology  Preprocedure Note       Name:  Riesa Hammans   Age:  61 y.o.  :  1960                                          MRN:  [id number]         Date:  2020      Surgeon: Vanna Hale):  Jose Cruz Beltran DO    Procedure: Procedure(s):  TLIF OF L4/5. L5-S1    Medications prior to admission:   Prior to Admission medications    Medication Sig Start Date End Date Taking? Authorizing Provider   metoprolol tartrate (LOPRESSOR) 50 MG tablet TAKE 1 TABLET BY MOUTH 2 TIMES A DAY  Patient taking differently: Take 50 mg by mouth 2 times daily TAKE 1 TABLET BY MOUTH 2 TIMES A DAY 10/29/20  Yes Anirudh Kennedy MD   Probiotic Product (PROBIOTIC-10 PO) Take 1 capsule by mouth daily   Yes Historical Provider, MD   chlordiazePOXIDE-clidinium (LIBRAX) 5-2.5 MG per capsule TAKE TWO CAPSULES EVERY MORNING AND TAKE 1 TO 2 CAPSULE BY MOUTH AT BEDTIME 20  Yes Anirudh Kennedy MD   dilTIAZem (CARDIZEM CD) 240 MG extended release capsule TAKE 1 CAPSULE BY MOUTH ONE TIME DAILY  Patient taking differently: Take 240 mg by mouth daily TAKE 1 CAPSULE BY MOUTH ONE TIME DAILY 3/4/20  Yes SORIN Lopez   triamterene-hydrochlorothiazide (MAXZIDE-25) 37.5-25 MG per tablet TAKE 2 TABLETS BY MOUTH ONE TIME DAILY. DO NOT RESUME IF BP LESS THAN 100/70  Patient taking differently: Take 1 tablet by mouth daily TAKE 2 TABLETS BY MOUTH ONE TIME DAILY.  DO NOT RESUME IF BP LESS THAN 100/70 3/4/20  Yes SORIN Schuster   simvastatin (ZOCOR) 40 MG tablet Take 1 tablet by mouth nightly 19 Yes Anirudh Kennedy MD   Estradiol-Estriol-Progesterone (BIEST/PROGESTERONE TD) Take 1 capsule by mouth nightly   Yes Historical Provider, MD   Multiple Vitamins-Minerals (WOMENS MULTIVITAMIN PO) Take 1 tablet by mouth daily   Yes Historical Provider, MD   calcium-vitamin D (OSCAL-500) 500-200 MG-UNIT per tablet Take 1 tablet by mouth daily    Historical Provider, MD       Current medications:    No current facility-administered medications for this encounter. Allergies:     Allergies   Allergen Reactions    Latex      Other reaction(s): Unknown    Amoxicillin Shortness Of Breath and Nausea And Vomiting    Anaprox [Naproxen Sodium] Shortness Of Breath and Nausea And Vomiting    Aloe Vera      Other reaction(s): Unknown    Augmentin [Amoxicillin-Pot Clavulanate]            Buspar [Buspirone]     Nortriptyline Hcl     Vibramycin [Doxycycline Calcium]        Problem List:    Patient Active Problem List   Diagnosis Code    Anxiety F41.9    Cervical radiculopathy M54.12    Cyst of kidney, acquired N28.1    DDD (degenerative disc disease), lumbar M51.36    Depression F32.9    Essential hypertension, benign I10    Hemorrhoid K64.9    IBS (irritable bowel syndrome) K58.9    Bilateral low back pain with sciatica M54.40    Lumbar radiculopathy M54.16    Menopause Z78.0    Mixed hyperlipidemia E78.2    Osteoporosis M81.0    Prediabetes R73.03    Sciatica M54.30    Obesity due to excess calories E66.09    Clostridium difficile colitis A04.72    Nausea vomiting and diarrhea R11.2, R19.7    Failure of outpatient treatment Z78.9    Hypokalemia E87.6    Hyponatremia E87.1    Hypophosphatemia E83.39       Past Medical History:        Diagnosis Date    Anal fissure     Anxiety 9/15/2017    C. difficile colitis     Cervical radiculopathy 9/15/2017    Chronic back pain     Chronic kidney disease     Class 1 obesity due to excess calories with serious comorbidity and body mass index (BMI) of 32.0 to 32.9 in adult 9/15/2017    Cyst of kidney, acquired 9/15/2017    Depression 9/15/2017    History of oral surgery     Hyperlipidemia     Hypertension     IBS (irritable bowel syndrome) 9/15/2017    Irregular heartbeat     Obesity 9/15/2017    Osteoarthritis     PONV (postoperative nausea and vomiting)     Retinal detachment        Past Surgical History:        Procedure Laterality Date    APPENDECTOMY       SECTION      EYE SURGERY      HYSTERECTOMY      HYSTERECTOMY, TOTAL ABDOMINAL      RETINAL DETACHMENT SURGERY         Social History:    Social History     Tobacco Use    Smoking status: Never Smoker    Smokeless tobacco: Never Used   Substance Use Topics    Alcohol use: No                                Counseling given: Not Answered      Vital Signs (Current):   Vitals:    20 0635   BP: 133/79   Pulse: 71   Resp: 14   Temp: 98.5 °F (36.9 °C)   TempSrc: Tympanic   SpO2: 96%   Weight: 189 lb (85.7 kg)   Height: 5' 8\" (1.727 m)                                              BP Readings from Last 3 Encounters:   20 133/79   10/01/20 122/80   20 127/88       NPO Status: Time of last liquid consumption:                         Time of last solid consumption:                         Date of last liquid consumption: 20                        Date of last solid food consumption: 20    BMI:   Wt Readings from Last 3 Encounters:   20 189 lb (85.7 kg)   20 189 lb (85.7 kg)   10/01/20 197 lb (89.4 kg)     Body mass index is 28.74 kg/m². CBC:   Lab Results   Component Value Date    WBC 9.2 2020    RBC 5.02 2020    HGB 13.1 2020    HCT 41.5 2020    MCV 82.7 2020    RDW 13.7 2020     2020       CMP:   Lab Results   Component Value Date     2020    K 3.9 2020    K 3.7 2020     2020    CO2 25 2020    BUN 19 2020    CREATININE 0.9 2020    GFRAA >59 2020    LABGLOM >60 2020    GLUCOSE 107 2020    PROT 6.8 2020    CALCIUM 9.4 2020    BILITOT 0.3 2020    ALKPHOS 60 2020    AST 23 2020    ALT 21 2020       POC Tests: No results for input(s): POCGLU, POCNA, POCK, POCCL, POCBUN, POCHEMO, POCHCT in the last 72 hours.     Coags:   Lab Results   Component Value Date    PROTIME 13.2 2020 INR 1.01 12/02/2020    APTT 24.7 12/02/2020       HCG (If Applicable): No results found for: PREGTESTUR, PREGSERUM, HCG, HCGQUANT     ABGs: No results found for: PHART, PO2ART, BRY7SKN, JGP0XGP, BEART, O8RASFSH     Type & Screen (If Applicable):  No results found for: LABABO, LABRH    Drug/Infectious Status (If Applicable):  No results found for: HIV, HEPCAB    COVID-19 Screening (If Applicable):   Lab Results   Component Value Date    COVID19 NOT DETECTED 12/02/2020         Anesthesia Evaluation  Patient summary reviewed and Nursing notes reviewed no history of anesthetic complications:   Airway: Mallampati: II  TM distance: >3 FB   Neck ROM: full  Mouth opening: > = 3 FB Dental:    (+) upper dentures and lower dentures      Pulmonary:Negative Pulmonary ROS and normal exam        (-) sleep apnea and not a current smoker                           Cardiovascular:    (+) hypertension:, hyperlipidemia      ECG reviewed               Beta Blocker:  Dose within 24 Hrs         Neuro/Psych:   (+) psychiatric history: stable with treatmentdepression/anxiety    (-) seizures           GI/Hepatic/Renal:   (+) GERD: well controlled,           Endo/Other:    (+) : arthritis:., .    (-) diabetes mellitus               Abdominal:           Vascular: negative vascular ROS. Anesthesia Plan      general     ASA 2       Induction: intravenous. MIPS: Postoperative opioids intended and Prophylactic antiemetics administered. Anesthetic plan and risks discussed with patient.                       José Miguel Lantigua MD   12/7/2020

## 2020-12-08 LAB
ANION GAP SERPL CALCULATED.3IONS-SCNC: 12 MMOL/L (ref 7–19)
BASOPHILS ABSOLUTE: 0 K/UL (ref 0–0.2)
BASOPHILS RELATIVE PERCENT: 0.1 % (ref 0–1)
BUN BLDV-MCNC: 33 MG/DL (ref 8–23)
CALCIUM SERPL-MCNC: 8.4 MG/DL (ref 8.8–10.2)
CHLORIDE BLD-SCNC: 100 MMOL/L (ref 98–111)
CO2: 26 MMOL/L (ref 22–29)
CREAT SERPL-MCNC: 1.5 MG/DL (ref 0.5–0.9)
EOSINOPHILS ABSOLUTE: 0 K/UL (ref 0–0.6)
EOSINOPHILS RELATIVE PERCENT: 0 % (ref 0–5)
GFR AFRICAN AMERICAN: 43
GFR NON-AFRICAN AMERICAN: 35
GLUCOSE BLD-MCNC: 145 MG/DL (ref 74–109)
HCT VFR BLD CALC: 34.1 % (ref 37–47)
HEMOGLOBIN: 10.5 G/DL (ref 12–16)
IMMATURE GRANULOCYTES #: 0.1 K/UL
LYMPHOCYTES ABSOLUTE: 1.6 K/UL (ref 1.1–4.5)
LYMPHOCYTES RELATIVE PERCENT: 9.2 % (ref 20–40)
MCH RBC QN AUTO: 26.3 PG (ref 27–31)
MCHC RBC AUTO-ENTMCNC: 30.8 G/DL (ref 33–37)
MCV RBC AUTO: 85.5 FL (ref 81–99)
MONOCYTES ABSOLUTE: 1.3 K/UL (ref 0–0.9)
MONOCYTES RELATIVE PERCENT: 7.3 % (ref 0–10)
NEUTROPHILS ABSOLUTE: 14.3 K/UL (ref 1.5–7.5)
NEUTROPHILS RELATIVE PERCENT: 82.7 % (ref 50–65)
PDW BLD-RTO: 13.9 % (ref 11.5–14.5)
PLATELET # BLD: 191 K/UL (ref 130–400)
PMV BLD AUTO: 11.8 FL (ref 9.4–12.3)
POTASSIUM REFLEX MAGNESIUM: 5 MMOL/L (ref 3.5–5)
RBC # BLD: 3.99 M/UL (ref 4.2–5.4)
SODIUM BLD-SCNC: 138 MMOL/L (ref 136–145)
WBC # BLD: 17.3 K/UL (ref 4.8–10.8)

## 2020-12-08 PROCEDURE — 6370000000 HC RX 637 (ALT 250 FOR IP): Performed by: NEUROLOGICAL SURGERY

## 2020-12-08 PROCEDURE — 80048 BASIC METABOLIC PNL TOTAL CA: CPT

## 2020-12-08 PROCEDURE — 2700000000 HC OXYGEN THERAPY PER DAY

## 2020-12-08 PROCEDURE — 6360000002 HC RX W HCPCS: Performed by: NEUROLOGICAL SURGERY

## 2020-12-08 PROCEDURE — 97535 SELF CARE MNGMENT TRAINING: CPT

## 2020-12-08 PROCEDURE — 1210000000 HC MED SURG R&B

## 2020-12-08 PROCEDURE — 36415 COLL VENOUS BLD VENIPUNCTURE: CPT

## 2020-12-08 PROCEDURE — 97530 THERAPEUTIC ACTIVITIES: CPT

## 2020-12-08 PROCEDURE — 99024 POSTOP FOLLOW-UP VISIT: CPT | Performed by: NURSE PRACTITIONER

## 2020-12-08 PROCEDURE — 97161 PT EVAL LOW COMPLEX 20 MIN: CPT

## 2020-12-08 PROCEDURE — 97116 GAIT TRAINING THERAPY: CPT

## 2020-12-08 PROCEDURE — 2580000003 HC RX 258: Performed by: NURSE PRACTITIONER

## 2020-12-08 PROCEDURE — 2580000003 HC RX 258: Performed by: NEUROLOGICAL SURGERY

## 2020-12-08 PROCEDURE — 85025 COMPLETE CBC W/AUTO DIFF WBC: CPT

## 2020-12-08 PROCEDURE — 97165 OT EVAL LOW COMPLEX 30 MIN: CPT

## 2020-12-08 RX ORDER — 0.9 % SODIUM CHLORIDE 0.9 %
500 INTRAVENOUS SOLUTION INTRAVENOUS ONCE
Status: COMPLETED | OUTPATIENT
Start: 2020-12-08 | End: 2020-12-08

## 2020-12-08 RX ADMIN — TRIAMTERENE AND HYDROCHLOROTHIAZIDE 1 TABLET: 37.5; 25 TABLET ORAL at 09:27

## 2020-12-08 RX ADMIN — SODIUM CHLORIDE 500 ML: 9 INJECTION, SOLUTION INTRAVENOUS at 09:26

## 2020-12-08 RX ADMIN — METOPROLOL TARTRATE 50 MG: 50 TABLET, FILM COATED ORAL at 20:37

## 2020-12-08 RX ADMIN — HYDROCODONE BITARTRATE AND ACETAMINOPHEN 1 TABLET: 7.5; 325 TABLET ORAL at 20:37

## 2020-12-08 RX ADMIN — ONDANSETRON HYDROCHLORIDE 4 MG: 2 SOLUTION INTRAMUSCULAR; INTRAVENOUS at 02:20

## 2020-12-08 RX ADMIN — METHOCARBAMOL TABLETS 750 MG: 750 TABLET, COATED ORAL at 20:37

## 2020-12-08 RX ADMIN — SODIUM CHLORIDE: 9 INJECTION, SOLUTION INTRAVENOUS at 00:40

## 2020-12-08 RX ADMIN — METHOCARBAMOL TABLETS 750 MG: 750 TABLET, COATED ORAL at 15:58

## 2020-12-08 RX ADMIN — METHOCARBAMOL TABLETS 750 MG: 750 TABLET, COATED ORAL at 09:27

## 2020-12-08 RX ADMIN — Medication: at 00:40

## 2020-12-08 RX ADMIN — DILTIAZEM HYDROCHLORIDE 240 MG: 240 CAPSULE, COATED, EXTENDED RELEASE ORAL at 09:27

## 2020-12-08 RX ADMIN — CHLORDIAZEPOXIDE HYDROCHLORIDE AND CLIDINIUM BROMIDE 1 CAPSULE: 5; 2.5 CAPSULE ORAL at 17:41

## 2020-12-08 RX ADMIN — ONDANSETRON HYDROCHLORIDE 4 MG: 2 SOLUTION INTRAMUSCULAR; INTRAVENOUS at 09:27

## 2020-12-08 RX ADMIN — METHOCARBAMOL TABLETS 750 MG: 750 TABLET, COATED ORAL at 02:20

## 2020-12-08 RX ADMIN — CHLORDIAZEPOXIDE HYDROCHLORIDE AND CLIDINIUM BROMIDE 1 CAPSULE: 5; 2.5 CAPSULE ORAL at 11:26

## 2020-12-08 RX ADMIN — Medication 1 TABLET: at 09:27

## 2020-12-08 RX ADMIN — ATORVASTATIN CALCIUM 20 MG: 20 TABLET, FILM COATED ORAL at 09:27

## 2020-12-08 RX ADMIN — CHLORDIAZEPOXIDE HYDROCHLORIDE AND CLIDINIUM BROMIDE 1 CAPSULE: 5; 2.5 CAPSULE ORAL at 09:27

## 2020-12-08 ASSESSMENT — PAIN SCALES - GENERAL
PAINLEVEL_OUTOF10: 7
PAINLEVEL_OUTOF10: 7
PAINLEVEL_OUTOF10: 8
PAINLEVEL_OUTOF10: 7
PAINLEVEL_OUTOF10: 2

## 2020-12-08 ASSESSMENT — PAIN DESCRIPTION - DIRECTION: RADIATING_TOWARDS: NO

## 2020-12-08 ASSESSMENT — PAIN DESCRIPTION - ONSET: ONSET: ON-GOING

## 2020-12-08 ASSESSMENT — PAIN DESCRIPTION - LOCATION
LOCATION: BACK
LOCATION: BACK

## 2020-12-08 ASSESSMENT — PAIN DESCRIPTION - FREQUENCY: FREQUENCY: INTERMITTENT

## 2020-12-08 ASSESSMENT — PAIN DESCRIPTION - PAIN TYPE: TYPE: SURGICAL PAIN

## 2020-12-08 ASSESSMENT — PAIN - FUNCTIONAL ASSESSMENT: PAIN_FUNCTIONAL_ASSESSMENT: ACTIVITIES ARE NOT PREVENTED

## 2020-12-08 ASSESSMENT — PAIN DESCRIPTION - DESCRIPTORS: DESCRIPTORS: SHARP

## 2020-12-08 ASSESSMENT — PAIN DESCRIPTION - PROGRESSION: CLINICAL_PROGRESSION: GRADUALLY IMPROVING

## 2020-12-08 NOTE — PROGRESS NOTES
Occupational Therapy   Occupational Therapy Initial Assessment  Date: 2020   Patient Name: Naomy Olmos  MRN: [id number]     : 1960    Date of Service: 2020    Discharge Recommendations:  Home with assist PRN  OT Equipment Recommendations  Equipment Needed: Yes  Other: [facility]    Assessment   Assessment: Evaluation completed and tx initiated. No overt barriers to stated discharge plan. Will continue to follow to promote safety and application of back protocols  Treatment Diagnosis: L5-S1 posterior lumbar fushion  REQUIRES OT FOLLOW UP: Yes  Activity Tolerance  Activity Tolerance: Patient Tolerated treatment well  Safety Devices  Safety Devices in place: Yes  Type of devices: Call light within reach; Left in chair;Nurse notified           Patient Diagnosis(es): There were no encounter diagnoses. has a past medical history of Anal fissure, Anxiety, C. difficile colitis, Cervical radiculopathy, Chronic back pain, Chronic kidney disease, Class 1 obesity due to excess calories with serious comorbidity and body mass index (BMI) of 32.0 to 32.9 in adult, Cyst of kidney, acquired, Depression, History of oral surgery, Hyperlipidemia, Hypertension, IBS (irritable bowel syndrome), Irregular heartbeat, Obesity, Osteoarthritis, PONV (postoperative nausea and vomiting), and Retinal detachment. has a past surgical history that includes  section; Appendectomy; Hysterectomy; eye surgery; Hysterectomy, total abdominal; Retinal detachment surgery; and lumbar fusion (N/A, 2020).     Treatment Diagnosis: L5-S1 posterior lumbar fushion      Restrictions  Restrictions/Precautions  Restrictions/Precautions: Fall Risk  Required Braces or Orthoses?: Yes  Required Braces or Orthoses  Spinal Other: LSO  Position Activity Restriction  Spinal Precautions: No Bending, No Lifting, No Twisting    Subjective   General  Chart Reviewed: Yes  Patient assessed for rehabilitation services?:

## 2020-12-08 NOTE — PROGRESS NOTES
Physical Therapy    Facility/Department: [facility] SURG SERVICES  Initial Assessment    NAME: Luz Marina Olea  : 1960  MRN: [id number]    Date of Service: 2020    Discharge Recommendations:  Continue to assess pending progress, Home independently(WILL HAVE ASSIST FOR A FEW DAYS INITIALLY)        Assessment   Body structures, Functions, Activity limitations: Decreased functional mobility ; Decreased ADL status; Decreased ROM; Decreased strength; Increased pain  Assessment: DONNED LSO IN STANDING DUE TO TIGHT FIT, BUT Pt APPRECIATES THE STABILITY ONCE IN PLACE. AMB INTO [place] THEN BACK TO BR. UP IN CHAIR AT END OF SESSION. PT Education: PT Role;Plan of Care;Precautions  REQUIRES PT FOLLOW UP: Yes  Activity Tolerance  Activity Tolerance: Patient Tolerated treatment well       Patient Diagnosis(es): There were no encounter diagnoses. has a past medical history of Anal fissure, Anxiety, C. difficile colitis, Cervical radiculopathy, Chronic back pain, Chronic kidney disease, Class 1 obesity due to excess calories with serious comorbidity and body mass index (BMI) of 32.0 to 32.9 in adult, Cyst of kidney, acquired, Depression, History of oral surgery, Hyperlipidemia, Hypertension, IBS (irritable bowel syndrome), Irregular heartbeat, Obesity, Osteoarthritis, PONV (postoperative nausea and vomiting), and Retinal detachment. has a past surgical history that includes  section; Appendectomy; Hysterectomy; eye surgery; Hysterectomy, total abdominal; Retinal detachment surgery; and lumbar fusion (N/A, 2020).     Restrictions  Restrictions/Precautions  Restrictions/Precautions: Fall Risk  Required Braces or Orthoses?: Yes  Required Braces or Orthoses  Spinal Other: LSO  Position Activity Restriction  Spinal Precautions: No Bending, No Lifting, No Twisting  Vision/Hearing        Subjective  General  Patient assessed for rehabilitation services?: Yes  Diagnosis: TLIF L4-5, L5-S1  Subjective  Subjective: Pt AGREES Time In           Time Out           Minutes                   Rosa Penn, PT

## 2020-12-08 NOTE — PROGRESS NOTES
Occupational Therapy  Awaiting LSO. Nursing notified LSO not in room. Will continue to follow.  Electronically signed by Mohan France OT on 12/8/2020 at 10:33 AM

## 2020-12-09 VITALS
SYSTOLIC BLOOD PRESSURE: 105 MMHG | TEMPERATURE: 98.6 F | BODY MASS INDEX: 28.64 KG/M2 | HEIGHT: 68 IN | WEIGHT: 189 LBS | HEART RATE: 84 BPM | RESPIRATION RATE: 16 BRPM | OXYGEN SATURATION: 89 % | DIASTOLIC BLOOD PRESSURE: 60 MMHG

## 2020-12-09 PROCEDURE — 6370000000 HC RX 637 (ALT 250 FOR IP): Performed by: NEUROLOGICAL SURGERY

## 2020-12-09 PROCEDURE — 2580000003 HC RX 258: Performed by: NEUROLOGICAL SURGERY

## 2020-12-09 PROCEDURE — 97116 GAIT TRAINING THERAPY: CPT

## 2020-12-09 PROCEDURE — 97530 THERAPEUTIC ACTIVITIES: CPT

## 2020-12-09 PROCEDURE — 6360000002 HC RX W HCPCS: Performed by: NEUROLOGICAL SURGERY

## 2020-12-09 RX ORDER — HYDROCODONE BITARTRATE AND ACETAMINOPHEN 7.5; 325 MG/1; MG/1
1 TABLET ORAL EVERY 4 HOURS PRN
Qty: 120 TABLET | Refills: 0 | Status: SHIPPED | OUTPATIENT
Start: 2020-12-09 | End: 2021-01-06 | Stop reason: SDUPTHER

## 2020-12-09 RX ORDER — ONDANSETRON 4 MG/1
4 TABLET, FILM COATED ORAL EVERY 8 HOURS PRN
Qty: 30 TABLET | Refills: 1 | Status: SHIPPED | OUTPATIENT
Start: 2020-12-09 | End: 2022-01-10

## 2020-12-09 RX ORDER — DOCUSATE SODIUM 100 MG/1
100 CAPSULE, LIQUID FILLED ORAL 2 TIMES DAILY PRN
Qty: 30 CAPSULE | Refills: 1 | Status: SHIPPED | OUTPATIENT
Start: 2020-12-09 | End: 2021-09-07

## 2020-12-09 RX ADMIN — HYDROCODONE BITARTRATE AND ACETAMINOPHEN 1 TABLET: 7.5; 325 TABLET ORAL at 02:33

## 2020-12-09 RX ADMIN — SODIUM CHLORIDE, PRESERVATIVE FREE 10 ML: 5 INJECTION INTRAVENOUS at 08:40

## 2020-12-09 RX ADMIN — CHLORDIAZEPOXIDE HYDROCHLORIDE AND CLIDINIUM BROMIDE 1 CAPSULE: 5; 2.5 CAPSULE ORAL at 08:41

## 2020-12-09 RX ADMIN — ONDANSETRON HYDROCHLORIDE 4 MG: 2 SOLUTION INTRAMUSCULAR; INTRAVENOUS at 08:40

## 2020-12-09 RX ADMIN — SODIUM CHLORIDE: 9 INJECTION, SOLUTION INTRAVENOUS at 02:33

## 2020-12-09 RX ADMIN — ONDANSETRON HYDROCHLORIDE 4 MG: 2 SOLUTION INTRAMUSCULAR; INTRAVENOUS at 02:33

## 2020-12-09 RX ADMIN — ATORVASTATIN CALCIUM 20 MG: 20 TABLET, FILM COATED ORAL at 08:41

## 2020-12-09 RX ADMIN — DILTIAZEM HYDROCHLORIDE 240 MG: 240 CAPSULE, COATED, EXTENDED RELEASE ORAL at 08:41

## 2020-12-09 RX ADMIN — METHOCARBAMOL TABLETS 750 MG: 750 TABLET, COATED ORAL at 02:33

## 2020-12-09 RX ADMIN — METHOCARBAMOL TABLETS 750 MG: 750 TABLET, COATED ORAL at 08:41

## 2020-12-09 RX ADMIN — Medication 1 TABLET: at 08:41

## 2020-12-09 RX ADMIN — HYDROCODONE BITARTRATE AND ACETAMINOPHEN 1 TABLET: 7.5; 325 TABLET ORAL at 08:41

## 2020-12-09 RX ADMIN — METOPROLOL TARTRATE 50 MG: 50 TABLET, FILM COATED ORAL at 08:41

## 2020-12-09 ASSESSMENT — PAIN DESCRIPTION - PAIN TYPE: TYPE: SURGICAL PAIN

## 2020-12-09 ASSESSMENT — PAIN SCALES - GENERAL
PAINLEVEL_OUTOF10: 7
PAINLEVEL_OUTOF10: 7
PAINLEVEL_OUTOF10: 3

## 2020-12-09 ASSESSMENT — PAIN DESCRIPTION - LOCATION: LOCATION: BACK

## 2020-12-09 ASSESSMENT — PAIN DESCRIPTION - PROGRESSION: CLINICAL_PROGRESSION: GRADUALLY IMPROVING

## 2020-12-09 NOTE — PROGRESS NOTES
CLINICAL PHARMACY NOTE: MEDS TO [address] Select Patient?: Yes  Total # of Prescriptions Filled: 3   The following medications were delivered to the patient:  · Dok 100 mg  · Norco 7.5/325 mg  · Ondansetron 4 mg  Total # of Interventions Completed: 0  Time Spent (min): 30    Additional Documentation:    Handed scripts to patient.

## 2020-12-09 NOTE — DISCHARGE SUMMARY
[facility]  Discharge Summary     Patient:   Linda Diaz  MR#:    [id number]      YOB: 1960  Date of Evaluation:  12/9/2020  Time of Note:                          7:30 AM  Primary/Referring Physician:  Chelo Vaca MD   Note Author:    Nina Ladd DO        Admission Date: 12/7/2020    Discharge Date: 12/9/2020    Final Diagnoses: Spondylolisthesis at L5-S1 level [M43.17]    Procedures: TLIF L4-5, L5-S1    Consultations: PT, OT    Reason for Admission: Surgery    Hospital Course:  Linda Diaz was admitted with the above named diagnosis, surgery was performed and tolerated well. At the time of discharge, the patient was afebrile, vitals stable, pain was controlled with oral medication, they were tolerating a by mouth diet, and voiding appropriately. Patient Condition: Good    Disposition: Home    Wound Instructions: Able to shower. Please keep wound dry. DO NOT SOAK WOUND    Diet: regular diet    Resume home meds:      Medication List      START taking these medications    docusate sodium 100 MG capsule  Commonly known as:  Colace  Take 1 capsule by mouth 2 times daily as needed for Constipation     HYDROcodone-acetaminophen 7.5-325 MG per tablet  Commonly known as:  NORCO  Take 1 tablet by mouth every 4 hours as needed for Pain for up to 30 days.      ondansetron 4 MG tablet  Commonly known as:  Zofran  Take 1 tablet by mouth every 8 hours as needed for Nausea or Vomiting        CHANGE how you take these medications    dilTIAZem 240 MG extended release capsule  Commonly known as:  CARDIZEM CD  TAKE 1 CAPSULE BY MOUTH ONE TIME DAILY  What changed:    · how much to take  · how to take this  · when to take this     metoprolol tartrate 50 MG tablet  Commonly known as:  LOPRESSOR  TAKE 1 TABLET BY MOUTH 2 TIMES A DAY  What changed:    · how much to take  · how to take this  · when to take this     triamterene-hydroCHLOROthiazide 37.5-25 MG per tablet  Commonly known as: MAXZIDE-25  TAKE 2 TABLETS BY MOUTH ONE TIME DAILY.  DO NOT RESUME IF BP LESS THAN 100/70  What changed:    · how much to take  · how to take this  · when to take this        CONTINUE taking these medications    BIEST/PROGESTERONE TD     calcium-vitamin D 500-200 MG-UNIT per tablet  Commonly known as:  OSCAL-500     chlordiazePOXIDE-clidinium 5-2.5 MG per capsule  Commonly known as:  LIBRAX  TAKE TWO CAPSULES EVERY MORNING AND TAKE 1 TO 2 CAPSULE BY MOUTH AT BEDTIME     PROBIOTIC-10 PO     simvastatin 40 MG tablet  Commonly known as:  ZOCOR  Take 1 tablet by mouth nightly     WOMENS MULTIVITAMIN PO           Where to Get Your Medications      You can get these medications from any pharmacy    Bring a paper prescription for each of these medications  · docusate sodium 100 MG capsule  · HYDROcodone-acetaminophen 7.5-325 MG per tablet  · ondansetron 4 MG tablet         Return to Clinic: 1 week

## 2020-12-10 ENCOUNTER — CARE COORDINATION (OUTPATIENT)
Dept: CASE MANAGEMENT | Age: 60
End: 2020-12-10

## 2020-12-10 NOTE — CARE COORDINATION
[facility] Transitions Initial Follow Up Call    Call within 2 business days of discharge: Yes    Patient: Linda Diaz Patient : 1960   MRN: [id number]    Discharge Date: 20 RARS: Readmission Risk Score: 13      Last Discharge [facility]       Complaint Diagnosis Description Type Department Provider    20  Post-operative pain Admission (Discharged) [facility] [address], DO           Spoke with: N/A    Facility: [address] [place] [address]    Non-face-to-face services provided:  Reviewed encounter information for continuity of care prior to follow up Care Transitions phone call - chart notes, consults, progress notes, test results, med list, appointments, AVS, other information. Care Transitions 24 Hour Call    Do you have all of your prescriptions and are they filled?:  Yes  Do you have support at home?:  Alone  Are you an active caregiver in your home?:  No  Care Transitions Interventions       Attempted to make contact with patient for an initial follow up call post discharge without success. Left a message regarding intent of call and call back information. CTN to try again at a later time.       Follow Up  Future Appointments   Date Time Provider Jeb Cm   2020 10:30 AM SORIN Smyth [facility]   2020 10:45 AM Chelo Vaca MD [facility]       Gerhard Dominguez RN

## 2020-12-11 ENCOUNTER — CARE COORDINATION (OUTPATIENT)
Dept: OTHER | Facility: CLINIC | Age: 60
End: 2020-12-11

## 2020-12-14 ENCOUNTER — OFFICE VISIT (OUTPATIENT)
Dept: NEUROSURGERY | Age: 60
End: 2020-12-14

## 2020-12-14 VITALS
DIASTOLIC BLOOD PRESSURE: 69 MMHG | OXYGEN SATURATION: 93 % | WEIGHT: 198 LBS | HEART RATE: 81 BPM | BODY MASS INDEX: 30.01 KG/M2 | HEIGHT: 68 IN | SYSTOLIC BLOOD PRESSURE: 113 MMHG

## 2020-12-14 PROCEDURE — 99024 POSTOP FOLLOW-UP VISIT: CPT | Performed by: NURSE PRACTITIONER

## 2020-12-14 ASSESSMENT — ENCOUNTER SYMPTOMS
GASTROINTESTINAL NEGATIVE: 1
RESPIRATORY NEGATIVE: 1
EYES NEGATIVE: 1

## 2020-12-14 NOTE — PROGRESS NOTES
[facility] Office Visit          Chief Complaint   Patient presents with    Wound Check       HISTORY OF PRESENT ILLNESS:      Tierra Walker is a 61 y.o. female who underwent a TLIF L4-5, L5-S1 for spondylolisthesis of L4-5 and L5-S1 on 2020 and now she is 1 week out from her surgery. Prior to surgery she complained of low back and RLE pain. The pain radiated into the posterior aspect of the legs. Today she states that she is doing alright. She does have typical post-operative back pain. She states that leg pain has improved and intermittent. She denies any fever, chills, nausea or vomiting. Past Medical History:   Diagnosis Date    Anal fissure     Anxiety 9/15/2017    C. difficile colitis     Cervical radiculopathy 9/15/2017    Chronic back pain     Chronic kidney disease     Class 1 obesity due to excess calories with serious comorbidity and body mass index (BMI) of 32.0 to 32.9 in adult 9/15/2017    Cyst of kidney, acquired 9/15/2017    Depression 9/15/2017    History of oral surgery     Hyperlipidemia     Hypertension     IBS (irritable bowel syndrome) 9/15/2017    Irregular heartbeat     Obesity 9/15/2017    Osteoarthritis     PONV (postoperative nausea and vomiting)     Retinal detachment        Past Surgical History:   Procedure Laterality Date    APPENDECTOMY       SECTION      EYE SURGERY      HYSTERECTOMY      HYSTERECTOMY, TOTAL ABDOMINAL      LUMBAR FUSION N/A 2020    TLIF OF L4/5. L5-S1 performed by Luis Gaviria DO at [address] [place] [address] RETINAL DETACHMENT SURGERY          Medications    Current Outpatient Medications:     HYDROcodone-acetaminophen (NORCO) 7.5-325 MG per tablet, Take 1 tablet by mouth every 4 hours as needed for Pain for up to 30 days. , Disp: 120 tablet, Rfl: 0    docusate sodium (COLACE) 100 MG capsule, Take 1 capsule by mouth 2 times daily as needed for Constipation, Disp: 30 capsule, Rfl: 1 Problem Relation Age of Onset    Diabetes Mother     High Blood Pressure Mother     Heart Attack Mother     Kidney Disease Father     Stroke Father     Other Father         renal failure    Diabetes Maternal Grandmother     Colon Cancer Maternal Grandmother     Heart Disease Maternal Grandfather     Cancer Paternal Grandmother         colon cancer    Alcohol Abuse Sister     Cirrhosis Sister     Stroke Paternal Aunt     Colon Cancer Maternal Cousin        Review of Systems:  Constitutional: Negative. HENT: Negative. Eyes: Negative. Respiratory: Negative. Cardiovascular: Negative. Gastrointestinal: Negative. Genitourinary: Negative. Musculoskeletal: Negative. Skin: Negative. Neurological: Negative. Endo/Heme/Allergies: Negative. Psychiatric/Behavioral: Negative. PHYSICAL EXAM:  Vitals:    12/14/20 1035   BP: 113/69   Pulse: 81   SpO2: 93%     Constitutional: The patient appears well-developed andwell-nourished. Eyes  conjunctiva normal.  Conjugate gaze  Ear, nose, throat -No scars, masses, or lesions over external nose or ears, no atrophy of tongue  Neck-symmetric, no masses noted, no jugular vein distension  Respiration- chest wall appears symmetric, goodexpansion, normal effort without use of accessory muscles  Musculoskeletal  no significant wasting of muscles noted, no bony deformities, gait no gross ataxia  Extremities-no clubbing, cyanosis or edema  Skin warm, dry, and intact. No rash, erythema, or pallor.   Psychiatric  mood, affect, and behavior appear normal.     Neurologic Examination  Awake, Alert and oriented x 4  Normal speech pattern, following commands     Motor:  RIGHT:               iliopsoas 5/5               knee flexor 5/5               knee extension 5/5               EHL/dorsiflexion 5/5               plantar flexion 5/5     LEFT:                  iliopsoas 5/5               knee flexor 5/5               knee extension 5/5

## 2020-12-15 ENCOUNTER — CARE COORDINATION (OUTPATIENT)
Dept: OTHER | Facility: CLINIC | Age: 60
End: 2020-12-15

## 2020-12-15 NOTE — CARE COORDINATION
incision was fine, xrays ordered. Pt said she has all medications, did not want to review today, said it was done yesterday. Pt denies having any needs today or questions will follow         Care Transitions Subsequent and Final Call    Subsequent and Final Calls  Have your medications changed?: No  Do you have any questions related to your medications?: No  Do you currently have any active services?: No  Do you have any needs or concerns that I can assist you with?: No  Identified Barriers: None  Care Transitions Interventions  Other Interventions:            Follow Up  Future Appointments   Date Time Provider Jeb Cm   12/18/2020 10:45 AM MD KARMA Kauffman [facility]   1/6/2021 10:30 AM SORIN Powell [facility]       Keshia Garrett RN

## 2020-12-18 ENCOUNTER — VIRTUAL VISIT (OUTPATIENT)
Dept: FAMILY MEDICINE CLINIC | Age: 60
End: 2020-12-18
Payer: COMMERCIAL

## 2020-12-18 PROBLEM — A04.72 CLOSTRIDIUM DIFFICILE COLITIS: Status: RESOLVED | Noted: 2020-02-16 | Resolved: 2020-12-18

## 2020-12-18 PROBLEM — R11.2 NAUSEA VOMITING AND DIARRHEA: Status: RESOLVED | Noted: 2020-02-16 | Resolved: 2020-12-18

## 2020-12-18 PROBLEM — Z78.9 FAILURE OF OUTPATIENT TREATMENT: Status: RESOLVED | Noted: 2020-02-16 | Resolved: 2020-12-18

## 2020-12-18 PROBLEM — R19.7 NAUSEA VOMITING AND DIARRHEA: Status: RESOLVED | Noted: 2020-02-16 | Resolved: 2020-12-18

## 2020-12-18 PROCEDURE — 99495 TRANSJ CARE MGMT MOD F2F 14D: CPT | Performed by: INTERNAL MEDICINE

## 2020-12-18 PROCEDURE — 1111F DSCHRG MED/CURRENT MED MERGE: CPT | Performed by: INTERNAL MEDICINE

## 2020-12-18 RX ORDER — SIMVASTATIN 40 MG
40 TABLET ORAL NIGHTLY
Qty: 90 TABLET | Refills: 3 | Status: SHIPPED | OUTPATIENT
Start: 2020-12-18 | End: 2021-02-23

## 2020-12-18 ASSESSMENT — ENCOUNTER SYMPTOMS
BACK PAIN: 1
VOICE CHANGE: 0
ABDOMINAL PAIN: 0
SHORTNESS OF BREATH: 0
BLOOD IN STOOL: 0
VOMITING: 0
WHEEZING: 0
CONSTIPATION: 1
DIARRHEA: 0
SORE THROAT: 0
EYE DISCHARGE: 0
RHINORRHEA: 0
SINUS PRESSURE: 0
EYE PAIN: 0
COUGH: 0
EYE REDNESS: 0
CHEST TIGHTNESS: 0
COLOR CHANGE: 0

## 2020-12-18 NOTE — PROGRESS NOTES
Post-Discharge Transitional Care Management Services or Hospital Follow Up        Lucille Lewis   YOB: 1960    Date of Office Visit:  12/18/2020  Date of Hospital Admission: 12/7/20  Date of Hospital Discharge: 12/9/20  Readmission Risk Score(high >=14%.  Medium >=10%):Readmission Risk Score: 13      Care management risk score Rising risk (score 2-5) and Complex Care (Scores >=6): 8     Non face to face  following discharge, date last encounter closed (first attempt may have been earlier): 12/11/2020 11:07 AM 12/11/2020 11:07 AM    Call initiated 2 business days of discharge: Yes     Patient Active Problem List   Diagnosis    Anxiety    Cervical radiculopathy    Cyst of kidney, acquired    DDD (degenerative disc disease), lumbar    Depression    Essential hypertension, benign    Hemorrhoid    IBS (irritable bowel syndrome)    Bilateral low back pain with sciatica    Lumbar radiculopathy    Menopause    Mixed hyperlipidemia    Osteoporosis    Prediabetes    Sciatica    Obesity due to excess calories    Hypokalemia    Hyponatremia    Hypophosphatemia    Spondylolisthesis at L5-S1 level       Allergies   Allergen Reactions    Latex Rash    Amoxicillin Shortness Of Breath and Nausea And Vomiting    Anaprox [Naproxen Sodium] Shortness Of Breath and Nausea And Vomiting    Aloe Vera      Other reaction(s): Unknown    Nortriptyline Hcl      Doesn't remember      Augmentin [Amoxicillin-Pot Clavulanate] Nausea And Vomiting           Buspar [Buspirone] Nausea And Vomiting    Vibramycin [Doxycycline Calcium] Nausea And Vomiting       Medications listed as ordered at the time of discharge from hospital   [facility]   Home Medication Instructions LUZ:    Printed on:12/18/20 1663   Medication Information                      calcium-vitamin D (OSCAL-500) 500-200 MG-UNIT per tablet  Take 1 tablet by mouth daily             chlordiazePOXIDE-clidinium (LIBRAX) 5-2.5 MG per capsule TAKE TWO CAPSULES EVERY MORNING AND TAKE 1 TO 2 CAPSULE BY MOUTH AT BEDTIME             dilTIAZem (CARDIZEM CD) 240 MG extended release capsule  TAKE 1 CAPSULE BY MOUTH ONE TIME DAILY             docusate sodium (COLACE) 100 MG capsule  Take 1 capsule by mouth 2 times daily as needed for Constipation             Estradiol-Estriol-Progesterone (BIEST/PROGESTERONE TD)  Take 1 capsule by mouth nightly             HYDROcodone-acetaminophen (NORCO) 7.5-325 MG per tablet  Take 1 tablet by mouth every 4 hours as needed for Pain for up to 30 days. metoprolol tartrate (LOPRESSOR) 50 MG tablet  TAKE 1 TABLET BY MOUTH 2 TIMES A DAY             Multiple Vitamins-Minerals (WOMENS MULTIVITAMIN PO)  Take 1 tablet by mouth daily             ondansetron (ZOFRAN) 4 MG tablet  Take 1 tablet by mouth every 8 hours as needed for Nausea or Vomiting             Probiotic Product (PROBIOTIC-10 PO)  Take 1 capsule by mouth daily             simvastatin (ZOCOR) 40 MG tablet  Take 1 tablet by mouth nightly             triamterene-hydrochlorothiazide (MAXZIDE-25) 37.5-25 MG per tablet  TAKE 2 TABLETS BY MOUTH ONE TIME DAILY. DO NOT RESUME IF BP LESS THAN 100/70                   Medications marked \"taking\" at this time  Outpatient Medications Marked as Taking for the 12/18/20 encounter (Virtual Visit) with Romona Krabbe, MD   Medication Sig Dispense Refill    simvastatin (ZOCOR) 40 MG tablet Take 1 tablet by mouth nightly 90 tablet 3        Medications patient taking as of now reconciled against medications ordered at time of hospital discharge: Yes    Chief Complaint   Patient presents with   [address] from [facility]     lumbar spine surgery       HPI  Location of this provider: Clinic    Due to [place] outbreak and patient inability to do a tele-health video visit or in-person visit with clinician, tele-health telephone visit without video was performed.     Consent: Katheryn Morton. Ori Patterson and/or health care decision maker is aware that that she may receive a bill for this telephone service, depending on her insurance coverage, and has provided verbal consent to proceed: Yes     The risks and benefits of converting to a virtual visit were discussed in light of the current infectious disease epidemic. Patient also understood that insurance coverage and co-pays are up to their individual insurance plans. Inpatient course: Discharge summary reviewed- see chart. Interval history/Current status:   27-year-old white female presents for moderate complexity [facility] follow-up after lumbar fusion of the L4-S1 vertebrae with [facility] neurosurgery (Dr. Belinda Barrios) on 12/7/2020. Patient has been doing well from the postop standpoint and when asked about her pain control, she says \"I feel like I had back surgery\". She is taking hydrocodone/acetaminophen 7.5325 for postoperative pain but says it makes her sleepy so she is cutting the pills in half which does not make her as sedated and still decreases her pain adequately. Patient has had some postop constipation but MiraLAX once daily and additional fiber as well as another stool softener recommended by her surgeon (docusate). She has not had any postoperative fever and her wound check/surgical site check on 12/14/2020 with the neurosurgery office went well. Review of Systems   Constitutional: Negative for appetite change, chills, fatigue and fever. HENT: Negative for ear pain, rhinorrhea, sinus pressure, sore throat and voice change. Eyes: Negative for pain, discharge and redness. Respiratory: Negative for cough, chest tightness, shortness of breath and wheezing. Cardiovascular: Negative for chest pain and palpitations. Gastrointestinal: Positive for constipation. Negative for abdominal pain, blood in stool, diarrhea and vomiting.         +hx of IBS Pursuant to the emergency declaration under the [facility], [facility] waiver authority and the [employer] Act, this Virtual  Visit was conducted, with patient's consent, to reduce the patient's risk of exposure to COVID-19 and provide continuity of care for an established patient. Services were provided through a video synchronous discussion virtually to substitute for in-person clinic visit.

## 2020-12-28 ENCOUNTER — CARE COORDINATION (OUTPATIENT)
Dept: OTHER | Facility: CLINIC | Age: 60
End: 2020-12-28

## 2020-12-28 NOTE — CARE COORDINATION
[facility] Transitions Follow Up Call    2020    Patient: Jossy Bonner  Patient : 1960   MRN: [id number]  Reason for Admission: Post lumbar spinal fusion  Discharge Date: 20 RARS: Readmission Risk Score: 13         ACM attempted to reach patient for Care Transitions call. HIPAA compliant message left requesting a return phone call at patients convenience. Will continue to follow. Care Transitions Subsequent and Final Call    Subsequent and Final Calls  Care Transitions Interventions  Other Interventions:            Follow Up  Future Appointments   Date Time Provider Jeb Cm   2021 10:30 AM SORIN Acuña [facility]   3/19/2021  2:00 PM MD KARMA Delgadillo [facility]       Rula Beltran RN

## 2021-01-05 ENCOUNTER — CARE COORDINATION (OUTPATIENT)
Dept: OTHER | Facility: CLINIC | Age: 61
End: 2021-01-05

## 2021-01-05 NOTE — CARE COORDINATION
[facility] Transitions Follow Up Call    2021    Patient: Bradford Abarca  Patient : 1960   MRN: [id number]  Reason for Admission: Spinal fusion  Discharge Date: 20 RARS: Readmission Risk Score: 13       ACM attempted to reach patient for Care Transitions call. HIPAA compliant message left requesting a return phone call at patients convenience. Will continue to follow. Care Transitions Subsequent and Final Call    Subsequent and Final Calls  Care Transitions Interventions  Other Interventions:            Follow Up  Future Appointments   Date Time Provider Jeb Cm   2021 10:30 AM Cheryle Pupa, APRN [facility]   3/19/2021  2:00 PM MD KARMA Diamond [facility] Chowdary RN

## 2021-01-06 ENCOUNTER — HOSPITAL ENCOUNTER (OUTPATIENT)
Dept: GENERAL RADIOLOGY | Age: 61
Discharge: HOME | End: 2021-01-06
Payer: COMMERCIAL

## 2021-01-06 ENCOUNTER — OFFICE VISIT (OUTPATIENT)
Dept: NEUROSURGERY | Age: 61
End: 2021-01-06

## 2021-01-06 ENCOUNTER — TELEPHONE (OUTPATIENT)
Dept: FAMILY MEDICINE CLINIC | Age: 61
End: 2021-01-06

## 2021-01-06 VITALS
HEART RATE: 62 BPM | DIASTOLIC BLOOD PRESSURE: 67 MMHG | WEIGHT: 198 LBS | HEIGHT: 68 IN | OXYGEN SATURATION: 94 % | BODY MASS INDEX: 30.01 KG/M2 | SYSTOLIC BLOOD PRESSURE: 113 MMHG

## 2021-01-06 DIAGNOSIS — Z98.1 S/P LUMBAR FUSION: ICD-10-CM

## 2021-01-06 DIAGNOSIS — M54.41 CHRONIC MIDLINE LOW BACK PAIN WITH RIGHT-SIDED SCIATICA: ICD-10-CM

## 2021-01-06 DIAGNOSIS — Z98.1 S/P LUMBAR FUSION: Primary | ICD-10-CM

## 2021-01-06 DIAGNOSIS — G89.29 CHRONIC MIDLINE LOW BACK PAIN WITH RIGHT-SIDED SCIATICA: ICD-10-CM

## 2021-01-06 DIAGNOSIS — G89.18 POST-OPERATIVE PAIN: ICD-10-CM

## 2021-01-06 PROCEDURE — 72100 X-RAY EXAM L-S SPINE 2/3 VWS: CPT

## 2021-01-06 PROCEDURE — 99024 POSTOP FOLLOW-UP VISIT: CPT | Performed by: NURSE PRACTITIONER

## 2021-01-06 RX ORDER — HYDROCODONE BITARTRATE AND ACETAMINOPHEN 7.5; 325 MG/1; MG/1
1 TABLET ORAL EVERY 8 HOURS PRN
Qty: 90 TABLET | Refills: 0 | Status: SHIPPED | OUTPATIENT
Start: 2021-01-06 | End: 2021-02-05

## 2021-01-06 ASSESSMENT — ENCOUNTER SYMPTOMS
RESPIRATORY NEGATIVE: 1
EYES NEGATIVE: 1
GASTROINTESTINAL NEGATIVE: 1
BACK PAIN: 1

## 2021-01-06 NOTE — TELEPHONE ENCOUNTER
Per Dr. Montanez Man called [facility] and asked what the patient was on for her hormones, since it is a special compound that they make. The pharmacist said she is taking Biest 1.5/ Progesterone 75 MG . Per Dr. Romi Paulino I gave a verbal order for a 30 day supply with 11 refills.

## 2021-01-06 NOTE — PROGRESS NOTES
[facility] Office Visit          Chief Complaint   Patient presents with    Follow-up    Results       HISTORY OF PRESENT ILLNESS:      Jaylin Garcia is a 61 y.o. female who underwent a TLIF L4-5, L5-S1 for spondylolisthesis of L4-5 and L5-S1 on 2020 and now she is 1 month out from her surgery. Prior to surgery she complained of low back and RLE pain. The pain radiated into the posterior aspect of the legs. Today she states that she is doing okay. She states that she does not like to take the Norco, stating that it might be too strong. She does still have low back pain when up and doing activity. The RLE pain is present when performing certain activities. Denies any numbness other than when sitting for prolonged periods of time. Past Medical History:   Diagnosis Date    Anal fissure     Anxiety 9/15/2017    C. difficile colitis     Cervical radiculopathy 9/15/2017    Chronic back pain     Chronic kidney disease     Class 1 obesity due to excess calories with serious comorbidity and body mass index (BMI) of 32.0 to 32.9 in adult 9/15/2017    Clostridium difficile colitis 2020    Cyst of kidney, acquired 9/15/2017    Depression 9/15/2017    History of oral surgery     Hyperlipidemia     Hypertension     IBS (irritable bowel syndrome) 9/15/2017    Irregular heartbeat     Obesity 9/15/2017    Osteoarthritis     PONV (postoperative nausea and vomiting)     Retinal detachment        Past Surgical History:   Procedure Laterality Date    APPENDECTOMY       SECTION      EYE SURGERY      HYSTERECTOMY      HYSTERECTOMY, TOTAL ABDOMINAL      LUMBAR FUSION N/A 2020    TLIF OF L4/5. L5-S1 performed by Noemí Payan DO at [address] [place] [address] RETINAL DETACHMENT SURGERY          Medications    Current Outpatient Medications:     HYDROcodone-acetaminophen (NORCO) 7.5-325 MG per tablet, Take 1 tablet by mouth every 8 hours as needed for Pain for up to 30 days. , Disp: 90 tablet, Rfl: 0    simvastatin (ZOCOR) 40 MG tablet, Take 1 tablet by mouth nightly, Disp: 90 tablet, Rfl: 3    docusate sodium (COLACE) 100 MG capsule, Take 1 capsule by mouth 2 times daily as needed for Constipation, Disp: 30 capsule, Rfl: 1    ondansetron (ZOFRAN) 4 MG tablet, Take 1 tablet by mouth every 8 hours as needed for Nausea or Vomiting, Disp: 30 tablet, Rfl: 1    metoprolol tartrate (LOPRESSOR) 50 MG tablet, TAKE 1 TABLET BY MOUTH 2 TIMES A DAY (Patient taking differently: Take 50 mg by mouth 2 times daily TAKE 1 TABLET BY MOUTH 2 TIMES A DAY), Disp: 180 tablet, Rfl: 3    Probiotic Product (PROBIOTIC-10 PO), Take 1 capsule by mouth daily, Disp: , Rfl:     chlordiazePOXIDE-clidinium (LIBRAX) 5-2.5 MG per capsule, TAKE TWO CAPSULES EVERY MORNING AND TAKE 1 TO 2 CAPSULE BY MOUTH AT BEDTIME, Disp: 360 capsule, Rfl: 1    dilTIAZem (CARDIZEM CD) 240 MG extended release capsule, TAKE 1 CAPSULE BY MOUTH ONE TIME DAILY (Patient taking differently: Take 240 mg by mouth daily TAKE 1 CAPSULE BY MOUTH ONE TIME DAILY), Disp: 90 capsule, Rfl: 3    triamterene-hydrochlorothiazide (MAXZIDE-25) 37.5-25 MG per tablet, TAKE 2 TABLETS BY MOUTH ONE TIME DAILY. DO NOT RESUME IF BP LESS THAN 100/70 (Patient taking differently: Take 1 tablet by mouth daily TAKE 2 TABLETS BY MOUTH ONE TIME DAILY.  DO NOT RESUME IF BP LESS THAN 100/70), Disp: 180 tablet, Rfl: 3    Estradiol-Estriol-Progesterone (BIEST/PROGESTERONE TD), Take 1 capsule by mouth nightly, Disp: , Rfl:     Multiple Vitamins-Minerals (WOMENS MULTIVITAMIN PO), Take 1 tablet by mouth daily, Disp: , Rfl:     calcium-vitamin D (OSCAL-500) 500-200 MG-UNIT per tablet, Take 1 tablet by mouth daily, Disp: , Rfl:   Latex, Amoxicillin, Anaprox [naproxen sodium], Aloe vera, Nortriptyline hcl, Augmentin [amoxicillin-pot clavulanate], Buspar [buspirone], and Vibramycin [doxycycline calcium]    Social History  Social History     Tobacco Use Smoking Status Never Smoker   Smokeless Tobacco Never Used     Social History     Substance and Sexual Activity   Alcohol Use No         Family History   Problem Relation Age of Onset    Diabetes Mother     High Blood Pressure Mother     Heart Attack Mother     Kidney Disease Father     Stroke Father     Other Father         renal failure    Diabetes Maternal Grandmother     Colon Cancer Maternal Grandmother     Heart Disease Maternal Grandfather     Cancer Paternal Grandmother         colon cancer    Alcohol Abuse Sister     Cirrhosis Sister     Stroke Paternal Aunt     Colon Cancer Maternal Cousin        Review of Systems:  Constitutional: Negative. HENT: Negative. Eyes: Negative. Respiratory: Negative. Cardiovascular: Negative. Gastrointestinal: Negative. Genitourinary: Negative. Musculoskeletal: Negative. Skin: Negative. Neurological: Negative. Endo/Heme/Allergies: Negative. Psychiatric/Behavioral: Negative. PHYSICAL EXAM:  Vitals:    01/06/21 1152   BP: 113/67   Pulse: 62   SpO2: 94%     Constitutional: The patient appears well-developed andwell-nourished. Eyes  conjunctiva normal.  Conjugate gaze  Ear, nose, throat -No scars, masses, or lesions over external nose or ears, no atrophy of tongue  Neck-symmetric, no masses noted, no jugular vein distension  Respiration- chest wall appears symmetric, goodexpansion, normal effort without use of accessory muscles  Musculoskeletal  no significant wasting of muscles noted, no bony deformities, gait no gross ataxia  Extremities-no clubbing, cyanosis or edema  Skin warm, dry, and intact. No rash, erythema, or pallor.   Psychiatric  mood, affect, and behavior appear normal.     Neurologic Examination  Awake, Alert and oriented x 4  Normal speech pattern, following commands     Motor:  RIGHT:               iliopsoas 5/5               knee flexor 5/5               knee extension 5/5               EHL/dorsiflexion 5/5 plantar flexion 5/5     LEFT:                  iliopsoas 5/5               knee flexor 5/5               knee extension 5/5               EHL/dorsiflexion 5/5               plantar flexion 5/5       Decrease to pinprick sensation throughout BUE, BLE  Reflexes are 2+ and symmetric BLE  No myofacial tenderness to palpation    Utilizing a walker at today's visit. Wound:  C/D/I, steri strips still intact on left incision. No signs or symptoms of infection noted     DATA and IMAGING:    Lab Results   Component Value Date    WBC 17.3 (H) 12/08/2020    HGB 10.5 (L) 12/08/2020    HCT 34.1 (L) 12/08/2020    MCV 85.5 12/08/2020     12/08/2020     Lab Results   Component Value Date     12/08/2020    K 5.0 12/08/2020     12/08/2020    CO2 26 12/08/2020    BUN 33 (H) 12/08/2020    CREATININE 1.5 (H) 12/08/2020    GLUCOSE 145 (H) 12/08/2020    CALCIUM 8.4 (L) 12/08/2020    PROT 6.8 12/02/2020    LABALBU 4.1 12/02/2020    BILITOT 0.3 12/02/2020    ALKPHOS 60 12/02/2020    AST 23 12/02/2020    ALT 21 12/02/2020    LABGLOM 35 (A) 12/08/2020    GFRAA 43 (L) 12/08/2020     Lab Results   Component Value Date    INR 1.01 12/02/2020    INR 1.25 (H) 02/16/2020    INR 1.08 02/10/2020    PROTIME 13.2 12/02/2020    PROTIME 15.1 (H) 02/16/2020    PROTIME 13.4 02/10/2020    No results found. ASSESSMENT:   Horacio Thompson is a 61 y.o. female who underwent a TLIF L4-5, L5-S1 for spondylolisthesis of L4-5 and L5-S1 on 12/07/2020 and now she is 1 month out from her surgery. PLAN:  -Will refill norco  -Follow 2 months w/XR lumbar         ICD-10-CM    1. S/P lumbar fusion  Z98.1    2. Post-operative pain  G89.18 HYDROcodone-acetaminophen (NORCO) 7.5-325 MG per tablet   3.  Chronic midline low back pain with right-sided sciatica  M54.41     G89.29         Tammy Chow, APRN

## 2021-01-06 NOTE — TELEPHONE ENCOUNTER
I was unable to reorder this due to it being ordered by a historical provider. It was from 2017 as well.

## 2021-01-06 NOTE — TELEPHONE ENCOUNTER
Please verify prescription and dose with [place] as it may be compounded and not correct in computer. It is fine to refill for 12 mths.

## 2021-01-11 DIAGNOSIS — K58.2 IRRITABLE BOWEL SYNDROME WITH BOTH CONSTIPATION AND DIARRHEA: ICD-10-CM

## 2021-01-11 RX ORDER — CHLORDIAZEPOXIDE HYDROCHLORIDE AND CLIDINIUM BROMIDE 5; 2.5 MG/1; MG/1
CAPSULE ORAL
Qty: 360 CAPSULE | Refills: 1 | Status: SHIPPED | OUTPATIENT
Start: 2021-01-11 | End: 2021-03-05 | Stop reason: SDUPTHER

## 2021-01-13 ENCOUNTER — CARE COORDINATION (OUTPATIENT)
Dept: OTHER | Facility: CLINIC | Age: 61
End: 2021-01-13

## 2021-01-13 NOTE — CARE COORDINATION
Ambulatory Care Coordination Note  1/13/2021  CM Risk Score: 8  Charlson 10 Year Mortality Risk Score: 10%     ACC: Quynh Loyola, RN    Summary Note: ACM attempted to reach patient for follow up call regarding complex care, follow up from back surgery. HIPAA compliant message left requesting a return phone call at patients convenience. Will continue to follow. Care Coordination Interventions    Program Enrollment: Complex Care  Referral from Primary Care Provider: No  Suggested Interventions and Community Resources         Goals Addressed    None         Prior to Admission medications    Medication Sig Start Date End Date Taking? Authorizing Provider   chlordiazePOXIDE-clidinium (LIBRAX) 5-2.5 MG per capsule TAKE TWO CAPSULES EVERY MORNING AND TAKE 1 TO 2 CAPSULE BY MOUTH AT BEDTIME 1/11/21   Callum Mckoy MD   Estriol POWD TAKE 1 CAPSULE BY MOUTH AT BEDTIME. 1/6/21   Callum Mckoy MD   HYDROcodone-acetaminophen (NORCO) 7.5-325 MG per tablet Take 1 tablet by mouth every 8 hours as needed for Pain for up to 30 days.  1/6/21 2/5/21  OSRIN Mckinney   simvastatin (ZOCOR) 40 MG tablet Take 1 tablet by mouth nightly 12/18/20 9/16/21  Callum Mckoy MD   docusate sodium (COLACE) 100 MG capsule Take 1 capsule by mouth 2 times daily as needed for Constipation 12/9/20   Alana Shirley,    ondansetron (ZOFRAN) 4 MG tablet Take 1 tablet by mouth every 8 hours as needed for Nausea or Vomiting 12/9/20   Alana Shirley,    metoprolol tartrate (LOPRESSOR) 50 MG tablet TAKE 1 TABLET BY MOUTH 2 TIMES A DAY  Patient taking differently: Take 50 mg by mouth 2 times daily TAKE 1 TABLET BY MOUTH 2 TIMES A DAY 10/29/20   Callum Mckoy MD   Probiotic Product (PROBIOTIC-10 PO) Take 1 capsule by mouth daily    Historical Provider, MD   dilTIAZem (CARDIZEM CD) 240 MG extended release capsule TAKE 1 CAPSULE BY MOUTH ONE TIME DAILY  Patient taking differently: Take 240 mg by mouth daily TAKE 1 CAPSULE BY MOUTH ONE TIME DAILY 3/4/20   SORIN Garcia   triamterene-hydrochlorothiazide (MAXZIDE-25) 37.5-25 MG per tablet TAKE 2 TABLETS BY MOUTH ONE TIME DAILY. DO NOT RESUME IF BP LESS THAN 100/70  Patient taking differently: Take 1 tablet by mouth daily TAKE 2 TABLETS BY MOUTH ONE TIME DAILY.  DO NOT RESUME IF BP LESS THAN 100/70 3/4/20   SORIN Garcia   Estradiol-Estriol-Progesterone (BIEST/PROGESTERONE TD) Take 1 capsule by mouth nightly    Historical Provider, MD   Multiple Vitamins-Minerals (WOMENS MULTIVITAMIN PO) Take 1 tablet by mouth daily    Historical Provider, MD   calcium-vitamin D (OSCAL-500) 500-200 MG-UNIT per tablet Take 1 tablet by mouth daily    Historical Provider, MD       Future Appointments   Date Time Provider Jeb Cm   3/9/2021 10:45 AM SORIN Dominguez [facility]   3/19/2021  2:00 PM MD KARMA Renner [facility]

## 2021-01-21 ENCOUNTER — CARE COORDINATION (OUTPATIENT)
Dept: OTHER | Facility: CLINIC | Age: 61
End: 2021-01-21

## 2021-01-21 NOTE — CARE COORDINATION
Ambulatory Care Coordination Note  1/21/2021  CM Risk Score: 8  Charlson 10 Year Mortality Risk Score: 10%     ACC: Sb Mendoza RN    Summary Note: ACM attempted to reach patient for follow up call regarding recent hospitalization for spinal fusion. HIPAA compliant message left requesting a return phone call at patients convenience. UTR letter mailed to patients home. This is 3rd attempt to reach patient Will continue to follow. Care Coordination Interventions    Program Enrollment: Complex Care  Referral from Primary Care Provider: No  Suggested Interventions and Community Resources           Prior to Admission medications    Medication Sig Start Date End Date Taking? Authorizing Provider   chlordiazePOXIDE-clidinium (LIBRAX) 5-2.5 MG per capsule TAKE TWO CAPSULES EVERY MORNING AND TAKE 1 TO 2 CAPSULE BY MOUTH AT BEDTIME 1/11/21   Jhonathan Faustin MD   Estriol POWD TAKE 1 CAPSULE BY MOUTH AT BEDTIME. 1/6/21   Jhonathan Faustin MD   HYDROcodone-acetaminophen (NORCO) 7.5-325 MG per tablet Take 1 tablet by mouth every 8 hours as needed for Pain for up to 30 days.  1/6/21 2/5/21  SORIN Morocho   simvastatin (ZOCOR) 40 MG tablet Take 1 tablet by mouth nightly 12/18/20 9/16/21  Jhonathan Faustin MD   docusate sodium (COLACE) 100 MG capsule Take 1 capsule by mouth 2 times daily as needed for Constipation 12/9/20   Patrick Franks,    ondansetron (ZOFRAN) 4 MG tablet Take 1 tablet by mouth every 8 hours as needed for Nausea or Vomiting 12/9/20   Patrick Franks, DO   metoprolol tartrate (LOPRESSOR) 50 MG tablet TAKE 1 TABLET BY MOUTH 2 TIMES A DAY  Patient taking differently: Take 50 mg by mouth 2 times daily TAKE 1 TABLET BY MOUTH 2 TIMES A DAY 10/29/20   Jhonathan Faustin MD   Probiotic Product (PROBIOTIC-10 PO) Take 1 capsule by mouth daily    Historical Provider, MD   dilTIAZem (CARDIZEM CD) 240 MG extended release capsule TAKE 1 CAPSULE BY MOUTH ONE TIME DAILY  Patient taking differently: Take 240 mg by mouth daily TAKE 1 CAPSULE BY MOUTH ONE TIME DAILY 3/4/20   SORIN Lopez   triamterene-hydrochlorothiazide (MAXZIDE-25) 37.5-25 MG per tablet TAKE 2 TABLETS BY MOUTH ONE TIME DAILY. DO NOT RESUME IF BP LESS THAN 100/70  Patient taking differently: Take 1 tablet by mouth daily TAKE 2 TABLETS BY MOUTH ONE TIME DAILY.  DO NOT RESUME IF BP LESS THAN 100/70 3/4/20   SORIN Spivey   Estradiol-Estriol-Progesterone (BIEST/PROGESTERONE TD) Take 1 capsule by mouth nightly    Historical Provider, MD   Multiple Vitamins-Minerals (WOMENS MULTIVITAMIN PO) Take 1 tablet by mouth daily    Historical Provider, MD   calcium-vitamin D (OSCAL-500) 500-200 MG-UNIT per tablet Take 1 tablet by mouth daily    Historical Provider, MD       Future Appointments   Date Time Provider Jeb Soila   3/9/2021 10:45 AM SORIN [facility]   3/19/2021  2:00 PM MD KARMA Mckenna [facility]

## 2021-01-21 NOTE — LETTER
Dear Antony Shaver:      My name is Loren Wilder , Associate Care Manager for [facility] and I have been trying to reach you. The Associate Care Management (ACM) program is a free-of-charge confidential service provided to our employees and their family members covered by the [facility]. The program will provide an associate and his/her family with the [employer]'s expertise to assist in navigating the health care delivery system, provider services, and their overall care needsso as to assure and improve health care interactions and enhance the quality of life. This program is designed to provide you with the opportunity to have a [facility] partner with you for the following services:     1) when you come home from the hospital or emergency room   2) when help is needed to manage your disease   3) when you need assistance coordinating services or appointments  4) when you need additional education, resources or assistance reaching your Be Well Health Program goals/requirements such as Be Well With Diabetes      [facility] is dedicated to empowering the good health of its community and improving the quality of care and care experiences for employees and their families. We are committed to safeguarding patient confidentiality and privacy, assuring that every employee has the respect he or she deserves in managing their health. The information shared with your care manager will not be shared with anyone else aside from those you identify as part of your care team, and will only be used to assist you with any identified care needs. Please contact me if you would like this service provided to you. Sincerely,    Loren MEZA, RN- [facility]  Associate Care Manager  [phone]      If you have any questions or concerns, please don't hesitate to call.

## 2021-02-01 ENCOUNTER — CARE COORDINATION (OUTPATIENT)
Dept: OTHER | Facility: CLINIC | Age: 61
End: 2021-02-01

## 2021-02-01 NOTE — CARE COORDINATION
Ambulatory Care Coordination Note  2/1/2021  CM Risk Score: 8  Charlson 10 Year Mortality Risk Score: 10%     ACC: Carmen Johnson RN    Summary Note: ACM attempted 4th and final call to reach patient for follow Associate Care Management up call. HIPAA compliant message left requesting a return phone call at patients convenience. Lost to follow up letter previously mailed. Patient will be discharged if no return call. Care Coordination Interventions    Program Enrollment: Complex Care  Referral from Primary Care Provider: No  Suggested Interventions and Community Resources         Goals Addressed    None         Prior to Admission medications    Medication Sig Start Date End Date Taking? Authorizing Provider   chlordiazePOXIDE-clidinium (LIBRAX) 5-2.5 MG per capsule TAKE TWO CAPSULES EVERY MORNING AND TAKE 1 TO 2 CAPSULE BY MOUTH AT BEDTIME 1/11/21   Melvin Eagle MD   Estriol POWD TAKE 1 CAPSULE BY MOUTH AT BEDTIME. 1/6/21   Melvin Eagle MD   HYDROcodone-acetaminophen (NORCO) 7.5-325 MG per tablet Take 1 tablet by mouth every 8 hours as needed for Pain for up to 30 days.  1/6/21 2/5/21  SORIN John   simvastatin (ZOCOR) 40 MG tablet Take 1 tablet by mouth nightly 12/18/20 9/16/21  Melvin Eagle MD   docusate sodium (COLACE) 100 MG capsule Take 1 capsule by mouth 2 times daily as needed for Constipation 12/9/20   Sharlene Genao DO   ondansetron (ZOFRAN) 4 MG tablet Take 1 tablet by mouth every 8 hours as needed for Nausea or Vomiting 12/9/20   Betaleyda Merck DO   metoprolol tartrate (LOPRESSOR) 50 MG tablet TAKE 1 TABLET BY MOUTH 2 TIMES A DAY  Patient taking differently: Take 50 mg by mouth 2 times daily TAKE 1 TABLET BY MOUTH 2 TIMES A DAY 10/29/20   Melvin Eagle MD   Probiotic Product (PROBIOTIC-10 PO) Take 1 capsule by mouth daily    Historical Provider, MD   dilTIAZem (CARDIZEM CD) 240 MG extended release capsule TAKE 1 CAPSULE BY MOUTH ONE TIME DAILY  Patient taking differently: Take 240 mg by mouth daily TAKE 1 CAPSULE BY MOUTH ONE TIME DAILY 3/4/20   SORIN Lopez   triamterene-hydrochlorothiazide (MAXZIDE-25) 37.5-25 MG per tablet TAKE 2 TABLETS BY MOUTH ONE TIME DAILY. DO NOT RESUME IF BP LESS THAN 100/70  Patient taking differently: Take 1 tablet by mouth daily TAKE 2 TABLETS BY MOUTH ONE TIME DAILY.  DO NOT RESUME IF BP LESS THAN 100/70 3/4/20   SORIN Garcia   Estradiol-Estriol-Progesterone (BIEST/PROGESTERONE TD) Take 1 capsule by mouth nightly    Historical Provider, MD   Multiple Vitamins-Minerals (WOMENS MULTIVITAMIN PO) Take 1 tablet by mouth daily    Historical Provider, MD   calcium-vitamin D (OSCAL-500) 500-200 MG-UNIT per tablet Take 1 tablet by mouth daily    Historical Provider, MD       Future Appointments   Date Time Provider Jeb Soila   3/9/2021 10:45 AM SORIN Dominguez [place]   3/19/2021  2:00 PM MD KARMA Renner [facility]

## 2021-02-22 ENCOUNTER — TELEPHONE (OUTPATIENT)
Dept: NEUROSURGERY | Age: 61
End: 2021-02-22

## 2021-02-22 DIAGNOSIS — G89.18 POST-OPERATIVE PAIN: Primary | ICD-10-CM

## 2021-02-22 RX ORDER — HYDROCODONE BITARTRATE AND ACETAMINOPHEN 7.5; 325 MG/1; MG/1
1 TABLET ORAL 2 TIMES DAILY
Qty: 60 TABLET | Refills: 0 | Status: SHIPPED | OUTPATIENT
Start: 2021-02-22 | End: 2021-03-24

## 2021-02-22 NOTE — TELEPHONE ENCOUNTER
Patient called asking for a refill on her pain rx. States she wants a lower dosage of possible as she wants to wean off of it.

## 2021-02-23 DIAGNOSIS — E78.2 MIXED HYPERLIPIDEMIA: ICD-10-CM

## 2021-02-23 DIAGNOSIS — I10 ESSENTIAL HYPERTENSION, BENIGN: ICD-10-CM

## 2021-02-23 RX ORDER — SIMVASTATIN 40 MG
40 TABLET ORAL NIGHTLY
Qty: 90 TABLET | Refills: 3 | Status: SHIPPED | OUTPATIENT
Start: 2021-02-23 | End: 2022-01-10 | Stop reason: SINTOL

## 2021-02-23 RX ORDER — DILTIAZEM HYDROCHLORIDE 240 MG/1
CAPSULE, COATED, EXTENDED RELEASE ORAL
Qty: 90 CAPSULE | Refills: 3 | Status: SHIPPED | OUTPATIENT
Start: 2021-02-23 | End: 2022-01-11 | Stop reason: SDUPTHER

## 2021-02-23 NOTE — TELEPHONE ENCOUNTER
Gena Davenport called to request a refill on her medication.       Last office visit : 12/18/2020   Next office visit : 3/19/2021     Requested Prescriptions     Pending Prescriptions Disp Refills    simvastatin (ZOCOR) 40 MG tablet [Pharmacy Med Name: SIMVASTATIN 40MG TABS] 90 tablet 3     Sig: TAKE 1 TABLET BY MOUTH NIGHTLY    dilTIAZem (CARDIZEM CD) 240 MG extended release capsule 90 capsule 3     Sig: TAKE 1 CAPSULE BY MOUTH ONE TIME DAILY            Jerzy Newton MA

## 2021-02-24 NOTE — TELEPHONE ENCOUNTER
Patient called wanting to check on her pain medication. She states that she called the pharmacy and there still has not been anything called in. I voiced to the patient that Dr Tee Jackson sent the Rx to [facility] pharmacy on 2/22/2021. Patient states that is the reason why [address] [place] [address] is saying that they do not have it. Patient voiced that she will call [facility] and check on this.

## 2021-03-04 DIAGNOSIS — E78.2 MIXED HYPERLIPIDEMIA: Primary | ICD-10-CM

## 2021-03-04 DIAGNOSIS — E78.2 MIXED HYPERLIPIDEMIA: ICD-10-CM

## 2021-03-04 LAB
ALBUMIN SERPL-MCNC: 4.4 G/DL (ref 3.5–5.2)
ALP BLD-CCNC: 84 U/L (ref 35–104)
ALT SERPL-CCNC: 26 U/L (ref 5–33)
ANION GAP SERPL CALCULATED.3IONS-SCNC: 16 MMOL/L (ref 7–19)
AST SERPL-CCNC: 24 U/L (ref 5–32)
BASOPHILS ABSOLUTE: 0.1 K/UL (ref 0–0.2)
BASOPHILS RELATIVE PERCENT: 0.6 % (ref 0–1)
BILIRUB SERPL-MCNC: <0.2 MG/DL (ref 0.2–1.2)
BUN BLDV-MCNC: 24 MG/DL (ref 8–23)
CALCIUM SERPL-MCNC: 10.1 MG/DL (ref 8.8–10.2)
CHLORIDE BLD-SCNC: 102 MMOL/L (ref 98–111)
CHOLESTEROL, TOTAL: 187 MG/DL (ref 160–199)
CO2: 27 MMOL/L (ref 22–29)
CREAT SERPL-MCNC: 1.1 MG/DL (ref 0.5–0.9)
EOSINOPHILS ABSOLUTE: 0.4 K/UL (ref 0–0.6)
EOSINOPHILS RELATIVE PERCENT: 3.6 % (ref 0–5)
GFR AFRICAN AMERICAN: >59
GFR NON-AFRICAN AMERICAN: 51
GLUCOSE FASTING: 110 MG/DL (ref 74–109)
HCT VFR BLD CALC: 41.5 % (ref 37–47)
HDLC SERPL-MCNC: 53 MG/DL (ref 65–121)
HEMOGLOBIN: 12.9 G/DL (ref 12–16)
IMMATURE GRANULOCYTES #: 0 K/UL
LDL CHOLESTEROL CALCULATED: 93 MG/DL
LYMPHOCYTES ABSOLUTE: 3.5 K/UL (ref 1.1–4.5)
LYMPHOCYTES RELATIVE PERCENT: 35.8 % (ref 20–40)
MCH RBC QN AUTO: 24.9 PG (ref 27–31)
MCHC RBC AUTO-ENTMCNC: 31.1 G/DL (ref 33–37)
MCV RBC AUTO: 80.1 FL (ref 81–99)
MONOCYTES ABSOLUTE: 0.8 K/UL (ref 0–0.9)
MONOCYTES RELATIVE PERCENT: 8 % (ref 0–10)
NEUTROPHILS ABSOLUTE: 5 K/UL (ref 1.5–7.5)
NEUTROPHILS RELATIVE PERCENT: 51.7 % (ref 50–65)
PDW BLD-RTO: 14.8 % (ref 11.5–14.5)
PLATELET # BLD: 254 K/UL (ref 130–400)
PMV BLD AUTO: 11.6 FL (ref 9.4–12.3)
POTASSIUM SERPL-SCNC: 4.1 MMOL/L (ref 3.5–5)
RBC # BLD: 5.18 M/UL (ref 4.2–5.4)
SODIUM BLD-SCNC: 145 MMOL/L (ref 136–145)
TOTAL PROTEIN: 7.2 G/DL (ref 6.6–8.7)
TRIGL SERPL-MCNC: 203 MG/DL (ref 0–149)
WBC # BLD: 9.7 K/UL (ref 4.8–10.8)

## 2021-03-05 DIAGNOSIS — K58.2 IRRITABLE BOWEL SYNDROME WITH BOTH CONSTIPATION AND DIARRHEA: ICD-10-CM

## 2021-03-05 RX ORDER — CHLORDIAZEPOXIDE HYDROCHLORIDE AND CLIDINIUM BROMIDE 5; 2.5 MG/1; MG/1
CAPSULE ORAL
Qty: 360 CAPSULE | Refills: 1 | Status: SHIPPED | OUTPATIENT
Start: 2021-03-05 | End: 2021-03-05 | Stop reason: SDUPTHER

## 2021-03-05 RX ORDER — CHLORDIAZEPOXIDE HYDROCHLORIDE AND CLIDINIUM BROMIDE 5; 2.5 MG/1; MG/1
CAPSULE ORAL
Qty: 360 CAPSULE | Refills: 1 | Status: SHIPPED | OUTPATIENT
Start: 2021-03-05 | End: 2021-10-18 | Stop reason: SDUPTHER

## 2021-03-05 NOTE — TELEPHONE ENCOUNTER
Trent Sinclair called to request a refill on her medication.       Last office visit : 12/18/2020   Next office visit : 3/19/2021     Requested Prescriptions      No prescriptions requested or ordered in this encounter            Michelle Weems MA

## 2021-03-09 ENCOUNTER — HOSPITAL ENCOUNTER (OUTPATIENT)
Dept: GENERAL RADIOLOGY | Age: 61
Discharge: HOME | End: 2021-03-09
Payer: COMMERCIAL

## 2021-03-09 ENCOUNTER — OFFICE VISIT (OUTPATIENT)
Dept: NEUROSURGERY | Age: 61
End: 2021-03-09
Payer: COMMERCIAL

## 2021-03-09 VITALS
HEIGHT: 68 IN | OXYGEN SATURATION: 96 % | HEART RATE: 71 BPM | BODY MASS INDEX: 28.64 KG/M2 | SYSTOLIC BLOOD PRESSURE: 128 MMHG | WEIGHT: 189 LBS | DIASTOLIC BLOOD PRESSURE: 78 MMHG

## 2021-03-09 DIAGNOSIS — Z98.1 S/P LUMBAR FUSION: Primary | ICD-10-CM

## 2021-03-09 DIAGNOSIS — R26.81 UNSTEADY GAIT: ICD-10-CM

## 2021-03-09 DIAGNOSIS — Z98.1 S/P LUMBAR FUSION: ICD-10-CM

## 2021-03-09 DIAGNOSIS — R20.0 RIGHT LEG NUMBNESS: ICD-10-CM

## 2021-03-09 PROCEDURE — 72100 X-RAY EXAM L-S SPINE 2/3 VWS: CPT

## 2021-03-09 PROCEDURE — 99213 OFFICE O/P EST LOW 20 MIN: CPT | Performed by: NURSE PRACTITIONER

## 2021-03-09 ASSESSMENT — ENCOUNTER SYMPTOMS
GASTROINTESTINAL NEGATIVE: 1
RESPIRATORY NEGATIVE: 1
EYES NEGATIVE: 1

## 2021-03-09 NOTE — PROGRESS NOTES
[facility] Office Visit      Chief Complaint   Patient presents with    Follow-up       HISTORY OF PRESENT ILLNESS:      Otilio Babb is a 61 y.o. female who underwent a TLIF L4-5, L5-S1 for spondylolisthesis of L4-5 and L5-S1 on 2020 and now she is 3 months out from her surgery. Prior to surgery she complained of low back and RLE pain. The pain radiated into the posterior aspect of the legs. Today she states that she is doing okay. She states that she still has numbness and pain of the RLE that is intermittent. She states when she is up and walking she feels \"imbalanced\" due to the numbness of the RLE. She feels as if she is going to fall. She now uses a lincoln. When looking further into her history it appears that she is had intermittent RLE numbness for about 10+ years. Past Medical History:   Diagnosis Date    Anal fissure     Anxiety 9/15/2017    C. difficile colitis     Cervical radiculopathy 9/15/2017    Chronic back pain     Chronic kidney disease     Class 1 obesity due to excess calories with serious comorbidity and body mass index (BMI) of 32.0 to 32.9 in adult 9/15/2017    Clostridium difficile colitis 2020    Cyst of kidney, acquired 9/15/2017    Depression 9/15/2017    History of oral surgery     Hyperlipidemia     Hypertension     IBS (irritable bowel syndrome) 9/15/2017    Irregular heartbeat     Obesity 9/15/2017    Osteoarthritis     PONV (postoperative nausea and vomiting)     Retinal detachment        Past Surgical History:   Procedure Laterality Date    APPENDECTOMY       SECTION      EYE SURGERY      HYSTERECTOMY      HYSTERECTOMY, TOTAL ABDOMINAL      LUMBAR FUSION N/A 2020    TLIF OF L4/5. L5-S1 performed by Daina Landon DO at [address] [place] [address] RETINAL DETACHMENT SURGERY          Medications    Current Outpatient Medications:     chlordiazePOXIDE-clidinium (LIBRAX) 5-2.5 MG per capsule, TAKE TWO CAPSULES EVERY MORNING AND TAKE 1 TO 2 CAPSULE BY MOUTH AT BEDTIME, Disp: 360 capsule, Rfl: 1    simvastatin (ZOCOR) 40 MG tablet, TAKE 1 TABLET BY MOUTH NIGHTLY, Disp: 90 tablet, Rfl: 3    dilTIAZem (CARDIZEM CD) 240 MG extended release capsule, TAKE 1 CAPSULE BY MOUTH ONE TIME DAILY, Disp: 90 capsule, Rfl: 3    HYDROcodone-acetaminophen (NORCO) 7.5-325 MG per tablet, Take 1 tablet by mouth 2 times daily for 30 days. Intended supply: 30 days (Patient taking differently: Take 0.5 tablets by mouth daily. Intended supply: 30 days), Disp: 60 tablet, Rfl: 0    Estriol POWD, TAKE 1 CAPSULE BY MOUTH AT BEDTIME., Disp: 30 g, Rfl: 11    docusate sodium (COLACE) 100 MG capsule, Take 1 capsule by mouth 2 times daily as needed for Constipation, Disp: 30 capsule, Rfl: 1    ondansetron (ZOFRAN) 4 MG tablet, Take 1 tablet by mouth every 8 hours as needed for Nausea or Vomiting, Disp: 30 tablet, Rfl: 1    metoprolol tartrate (LOPRESSOR) 50 MG tablet, TAKE 1 TABLET BY MOUTH 2 TIMES A DAY (Patient taking differently: Take 50 mg by mouth 2 times daily TAKE 1 TABLET BY MOUTH 2 TIMES A DAY), Disp: 180 tablet, Rfl: 3    Probiotic Product (PROBIOTIC-10 PO), Take 1 capsule by mouth daily, Disp: , Rfl:     triamterene-hydrochlorothiazide (MAXZIDE-25) 37.5-25 MG per tablet, TAKE 2 TABLETS BY MOUTH ONE TIME DAILY. DO NOT RESUME IF BP LESS THAN 100/70 (Patient taking differently: Take 1 tablet by mouth daily TAKE 2 TABLETS BY MOUTH ONE TIME DAILY.  DO NOT RESUME IF BP LESS THAN 100/70), Disp: 180 tablet, Rfl: 3    Estradiol-Estriol-Progesterone (BIEST/PROGESTERONE TD), Take 1 capsule by mouth nightly, Disp: , Rfl:     Multiple Vitamins-Minerals (WOMENS MULTIVITAMIN PO), Take 1 tablet by mouth daily, Disp: , Rfl:     calcium-vitamin D (OSCAL-500) 500-200 MG-UNIT per tablet, Take 1 tablet by mouth daily, Disp: , Rfl:   Latex, Amoxicillin, Anaprox [naproxen sodium], Aloe vera, Nortriptyline hcl, Augmentin [amoxicillin-pot clavulanate], Buspar [buspirone], and Vibramycin [doxycycline calcium]    Social History  Social History     Tobacco Use   Smoking Status Never Smoker   Smokeless Tobacco Never Used     Social History     Substance and Sexual Activity   Alcohol Use No         Family History   Problem Relation Age of Onset    Diabetes Mother     High Blood Pressure Mother     Heart Attack Mother     Kidney Disease Father     Stroke Father     Other Father         renal failure    Diabetes Maternal Grandmother     Colon Cancer Maternal Grandmother     Heart Disease Maternal Grandfather     Cancer Paternal Grandmother         colon cancer    Alcohol Abuse Sister     Cirrhosis Sister     Stroke Paternal Aunt     Colon Cancer Maternal Cousin        Review of Systems:  Constitutional: Negative. HENT: Negative. Eyes: Negative. Respiratory: Negative. Cardiovascular: Negative. Gastrointestinal: Negative. Genitourinary: Negative. Musculoskeletal: Negative. Skin: Negative. Neurological: Negative. Endo/Heme/Allergies: Negative. Psychiatric/Behavioral: Negative. PHYSICAL EXAM:  Vitals:    03/09/21 1131   BP: 128/78   Pulse: 71   SpO2: 96%     Constitutional: The patient appears well-developed andwell-nourished. Eyes  conjunctiva normal.  Conjugate gaze  Ear, nose, throat -No scars, masses, or lesions over external nose or ears, no atrophy of tongue  Neck-symmetric, no masses noted, no jugular vein distension  Respiration- chest wall appears symmetric, goodexpansion, normal effort without use of accessory muscles  Musculoskeletal  no significant wasting of muscles noted, no bony deformities, gait no gross ataxia  Extremities-no clubbing, cyanosis or edema  Skin warm, dry, and intact. No rash, erythema, or pallor.   Psychiatric  mood, affect, and behavior appear normal.     Neurologic Examination  Awake, Alert and oriented x 4  Normal speech pattern, following commands     Motor: RIGHT:               iliopsoas 5/5               knee flexor 5/5               knee extension 5/5               EHL/dorsiflexion 5/5               plantar flexion 5/5     LEFT:                  iliopsoas 5/5               knee flexor 5/5               knee extension 5/5               EHL/dorsiflexion 5/5               plantar flexion 5/5       Decrease to pinprick sensation throughout BUE, BLE  Reflexes are 2+ and symmetric BLE  No myofacial tenderness to palpation    Utilizing a lincoln at today's visit. Wound: Well healed         DATA and IMAGING:    Lab Results   Component Value Date    WBC 9.7 03/04/2021    HGB 12.9 03/04/2021    HCT 41.5 03/04/2021    MCV 80.1 (L) 03/04/2021     03/04/2021     Lab Results   Component Value Date     03/04/2021    K 4.1 03/04/2021     03/04/2021    CO2 27 03/04/2021    BUN 24 (H) 03/04/2021    CREATININE 1.1 (H) 03/04/2021    GLUCOSE 145 (H) 12/08/2020    CALCIUM 10.1 03/04/2021    PROT 7.2 03/04/2021    LABALBU 4.4 03/04/2021    BILITOT <0.2 03/04/2021    ALKPHOS 84 03/04/2021    AST 24 03/04/2021    ALT 26 03/04/2021    LABGLOM 51 (A) 03/04/2021    GFRAA >59 03/04/2021     Lab Results   Component Value Date    INR 1.01 12/02/2020    INR 1.25 (H) 02/16/2020    INR 1.08 02/10/2020    PROTIME 13.2 12/02/2020    PROTIME 15.1 (H) 02/16/2020    PROTIME 13.4 02/10/2020    No results found. Narrative   XR LUMBAR SPINE (2-3 VIEWS)    3/9/2021 11:27 AM   History: 3 month follow-up after surgery for lumbar fusion. Two-view lumbar spine series compared with 6 January 2021. Exaggerated lordotic curvature is stable. L4-5 and L5-S1 discectomy. Bilateral pedicle screw hardware fusion from L4 through S1. Hardware is intact. No bony lucency is seen around the pedicle screws. No scoliosis. No compression fracture.       Impression   1. Stable postoperative appearance.    Signed by Dr Lovely Rasheed on 3/9/2021 11:28 AM   I have personally reviewed the images and my interpretation is:  Hardware in good position, some bony formation noted       ASSESSMENT:   Daina Chapman is a 61 y.o. female who underwent a TLIF L4-5, L5-S1 for spondylolisthesis of L4-5 and L5-S1 on 12/07/2020 and now she is 3 months out from her surgery. PLAN:  -Ms. Alberta Tyler has done fairly well, however, still having numbness of the RLE, however, this could be chronic given the history.   -Will obtain NCS  -Follow up 3 months w/XR lumbar; could discuss gabapentin at next appointment         ICD-10-CM    1. S/P lumbar fusion  Z98.1    2. Right leg numbness  R20.0 Nerve conduction test   3.  Unsteady gait  R26.81 Nerve conduction test        Meño Age, APRN

## 2021-03-18 ENCOUNTER — HOSPITAL ENCOUNTER (OUTPATIENT)
Dept: NEUROLOGY | Age: 61
Discharge: HOME | End: 2021-03-18
Payer: COMMERCIAL

## 2021-03-18 PROCEDURE — 95886 MUSC TEST DONE W/N TEST COMP: CPT | Performed by: PSYCHIATRY & NEUROLOGY

## 2021-03-18 PROCEDURE — 95908 NRV CNDJ TST 3-4 STUDIES: CPT | Performed by: PSYCHIATRY & NEUROLOGY

## 2021-03-18 PROCEDURE — 95908 NRV CNDJ TST 3-4 STUDIES: CPT

## 2021-03-18 PROCEDURE — 95886 MUSC TEST DONE W/N TEST COMP: CPT

## 2021-03-26 ENCOUNTER — TELEPHONE (OUTPATIENT)
Dept: NEUROLOGY | Age: 61
End: 2021-03-26

## 2021-03-26 NOTE — TELEPHONE ENCOUNTER
Candice Urbina [facility] called regarding a medical records request that was sent on March 12th. She is seeking Office notes, diagnostic reports, labs, PT, ER discharge records from June 1, 2020-to Present.     Fax [phone]  [facility] [id number]    Case number [id number]

## 2021-03-26 NOTE — TELEPHONE ENCOUNTER
Called and spoke Aaron Lancaster with MR she stated that she records were completed just waiting on payment prior to records going out

## 2021-03-30 ENCOUNTER — OFFICE VISIT (OUTPATIENT)
Dept: FAMILY MEDICINE CLINIC | Age: 61
End: 2021-03-30
Payer: COMMERCIAL

## 2021-03-30 VITALS
TEMPERATURE: 98.3 F | HEIGHT: 68 IN | DIASTOLIC BLOOD PRESSURE: 82 MMHG | OXYGEN SATURATION: 97 % | SYSTOLIC BLOOD PRESSURE: 136 MMHG | BODY MASS INDEX: 33.8 KG/M2 | WEIGHT: 223 LBS | HEART RATE: 67 BPM

## 2021-03-30 DIAGNOSIS — Z79.899 MEDICATION MANAGEMENT: ICD-10-CM

## 2021-03-30 DIAGNOSIS — E66.09 CLASS 1 OBESITY DUE TO EXCESS CALORIES WITH SERIOUS COMORBIDITY AND BODY MASS INDEX (BMI) OF 33.0 TO 33.9 IN ADULT: ICD-10-CM

## 2021-03-30 DIAGNOSIS — R53.83 OTHER FATIGUE: ICD-10-CM

## 2021-03-30 DIAGNOSIS — I10 ESSENTIAL HYPERTENSION, BENIGN: ICD-10-CM

## 2021-03-30 DIAGNOSIS — Z00.00 ANNUAL PHYSICAL EXAM: Primary | ICD-10-CM

## 2021-03-30 DIAGNOSIS — D50.8 IRON DEFICIENCY ANEMIA SECONDARY TO INADEQUATE DIETARY IRON INTAKE: ICD-10-CM

## 2021-03-30 DIAGNOSIS — Z12.31 ENCOUNTER FOR SCREENING MAMMOGRAM FOR MALIGNANT NEOPLASM OF BREAST: ICD-10-CM

## 2021-03-30 DIAGNOSIS — E78.2 MIXED HYPERLIPIDEMIA: ICD-10-CM

## 2021-03-30 DIAGNOSIS — Z13.1 SCREENING FOR DIABETES MELLITUS: ICD-10-CM

## 2021-03-30 DIAGNOSIS — R79.89 ELEVATED SERUM CREATININE: ICD-10-CM

## 2021-03-30 PROBLEM — E87.1 HYPONATREMIA: Status: RESOLVED | Noted: 2020-02-16 | Resolved: 2021-03-30

## 2021-03-30 PROBLEM — E66.811 CLASS 1 OBESITY DUE TO EXCESS CALORIES WITH SERIOUS COMORBIDITY AND BODY MASS INDEX (BMI) OF 33.0 TO 33.9 IN ADULT: Status: ACTIVE | Noted: 2021-03-30

## 2021-03-30 PROBLEM — D50.9 IRON DEFICIENCY ANEMIA: Status: ACTIVE | Noted: 2021-03-30

## 2021-03-30 PROCEDURE — 99396 PREV VISIT EST AGE 40-64: CPT | Performed by: INTERNAL MEDICINE

## 2021-03-30 PROCEDURE — 80305 DRUG TEST PRSMV DIR OPT OBS: CPT | Performed by: INTERNAL MEDICINE

## 2021-03-30 RX ORDER — TRIAMTERENE AND HYDROCHLOROTHIAZIDE 37.5; 25 MG/1; MG/1
1 TABLET ORAL DAILY
Qty: 180 TABLET | Refills: 3 | Status: SHIPPED | OUTPATIENT
Start: 2021-03-30 | End: 2021-10-19 | Stop reason: SDUPTHER

## 2021-03-30 ASSESSMENT — ENCOUNTER SYMPTOMS
COUGH: 0
BLOOD IN STOOL: 0
BACK PAIN: 1
SINUS PRESSURE: 0
ABDOMINAL PAIN: 0
RHINORRHEA: 0
EYE REDNESS: 0
EYE PAIN: 0
VOICE CHANGE: 0
SORE THROAT: 0
SHORTNESS OF BREATH: 0
EYE DISCHARGE: 0
DIARRHEA: 0
CHEST TIGHTNESS: 0
WHEEZING: 0
COLOR CHANGE: 0
VOMITING: 0

## 2021-03-30 ASSESSMENT — VISUAL ACUITY: OU: 1

## 2021-03-30 NOTE — PATIENT INSTRUCTIONS
Patient Education        Well Visit, Women 48 to 72: Care Instructions  Overview     Well visits can help you stay healthy. Your doctor has checked your overall health and may have suggested ways to take good care of yourself. Your doctor also may have recommended tests. At home, you can help prevent illness with healthy eating, regular exercise, and other steps. Follow-up care is a key part of your treatment and safety. Be sure to make and go to all appointments, and call your doctor if you are having problems. It's also a good idea to know your test results and keep a list of the medicines you take. How can you care for yourself at home? · Get screening tests that you and your doctor decide on. Screening helps find diseases before any symptoms appear. · Eat healthy foods. Choose fruits, vegetables, whole grains, protein, and low-fat dairy foods. Limit fat, especially saturated fat. Reduce salt in your diet. · Limit alcohol. Have no more than 1 drink a day or 7 drinks a week. · Get at least 30 minutes of exercise on most days of the week. Walking is a good choice. You also may want to do other activities, such as running, swimming, cycling, or playing tennis or team sports. · Reach and stay at a healthy weight. This will lower your risk for many problems, such as obesity, diabetes, heart disease, and high blood pressure. · Do not smoke. Smoking can make health problems worse. If you need help quitting, talk to your doctor about stop-smoking programs and medicines. These can increase your chances of quitting for good. · Care for your mental health. It is easy to get weighed down by worry and stress. Learn strategies to manage stress, like deep breathing and mindfulness, and stay connected with your family and community. If you find you often feel sad or hopeless, talk with your doctor. Treatment can help.   · Talk to your doctor about whether you have any risk factors for sexually transmitted infections (STIs). You can help prevent STIs if you wait to have sex with a new partner (or partners) until you've each been tested for STIs. It also helps if you use condoms (male or female condoms) and if you limit your sex partners to one person who only has sex with you. Vaccines are available for some STIs. · If you think you may have a problem with alcohol or drug use, talk to your doctor. This includes prescription medicines (such as amphetamines and opioids) and illegal drugs (such as cocaine and methamphetamine). Your doctor can help you figure out what type of treatment is best for you. · Protect your skin from too much sun. When you're outdoors from 10 a.m. to 4 p.m., stay in the shade or cover up with clothing and a hat with a wide brim. Wear sunglasses that block UV rays. Even when it's cloudy, put broad-spectrum sunscreen (SPF 30 or higher) on any exposed skin. · See a dentist one or two times a year for checkups and to have your teeth cleaned. · Wear a seat belt in the car. When should you call for help? Watch closely for changes in your health, and be sure to contact your doctor if you have any problems or symptoms that concern you. Where can you learn more? Go to https://[employer].healthThe Minerva Project. org and sign in to your [employer] account. Enter R070 in the [facility] box to learn more about \"Well Visit, Women 50 to 72: Care Instructions. \"     If you do not have an account, please click on the \"Sign Up Now\" link. Current as of: May 27, 2020               Content Version: 12.8  © 1802-0415 Healthwise, Incorporated. Care instructions adapted under license by [facility] ([facility]). If you have questions about a medical condition or this instruction, always ask your healthcare professional. [place] [address] any warranty or liability for your use of this information.          Patient Education        Iron-Rich Diet: Care Instructions  Your Care Instructions     Your body needs iron to make hemoglobin. Hemoglobin is a substance in red blood cells that carries oxygen from the lungs to cells all through your body. If you do not get enough iron, your body makes fewer and smaller red blood cells. As a result, your body's cells may not get enough oxygen. Adult men need 8 milligrams of iron a day; adult women need 18 milligrams of iron a day. After menopause, women need 8 milligrams of iron a day. A pregnant woman needs 27 milligrams of iron a day. Infants and young children have higher iron needs relative to their size than other age groups. People who have lost blood because of ulcers or heavy menstrual periods may become very low in iron and may develop anemia. Most people can get the iron their bodies need by eating enough of certain iron-rich foods. Your doctor may recommend that you take an iron supplement along with eating an iron-rich diet. Follow-up care is a key part of your treatment and safety. Be sure to make and go to all appointments, and call your doctor if you are having problems. It's also a good idea to know your test results and keep a list of the medicines you take. How can you care for yourself at home? · Make iron-rich foods a part of your daily diet. Iron-rich foods include:  ? All meats, such as chicken, beef, lamb, pork, fish, and shellfish. Liver is especially high in iron. ? Leafy green vegetables. ? Raisins, peas, beans, lentils, barley, and eggs. ? Iron-fortified breakfast cereals. · Eat foods with vitamin C along with iron-rich foods. Vitamin C helps you absorb more iron from food. Drink a glass of orange juice or another citrus juice with your food. · Eat meat and vegetables or grains together. The iron in meat helps your body absorb the iron in other foods. Where can you learn more? Go to https://chorieb.healthPocits. org and sign in to your Hacker[employer] account.  Enter [id number] in the [facility] box to learn more about \"Iron-Rich Diet: Care Instructions. \"     If you do not have an account, please click on the \"Sign Up Now\" link. Current as of: December 17, 2020               Content Version: 12.8  © 2006-2021 Healthwise, FIMBex. Care instructions adapted under license by [facility] ([facility]). If you have questions about a medical condition or this instruction, always ask your healthcare professional. [facility] any warranty or liability for your use of this information. Patient Education        Back Care and Preventing Injuries: Care Instructions  Your Care Instructions     You can hurt your back doing many everyday activities: lifting a heavy box, bending down to garden, exercising at the gym, and even getting out of bed. But you can keep your back strong and healthy by doing some exercises. You also can follow a few tips for sitting, sleeping, and lifting to avoid hurting your back again. Talk to your doctor before you start an exercise program. Ask for help if you want to learn more about keeping your back healthy. Follow-up care is a key part of your treatment and safety. Be sure to make and go to all appointments, and call your doctor if you are having problems. It's also a good idea to know your test results and keep a list of the medicines you take. How can you care for yourself at home? · Stay at a healthy weight to avoid strain on your lower back. · Do not smoke. Smoking increases the risk of osteoporosis, which weakens the spine. If you need help quitting, talk to your doctor about stop-smoking programs and medicines. These can increase your chances of quitting for good. · Make sure you sleep in a position that maintains your back's normal curves and on a mattress that feels comfortable. Sleep on your side with a pillow between your knees, or sleep on your back with a pillow under your knees. These positions can reduce strain on your back. · When you get out of bed, lie on your side and bend both knees. Drop your feet over the edge of the bed as you push up with both arms. Scoot to the edge of the bed. Make sure your feet are in line with your rear end (buttocks), and then stand up. · If you must stand for a long time, put one foot on a stool, ledge, or box. Exercise to strengthen your back and other muscles  · Get at least 30 minutes of exercise on most days of the week. Walking is a good choice. You also may want to do other activities, such as running, swimming, cycling, or playing tennis or team sports. · Stretch your back muscles. Here are few exercises to try:  ? Lie on your back with your knees bent and your feet flat on the floor. Gently pull one bent knee to your chest. Put that foot back on the floor, and then pull the other knee to your chest. Hold for 15 to 30 seconds. Repeat 2 to 4 times. ? Do pelvic tilts. Lie on your back with your knees bent. Tighten your stomach muscles. Pull your belly button (navel) in and up toward your ribs. You should feel like your back is pressing to the floor and your hips and pelvis are slightly lifting off the floor. Hold for 6 seconds while breathing smoothly. · Keep your core muscles strong. The muscles of your back, belly (abdomen), and buttocks support your spine. ? Pull in your belly, and imagine pulling your navel toward your spine. Hold this for 6 seconds, then relax. Remember to keep breathing normally as you tense your muscles. ? Do curl-ups. Always do them with your knees bent. Keep your low back on the floor, and curl your shoulders toward your knees using a smooth, slow motion. Keep your arms folded across your chest. If this bothers your neck, try putting your hands behind your neck (not your head), with your elbows spread apart. ? Lie on your back with your knees bent and your feet flat on the floor. Tighten your belly muscles, and then push with your feet and raise your buttocks up a few inches.  Hold this position 6 seconds as you continue to breathe normally, then lower yourself slowly to the floor. Repeat 8 to 12 times. ? If you like group exercise, try Pilates or yoga. These classes have poses that strengthen the core muscles. Protect your back when you sit  · Place a small pillow, a rolled-up towel, or a lumbar roll in the curve of your back if you need extra support. · Sit in a chair that is low enough to let you place both feet flat on the floor with both knees nearly level with your hips. If your chair or desk is too high, use a foot rest to raise your knees. · When driving, keep your knees nearly level with your hips. Sit straight, and drive with both hands on the steering wheel. Your arms should be in a slightly bent position. · Try a kneeling chair, which helps tilt your hips forward. This takes pressure off your lower back. · Try sitting on an exercise ball. It can rock from side to side, which helps keep your back loose. Lift properly  · Squat down, bending at the hips and knees only. If you need to, put one knee to the floor and extend your other knee in front of you, bent at a right angle (half kneeling). · Press your chest straight forward. This helps keep your upper back straight while keeping a slight arch in your low back. · Hold the load as close to your body as possible, at the level of your navel. · Use your feet to change direction, taking small steps. · Lead with your hips as you change direction. Keep your shoulders in line with your hips as you move. Do not twist your body. · Set down your load carefully, squatting with your knees and hips only. When should you call for help? Watch closely for changes in your health, and be sure to contact your doctor if you have any problems. Where can you learn more? Go to https://keren.iMusica. org and sign in to your [employer] account. Enter S810 in the Getourguide box to learn more about \"Back Care and Preventing Injuries: Care Instructions. \"     If you do not have an account, please click on the \"Sign Up Now\" link. Current as of: November 16, 2020               Content Version: 12.8  © 2006-2021 Healthwise, ProfStream. Care instructions adapted under license by [facility] ([facility]). If you have questions about a medical condition or this instruction, always ask your healthcare professional. [address] any warranty or liability for your use of this information. Patient Education        Learning About High Cholesterol  What is high cholesterol? High cholesterol means that you have too much cholesterol in your blood. Cholesterol is a type of fat. It's needed for many body functions, such as making new cells. Cholesterol is made by your body. It also comes from food you eat. Having high cholesterol can lead to the buildup of plaque in artery walls. This can increase your risk of heart disease and stroke. When your doctor talks about high cholesterol levels, he or she is talking about your total cholesterol and LDL cholesterol (the \"bad\" cholesterol) levels. Your doctor may also speak about HDL (the \"good\" cholesterol) levels. High HDL is linked with a lower risk for heart disease, heart attack, and stroke. Your cholesterol levels help your doctor find out your risk for having a heart attack or stroke. How can you prevent high cholesterol? A heart-healthy lifestyle can help you prevent high cholesterol. This lifestyle helps lower your risk for a heart attack and stroke. · Eat heart-healthy foods. ? Eat fruits, vegetables, whole grains (like oatmeal), dried beans and peas, nuts and seeds, soy products (like tofu), and fat-free or low-fat dairy products. ? Replace butter, margarine, and hydrogenated or partially hydrogenated oils with olive and canola oils. (Canola oil margarine without trans fat is fine.)  ? Replace red meat with fish, poultry, and soy protein (like tofu). ?  Limit processed and packaged foods like chips, crackers, and cookies. · Be active. Exercise can improve your cholesterol level. Get at least 30 minutes of exercise on most days of the week. Walking is a good choice. You also may want to do other activities, such as running, swimming, cycling, or playing tennis or team sports. · Stay at a healthy weight. Lose weight if you need to. · Don't smoke. If you need help quitting, talk to your doctor about stop-smoking programs and medicines. These can increase your chances of quitting for good. How is high cholesterol treated? The goal of treatment is to reduce your chances of having a heart attack or stroke. The goal is not to lower your cholesterol numbers only. · You may make lifestyle changes, such as eating healthy foods, not smoking, losing weight, and being more active. · You may have to take medicine. Follow-up care is a key part of your treatment and safety. Be sure to make and go to all appointments, and call your doctor if you are having problems. It's also a good idea to know your test results and keep a list of the medicines you take. Where can you learn more? Go to https://Rawporter.[employer] Instruments. org and sign in to your [employer]mize account. Enter S053 in the [facility] box to learn more about \"Learning About High Cholesterol. \"     If you do not have an account, please click on the \"Sign Up Now\" link. Current as of: August 31, 2020               Content Version: 12.8  © 4917-7372 ThisLife. Care instructions adapted under license by [facility] ([facility]). If you have questions about a medical condition or this instruction, always ask your healthcare professional. [place] [address] any warranty or liability for your use of this information. Patient Education        Starting a Weight Loss Plan: Care Instructions  Overview     If you're thinking about losing weight, it can be hard to know where to start.  Your doctor can help you set up a weight loss plan that best meets your needs. You may want to take a class on nutrition or exercise, or you could join a weight loss support group. If you have questions about how to make changes to your eating or exercise habits, ask your doctor about seeing a registered dietitian or an exercise specialist.  It can be a big challenge to lose weight. But you don't have to make huge changes at once. Make small changes, and stick with them. When those changes become habit, add a few more changes. If you don't think you're ready to make changes right now, try to pick a date in the future. Make an appointment to see your doctor to discuss whether the time is right for you to start a plan. Follow-up care is a key part of your treatment and safety. Be sure to make and go to all appointments, and call your doctor if you are having problems. It's also a good idea to know your test results and keep a list of the medicines you take. How can you care for yourself at home? · Set realistic goals. Many people expect to lose much more weight than is likely. A weight loss of 5% to 10% of your body weight may be enough to improve your health. · Get family and friends involved to provide support. Talk to them about why you are trying to lose weight, and ask them to help. They can help by participating in exercise and having meals with you, even if they may be eating something different. · Find what works best for you. If you do not have time or do not like to cook, a program that offers meal replacement bars or shakes may be better for you. Or if you like to prepare meals, finding a plan that includes daily menus and recipes may be best.  · Ask your doctor about other health professionals who can help you achieve your weight loss goals. ? A dietitian can help you make healthy changes in your diet. ?  An exercise specialist or  can help you develop a safe and effective exercise program.  ? A counselor or psychiatrist can help you cope with issues such as depression, anxiety, or family problems that can make it hard to focus on weight loss. · Consider joining a support group for people who are trying to lose weight. Your doctor can suggest groups in your area. Where can you learn more? Go to https://keren.Aeropostale. org and sign in to your [employer] account. Enter K729 in the Opbeat box to learn more about \"Starting a Weight Loss Plan: Care Instructions. \"     If you do not have an account, please click on the \"Sign Up Now\" link. Current as of: September 23, 2020               Content Version: 12.8  © 2719-1797 Healthwise, ViViFi. Care instructions adapted under license by [facility] ([facility]). If you have questions about a medical condition or this instruction, always ask your healthcare professional. [address] any warranty or liability for your use of this information.

## 2021-03-30 NOTE — PROGRESS NOTES
Óscar Kirby is a 61 y.o. female who presents today for   Chief Complaint   Patient presents with    Annual Exam    Hyperlipidemia    Hypertension       HPI  62 y/o female here for annual wellness visit and follow up on HTN, hyperlipidemia, IBS with controlled med refill, and obesity. She had back surgery on lumbar spine in December but has had peristent pain with RLE numbness affecting balance on right side which did not improve much with surgery. She has to wear a lumbar brace and use a cane when she ambulates. She has not been released to go to back to work yet. She had an EMG earlier this month which showed a mild sensory polyneuropathy RLE and L4-L5 radiculopathy RLE. She does not think she will be able to go back to work as a  and he back issues are related to a workman's compensation injury. Hypertension  Patient is here for follow-up of elevated blood pressure. She is not exercising and is adherent to a low-salt diet. Patient is checking blood pressure at home. Blood pressure is controlled. Cardiac symptoms: none. Patient denies chest pain, dyspnea, irregular heart beat, orthopnea, paroxysmal nocturnal dyspnea and syncope. Use of agents associated with hypertension: estrogens. History of target organ damage: elevated creatinine level recently. Óscar Kirby is here for follow up of dyslipidemia. Compliance with treatment has been good. The patient exercises rarely. Patient denies muscle pain associated with their medications which include simvastatin. BMI Readings from Last 3 Encounters:   03/30/21 33.91 kg/m²   03/09/21 28.74 kg/m²   01/06/21 30.11 kg/m²     Wt Readings from Last 3 Encounters:   03/30/21 223 lb (101.2 kg)   03/09/21 189 lb (85.7 kg)   01/06/21 198 lb (89.8 kg)         Review of Systems   Constitutional: Positive for fatigue. Negative for appetite change, chills and fever.    HENT: Negative for ear pain, rhinorrhea, sinus pressure, sore throat and voice change. Eyes: Negative for pain, discharge and redness. Respiratory: Negative for cough, chest tightness, shortness of breath and wheezing. Cardiovascular: Negative for chest pain and palpitations. Gastrointestinal: Negative for abdominal pain, blood in stool, diarrhea and vomiting.        +history of IBS which is well controlled   Endocrine: Negative for cold intolerance, heat intolerance and polydipsia. Genitourinary: Negative for dysuria and hematuria. Musculoskeletal: Positive for arthralgias, back pain, gait problem and myalgias. Negative for neck pain and neck stiffness. See HPI   Skin: Negative for color change and rash. Neurological: Negative for dizziness, tremors, syncope, speech difficulty, weakness, numbness and headaches. Hematological: Negative for adenopathy. Does not bruise/bleed easily. Psychiatric/Behavioral: Negative for confusion, dysphoric mood and sleep disturbance. The patient is not nervous/anxious. All other systems reviewed and are negative.       Past Medical History:   Diagnosis Date    Anal fissure     Anxiety 9/15/2017    C. difficile colitis     Cervical radiculopathy 9/15/2017    Chronic back pain     Chronic kidney disease     Class 1 obesity due to excess calories with serious comorbidity and body mass index (BMI) of 32.0 to 32.9 in adult 9/15/2017    Clostridium difficile colitis 2/16/2020    Cyst of kidney, acquired 9/15/2017    Depression 9/15/2017    History of oral surgery     Hyperlipidemia     Hypertension     IBS (irritable bowel syndrome) 9/15/2017    Irregular heartbeat     Obesity 9/15/2017    Osteoarthritis     PONV (postoperative nausea and vomiting)     Retinal detachment        Current Outpatient Medications   Medication Sig Dispense Refill    metoprolol tartrate (LOPRESSOR) 50 MG tablet Take 1 tablet by mouth 2 times daily TAKE 1 TABLET BY MOUTH 2 TIMES A DAY 60 tablet 5    triamterene-hydroCHLOROthiazide (MAXZIDE-25) 37.5-25 MG per tablet Take 1 tablet by mouth daily TAKE 2 TABLETS BY MOUTH ONE TIME DAILY. DO NOT RESUME IF BP LESS THAN 100/70 180 tablet 3    chlordiazePOXIDE-clidinium (LIBRAX) 5-2.5 MG per capsule TAKE TWO CAPSULES EVERY MORNING AND TAKE 1 TO 2 CAPSULE BY MOUTH AT BEDTIME 360 capsule 1    simvastatin (ZOCOR) 40 MG tablet TAKE 1 TABLET BY MOUTH NIGHTLY 90 tablet 3    dilTIAZem (CARDIZEM CD) 240 MG extended release capsule TAKE 1 CAPSULE BY MOUTH ONE TIME DAILY 90 capsule 3    Estriol POWD TAKE 1 CAPSULE BY MOUTH AT BEDTIME. 30 g 11    docusate sodium (COLACE) 100 MG capsule Take 1 capsule by mouth 2 times daily as needed for Constipation 30 capsule 1    ondansetron (ZOFRAN) 4 MG tablet Take 1 tablet by mouth every 8 hours as needed for Nausea or Vomiting 30 tablet 1    Probiotic Product (PROBIOTIC-10 PO) Take 1 capsule by mouth daily      Estradiol-Estriol-Progesterone (BIEST/PROGESTERONE TD) Take 1 capsule by mouth nightly      Multiple Vitamins-Minerals (WOMENS MULTIVITAMIN PO) Take 1 tablet by mouth daily      calcium-vitamin D (OSCAL-500) 500-200 MG-UNIT per tablet Take 1 tablet by mouth daily       No current facility-administered medications for this visit. Allergies   Allergen Reactions    Latex Rash    Amoxicillin Shortness Of Breath and Nausea And Vomiting    Anaprox [Naproxen Sodium] Shortness Of Breath and Nausea And Vomiting    Aloe Vera      Other reaction(s): Unknown    Nortriptyline Hcl      Doesn't remember      Augmentin [Amoxicillin-Pot Clavulanate] Nausea And Vomiting           Buspar [Buspirone] Nausea And Vomiting    Vibramycin [Doxycycline Calcium] Nausea And Vomiting       Past Surgical History:   Procedure Laterality Date    APPENDECTOMY       SECTION      EYE SURGERY      HYSTERECTOMY      HYSTERECTOMY, TOTAL ABDOMINAL      LUMBAR FUSION N/A 2020    TLIF OF L4/5. L5-S1 performed by Chase Cm DO at [place] [address] Social History     Tobacco Use    Smoking status: Never Smoker    Smokeless tobacco: Never Used   Substance Use Topics    Alcohol use: No    Drug use: No       Family History   Problem Relation Age of Onset    Diabetes Mother     High Blood Pressure Mother     Heart Attack Mother     Kidney Disease Father     Stroke Father     Other Father         renal failure    Diabetes Maternal Grandmother     Colon Cancer Maternal Grandmother     Heart Disease Maternal Grandfather     Cancer Paternal Grandmother         colon cancer    Alcohol Abuse Sister     Cirrhosis Sister     Stroke Paternal Aunt     Colon Cancer Maternal Cousin        /82 (Site: Right Upper Arm, Position: Sitting)   Pulse 67   Temp 98.3 °F (36.8 °C)   Ht 5' 8\" (1.727 m)   Wt 223 lb (101.2 kg)   SpO2 97%   BMI 33.91 kg/m²     Physical Exam  Vitals signs and nursing note reviewed. Constitutional:       General: She is not in acute distress. Appearance: Normal appearance. She is well-developed and well-groomed. She is obese. She is not ill-appearing, toxic-appearing or diaphoretic. Comments: Appears tired and uncomfortable due to back pain   HENT:      Head: Normocephalic and atraumatic. Right Ear: Tympanic membrane, ear canal and external ear normal.      Left Ear: Tympanic membrane, ear canal and external ear normal.      Nose: Nose normal.      Mouth/Throat:      Lips: Pink. Mouth: Mucous membranes are moist. No oral lesions. Tongue: No lesions. Palate: No mass and lesions. Pharynx: Oropharynx is clear. Uvula midline. No pharyngeal swelling, oropharyngeal exudate, posterior oropharyngeal erythema or uvula swelling. Tonsils: 1+ on the right. 1+ on the left. Eyes:      General: Lids are normal. Vision grossly intact. No scleral icterus. Extraocular Movements: Extraocular movements intact.       Conjunctiva/sclera: Conjunctivae normal.      Pupils: Pupils are equal, round, and reactive to light. Neck:      Musculoskeletal: Normal range of motion and neck supple. Thyroid: No thyroid mass or thyromegaly. Vascular: No carotid bruit or JVD. Trachea: Trachea and phonation normal.   Cardiovascular:      Rate and Rhythm: Normal rate and regular rhythm. Pulses: Normal pulses. Radial pulses are 2+ on the right side and 2+ on the left side. Posterior tibial pulses are 2+ on the right side and 2+ on the left side. Heart sounds: Normal heart sounds. No murmur. No friction rub. No gallop. Pulmonary:      Effort: Pulmonary effort is normal. No accessory muscle usage. Breath sounds: Normal breath sounds. No decreased breath sounds, wheezing, rhonchi or rales. Abdominal:      General: Abdomen is flat. Bowel sounds are normal. There is no distension. Palpations: Abdomen is soft. There is no hepatomegaly, splenomegaly or mass. Tenderness: There is no abdominal tenderness. There is no guarding or rebound. Hernia: No hernia is present. Musculoskeletal:      Right wrist: Normal.      Left wrist: Normal.      Right ankle: Normal.      Left ankle: Normal.      Thoracic back: She exhibits decreased range of motion. Lumbar back: She exhibits decreased range of motion and tenderness. Right lower leg: No edema. Left lower leg: No edema. Comments: LSO brace in place   Lymphadenopathy:      Head:      Right side of head: No submandibular adenopathy. Left side of head: No submandibular adenopathy. Cervical: No cervical adenopathy. Right cervical: No superficial, deep or posterior cervical adenopathy. Left cervical: No superficial, deep or posterior cervical adenopathy. Upper Body:      Right upper body: No supraclavicular adenopathy. Left upper body: No supraclavicular adenopathy. Lower Body: No right inguinal adenopathy. No left inguinal adenopathy. Skin:     General: Skin is warm and dry. LABGLOM 56 (A) 04/07/2021    GFRAA >59 04/07/2021       Lab Results   Component Value Date    TSH 1.32 02/22/2014    T4FREE 1.2 11/27/2017     Lab Results   Component Value Date    CHOL 187 03/04/2021    CHOL 158 (L) 12/12/2019    CHOL 150 (L) 12/05/2018     Lab Results   Component Value Date    TRIG 203 (H) 03/04/2021    TRIG 174 (H) 12/12/2019    TRIG 117 12/05/2018     Lab Results   Component Value Date    HDL 53 (L) 03/04/2021    HDL 57 (L) 12/12/2019    HDL 51 (L) 12/05/2018     Lab Results   Component Value Date    LDLCALC 93 03/04/2021    LDLCALC 66 12/12/2019    LDLCALC 76 12/05/2018     No results found for: LABVLDL, VLDL  No results found for: CHOLHDLRATIO    Results for orders placed or performed in visit on 03/30/21   POCT Rapid Drug Screen   Result Value Ref Range    Alcohol, Urine neg     Amphetamine Screen, Urine neg     Barbiturate Screen, Urine neg     Benzodiazepine Screen, Urine pos     Buprenorphine Urine neg     Cocaine Metabolite Screen, Urine neg     FENTANYL SCREEN, URINE neg     Gabapentin Screen, Urine neg     MDMA, Urine neg     Methadone Screen, Urine neg     Methamphetamine, Urine neg     Opiate Scrn, Ur pos     Oxycodone Screen, Ur neg     PCP Screen, Urine neg     Propoxyphene Screen, Urine neg     Synthetic Cannabinoids (K2) Screen, Urine neg     THC Screen, Urine neg     Tramadol Scrn, Ur neg     Tricyclic Antidepressants, Urine neg        Assessment:    ICD-10-CM    1. Annual physical exam  Z00.00    2. Essential hypertension, benign  I10 triamterene-hydroCHLOROthiazide (MAXZIDE-25) 37.5-25 MG per tablet     metoprolol tartrate (LOPRESSOR) 50 MG tablet   3. Mixed hyperlipidemia  E78.2    4. Elevated serum creatinine  I06.42 Basic Metabolic Panel   5. Other fatigue  R53.83 TSH WITH REFLEX TO FT4   6. Iron deficiency anemia secondary to inadequate dietary iron intake  D50.8    7.  Encounter for screening mammogram for malignant neoplasm of breast  Z12.31 SERAFIN DIGITAL SCREEN W OR WO CAD BILATERAL   8. Screening for diabetes mellitus  Z13.1 Hemoglobin A1C   9. Medication management  Z79.899 POCT Rapid Drug Screen   10. Class 1 obesity due to excess calories with serious comorbidity and body mass index (BMI) of 33.0 to 33.9 in adult  E66.09     Z68.33        Plan:  Shaaron Dakins was seen today for annual exam, hyperlipidemia and hypertension. Diagnoses and all orders for this visit:    Annual physical exam    Essential hypertension, benign  -     triamterene-hydroCHLOROthiazide (MAXZIDE-25) 37.5-25 MG per tablet; Take 1 tablet by mouth daily TAKE 2 TABLETS BY MOUTH ONE TIME DAILY. DO NOT RESUME IF BP LESS THAN 100/70  -     metoprolol tartrate (LOPRESSOR) 50 MG tablet; Take 1 tablet by mouth 2 times daily TAKE 1 TABLET BY MOUTH 2 TIMES A DAY    Mixed hyperlipidemia    Elevated serum creatinine  -     Basic Metabolic Panel; Future    Other fatigue  -     TSH WITH REFLEX TO FT4; Future    Iron deficiency anemia secondary to inadequate dietary iron intake    Encounter for screening mammogram for malignant neoplasm of breast  -     SERAFIN DIGITAL SCREEN W OR WO CAD BILATERAL; Future    Screening for diabetes mellitus  -     Hemoglobin A1C; Future    Medication management  -     POCT Rapid Drug Screen    Class 1 obesity due to excess calories with serious comorbidity and body mass index (BMI) of 33.0 to 33.9 in adult    1. Health Maintenance reviewed - patient asked to schedule her mammogram (advised of need to have breast exam in addition to mammogram), HbA1C ordered, Dexa up to date , Patient declined colon cancer screening but patient may consider cologuard in the future, PAP smear not clinically indicated due to previous hysterectomy without history of abnormal pap smears. 2. Unable to perform breast exam today due to patient discomfort related to back pain and recent surgery. Will reschedule at later date to perform in office. 3. The current medical regimen is effective.  Continue present plan and medications. UDS and controlled substance contract updated today. No unusual filling on current [facility] report. Tx continues to be medically necessary and improves patient quality of life. No signs of misuse of controlled medication. 4. Return in about 4 months (around 7/30/2021) for HTN, high cholesterol. Over 50% of the total visit time of 30 minutes was spent on counseling and/or coordination of care of:   1. Annual physical exam    2. Essential hypertension, benign    3. Mixed hyperlipidemia    4. Elevated serum creatinine    5. Other fatigue    6. Iron deficiency anemia secondary to inadequate dietary iron intake    7. Encounter for screening mammogram for malignant neoplasm of breast    8. Screening for diabetes mellitus    9. Medication management    10. Class 1 obesity due to excess calories with serious comorbidity and body mass index (BMI) of 33.0 to 33.9 in adult         Orders Placed This Encounter   Procedures    SERAFIN DIGITAL SCREEN W OR WO CAD BILATERAL     Standing Status:   Future     Standing Expiration Date:   5/31/2022     Order Specific Question:   Reason for exam:     Answer:   routine breast cancer screening    Basic Metabolic Panel     Standing Status:   Future     Number of Occurrences:   1     Standing Expiration Date:   3/30/2022    Hemoglobin A1C     Standing Status:   Future     Number of Occurrences:   1     Standing Expiration Date:   3/30/2022    TSH WITH REFLEX TO FT4     Standing Status:   Future     Number of Occurrences:   1     Standing Expiration Date:   3/30/2022    POCT Rapid Drug Screen     Orders Placed This Encounter   Medications    triamterene-hydroCHLOROthiazide (MAXZIDE-25) 37.5-25 MG per tablet     Sig: Take 1 tablet by mouth daily TAKE 2 TABLETS BY MOUTH ONE TIME DAILY.  DO NOT RESUME IF BP LESS THAN 100/70     Dispense:  180 tablet     Refill:  3    metoprolol tartrate (LOPRESSOR) 50 MG tablet     Sig: Take 1 tablet by mouth 2 times daily TAKE 1 TABLET BY MOUTH strategies to manage stress, like deep breathing and mindfulness, and stay connected with your family and community. If you find you often feel sad or hopeless, talk with your doctor. Treatment can help. · Talk to your doctor about whether you have any risk factors for sexually transmitted infections (STIs). You can help prevent STIs if you wait to have sex with a new partner (or partners) until you've each been tested for STIs. It also helps if you use condoms (male or female condoms) and if you limit your sex partners to one person who only has sex with you. Vaccines are available for some STIs. · If you think you may have a problem with alcohol or drug use, talk to your doctor. This includes prescription medicines (such as amphetamines and opioids) and illegal drugs (such as cocaine and methamphetamine). Your doctor can help you figure out what type of treatment is best for you. · Protect your skin from too much sun. When you're outdoors from 10 a.m. to 4 p.m., stay in the shade or cover up with clothing and a hat with a wide brim. Wear sunglasses that block UV rays. Even when it's cloudy, put broad-spectrum sunscreen (SPF 30 or higher) on any exposed skin. · See a dentist one or two times a year for checkups and to have your teeth cleaned. · Wear a seat belt in the car. When should you call for help? Watch closely for changes in your health, and be sure to contact your doctor if you have any problems or symptoms that concern you. Where can you learn more? Go to https://Asure Softwarejose.[email or web address]. org and sign in to your howsimple account. Enter U670 in the [employer] box to learn more about \"Well Visit, Women 50 to 72: Care Instructions. \"     If you do not have an account, please click on the \"Sign Up Now\" link. Current as of: May 27, 2020               Content Version: 12.8  © 9269-0570 Healthwise, Incorporated. Care instructions adapted under license by [facility] ([facility]).  If you have meat and vegetables or grains together. The iron in meat helps your body absorb the iron in other foods. Where can you learn more? Go to https://chpepiceweb.[email or web address] org and sign in to your [employer] account. Enter [id number] in the [facility] box to learn more about \"Iron-Rich Diet: Care Instructions. \"     If you do not have an account, please click on the \"Sign Up Now\" link. Current as of: December 17, 2020               Content Version: 12.8  © 4947-8415 [employer]. Care instructions adapted under license by [facility] ([facility]). If you have questions about a medical condition or this instruction, always ask your healthcare professional. [address] any warranty or liability for your use of this information. Patient Education        Back Care and Preventing Injuries: Care Instructions  Your Care Instructions     You can hurt your back doing many everyday activities: lifting a heavy box, bending down to garden, exercising at the gym, and even getting out of bed. But you can keep your back strong and healthy by doing some exercises. You also can follow a few tips for sitting, sleeping, and lifting to avoid hurting your back again. Talk to your doctor before you start an exercise program. Ask for help if you want to learn more about keeping your back healthy. Follow-up care is a key part of your treatment and safety. Be sure to make and go to all appointments, and call your doctor if you are having problems. It's also a good idea to know your test results and keep a list of the medicines you take. How can you care for yourself at home? · Stay at a healthy weight to avoid strain on your lower back. · Do not smoke. Smoking increases the risk of osteoporosis, which weakens the spine. If you need help quitting, talk to your doctor about stop-smoking programs and medicines. These can increase your chances of quitting for good.   · Make sure you sleep in a position that maintains your back's normal curves and on a mattress that feels comfortable. Sleep on your side with a pillow between your knees, or sleep on your back with a pillow under your knees. These positions can reduce strain on your back. · When you get out of bed, lie on your side and bend both knees. Drop your feet over the edge of the bed as you push up with both arms. Scoot to the edge of the bed. Make sure your feet are in line with your rear end (buttocks), and then stand up. · If you must stand for a long time, put one foot on a stool, ledge, or box. Exercise to strengthen your back and other muscles  · Get at least 30 minutes of exercise on most days of the week. Walking is a good choice. You also may want to do other activities, such as running, swimming, cycling, or playing tennis or team sports. · Stretch your back muscles. Here are few exercises to try:  ? Lie on your back with your knees bent and your feet flat on the floor. Gently pull one bent knee to your chest. Put that foot back on the floor, and then pull the other knee to your chest. Hold for 15 to 30 seconds. Repeat 2 to 4 times. ? Do pelvic tilts. Lie on your back with your knees bent. Tighten your stomach muscles. Pull your belly button (navel) in and up toward your ribs. You should feel like your back is pressing to the floor and your hips and pelvis are slightly lifting off the floor. Hold for 6 seconds while breathing smoothly. · Keep your core muscles strong. The muscles of your back, belly (abdomen), and buttocks support your spine. ? Pull in your belly, and imagine pulling your navel toward your spine. Hold this for 6 seconds, then relax. Remember to keep breathing normally as you tense your muscles. ? Do curl-ups. Always do them with your knees bent. Keep your low back on the floor, and curl your shoulders toward your knees using a smooth, slow motion.  Keep your arms folded across your chest. If this bothers your neck, try putting your hands behind your neck (not your head), with your elbows spread apart. ? Lie on your back with your knees bent and your feet flat on the floor. Tighten your belly muscles, and then push with your feet and raise your buttocks up a few inches. Hold this position 6 seconds as you continue to breathe normally, then lower yourself slowly to the floor. Repeat 8 to 12 times. ? If you like group exercise, try Pilates or yoga. These classes have poses that strengthen the core muscles. Protect your back when you sit  · Place a small pillow, a rolled-up towel, or a lumbar roll in the curve of your back if you need extra support. · Sit in a chair that is low enough to let you place both feet flat on the floor with both knees nearly level with your hips. If your chair or desk is too high, use a foot rest to raise your knees. · When driving, keep your knees nearly level with your hips. Sit straight, and drive with both hands on the steering wheel. Your arms should be in a slightly bent position. · Try a kneeling chair, which helps tilt your hips forward. This takes pressure off your lower back. · Try sitting on an exercise ball. It can rock from side to side, which helps keep your back loose. Lift properly  · Squat down, bending at the hips and knees only. If you need to, put one knee to the floor and extend your other knee in front of you, bent at a right angle (half kneeling). · Press your chest straight forward. This helps keep your upper back straight while keeping a slight arch in your low back. · Hold the load as close to your body as possible, at the level of your navel. · Use your feet to change direction, taking small steps. · Lead with your hips as you change direction. Keep your shoulders in line with your hips as you move. Do not twist your body. · Set down your load carefully, squatting with your knees and hips only. When should you call for help?   Watch closely for changes in your health, and be sure to contact your doctor if you have any problems. Where can you learn more? Go to https://chpepiceweb.[email or web address] org and sign in to your [employer] account. Enter S810 in the [facility] box to learn more about \"Back Care and Preventing Injuries: Care Instructions. \"     If you do not have an account, please click on the \"Sign Up Now\" link. Current as of: November 16, 2020               Content Version: 12.8  © 2006-2021 [employer]. Care instructions adapted under license by [facility] ([facility]). If you have questions about a medical condition or this instruction, always ask your healthcare professional. [address] any warranty or liability for your use of this information. Patient Education        Learning About High Cholesterol  What is high cholesterol? High cholesterol means that you have too much cholesterol in your blood. Cholesterol is a type of fat. It's needed for many body functions, such as making new cells. Cholesterol is made by your body. It also comes from food you eat. Having high cholesterol can lead to the buildup of plaque in artery walls. This can increase your risk of heart disease and stroke. When your doctor talks about high cholesterol levels, he or she is talking about your total cholesterol and LDL cholesterol (the \"bad\" cholesterol) levels. Your doctor may also speak about HDL (the \"good\" cholesterol) levels. High HDL is linked with a lower risk for heart disease, heart attack, and stroke. Your cholesterol levels help your doctor find out your risk for having a heart attack or stroke. How can you prevent high cholesterol? A heart-healthy lifestyle can help you prevent high cholesterol. This lifestyle helps lower your risk for a heart attack and stroke. · Eat heart-healthy foods.   ? Eat fruits, vegetables, whole grains (like oatmeal), dried beans and peas, nuts and seeds, soy products (like tofu), and fat-free or low-fat dairy products. ? Replace butter, margarine, and hydrogenated or partially hydrogenated oils with olive and canola oils. (Canola oil margarine without trans fat is fine.)  ? Replace red meat with fish, poultry, and soy protein (like tofu). ? Limit processed and packaged foods like chips, crackers, and cookies. · Be active. Exercise can improve your cholesterol level. Get at least 30 minutes of exercise on most days of the week. Walking is a good choice. You also may want to do other activities, such as running, swimming, cycling, or playing tennis or team sports. · Stay at a healthy weight. Lose weight if you need to. · Don't smoke. If you need help quitting, talk to your doctor about stop-smoking programs and medicines. These can increase your chances of quitting for good. How is high cholesterol treated? The goal of treatment is to reduce your chances of having a heart attack or stroke. The goal is not to lower your cholesterol numbers only. · You may make lifestyle changes, such as eating healthy foods, not smoking, losing weight, and being more active. · You may have to take medicine. Follow-up care is a key part of your treatment and safety. Be sure to make and go to all appointments, and call your doctor if you are having problems. It's also a good idea to know your test results and keep a list of the medicines you take. Where can you learn more? Go to https://[employer]hawk Corporationpesarahewkristopher.MobSmith. org and sign in to your [employer] account. Enter W391 in the [facility] box to learn more about \"Learning About High Cholesterol. \"     If you do not have an account, please click on the \"Sign Up Now\" link. Current as of: August 31, 2020               Content Version: 12.8  © 7837-4783 Healthwise, [employer]. Care instructions adapted under license by [facility] ([facility]).  If you have questions about a medical condition or this instruction, always ask your healthcare professional. best.  · Ask your doctor about other health professionals who can help you achieve your weight loss goals. ? A dietitian can help you make healthy changes in your diet. ? An exercise specialist or  can help you develop a safe and effective exercise program.  ? A counselor or psychiatrist can help you cope with issues such as depression, anxiety, or family problems that can make it hard to focus on weight loss. · Consider joining a support group for people who are trying to lose weight. Your doctor can suggest groups in your area. Where can you learn more? Go to https://EmerGeo Solutionspepiceweb.[employer]. org and sign in to your [employer] account. Enter C213 in the [employer] box to learn more about \"Starting a Weight Loss Plan: Care Instructions. \"     If you do not have an account, please click on the \"Sign Up Now\" link. Current as of: September 23, 2020               Content Version: 12.8  © 7830-6580 [employer]. Care instructions adapted under license by [facility] ([facility]). If you have questions about a medical condition or this instruction, always ask your healthcare professional. [place] [address] any warranty or liability for your use of this information. Patient voices understanding and agrees to plans along with risks and benefits of plan. Counseling:  Jayce Hutchinson YJO-ANN'E case, medications and options were discussed in detail. patient was instructed to call the office if she   questions regarding her treatment. Should her conditions worsen, she should return to office to be reassessed by Dr. Claudio Haywood. she  Should to go the closest Emergency Department for any emergency. They verbalized understanding the above instructions.

## 2021-04-07 DIAGNOSIS — R79.89 ELEVATED SERUM CREATININE: ICD-10-CM

## 2021-04-07 DIAGNOSIS — R53.83 OTHER FATIGUE: ICD-10-CM

## 2021-04-07 DIAGNOSIS — Z13.1 SCREENING FOR DIABETES MELLITUS: ICD-10-CM

## 2021-04-07 LAB
ANION GAP SERPL CALCULATED.3IONS-SCNC: 14 MMOL/L (ref 7–19)
BUN BLDV-MCNC: 18 MG/DL (ref 8–23)
CALCIUM SERPL-MCNC: 9.8 MG/DL (ref 8.8–10.2)
CHLORIDE BLD-SCNC: 100 MMOL/L (ref 98–111)
CO2: 28 MMOL/L (ref 22–29)
CREAT SERPL-MCNC: 1 MG/DL (ref 0.5–0.9)
GFR AFRICAN AMERICAN: >59
GFR NON-AFRICAN AMERICAN: 56
GLUCOSE BLD-MCNC: 108 MG/DL (ref 74–109)
HBA1C MFR BLD: 6.2 % (ref 4–6)
POTASSIUM SERPL-SCNC: 4 MMOL/L (ref 3.5–5)
SODIUM BLD-SCNC: 142 MMOL/L (ref 136–145)
TSH REFLEX FT4: 2.09 UIU/ML (ref 0.35–5.5)

## 2021-04-16 DIAGNOSIS — R73.9 HYPERGLYCEMIA: ICD-10-CM

## 2021-04-16 DIAGNOSIS — I10 ESSENTIAL HYPERTENSION, BENIGN: ICD-10-CM

## 2021-04-16 DIAGNOSIS — D50.8 IRON DEFICIENCY ANEMIA SECONDARY TO INADEQUATE DIETARY IRON INTAKE: ICD-10-CM

## 2021-04-16 DIAGNOSIS — E78.2 MIXED HYPERLIPIDEMIA: ICD-10-CM

## 2021-04-16 DIAGNOSIS — R79.89 ELEVATED SERUM CREATININE: Primary | ICD-10-CM

## 2021-04-18 RX ORDER — METOPROLOL TARTRATE 50 MG/1
50 TABLET, FILM COATED ORAL 2 TIMES DAILY
Qty: 60 TABLET | Refills: 5 | Status: SHIPPED
Start: 2021-04-18 | End: 2021-10-19 | Stop reason: SDUPTHER

## 2021-04-30 ENCOUNTER — TELEPHONE (OUTPATIENT)
Dept: FAMILY MEDICINE CLINIC | Age: 61
End: 2021-04-30

## 2021-04-30 ENCOUNTER — TELEPHONE (OUTPATIENT)
Dept: NEUROSURGERY | Age: 61
End: 2021-04-30

## 2021-04-30 DIAGNOSIS — G89.29 CHRONIC MIDLINE LOW BACK PAIN WITH BILATERAL SCIATICA: Primary | ICD-10-CM

## 2021-04-30 DIAGNOSIS — M54.42 CHRONIC MIDLINE LOW BACK PAIN WITH BILATERAL SCIATICA: Primary | ICD-10-CM

## 2021-04-30 DIAGNOSIS — M51.36 DDD (DEGENERATIVE DISC DISEASE), LUMBAR: ICD-10-CM

## 2021-04-30 DIAGNOSIS — M54.41 CHRONIC MIDLINE LOW BACK PAIN WITH BILATERAL SCIATICA: Primary | ICD-10-CM

## 2021-04-30 RX ORDER — GABAPENTIN 100 MG/1
CAPSULE ORAL
Qty: 100 CAPSULE | Refills: 0 | Status: SHIPPED | OUTPATIENT
Start: 2021-04-30 | End: 2021-09-07

## 2021-04-30 NOTE — TELEPHONE ENCOUNTER
I spoke with Dr. Terrell Ulrich nurse, she said Dr. Ata Leon is out of the office until Monday. His nurse spoke with Deborrah Neftaly and she said it was fine to start Miss Para Foots on gabapentin.

## 2021-04-30 NOTE — TELEPHONE ENCOUNTER
Miss Kelsey Schumacher called and said she has been taking tylenol for her back pain and it isn't helping. Miss eKlsey Schumacher said Dr. Bj Lemons was going to consult with Dr. Fior Swanson about her medication for pain and was checking on that .  Please Advise

## 2021-04-30 NOTE — TELEPHONE ENCOUNTER
Noah Jiang from [facility] office called wanting to know if it was ok to start this patient on Gabapentin for her pain. I called and spoke with Nathan Dandy and she voiced that this was fine. Called Noah Jiang back and let her know, she voiced ok thank you.

## 2021-04-30 NOTE — TELEPHONE ENCOUNTER
She had been taking hydrocodone/APAP for post op pain related to her back surgery with Dr Medrano but I wondered if she might benefit from starting gabapentin to help with pain control also. I do not mind to prescribe it if Dr Medrano is ok with that given he has been writing for her controlled pain medication. Can you call his office and have his nurse ask him?

## 2021-09-07 ENCOUNTER — OFFICE VISIT (OUTPATIENT)
Dept: FAMILY MEDICINE CLINIC | Age: 61
End: 2021-09-07

## 2021-09-07 VITALS
OXYGEN SATURATION: 98 % | WEIGHT: 226.38 LBS | TEMPERATURE: 97.6 F | HEIGHT: 69 IN | DIASTOLIC BLOOD PRESSURE: 74 MMHG | BODY MASS INDEX: 33.53 KG/M2 | HEART RATE: 82 BPM | SYSTOLIC BLOOD PRESSURE: 122 MMHG

## 2021-09-07 DIAGNOSIS — E66.09 CLASS 1 OBESITY DUE TO EXCESS CALORIES WITH SERIOUS COMORBIDITY AND BODY MASS INDEX (BMI) OF 33.0 TO 33.9 IN ADULT: ICD-10-CM

## 2021-09-07 DIAGNOSIS — M54.16 LUMBAR RADICULOPATHY: ICD-10-CM

## 2021-09-07 DIAGNOSIS — D50.8 OTHER IRON DEFICIENCY ANEMIA: ICD-10-CM

## 2021-09-07 DIAGNOSIS — M54.40 CHRONIC BILATERAL LOW BACK PAIN WITH SCIATICA, SCIATICA LATERALITY UNSPECIFIED: ICD-10-CM

## 2021-09-07 DIAGNOSIS — E78.2 MIXED HYPERLIPIDEMIA: ICD-10-CM

## 2021-09-07 DIAGNOSIS — I10 ESSENTIAL HYPERTENSION, BENIGN: Primary | ICD-10-CM

## 2021-09-07 DIAGNOSIS — E87.6 HYPOKALEMIA: ICD-10-CM

## 2021-09-07 DIAGNOSIS — N18.31 STAGE 3A CHRONIC KIDNEY DISEASE (HCC): ICD-10-CM

## 2021-09-07 DIAGNOSIS — R73.9 HYPERGLYCEMIA: ICD-10-CM

## 2021-09-07 DIAGNOSIS — M62.830 MUSCLE SPASM OF BACK: ICD-10-CM

## 2021-09-07 DIAGNOSIS — Z12.11 COLON CANCER SCREENING: ICD-10-CM

## 2021-09-07 DIAGNOSIS — G89.29 CHRONIC BILATERAL LOW BACK PAIN WITH SCIATICA, SCIATICA LATERALITY UNSPECIFIED: ICD-10-CM

## 2021-09-07 PROCEDURE — 99214 OFFICE O/P EST MOD 30 MIN: CPT | Performed by: INTERNAL MEDICINE

## 2021-09-07 RX ORDER — TIZANIDINE 4 MG/1
TABLET ORAL
Qty: 45 TABLET | Refills: 2 | Status: SHIPPED | OUTPATIENT
Start: 2021-09-07 | End: 2022-01-10 | Stop reason: SINTOL

## 2021-09-07 ASSESSMENT — ENCOUNTER SYMPTOMS
COUGH: 0
SHORTNESS OF BREATH: 0
RHINORRHEA: 0
WHEEZING: 0
CHEST TIGHTNESS: 0
BLOOD IN STOOL: 0
BACK PAIN: 1
COLOR CHANGE: 0
DIARRHEA: 0
VOMITING: 0
ABDOMINAL PAIN: 0
VOICE CHANGE: 0
EYE DISCHARGE: 0
SORE THROAT: 0
SINUS PRESSURE: 0
EYE PAIN: 0
EYE REDNESS: 0

## 2021-09-07 ASSESSMENT — VISUAL ACUITY: OU: 1

## 2021-09-07 NOTE — PROGRESS NOTES
Krunal Ambrosio is a 61 y.o. female who presents today for   Chief Complaint   Patient presents with    Hypertension    Hyperlipidemia       Hypertension  Pertinent negatives include no chest pain, headaches, neck pain, palpitations or shortness of breath. Hyperlipidemia  Associated symptoms include myalgias. Pertinent negatives include no chest pain or shortness of breath. 60 y/o female here for follow up on HTN, mixed hyperlipidemia, IBS with controlled med refill, and obesity. She has been working on dietary changes and slowly increasing her water intake along with trying to walk a little more/as much as tolerated given her back pain. 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 She is not sure how much the gabapentin helped with her pain however. She has more spasms in her back however but is not on a muscle relaxer currently. Patient says when the spasms hit they are \"crippling\" and it is hard to move and hard to sleep. She had back surgery on lumbar spine in December but has had peristent pain with RLE numbness affecting balance on right side which did not improve much with surgery. She has to wear a lumbar brace and use a cane when she ambulates. She had an EMG earlier this year which showed a mild sensory polyneuropathy RLE and L4-L5 radiculopathy RLE. Hypertension  Patient is here for follow-up of elevated blood pressure. She is not exercising and is adherent to a low-salt diet. Patient is checking blood pressure at home. Blood pressure is controlled. Cardiac symptoms: none. Patient denies chest pain, dyspnea, irregular heart beat, orthopnea, paroxysmal nocturnal dyspnea and syncope.  Use of agents associated with hypertension: estrogens. History of target organ damage: elevated creatinine level recently. Naomy Olmos is here for follow up of dyslipidemia. Compliance with treatment has been good. The patient exercises rarely. Patient denies muscle pain associated with their medications which include simvastatin. BMI Readings from Last 3 Encounters:   09/07/21 33.92 kg/m²   03/30/21 33.91 kg/m²   03/09/21 28.74 kg/m²     Wt Readings from Last 3 Encounters:   09/07/21 226 lb 6 oz (102.7 kg)   03/30/21 223 lb (101.2 kg)   03/09/21 189 lb (85.7 kg)         Review of Systems   Constitutional: Positive for fatigue. Negative for appetite change, chills and fever. HENT: Negative for ear pain, rhinorrhea, sinus pressure, sore throat and voice change. Eyes: Negative for pain, discharge and redness. Respiratory: Negative for cough, chest tightness, shortness of breath and wheezing. Cardiovascular: Negative for chest pain and palpitations. Gastrointestinal: Negative for abdominal pain, blood in stool, diarrhea and vomiting.        +history of IBS which is well controlled   Endocrine: Negative for cold intolerance, heat intolerance and polydipsia. Genitourinary: Negative for dysuria and hematuria. Musculoskeletal: Positive for arthralgias, back pain, gait problem and myalgias. Negative for neck pain and neck stiffness. See HPI   Skin: Negative for color change and rash. Neurological: Negative for dizziness, tremors, syncope, speech difficulty, weakness, numbness and headaches. Hematological: Negative for adenopathy. Does not bruise/bleed easily. Psychiatric/Behavioral: Negative for confusion, dysphoric mood and sleep disturbance. The patient is not nervous/anxious. All other systems reviewed and are negative.       Past Medical History:   Diagnosis Date    Anal fissure     Anxiety 9/15/2017    C. difficile colitis     Cervical radiculopathy 9/15/2017    Chronic back pain     Chronic kidney disease     Class 1 obesity due to excess calories with serious comorbidity and body mass index (BMI) of 32.0 to 32.9 in adult 9/15/2017    Clostridium difficile colitis 2/16/2020    Cyst of kidney, acquired 9/15/2017    Depression 9/15/2017    History of oral surgery     Hyperlipidemia     Hypertension     IBS (irritable bowel syndrome) 9/15/2017    Irregular heartbeat     Obesity 9/15/2017    Osteoarthritis     PONV (postoperative nausea and vomiting)     Retinal detachment        Current Outpatient Medications   Medication Sig Dispense Refill    tiZANidine (ZANAFLEX) 4 MG tablet 2-4 mg every 8 hours as needed for muscle spasms in back 45 tablet 2    metoprolol tartrate (LOPRESSOR) 50 MG tablet Take 1 tablet by mouth 2 times daily TAKE 1 TABLET BY MOUTH 2 TIMES A DAY 60 tablet 5    triamterene-hydroCHLOROthiazide (MAXZIDE-25) 37.5-25 MG per tablet Take 1 tablet by mouth daily TAKE 2 TABLETS BY MOUTH ONE TIME DAILY. DO NOT RESUME IF BP LESS THAN 100/70 180 tablet 3    chlordiazePOXIDE-clidinium (LIBRAX) 5-2.5 MG per capsule TAKE TWO CAPSULES EVERY MORNING AND TAKE 1 TO 2 CAPSULE BY MOUTH AT BEDTIME 360 capsule 1    simvastatin (ZOCOR) 40 MG tablet TAKE 1 TABLET BY MOUTH NIGHTLY 90 tablet 3    dilTIAZem (CARDIZEM CD) 240 MG extended release capsule TAKE 1 CAPSULE BY MOUTH ONE TIME DAILY 90 capsule 3    ondansetron (ZOFRAN) 4 MG tablet Take 1 tablet by mouth every 8 hours as needed for Nausea or Vomiting 30 tablet 1    Probiotic Product (PROBIOTIC-10 PO) Take 1 capsule by mouth daily      Multiple Vitamins-Minerals (WOMENS MULTIVITAMIN PO) Take 1 tablet by mouth daily      calcium-vitamin D (OSCAL-500) 500-200 MG-UNIT per tablet Take 1 tablet by mouth daily      Estradiol-Estriol-Progesterone (BIEST/PROGESTERONE TD) Take 1 capsule by mouth nightly       No current facility-administered medications for this visit.        Allergies   Allergen Reactions    Latex Rash    Amoxicillin Shortness Of Breath and Nausea And Vomiting    Anaprox [Naproxen Sodium] Shortness Of Breath and Nausea And Vomiting    Aloe Vera      Other reaction(s): Unknown    Nortriptyline Hcl      Doesn't remember      Augmentin [Amoxicillin-Pot Clavulanate] Nausea And Vomiting           Buspar [Buspirone] Nausea And Vomiting    Vibramycin [Doxycycline Calcium] Nausea And Vomiting       Past Surgical History:   Procedure Laterality Date    APPENDECTOMY       SECTION      EYE SURGERY      HYSTERECTOMY      HYSTERECTOMY, TOTAL ABDOMINAL      LUMBAR FUSION N/A 2020    TLIF OF L4/5. L5-S1 performed by Maribell Jha DO at [address] [place] [address] RETINAL DETACHMENT SURGERY         Social History     Tobacco Use    Smoking status: Never Smoker    Smokeless tobacco: Never Used   Vaping Use    Vaping Use: Never used   Substance Use Topics    Alcohol use: No    Drug use: No       Family History   Problem Relation Age of Onset    Diabetes Mother     High Blood Pressure Mother     Heart Attack Mother     Kidney Disease Father     Stroke Father     Other Father         renal failure    Diabetes Maternal Grandmother     Colon Cancer Maternal Grandmother     Heart Disease Maternal Grandfather     Cancer Paternal Grandmother         colon cancer    Alcohol Abuse Sister     Cirrhosis Sister     Stroke Paternal Aunt     Colon Cancer Maternal Cousin        /74   Pulse 82   Temp 97.6 °F (36.4 °C)   Ht 5' 8.5\" (1.74 m)   Wt 226 lb 6 oz (102.7 kg)   SpO2 98%   BMI 33.92 kg/m²     Physical Exam  Vitals and nursing note reviewed. Constitutional:       General: She is not in acute distress. Appearance: Normal appearance. She is well-developed and well-groomed. She is obese. She is not ill-appearing, toxic-appearing or diaphoretic. Comments: Appears tired and uncomfortable due to back pain   HENT:      Head: Normocephalic and atraumatic.       Right Ear: Tympanic membrane, ear canal and external ear normal.      Left Ear: Tympanic membrane, ear canal and external ear normal.      Nose: Nose normal.      Mouth/Throat:      Lips: Pink. Mouth: Mucous membranes are moist. No oral lesions. Tongue: No lesions. Palate: No mass and lesions. Pharynx: Oropharynx is clear. Uvula midline. No pharyngeal swelling, oropharyngeal exudate, posterior oropharyngeal erythema or uvula swelling. Tonsils: 1+ on the right. 1+ on the left. Eyes:      General: Lids are normal. Vision grossly intact. No scleral icterus. Extraocular Movements: Extraocular movements intact. Conjunctiva/sclera: Conjunctivae normal.      Pupils: Pupils are equal, round, and reactive to light. Neck:      Thyroid: No thyroid mass or thyromegaly. Vascular: No carotid bruit or JVD. Trachea: Trachea and phonation normal.   Cardiovascular:      Rate and Rhythm: Normal rate and regular rhythm. Pulses: Normal pulses. Radial pulses are 2+ on the right side and 2+ on the left side. Posterior tibial pulses are 2+ on the right side and 2+ on the left side. Heart sounds: Normal heart sounds. No murmur heard. No friction rub. No gallop. Pulmonary:      Effort: Pulmonary effort is normal. No accessory muscle usage. Breath sounds: Normal breath sounds. No decreased breath sounds, wheezing, rhonchi or rales. Abdominal:      General: Abdomen is flat. Bowel sounds are normal. There is no distension. Palpations: Abdomen is soft. There is no hepatomegaly, splenomegaly or mass. Tenderness: There is no abdominal tenderness. There is no guarding or rebound. Hernia: No hernia is present. Musculoskeletal:      Right wrist: Normal.      Left wrist: Normal.      Cervical back: Normal range of motion and neck supple. Thoracic back: Decreased range of motion. Lumbar back: Tenderness present. Decreased range of motion. Right lower leg: No edema. Left lower leg: No edema.       Right ankle: Normal.      Left ankle: Normal.      Comments: LSO brace in place   Lymphadenopathy:      Head:      Right side of head: No submandibular adenopathy. Left side of head: No submandibular adenopathy. Cervical: No cervical adenopathy. Right cervical: No superficial, deep or posterior cervical adenopathy. Left cervical: No superficial, deep or posterior cervical adenopathy. Upper Body:      Right upper body: No supraclavicular adenopathy. Left upper body: No supraclavicular adenopathy. Lower Body: No right inguinal adenopathy. No left inguinal adenopathy. Skin:     General: Skin is warm and dry. Capillary Refill: Capillary refill takes less than 2 seconds. Coloration: Skin is not cyanotic. Findings: No rash. Nails: There is no clubbing. Neurological:      Mental Status: She is alert and oriented to person, place, and time. Cranial Nerves: No cranial nerve deficit, dysarthria or facial asymmetry. Sensory: Sensation is intact. Motor: Motor function is intact. No weakness, tremor, atrophy or abnormal muscle tone. Coordination: Coordination is intact. Romberg sign negative. Coordination normal.      Gait: Gait is intact. Deep Tendon Reflexes: Reflexes are normal and symmetric. Reflex Scores:       Brachioradialis reflexes are 2+ on the right side and 2+ on the left side. Patellar reflexes are 2+ on the right side and 2+ on the left side. Comments: CN II-XII grossly intact, speech clear, MAEW, no focal deficits   Psychiatric:         Attention and Perception: Attention and perception normal.         Mood and Affect: Mood and affect normal.         Speech: Speech normal.         Behavior: Behavior normal. Behavior is cooperative. Thought Content:  Thought content normal.         Cognition and Memory: Cognition and memory normal.         Judgment: Judgment normal.         Lab Results   Component Value Date    WBC 10.1 09/07/2021    HGB 12.8 09/07/2021    HCT 41.2 09/07/2021    MCV 85.3 09/07/2021     09/07/2021    LABLYMP 3.35 02/22/2014    LYMPHOPCT 31.7 09/07/2021    RBC 4.83 09/07/2021    MCH 26.5 (L) 09/07/2021    MCHC 31.1 (L) 09/07/2021    RDW 13.9 09/07/2021     Lab Results   Component Value Date    IRON 100 09/07/2021    TIBC 338 09/07/2021     Lab Results   Component Value Date     09/07/2021    K 3.9 09/07/2021    CL 98 09/07/2021    CO2 26 09/07/2021    BUN 21 09/07/2021    CREATININE 1.0 (H) 09/07/2021    GLUCOSE 116 (H) 09/07/2021    CALCIUM 10.1 09/07/2021    PROT 7.2 09/07/2021    LABALBU 4.4 09/07/2021    BILITOT 0.3 09/07/2021    ALKPHOS 73 09/07/2021    AST 24 09/07/2021    ALT 21 09/07/2021    LABGLOM 56 (A) 09/07/2021    GFRAA >59 09/07/2021     Lab Results   Component Value Date    LABA1C 5.8 09/07/2021     No results found for: EAG  Lab Results   Component Value Date    TSH 1.32 02/22/2014    T4FREE 1.2 11/27/2017     Lab Results   Component Value Date    CHOL 179 09/07/2021    CHOL 187 03/04/2021    CHOL 158 (L) 12/12/2019     Lab Results   Component Value Date    TRIG 199 (H) 09/07/2021    TRIG 203 (H) 03/04/2021    TRIG 174 (H) 12/12/2019     Lab Results   Component Value Date    HDL 57 (L) 09/07/2021    HDL 53 (L) 03/04/2021    HDL 57 (L) 12/12/2019     Lab Results   Component Value Date    LDLCALC 82 09/07/2021    LDLCALC 93 03/04/2021    LDLCALC 66 12/12/2019     No results found for: LABVLDL, VLDL  No results found for: CHOLHDLRATIO    No results found for this visit on 09/07/21. Assessment:    ICD-10-CM    1. Essential hypertension, benign  I10    2. Mixed hyperlipidemia  E78.2 Comprehensive Metabolic Panel     Lipid Panel   3. Hyperglycemia  R73.9 Hemoglobin A1C   4. Muscle spasm of back  M62.830 tiZANidine (ZANAFLEX) 4 MG tablet   5. Chronic bilateral low back pain with sciatica, sciatica laterality unspecified  M54.40 tiZANidine (ZANAFLEX) 4 MG tablet    G89.29    6.  Lumbar radiculopathy  M54.16    7. Other iron deficiency anemia  D50.8    8. Hypokalemia  E87.6    9. Stage 3a chronic kidney disease ([facility] [address]  N18.31 External Referral To Nephrology   10. Colon cancer screening  Z12.11    11. Class 1 obesity due to excess calories with serious comorbidity and body mass index (BMI) of 33.0 to 33.9 in adult  E66.09     Z68.33        Plan:  Luz Marina Alexandre was seen today for hypertension and hyperlipidemia. Diagnoses and all orders for this visit:    Essential hypertension, benign    Mixed hyperlipidemia  -     Comprehensive Metabolic Panel; Future  -     Lipid Panel; Future    Hyperglycemia  -     Hemoglobin A1C; Future    Muscle spasm of back  -     tiZANidine (ZANAFLEX) 4 MG tablet; 2-4 mg every 8 hours as needed for muscle spasms in back    Chronic bilateral low back pain with sciatica, sciatica laterality unspecified  -     tiZANidine (ZANAFLEX) 4 MG tablet; 2-4 mg every 8 hours as needed for muscle spasms in back    Lumbar radiculopathy    Other iron deficiency anemia    Hypokalemia    Stage 3a chronic kidney disease ([facility] [address]  -     External Referral To Nephrology    Colon cancer screening    Class 1 obesity due to excess calories with serious comorbidity and body mass index (BMI) of 33.0 to 33.9 in adult    Labs reviewed with patient. Refills Provided. -Hypertension well controlled. Continue current medications. Encouraged efforts at well balanced diet, exercise, and weight loss. -Mixed Hyperlipidemia-improving but triglyceride level not well controlled. Patient Continue low dose simvastatin.  Low cholesterol diet, exercise, and weight loss encouraged.   -Probable new onset CKD IIIa which is stable, referral to Nephrology to further evaluate, handout on medications to avoid  -Chronic LBP with muscle spasms-inadequately controlled but patient does not want to take controlled medications for pain and needs to avoid oral NSAIDs due to CKD so will try starting muscle relaxer and consider pain management referral if not improving if patient agreeable  -Hyperglycemia and Obesity due to excess caloric intake-improved. Continue/Increase efforts at low carbohydrate diet, exercise, and weight loss/efforts at normalizing BMI. -patient agreeable to cologuard for colon cancer screening, declines screening colonoscopy at this time   Return in about 4 months (around 2022) for HTN, high cholesterol, back pain. Over 50% of the total visit time of 30 minutes was spent on counseling and/or coordination of care of:   1. Essential hypertension, benign    2. Mixed hyperlipidemia    3. Hyperglycemia    4. Muscle spasm of back    5. Chronic bilateral low back pain with sciatica, sciatica laterality unspecified    6. Lumbar radiculopathy    7. Other iron deficiency anemia    8. Hypokalemia    9. Stage 3a chronic kidney disease ([facility] [address]    10. Colon cancer screening    11.  Class 1 obesity due to excess calories with serious comorbidity and body mass index (BMI) of 33.0 to 33.9 in adult         Orders Placed This Encounter   Procedures    Comprehensive Metabolic Panel     Standing Status:   Future     Standing Expiration Date:   2022    Lipid Panel     Standing Status:   Future     Standing Expiration Date:   2022     Order Specific Question:   Is Patient Fasting?/# of Hours     Answer:   yes/8 hrs    Hemoglobin A1C     Standing Status:   Future     Standing Expiration Date:   2022    External Referral To Nephrology     Referral Priority:   Routine     Referral Type:   Eval and Treat     Referral Reason:   Specialty Services Required     Requested Specialty:   Nephrology     Number of Visits Requested:   1     Orders Placed This Encounter   Medications    tiZANidine (ZANAFLEX) 4 MG tablet     Si-4 mg every 8 hours as needed for muscle spasms in back     Dispense:  45 tablet     Refill:  2     Medications Discontinued During This Encounter   Medication Reason    gabapentin (NEURONTIN) 100 MG capsule LIST CLEANUP    docusate sodium (COLACE) 100 MG capsule LIST CLEANUP    Estriol POWD LIST CLEANUP     Patient Instructions       Patient Education        Learning About Relief for Back Pain  What is back strain? Back strain is an injury that happens when you overstretch, or pull, a muscle in your back. You may hurt your back in an accident or when you exercise or lift something. Most back pain gets better with rest and time. You can take care of yourself at home to help your back heal.  What can you do first to relieve back pain? When you first feel back pain, try these steps:  · Walk. Take a short walk (10 to 20 minutes) on a level surface (no slopes, hills, or stairs) every 2 to 3 hours. Walk only distances you can manage without pain, especially leg pain. · Relax. Find a comfortable position for rest. Some people are comfortable on the floor or a medium-firm bed with a small pillow under their head and another under their knees. Some people prefer to lie on their side with a pillow between their knees. Don't stay in one position for too long. · Try heat or ice. Try using a heating pad on a low or medium setting, or take a warm shower, for 15 to 20 minutes every 2 to 3 hours. Or you can buy single-use heat wraps that last up to 8 hours. You can also try an ice pack for 10 to 15 minutes every 2 to 3 hours. You can use an ice pack or a bag of frozen vegetables wrapped in a thin towel. There is not strong evidence that either heat or ice will help, but you can try them to see if they help. You may also want to try switching between heat and cold. · Take pain medicine exactly as directed. ? If the doctor gave you a prescription medicine for pain, take it as prescribed. ? If you are not taking a prescription pain medicine, ask your doctor if you can take an over-the-counter medicine. What else can you do? · Stretch and exercise.  Exercises that increase flexibility may relieve your pain and make it easier doctor may also suggest exercise or physical therapy to help improve strength and flexibility in your back muscles. In most cases, getting back to your normal activities is good for your back. Just make sure to avoid doing things that make your pain worse. Follow-up care is a key part of your treatment and safety. Be sure to make and go to all appointments, and call your doctor if you are having problems. It's also a good idea to know your test results and keep a list of the medicines you take. How can you care for yourself at home? Heat, ice, and medicines    · To relieve pain, use heat or ice (whichever feels better) on the affected area. ? Put a warm water bottle, a heating pad set on low, or a warm cloth on your back. Put a thin cloth between the heating pad and your skin. Do not go to sleep with a heating pad on your skin. ? Try ice or a cold pack on the area for 10 to 20 minutes at a time. Put a thin cloth between the ice and your skin.     · For most back pain you can take over-the-counter pain medicine. Nonsteroidal anti-inflammatory drugs (NSAIDs) such as ibuprofen or naproxen seem to work best. But if you can't take NSAIDs you can try acetaminophen. Your doctor can prescribe stronger medicines if needed. Be safe with medicines. Read and follow all instructions on the label. Body positions and posture    · Sit or lie in positions that are most comfortable for you and that reduce pain. Try one of these positions when you lie down:  ? Lie on your back with your knees bent and supported by large pillows. ? Lie on the floor with your legs on the seat of a sofa or chair. ? Lie on your side with your knees and hips bent and a pillow between your legs. ? Lie on your stomach if it does not make pain worse.     · Do not sit up in bed. Avoid soft couches and twisted positions.     · Avoid bed rest after the first day of back pain. Bed rest can help relieve pain at first, but it delays healing.  Continued rest without activity is usually not good for your back.     · If you must sit for long periods of time, take breaks from sitting. Change positions every 30 minutes. Get up and walk around, or lie in a comfortable position. Activity    · Take short walks several times a day. You can start with 5 to 10 minutes, 3 or 4 times a day, and work up to longer walks. Walk on level surfaces and avoid hills and stairs until your back starts to feel better.     · After your back spasm starts to feel better, try to stretch your muscles every day, especially before and after exercise and at bedtime. Regular stretching can help relax your muscles.     · To prevent future back pain, do exercises to stretch and strengthen your back and stomach. Learn to use good posture, safe lifting techniques, and other ways to move to help you avoid back pain. When should you call for help? Call 911 anytime you think you may need emergency care. For example, call if:    · You are unable to move an arm or a leg at all. Call your doctor now or seek immediate medical care if:    · You have new or worse symptoms in your legs, belly, or buttocks. Symptoms may include:  ? Numbness or tingling. ? Weakness. ? Pain.     · You lose bladder or bowel control. Watch closely for changes in your health, and be sure to contact your doctor if:    · You have a fever, lose weight, or don't feel well.     · You do not get better as expected. Where can you learn more? Go to [email or web address] and sign in to your [employer] account. Enter E232 in the [employer] box to learn more about \"Back Spasm: Care Instructions. \"     If you do not have an account, please click on the \"Sign Up Now\" link. Current as of: November 16, 2020               Content Version: 12.9  © 2505-8866 Healthwise, [employer]. Care instructions adapted under license by [facility] ([facility]).  If you have questions about a medical condition or this instruction, always ask your healthcare professional. [place] [address] any warranty or liability for your use of this information. Patient Education        Medicines to Avoid With Kidney Disease: Care Instructions  Your Care Instructions     Kidney disease means that your kidneys are not able to get rid of waste from the blood. So they can't keep your body's fluids and chemicals in balance. Usually, the kidneys get rid of waste from the blood through the urine. And they balance the fluids in the body. When your kidneys don't work as they should, you have to be careful about some medicines. They may harm your kidneys. Your doctor may tell you not to take them. Or he or she may change the dose. Medicines for pain and swelling, such as ibuprofen (Advil or Motrin) or naproxen (Aleve), can cause harm. So can some antibiotics and antacids. And you need to be careful about some drugs that treat cancer, lower blood pressure, or get rid of water from the body. Some herbal products could cause harm too. Follow-up care is a key part of your treatment and safety. Be sure to make and go to all appointments, and call your doctor if you are having problems. It's also a good idea to know your test results and keep a list of the medicines you take. How can you care for yourself at home? · Tell your doctor all the prescription, herbal, or over-the-counter medicines you take. Do not take any new ones unless you talk to your doctor first.  · Do not take anti-inflammatory medicines. These include ibuprofen (Advil, Motrin) and naproxen (Aleve). You can use acetaminophen (Tylenol) for pain. · Do not take two or more pain medicines at the same time unless the doctor told you to. Many pain medicines have acetaminophen, which is Tylenol. Too much acetaminophen (Tylenol) can be harmful. · Tell all doctors and others who work with your health care that you have kidney disease.   · Wear medical alert jewelry that lists your health problem. You can buy this at most drugstores. Where can you learn more? Go to [email or web address] and sign in to your [employer] account. Enter Q662 in the [facility] box to learn more about \"Medicines to Avoid With Kidney Disease: Care Instructions. \"     If you do not have an account, please click on the \"Sign Up Now\" link. Current as of: December 17, 2020               Content Version: 12.9  © 2006-2021 [employer]. Care instructions adapted under license by [facility] ([facility]). If you have questions about a medical condition or this instruction, always ask your healthcare professional. [address] any warranty or liability for your use of this information. Patient Education        Learning About Obesity  What is obesity? Obesity means having an unhealthy amount of body fat. This puts your health in danger. It can lead to other health problems, such as type 2 diabetes and high blood pressure. How do you know if your weight is in the obesity range? To know if your weight is in the obesity range, your doctor looks at your body mass index (BMI) and waist size. BMI is a number that is calculated from your weight and your height. To figure out your BMI for yourself, you can use an online tool, such as http://www.barrios.com/ on the [employer]. If your BMI is 30.0 or higher, it falls within the obesity range. Keep in mind that BMI and waist size are only guides. They are not tools to determine your ideal body weight. What causes obesity? When you take in more calories than you burn off, you gain weight. How you eat, how active you are, and other things affect how your body uses calories and whether you gain weight. If you have family members who have too much body fat, you may have inherited a tendency to gain weight.  And your family also helps form your eating and lifestyle habits, which can lead to obesity. Also, our busy lives make it harder to plan and cook healthy meals. For many of us, it's easier to reach for prepared foods, go out to eat, or go to the drive-through. But these foods are often high in saturated fat and calories. Portions are often too large. What can you do to reach a healthy weight? Focus on health, not diets. Diets are hard to stay on and don't work in the long run. It is very hard to stay with a diet that includes lots of big changes in your eating habits. Instead of a diet, focus on lifestyle changes that will improve your health and achieve the right balance of energy and calories. To lose weight, you need to burn more calories than you take in. You can do it by eating healthy foods in reasonable amounts and becoming more active, even a little bit every day. Making small changes over time can add up to a lot. Make a plan for change. Many people have found that naming their reasons for change and staying focused on their plan can make a big difference. Work with your doctor to create a plan that is right for you. · Ask yourself: Tiffanie Harrington are my personal, most powerful reasons for wanting this change? What will my life look like when I've made the change? \"  · Set your long-term goal. Make it specific, such as \"I will lose x pounds. \"  · Break your long-term goal into smaller, short-term goals. Make these small steps specific and within your reach, things you know you can do. These steps are what keep you going from day to day. Talk with your doctor about other weight-loss options. If you have a BMI in a certain range and have not been able to lose weight with diet and exercise, medicine or surgery may be an option for you. Before your doctor will prescribe medicines or surgery, he or she will probably want you to be more active and follow your healthy eating plan for a period of time.  These habits are key lifelong changes for managing your weight, with or without other medical treatment. And these changes can help you avoid weight-related health problems. How can you stay on your plan for change? Be ready. Choose to start during a time when there are few events like holidays, social events, and high-stress periods. These events might trigger slip-ups. Decide on your first few steps. Most people have more success when they make small changes, one step at a time. For example, you might switch a daily candy bar to a piece of fruit, walk 10 minutes more, or add more vegetables to a meal.  Line up your support people. Make sure you're not going to be alone as you make this change. Connect with people who understand how important it is to you. Ask family members and friends for help in keeping with your plan. And think about who could make it harder for you, and how to handle them. Try tracking. People who keep track of what they eat, feel, and do are better at losing weight. Try writing down things like:  · What and how much you eat. · How you feel before and after each meal.  · Details about each meal (like eating out or at home, eating alone, or with friends or family). · What you do to be active. Look and plan. As you track, look for patterns that you may want to change. Take note of:  · When you eat and whether you skip meals. · How often you eat out. · How many fruits and vegetables you eat. · When you eat beyond feeling full. · When and why you eat for reasons other than being hungry. When you stray from your plan, don't get upset. Figure out what made you slip up and how you can fix it. Can you take medicines or have surgery to lose weight? If you have a BMI in a certain range and have not been able to lose weight with diet and exercise, medicine or surgery may be an option for you.   If you have a BMI of at least 30.0 (or a BMI of at least 27.0 and another health problem related to your weight), ask your doctor about

## 2021-09-07 NOTE — PATIENT INSTRUCTIONS
Patient Education        Learning About Relief for Back Pain  What is back strain? Back strain is an injury that happens when you overstretch, or pull, a muscle in your back. You may hurt your back in an accident or when you exercise or lift something. Most back pain gets better with rest and time. You can take care of yourself at home to help your back heal.  What can you do first to relieve back pain? When you first feel back pain, try these steps:  · Walk. Take a short walk (10 to 20 minutes) on a level surface (no slopes, hills, or stairs) every 2 to 3 hours. Walk only distances you can manage without pain, especially leg pain. · Relax. Find a comfortable position for rest. Some people are comfortable on the floor or a medium-firm bed with a small pillow under their head and another under their knees. Some people prefer to lie on their side with a pillow between their knees. Don't stay in one position for too long. · Try heat or ice. Try using a heating pad on a low or medium setting, or take a warm shower, for 15 to 20 minutes every 2 to 3 hours. Or you can buy single-use heat wraps that last up to 8 hours. You can also try an ice pack for 10 to 15 minutes every 2 to 3 hours. You can use an ice pack or a bag of frozen vegetables wrapped in a thin towel. There is not strong evidence that either heat or ice will help, but you can try them to see if they help. You may also want to try switching between heat and cold. · Take pain medicine exactly as directed. ? If the doctor gave you a prescription medicine for pain, take it as prescribed. ? If you are not taking a prescription pain medicine, ask your doctor if you can take an over-the-counter medicine. What else can you do? · Stretch and exercise. Exercises that increase flexibility may relieve your pain and make it easier for your muscles to keep your spine in a good, neutral position. And don't forget to keep walking. · Do self-massage.  You can use self-massage to unwind after work or school or to energize yourself in the morning. You can easily massage your feet, hands, or neck. Self-massage works best if you are in comfortable clothes and are sitting or lying in a comfortable position. Use oil or lotion to massage bare skin. · Reduce stress. Back pain can lead to a vicious [place]: Distress about the pain tenses the muscles in your back, which in turn causes more pain. Learn how to relax your mind and your muscles to lower your stress. Where can you learn more? Go to https://youmagpeKadoinkeb.IronPearl. org and sign in to your Vanderdroid account. Enter S476 in the Z[employer] box to learn more about \"Learning About Relief for Back Pain. \"     If you do not have an account, please click on the \"Sign Up Now\" link. Current as of: November 16, 2020               Content Version: 12.9  © 2006-2021 [employer]. Care instructions adapted under license by [facility] [employer] [facility] ([facility]). If you have questions about a medical condition or this instruction, always ask your healthcare professional. [place] [address] any warranty or liability for your use of this information. Patient Education        Back Spasm: Care Instructions  Your Care Instructions  A back spasm is sudden tightness and pain in your back muscles. It may happen from overuse or an injury. Things like sleeping in an awkward way, bending, lifting, standing, or sitting can sometimes cause a spasm. But the cause isn't always clear. Home treatment includes using heat or ice, taking over-the-counter (OTC) pain medicines, and avoiding activities that may cause back pain. For a back spasm that doesn't get better with home care, your doctor may prescribe medicine. Treatments such as massage or manipulation may also help ease a back spasm. Your doctor may also suggest exercise or physical therapy to help improve strength and flexibility in your back muscles.   In most cases, getting back to your normal activities is good for your back. Just make sure to avoid doing things that make your pain worse. Follow-up care is a key part of your treatment and safety. Be sure to make and go to all appointments, and call your doctor if you are having problems. It's also a good idea to know your test results and keep a list of the medicines you take. How can you care for yourself at home? Heat, ice, and medicines    · To relieve pain, use heat or ice (whichever feels better) on the affected area. ? Put a warm water bottle, a heating pad set on low, or a warm cloth on your back. Put a thin cloth between the heating pad and your skin. Do not go to sleep with a heating pad on your skin. ? Try ice or a cold pack on the area for 10 to 20 minutes at a time. Put a thin cloth between the ice and your skin.     · For most back pain you can take over-the-counter pain medicine. Nonsteroidal anti-inflammatory drugs (NSAIDs) such as ibuprofen or naproxen seem to work best. But if you can't take NSAIDs you can try acetaminophen. Your doctor can prescribe stronger medicines if needed. Be safe with medicines. Read and follow all instructions on the label. Body positions and posture    · Sit or lie in positions that are most comfortable for you and that reduce pain. Try one of these positions when you lie down:  ? Lie on your back with your knees bent and supported by large pillows. ? Lie on the floor with your legs on the seat of a sofa or chair. ? Lie on your side with your knees and hips bent and a pillow between your legs. ? Lie on your stomach if it does not make pain worse.     · Do not sit up in bed. Avoid soft couches and twisted positions.     · Avoid bed rest after the first day of back pain. Bed rest can help relieve pain at first, but it delays healing.  Continued rest without activity is usually not good for your back.     · If you must sit for long periods of time, take breaks from sitting. Change positions every 30 minutes. Get up and walk around, or lie in a comfortable position. Activity    · Take short walks several times a day. You can start with 5 to 10 minutes, 3 or 4 times a day, and work up to longer walks. Walk on level surfaces and avoid hills and stairs until your back starts to feel better.     · After your back spasm starts to feel better, try to stretch your muscles every day, especially before and after exercise and at bedtime. Regular stretching can help relax your muscles.     · To prevent future back pain, do exercises to stretch and strengthen your back and stomach. Learn to use good posture, safe lifting techniques, and other ways to move to help you avoid back pain. When should you call for help? Call 911 anytime you think you may need emergency care. For example, call if:    · You are unable to move an arm or a leg at all. Call your doctor now or seek immediate medical care if:    · You have new or worse symptoms in your legs, belly, or buttocks. Symptoms may include:  ? Numbness or tingling. ? Weakness. ? Pain.     · You lose bladder or bowel control. Watch closely for changes in your health, and be sure to contact your doctor if:    · You have a fever, lose weight, or don't feel well.     · You do not get better as expected. Where can you learn more? Go to https://SunoviajoseCloudWork.Purchasing Platform. org and sign in to your Eachbaby account. Enter E232 in the dateIITians box to learn more about \"Back Spasm: Care Instructions. \"     If you do not have an account, please click on the \"Sign Up Now\" link. Current as of: November 16, 2020               Content Version: 12.9  © 4928-6301 Healthwise, [employer]. Care instructions adapted under license by [facility] ([facility]).  If you have questions about a medical condition or this instruction, always ask your healthcare professional. [place] any warranty or liability for your use of this information. Patient Education        Medicines to Avoid With Kidney Disease: Care Instructions  Your Care Instructions     Kidney disease means that your kidneys are not able to get rid of waste from the blood. So they can't keep your body's fluids and chemicals in balance. Usually, the kidneys get rid of waste from the blood through the urine. And they balance the fluids in the body. When your kidneys don't work as they should, you have to be careful about some medicines. They may harm your kidneys. Your doctor may tell you not to take them. Or he or she may change the dose. Medicines for pain and swelling, such as ibuprofen (Advil or Motrin) or naproxen (Aleve), can cause harm. So can some antibiotics and antacids. And you need to be careful about some drugs that treat cancer, lower blood pressure, or get rid of water from the body. Some herbal products could cause harm too. Follow-up care is a key part of your treatment and safety. Be sure to make and go to all appointments, and call your doctor if you are having problems. It's also a good idea to know your test results and keep a list of the medicines you take. How can you care for yourself at home? · Tell your doctor all the prescription, herbal, or over-the-counter medicines you take. Do not take any new ones unless you talk to your doctor first.  · Do not take anti-inflammatory medicines. These include ibuprofen (Advil, Motrin) and naproxen (Aleve). You can use acetaminophen (Tylenol) for pain. · Do not take two or more pain medicines at the same time unless the doctor told you to. Many pain medicines have acetaminophen, which is Tylenol. Too much acetaminophen (Tylenol) can be harmful. · Tell all doctors and others who work with your health care that you have kidney disease. · Wear medical alert jewelry that lists your health problem. You can buy this at most drugstores. Where can you learn more?   Go to https://chpepiceweb.healthAppFirst. org and sign in to your LevelEleven account. Enter V610 in the [facility] box to learn more about \"Medicines to Avoid With Kidney Disease: Care Instructions. \"     If you do not have an account, please click on the \"Sign Up Now\" link. Current as of: December 17, 2020               Content Version: 12.9  © 2006-2021 [employer]. Care instructions adapted under license by [facility] ([facility]). If you have questions about a medical condition or this instruction, always ask your healthcare professional. [place] [address] any warranty or liability for your use of this information. Patient Education        Learning About Obesity  What is obesity? Obesity means having an unhealthy amount of body fat. This puts your health in danger. It can lead to other health problems, such as type 2 diabetes and high blood pressure. How do you know if your weight is in the obesity range? To know if your weight is in the obesity range, your doctor looks at your body mass index (BMI) and waist size. BMI is a number that is calculated from your weight and your height. To figure out your BMI for yourself, you can use an online tool, such as http://www.barrios.com/ on the [employer]. If your BMI is 30.0 or higher, it falls within the obesity range. Keep in mind that BMI and waist size are only guides. They are not tools to determine your ideal body weight. What causes obesity? When you take in more calories than you burn off, you gain weight. How you eat, how active you are, and other things affect how your body uses calories and whether you gain weight. If you have family members who have too much body fat, you may have inherited a tendency to gain weight. And your family also helps form your eating and lifestyle habits, which can lead to obesity.   Also, our busy lives make it harder to plan and cook healthy meals. For many of us, it's easier to reach for prepared foods, go out to eat, or go to the drive-through. But these foods are often high in saturated fat and calories. Portions are often too large. What can you do to reach a healthy weight? Focus on health, not diets. Diets are hard to stay on and don't work in the long run. It is very hard to stay with a diet that includes lots of big changes in your eating habits. Instead of a diet, focus on lifestyle changes that will improve your health and achieve the right balance of energy and calories. To lose weight, you need to burn more calories than you take in. You can do it by eating healthy foods in reasonable amounts and becoming more active, even a little bit every day. Making small changes over time can add up to a lot. Make a plan for change. Many people have found that naming their reasons for change and staying focused on their plan can make a big difference. Work with your doctor to create a plan that is right for you. · Ask yourself: Sue Weiner are my personal, most powerful reasons for wanting this change? What will my life look like when I've made the change? \"  · Set your long-term goal. Make it specific, such as \"I will lose x pounds. \"  · Break your long-term goal into smaller, short-term goals. Make these small steps specific and within your reach, things you know you can do. These steps are what keep you going from day to day. Talk with your doctor about other weight-loss options. If you have a BMI in a certain range and have not been able to lose weight with diet and exercise, medicine or surgery may be an option for you. Before your doctor will prescribe medicines or surgery, he or she will probably want you to be more active and follow your healthy eating plan for a period of time. These habits are key lifelong changes for managing your weight, with or without other medical treatment.  And these changes can help you avoid weight-related health problems. How can you stay on your plan for change? Be ready. Choose to start during a time when there are few events like holidays, social events, and high-stress periods. These events might trigger slip-ups. Decide on your first few steps. Most people have more success when they make small changes, one step at a time. For example, you might switch a daily candy bar to a piece of fruit, walk 10 minutes more, or add more vegetables to a meal.  Line up your support people. Make sure you're not going to be alone as you make this change. Connect with people who understand how important it is to you. Ask family members and friends for help in keeping with your plan. And think about who could make it harder for you, and how to handle them. Try tracking. People who keep track of what they eat, feel, and do are better at losing weight. Try writing down things like:  · What and how much you eat. · How you feel before and after each meal.  · Details about each meal (like eating out or at home, eating alone, or with friends or family). · What you do to be active. Look and plan. As you track, look for patterns that you may want to change. Take note of:  · When you eat and whether you skip meals. · How often you eat out. · How many fruits and vegetables you eat. · When you eat beyond feeling full. · When and why you eat for reasons other than being hungry. When you stray from your plan, don't get upset. Figure out what made you slip up and how you can fix it. Can you take medicines or have surgery to lose weight? If you have a BMI in a certain range and have not been able to lose weight with diet and exercise, medicine or surgery may be an option for you. If you have a BMI of at least 30.0 (or a BMI of at least 27.0 and another health problem related to your weight), ask your doctor about weight-loss medicines.  They work by making you feel less hungry, making you feel full more quickly, or changing how you digest fat. Medicines are used along with diet changes and more physical activity to help you make lasting changes. If you have a BMI of 40.0 or more (or a BMI of 35.0 or more and another health problem related to your weight), your doctor may talk with you about surgery. Weight-loss surgery has risks, and you will need to work with your doctor to compare the risk of having obesity with the risks of surgery. With any option you choose, you will still need to eat a healthy diet and get regular exercise. Follow-up care is a key part of your treatment and safety. Be sure to make and go to all appointments, and call your doctor if you are having problems. It's also a good idea to know your test results and keep a list of the medicines you take. Where can you learn more? Go to https://Spoolorieb.[employer] Mult[employer] Technologies. org and sign in to your Thinknum account. Enter N111 in the InsightETE box to learn more about \"Learning About Obesity. \"     If you do not have an account, please click on the \"Sign Up Now\" link. Current as of: March 17, 2021               Content Version: 12.9  © 3237-1257 Healthwise, Incorporated. Care instructions adapted under license by [facility] ([facility]). If you have questions about a medical condition or this instruction, always ask your healthcare professional. [address] any warranty or liability for your use of this information.

## 2021-09-15 NOTE — TELEPHONE ENCOUNTER
Steff Bone called to request a refill on her medication.       Last office visit : 12/18/2020   Next office visit : 3/19/2021     Requested Prescriptions     Pending Prescriptions Disp Refills    chlordiazePOXIDE-clidinium (LIBRAX) 5-2.5 MG per capsule [Pharmacy Med Name: CHLORDIAZEPOXIDE-CLIDINI 5-2.5 CAPS] 360 capsule 1     Sig: TAKE TWO CAPSULES EVERY MORNING AND TAKE 1 TO 2 CAPSULE BY MOUTH AT BEDTIME            Farrah Wyman LPN 2 seconds or less

## 2021-10-07 PROBLEM — Z12.11 COLON CANCER SCREENING: Status: RESOLVED | Noted: 2021-09-07 | Resolved: 2021-10-07

## 2021-10-09 ENCOUNTER — APPOINTMENT (OUTPATIENT)
Dept: CT IMAGING | Facility: HOSPITAL | Age: 61
End: 2021-10-09

## 2021-10-09 ENCOUNTER — HOSPITAL ENCOUNTER (EMERGENCY)
Facility: HOSPITAL | Age: 61
Discharge: HOME | End: 2021-10-09
Attending: EMERGENCY MEDICINE | Admitting: EMERGENCY MEDICINE

## 2021-10-09 ENCOUNTER — APPOINTMENT (OUTPATIENT)
Dept: CARDIOLOGY | Facility: HOSPITAL | Age: 61
End: 2021-10-09

## 2021-10-09 ENCOUNTER — APPOINTMENT (OUTPATIENT)
Dept: GENERAL RADIOLOGY | Facility: HOSPITAL | Age: 61
End: 2021-10-09

## 2021-10-09 VITALS
OXYGEN SATURATION: 95 % | HEART RATE: 69 BPM | DIASTOLIC BLOOD PRESSURE: 82 MMHG | TEMPERATURE: 97.8 F | HEIGHT: 68 IN | RESPIRATION RATE: 17 BRPM | BODY MASS INDEX: 28.49 KG/M2 | SYSTOLIC BLOOD PRESSURE: 148 MMHG | WEIGHT: 188 LBS

## 2021-10-09 DIAGNOSIS — K44.9 HIATAL HERNIA: ICD-10-CM

## 2021-10-09 DIAGNOSIS — R06.00 DYSPNEA, UNSPECIFIED TYPE: ICD-10-CM

## 2021-10-09 DIAGNOSIS — R07.9 CHEST PAIN, UNSPECIFIED TYPE: Primary | ICD-10-CM

## 2021-10-09 LAB
ALBUMIN SERPL-MCNC: 4.4 G/DL (ref 3.5–5.2)
ALBUMIN/GLOB SERPL: 1.6 G/DL
ALP SERPL-CCNC: 74 U/L (ref 39–117)
ALT SERPL W P-5'-P-CCNC: 19 U/L (ref 1–33)
ANION GAP SERPL CALCULATED.3IONS-SCNC: 11 MMOL/L (ref 5–15)
AST SERPL-CCNC: 21 U/L (ref 1–32)
BASOPHILS # BLD AUTO: 0.07 10*3/MM3 (ref 0–0.2)
BASOPHILS NFR BLD AUTO: 0.7 % (ref 0–1.5)
BH CV STRESS BP STAGE 1: NORMAL
BH CV STRESS BP STAGE 2: NORMAL
BH CV STRESS BP STAGE 3: NORMAL
BH CV STRESS BP STAGE 4: NORMAL
BH CV STRESS DOB - ATROPINE STAGE 3: 1
BH CV STRESS DOSE DOBUTAMINE STAGE 1: 10
BH CV STRESS DOSE DOBUTAMINE STAGE 2: 20
BH CV STRESS DOSE DOBUTAMINE STAGE 3: 30
BH CV STRESS DOSE DOBUTAMINE STAGE 4: 40
BH CV STRESS DURATION MIN STAGE 1: 3
BH CV STRESS DURATION MIN STAGE 2: 3
BH CV STRESS DURATION MIN STAGE 3: 3
BH CV STRESS DURATION MIN STAGE 4: 2
BH CV STRESS DURATION SEC STAGE 1: 0
BH CV STRESS DURATION SEC STAGE 2: 0
BH CV STRESS DURATION SEC STAGE 3: 0
BH CV STRESS DURATION SEC STAGE 4: 25
BH CV STRESS ECHO POST STRESS EJECTION FRACTION EF: 65 %
BH CV STRESS HR STAGE 1: 73
BH CV STRESS HR STAGE 2: 81
BH CV STRESS HR STAGE 3: 126
BH CV STRESS HR STAGE 4: 136
BH CV STRESS PROTOCOL 1: NORMAL
BH CV STRESS RECOVERY BP: NORMAL MMHG
BH CV STRESS RECOVERY HR: 100 BPM
BH CV STRESS STAGE 1: 1
BH CV STRESS STAGE 2: 2
BH CV STRESS STAGE 3: 3
BH CV STRESS STAGE 4: 4
BILIRUB SERPL-MCNC: 0.3 MG/DL (ref 0–1.2)
BUN SERPL-MCNC: 24 MG/DL (ref 8–23)
BUN/CREAT SERPL: 24.5 (ref 7–25)
CALCIUM SPEC-SCNC: 9.9 MG/DL (ref 8.6–10.5)
CHLORIDE SERPL-SCNC: 102 MMOL/L (ref 98–107)
CO2 SERPL-SCNC: 28 MMOL/L (ref 22–29)
CREAT SERPL-MCNC: 0.98 MG/DL (ref 0.57–1)
D DIMER PPP FEU-MCNC: 0.48 MG/L (FEU) (ref 0–0.5)
DEPRECATED RDW RBC AUTO: 42 FL (ref 37–54)
EOSINOPHIL # BLD AUTO: 0.21 10*3/MM3 (ref 0–0.4)
EOSINOPHIL NFR BLD AUTO: 2 % (ref 0.3–6.2)
ERYTHROCYTE [DISTWIDTH] IN BLOOD BY AUTOMATED COUNT: 14 % (ref 12.3–15.4)
GFR SERPL CREATININE-BSD FRML MDRD: 58 ML/MIN/1.73
GFR SERPL CREATININE-BSD FRML MDRD: 70 ML/MIN/1.73
GLOBULIN UR ELPH-MCNC: 2.7 GM/DL
GLUCOSE SERPL-MCNC: 118 MG/DL (ref 65–99)
HCT VFR BLD AUTO: 41.2 % (ref 34–46.6)
HGB BLD-MCNC: 13.4 G/DL (ref 12–15.9)
HOLD SPECIMEN: NORMAL
HOLD SPECIMEN: NORMAL
IMM GRANULOCYTES # BLD AUTO: 0.04 10*3/MM3 (ref 0–0.05)
IMM GRANULOCYTES NFR BLD AUTO: 0.4 % (ref 0–0.5)
LV EF 2D ECHO EST: 55 %
LYMPHOCYTES # BLD AUTO: 2.72 10*3/MM3 (ref 0.7–3.1)
LYMPHOCYTES NFR BLD AUTO: 25.8 % (ref 19.6–45.3)
MAXIMAL PREDICTED HEART RATE: 160 BPM
MCH RBC QN AUTO: 26.9 PG (ref 26.6–33)
MCHC RBC AUTO-ENTMCNC: 32.5 G/DL (ref 31.5–35.7)
MCV RBC AUTO: 82.7 FL (ref 79–97)
MONOCYTES # BLD AUTO: 0.77 10*3/MM3 (ref 0.1–0.9)
MONOCYTES NFR BLD AUTO: 7.3 % (ref 5–12)
NEUTROPHILS NFR BLD AUTO: 6.74 10*3/MM3 (ref 1.7–7)
NEUTROPHILS NFR BLD AUTO: 63.8 % (ref 42.7–76)
NRBC BLD AUTO-RTO: 0 /100 WBC (ref 0–0.2)
NT-PROBNP SERPL-MCNC: 117.2 PG/ML (ref 0–900)
PERCENT MAX PREDICTED HR: 85 %
PLATELET # BLD AUTO: 246 10*3/MM3 (ref 140–450)
PMV BLD AUTO: 11.1 FL (ref 6–12)
POTASSIUM SERPL-SCNC: 3.9 MMOL/L (ref 3.5–5.2)
PROT SERPL-MCNC: 7.1 G/DL (ref 6–8.5)
RBC # BLD AUTO: 4.98 10*6/MM3 (ref 3.77–5.28)
SODIUM SERPL-SCNC: 141 MMOL/L (ref 136–145)
STRESS BASELINE BP: NORMAL MMHG
STRESS BASELINE HR: 67 BPM
STRESS PERCENT HR: 100 %
STRESS POST EXERCISE DUR MIN: 11 MIN
STRESS POST EXERCISE DUR SEC: 25 SEC
STRESS POST PEAK BP: NORMAL MMHG
STRESS POST PEAK HR: 136 BPM
STRESS TARGET HR: 136 BPM
TROPONIN T SERPL-MCNC: <0.01 NG/ML (ref 0–0.03)
TROPONIN T SERPL-MCNC: <0.01 NG/ML (ref 0–0.03)
WBC # BLD AUTO: 10.55 10*3/MM3 (ref 3.4–10.8)
WHOLE BLOOD HOLD SPECIMEN: NORMAL
WHOLE BLOOD HOLD SPECIMEN: NORMAL

## 2021-10-09 PROCEDURE — 93017 CV STRESS TEST TRACING ONLY: CPT

## 2021-10-09 PROCEDURE — 25010000002 PERFLUTREN 6.52 MG/ML SUSPENSION: Performed by: INTERNAL MEDICINE

## 2021-10-09 PROCEDURE — 71045 X-RAY EXAM CHEST 1 VIEW: CPT

## 2021-10-09 PROCEDURE — 93350 STRESS TTE ONLY: CPT

## 2021-10-09 PROCEDURE — 25010000003 DOBUTAMINE PER 250 MG: Performed by: INTERNAL MEDICINE

## 2021-10-09 PROCEDURE — 93005 ELECTROCARDIOGRAM TRACING: CPT | Performed by: EMERGENCY MEDICINE

## 2021-10-09 PROCEDURE — 80053 COMPREHEN METABOLIC PANEL: CPT | Performed by: EMERGENCY MEDICINE

## 2021-10-09 PROCEDURE — 71275 CT ANGIOGRAPHY CHEST: CPT

## 2021-10-09 PROCEDURE — 96375 TX/PRO/DX INJ NEW DRUG ADDON: CPT

## 2021-10-09 PROCEDURE — 93350 STRESS TTE ONLY: CPT | Performed by: INTERNAL MEDICINE

## 2021-10-09 PROCEDURE — 84484 ASSAY OF TROPONIN QUANT: CPT | Performed by: EMERGENCY MEDICINE

## 2021-10-09 PROCEDURE — 0 IOPAMIDOL PER 1 ML: Performed by: EMERGENCY MEDICINE

## 2021-10-09 PROCEDURE — 83880 ASSAY OF NATRIURETIC PEPTIDE: CPT | Performed by: EMERGENCY MEDICINE

## 2021-10-09 PROCEDURE — 99284 EMERGENCY DEPT VISIT MOD MDM: CPT

## 2021-10-09 PROCEDURE — 93010 ELECTROCARDIOGRAM REPORT: CPT | Performed by: INTERNAL MEDICINE

## 2021-10-09 PROCEDURE — 93352 ADMIN ECG CONTRAST AGENT: CPT | Performed by: INTERNAL MEDICINE

## 2021-10-09 PROCEDURE — 96365 THER/PROPH/DIAG IV INF INIT: CPT

## 2021-10-09 PROCEDURE — 85025 COMPLETE CBC W/AUTO DIFF WBC: CPT | Performed by: EMERGENCY MEDICINE

## 2021-10-09 PROCEDURE — 85379 FIBRIN DEGRADATION QUANT: CPT | Performed by: EMERGENCY MEDICINE

## 2021-10-09 PROCEDURE — 93018 CV STRESS TEST I&R ONLY: CPT | Performed by: INTERNAL MEDICINE

## 2021-10-09 RX ORDER — ASPIRIN 81 MG/1
324 TABLET, CHEWABLE ORAL ONCE
Status: COMPLETED | OUTPATIENT
Start: 2021-10-09 | End: 2021-10-09

## 2021-10-09 RX ORDER — SODIUM CHLORIDE 0.9 % (FLUSH) 0.9 %
10 SYRINGE (ML) INJECTION AS NEEDED
Status: DISCONTINUED | OUTPATIENT
Start: 2021-10-09 | End: 2021-10-09 | Stop reason: HOSPADM

## 2021-10-09 RX ORDER — DOBUTAMINE HYDROCHLORIDE 100 MG/100ML
10-50 INJECTION INTRAVENOUS CONTINUOUS
Status: DISCONTINUED | OUTPATIENT
Start: 2021-10-09 | End: 2021-10-09

## 2021-10-09 RX ADMIN — Medication 10 MCG/KG/MIN: at 13:29

## 2021-10-09 RX ADMIN — ASPIRIN 324 MG: 81 TABLET, CHEWABLE ORAL at 10:33

## 2021-10-09 RX ADMIN — PERFLUTREN 8.48 MG: 6.52 INJECTION, SUSPENSION INTRAVENOUS at 13:29

## 2021-10-09 RX ADMIN — IOPAMIDOL 70 ML: 755 INJECTION, SOLUTION INTRAVENOUS at 14:15

## 2021-10-09 RX ADMIN — ATROPINE SULFATE 1 MG: 0.1 INJECTION INTRAVENOUS at 13:49

## 2021-10-09 NOTE — ED TRIAGE NOTES
Pt presents with CP and SOA. Pain to LUE. No hx of MI. Pt appears anxious and diaphoretic in triage. Pt states her mother passed of cardiac arrest. Pt states her BP was 180 systolic at home.

## 2021-10-09 NOTE — ED PROVIDER NOTES
Subjective   This lady is 60 years old who came the ER complain of chest pain or shortness of breath she does not have any history of COPD no history of cigarette smoking.  Has not had any history of shortness of breath or chest pain prior to this episode has had some bronchitis episodes in the past.  The patient denies any cardiac history but does run in her family.  She is over here for evaluation of her complaints I had extensive discussion with her and went over the testing profile needed to rule out different etiology of her chest pain or shortness of breath including cardiac disease and pulmonary embolus she states that she does not any insurance and therefore would not  need or want a lot test done.  I have had a discussion with her regarding the work-up and the testing that is needed it her choice whether she agrees and consents to testing or not and she will have to think about this the pros and cons of different test were explained to her my recommendation is to get a CT of the chest to rule out a PE get a cardiac work-up and get a stress test at the minimum.  She is thinking about it and will let us know      Shortness of Breath  Severity:  Moderate  Onset quality:  Sudden  Timing:  Intermittent  Progression:  Partially resolved  Chronicity:  New  Context: not activity, not animal exposure, not emotional upset, not fumes, not occupational exposure, not pollens, not smoke exposure, not strong odors and not URI    Relieved by:  Nothing  Worsened by:  Nothing  Ineffective treatments:  None tried  Associated symptoms: chest pain and diaphoresis    Associated symptoms: no abdominal pain, no claudication, no cough, no ear pain, no fever, no headaches, no hemoptysis, no neck pain, no PND, no rash, no sore throat, no sputum production, no swollen glands, no vomiting and no wheezing    Risk factors: obesity    Risk factors: no recent alcohol use, no family hx of DVT, no hx of cancer, no hx of PE/DVT, no oral  contraceptive use, no prolonged immobilization, no recent surgery and no tobacco use    Chest Pain  Associated symptoms: diaphoresis and shortness of breath    Associated symptoms: no abdominal pain, no back pain, no claudication, no cough, no dizziness, no fatigue, no fever, no headache, no nausea, no numbness, no PND, no vomiting and no weakness    Arm Pain  Associated symptoms: chest pain and shortness of breath    Associated symptoms: no abdominal pain, no congestion, no cough, no diarrhea, no ear pain, no fatigue, no fever, no headaches, no myalgias, no nausea, no rash, no sore throat, no vomiting and no wheezing        Review of Systems   Constitutional: Positive for diaphoresis. Negative for activity change, appetite change, chills, fatigue and fever.   HENT: Negative for congestion, drooling, ear pain, facial swelling, hearing loss, sinus pressure and sore throat.    Eyes: Negative.  Negative for discharge.   Respiratory: Positive for shortness of breath. Negative for cough, hemoptysis, sputum production and wheezing.    Cardiovascular: Positive for chest pain. Negative for claudication and PND.   Gastrointestinal: Negative for abdominal distention, abdominal pain, blood in stool, diarrhea, nausea and vomiting.   Endocrine: Negative.  Negative for cold intolerance, heat intolerance, polydipsia, polyphagia and polyuria.   Genitourinary: Negative.  Negative for dysuria, flank pain and urgency.   Musculoskeletal: Negative.  Negative for arthralgias, back pain, myalgias, neck pain and neck stiffness.   Skin: Negative.  Negative for color change, pallor and rash.   Allergic/Immunologic: Negative.    Neurological: Negative.  Negative for dizziness, seizures, speech difficulty, weakness, numbness and headaches.   Hematological: Negative.  Negative for adenopathy.   All other systems reviewed and are negative.      History reviewed. No pertinent past medical history.    Allergies   Allergen Reactions   • Amoxicillin  Rash     Also N/V   • Augmentin [Amoxicillin-Pot Clavulanate] Rash   • Latex Rash       History reviewed. No pertinent surgical history.    History reviewed. No pertinent family history.    Social History     Socioeconomic History   • Marital status:            Objective   Physical Exam  Vitals and nursing note reviewed. Exam conducted with a chaperone present.   Constitutional:       General: She is not in acute distress.     Appearance: Normal appearance. She is well-developed. She is not toxic-appearing or diaphoretic.   HENT:      Head: Normocephalic and atraumatic.      Right Ear: External ear normal.      Nose: Nose normal.      Mouth/Throat:      Mouth: Mucous membranes are moist.   Eyes:      Conjunctiva/sclera: Conjunctivae normal.      Pupils: Pupils are equal, round, and reactive to light.   Cardiovascular:      Rate and Rhythm: Normal rate and regular rhythm.      Chest Wall: PMI is not displaced.      Pulses: Normal pulses. No decreased pulses.      Heart sounds: Normal heart sounds. No murmur heard.      Pulmonary:      Effort: Pulmonary effort is normal. No tachypnea, accessory muscle usage or respiratory distress.      Breath sounds: Normal breath sounds. No stridor. No decreased breath sounds, wheezing, rhonchi or rales.   Chest:      Chest wall: No tenderness or crepitus.   Abdominal:      General: Bowel sounds are normal. There is no distension.      Palpations: Abdomen is soft.      Tenderness: There is no abdominal tenderness.   Musculoskeletal:         General: No swelling or tenderness. Normal range of motion.      Cervical back: Normal range of motion and neck supple. No rigidity.      Comments: Lower extremity exam bilaterally is unremarkable.  There is no right or left calf tenderness .  There is no palpable venous cord.  No obvious difference in the size of the legs.  No pitting edema.  The dorsalis pedis and posterior tibial femoral and popliteal pulses are palpable and +2  bilaterally.  Homans sign is negative   Skin:     General: Skin is warm.      Capillary Refill: Capillary refill takes less than 2 seconds.      Coloration: Skin is not jaundiced.      Findings: No erythema or rash.   Neurological:      General: No focal deficit present.      Mental Status: She is alert and oriented to person, place, and time. Mental status is at baseline.      Cranial Nerves: No cranial nerve deficit.      Motor: No weakness.      Coordination: Coordination normal.      Deep Tendon Reflexes: Reflexes are normal and symmetric. Reflexes normal.   Psychiatric:         Mood and Affect: Mood normal.         Procedures           ED Course  ED Course as of 10/09/21 1549   Sat Oct 09, 2021   1022 Normal sinus rhythm [TS]   1232 I went back and discussed the case with the patient informed over the negative work-up initially she did not want any CTs a stress test done now she is asking what she should do already given my recommendation to her earlier [TS]   1351 Normal sinus rhythm [TS]   1517 Patient's work-up is negative [TS]   1517 . No evidence of pulmonary thromboembolic disease. The thoracic aorta  is normal in caliber with no evidence of dissection or aneurysm. The  lungs are clear.  2. Small hiatal hernia. There is steatosis of the liver.  3. Evidence of remote granulomatous disease. [TS]   1517 CT scan was discussed the patient [TS]   1543 ED findings are discussed the patient and stress echo is negative she is asymptomatic at this time will discharge home with a follow-up with the primary MD [TS]   1544 Despite Low risk stress test for stress induced myocardial ischemia, there is a small but definite fraction of patients who will have significant underlying coronary artery disease that needs further evaluation and definitive treatment.  Missing significant coronary artery disease may result and morbidity and mortality.  In view of this if any symptoms such as chest pain, shortness of breath,  increasing weakness, feeling dizzy, passing out, palpitations, cold sweats etc.,to seek immediate medical help.  Stress test is intrinsically limited and therefore the results do not confirm or rule out presence of coronary artery disease and need to be interpreted on the basis of presentation and overall clinical picture.    [TS]   1544 Risks and benefits of treatments given and alternative treatment options discussed with patient/family. I answered all the questions in simple, plain language, and there was voiced understanding and agreement with plan of care. There were no further questions. Differential diagnosis discussed. Patient/family was advised that the practice of medicine is not always an exact science, and sometimes tests, physical exam, or history may not show the underlying conditions with certainty. Additionally, the condition may change or show itself later after initial presentation. There was also expressed understanding and agreement with this limitation of emergency medicine practice. Patient/family was asked to return to ED if any problem or issues or if condition worsens or does not improved. Patient/family agreed to follow up with PCP/specialist as advised, or return to ED if unable to see a provider in a timely fashion for continued symptoms.  [TS]      ED Course User Index  [TS] Gerber Menendez MD                                         HEART Score (for prediction of 6-week risk of major adverse cardiac event) reviewed and/or performed as part of the patient evaluation and treatment planning process.  The result associated with this review/performance is: 3    Wells' Criteria (for pulmonary embolism) reviewed and/or performed as part of the patient evaluation and treatment planning process.  The result associated with this review/performance is: 3       MDM  Number of Diagnoses or Management Options  Diagnosis management comments: Differential Diagnosis:  I considered chest wall pain, muscle  strain, costochondritis, pleurisy, rib fracture, herpes zoster, cardiovascular etiology, myocardial infarction, intermediate coronary syndrome, unstable angina, angina, aortic dissection, pericarditis, pulmonary etiology, pulmonary embolism, pneumonia, pneumothorax, lung cancer, gastroesophageal reflux disease, esophagitis, esophageal spasm and gastrointestinal etiology as a possible cause of chest pain in this patient. This is a partial list of diagnoses considered.         Amount and/or Complexity of Data Reviewed  Clinical lab tests: ordered and reviewed  Tests in the radiology section of CPT®: ordered and reviewed  Tests in the medicine section of CPT®: reviewed and ordered    Risk of Complications, Morbidity, and/or Mortality  Presenting problems: moderate  Diagnostic procedures: moderate  Management options: moderate        Final diagnoses:   Chest pain, unspecified type   Dyspnea, unspecified type   Hiatal hernia       ED Disposition  ED Disposition     ED Disposition Condition Comment    Discharge Stable           Brianna English MD  [address] [place] [address] DR GOMEZ [address] [place] [id number]  [phone]    In 2 days           Medication List      No changes were made to your prescriptions during this visit.          Gerber Menendez MD  10/09/21 7029

## 2021-10-10 LAB
QT INTERVAL: 400 MS
QT INTERVAL: 444 MS
QTC INTERVAL: 434 MS
QTC INTERVAL: 435 MS

## 2021-10-18 ENCOUNTER — TELEPHONE (OUTPATIENT)
Dept: FAMILY MEDICINE CLINIC | Age: 61
End: 2021-10-18

## 2021-10-18 DIAGNOSIS — K58.2 IRRITABLE BOWEL SYNDROME WITH BOTH CONSTIPATION AND DIARRHEA: ICD-10-CM

## 2021-10-18 RX ORDER — CHLORDIAZEPOXIDE HYDROCHLORIDE AND CLIDINIUM BROMIDE 5; 2.5 MG/1; MG/1
CAPSULE ORAL
Qty: 360 CAPSULE | Refills: 1 | Status: SHIPPED | OUTPATIENT
Start: 2021-10-18 | End: 2021-10-18 | Stop reason: SDUPTHER

## 2021-10-18 RX ORDER — CHLORDIAZEPOXIDE HYDROCHLORIDE AND CLIDINIUM BROMIDE 5; 2.5 MG/1; MG/1
CAPSULE ORAL
Qty: 360 CAPSULE | Refills: 1 | Status: SHIPPED | OUTPATIENT
Start: 2021-10-18 | End: 2021-10-19 | Stop reason: SDUPTHER

## 2021-10-18 NOTE — TELEPHONE ENCOUNTER
----- Message from Bonnie Cordero sent at 10/18/2021  2:50 PM CDT -----  Subject: Refill Request    QUESTIONS  Name of Medication? chlordiazePOXIDE-clidinium (LIBRAX) 5-2.5 MG per   capsule  Patient-reported dosage and instructions? 5-2.5mg twice daily  How many days do you have left? 0  Preferred Pharmacy? [facility]ACY #[id number]  Pharmacy phone number (if available)? [phone]  ---------------------------------------------------------------------------  --------------,  Name of Medication? metoprolol tartrate (LOPRESSOR) 50 MG tablet  Patient-reported dosage and instructions? 50mg twice daily  How many days do you have left? 0  Preferred Pharmacy? [facility]/PHARMACY #[id number]  Pharmacy phone number (if available)? [phone]  ---------------------------------------------------------------------------  --------------,  Name of Medication? triamterene-hydroCHLOROthiazide (MAXZIDE-25) 37.5-25   MG per tablet  Patient-reported dosage and instructions? 37.5-25mg  How many days do you have left? 0  Preferred Pharmacy? [facility]/PHARMACY #[id number]  Pharmacy phone number (if available)? [phone]  ---------------------------------------------------------------------------  --------------  CALL BACK INFO  What is the best way for the office to contact you?  OK to leave message on   voicemail  Preferred Call Back Phone Number? [phone]

## 2021-10-18 NOTE — TELEPHONE ENCOUNTER
Olive Martinez called to request a refill on her medication. Last office visit : 9/7/2021   Next office visit : 10/18/2021     Last UDS:   Amphetamine Screen, Urine   Date Value Ref Range Status   03/30/2021 neg  Final     Barbiturate Screen, Urine   Date Value Ref Range Status   03/30/2021 neg  Final     Benzodiazepine Screen, Urine   Date Value Ref Range Status   03/30/2021 pos  Final     Buprenorphine Urine   Date Value Ref Range Status   03/30/2021 neg  Final     Cocaine Metabolite Screen, Urine   Date Value Ref Range Status   03/30/2021 neg  Final     Gabapentin Screen, Urine   Date Value Ref Range Status   03/30/2021 neg  Final     MDMA, Urine   Date Value Ref Range Status   03/30/2021 neg  Final     Methamphetamine, Urine   Date Value Ref Range Status   03/30/2021 neg  Final     Opiate Scrn, Ur   Date Value Ref Range Status   03/30/2021 pos  Final     Oxycodone Screen, Ur   Date Value Ref Range Status   03/30/2021 neg  Final     PCP Screen, Urine   Date Value Ref Range Status   03/30/2021 neg  Final     Propoxyphene Screen, Urine   Date Value Ref Range Status   03/30/2021 neg  Final     THC Screen, Urine   Date Value Ref Range Status   03/30/2021 neg  Final     Tricyclic Antidepressants, Urine   Date Value Ref Range Status   03/30/2021 neg  Final       Last Evangelist Armor: 10/18/2021  Medication Contract: 3/30/2021   Last Fill: 4/5/2021    Requested Prescriptions     Pending Prescriptions Disp Refills    chlordiazePOXIDE-clidinium (LIBRAX) 5-2.5 MG per capsule 360 capsule 1     Sig: TAKE TWO CAPSULES EVERY MORNING AND TAKE 1 TO 2 CAPSULE BY MOUTH AT BEDTIME     Signed Prescriptions Disp Refills    chlordiazePOXIDE-clidinium (LIBRAX) 5-2.5 MG per capsule 360 capsule 1     Sig: TAKE TWO CAPSULES EVERY MORNING AND TAKE 1 TO 2 CAPSULE BY MOUTH AT BEDTIME     Authorizing Provider: Edgar Odom     Ordering User: Amaris Butt         Please approve or refuse this medication.    John Quezada MA

## 2021-10-18 NOTE — TELEPHONE ENCOUNTER
Alline Fothergill called to request a refill on her medication. Last office visit : 9/7/2021   Next office visit : 10/18/2021     Last UDS:   Amphetamine Screen, Urine   Date Value Ref Range Status   03/30/2021 neg  Final     Barbiturate Screen, Urine   Date Value Ref Range Status   03/30/2021 neg  Final     Benzodiazepine Screen, Urine   Date Value Ref Range Status   03/30/2021 pos  Final     Buprenorphine Urine   Date Value Ref Range Status   03/30/2021 neg  Final     Cocaine Metabolite Screen, Urine   Date Value Ref Range Status   03/30/2021 neg  Final     Gabapentin Screen, Urine   Date Value Ref Range Status   03/30/2021 neg  Final     MDMA, Urine   Date Value Ref Range Status   03/30/2021 neg  Final     Methamphetamine, Urine   Date Value Ref Range Status   03/30/2021 neg  Final     Opiate Scrn, Ur   Date Value Ref Range Status   03/30/2021 pos  Final     Oxycodone Screen, Ur   Date Value Ref Range Status   03/30/2021 neg  Final     PCP Screen, Urine   Date Value Ref Range Status   03/30/2021 neg  Final     Propoxyphene Screen, Urine   Date Value Ref Range Status   03/30/2021 neg  Final     THC Screen, Urine   Date Value Ref Range Status   03/30/2021 neg  Final     Tricyclic Antidepressants, Urine   Date Value Ref Range Status   03/30/2021 neg  Final       Last Malachi Goldsmith: 10/18/2021  Medication Contract: 3/30/2021   Last Fill: 4/5/2021    Requested Prescriptions     Signed Prescriptions Disp Refills    chlordiazePOXIDE-clidinium (LIBRAX) 5-2.5 MG per capsule 360 capsule 1     Sig: TAKE TWO CAPSULES EVERY MORNING AND TAKE 1 TO 2 CAPSULE BY MOUTH AT BEDTIME     Authorizing Provider: Lore Arnold     Ordering User: Dino Biggs         Please approve or refuse this medication.    Sai Wilson MA

## 2021-10-19 ENCOUNTER — TELEPHONE (OUTPATIENT)
Dept: FAMILY MEDICINE CLINIC | Age: 61
End: 2021-10-19

## 2021-10-19 DIAGNOSIS — K58.2 IRRITABLE BOWEL SYNDROME WITH BOTH CONSTIPATION AND DIARRHEA: ICD-10-CM

## 2021-10-19 DIAGNOSIS — I10 ESSENTIAL HYPERTENSION, BENIGN: ICD-10-CM

## 2021-10-19 RX ORDER — METOPROLOL TARTRATE 50 MG/1
50 TABLET, FILM COATED ORAL 2 TIMES DAILY
Qty: 60 TABLET | Refills: 5 | Status: CANCELLED | OUTPATIENT
Start: 2021-10-19

## 2021-10-19 RX ORDER — CHLORDIAZEPOXIDE HYDROCHLORIDE AND CLIDINIUM BROMIDE 5; 2.5 MG/1; MG/1
CAPSULE ORAL
Qty: 360 CAPSULE | Refills: 1 | Status: SHIPPED | OUTPATIENT
Start: 2021-10-19 | End: 2022-01-10

## 2021-10-19 RX ORDER — CHLORDIAZEPOXIDE HYDROCHLORIDE AND CLIDINIUM BROMIDE 5; 2.5 MG/1; MG/1
CAPSULE ORAL
Qty: 360 CAPSULE | Refills: 1 | Status: CANCELLED | OUTPATIENT
Start: 2021-10-19

## 2021-10-19 RX ORDER — TRIAMTERENE AND HYDROCHLOROTHIAZIDE 37.5; 25 MG/1; MG/1
1 TABLET ORAL DAILY
Qty: 60 TABLET | Refills: 5 | Status: SHIPPED | OUTPATIENT
Start: 2021-10-19 | End: 2022-07-20

## 2021-10-19 RX ORDER — METOPROLOL TARTRATE 50 MG/1
50 TABLET, FILM COATED ORAL 2 TIMES DAILY
Qty: 60 TABLET | Refills: 5 | Status: SHIPPED | OUTPATIENT
Start: 2021-10-19 | End: 2022-02-09

## 2021-10-19 RX ORDER — TRIAMTERENE AND HYDROCHLOROTHIAZIDE 37.5; 25 MG/1; MG/1
1 TABLET ORAL DAILY
Qty: 180 TABLET | Refills: 3 | Status: CANCELLED | OUTPATIENT
Start: 2021-10-19

## 2021-10-19 NOTE — TELEPHONE ENCOUNTER
The patient called and said her Librax was supposed to to to [facility] in [facility]. She also needs a refill of her lopressor, Maxzide and she is asking for HCTZ which I didn't see in the chart . She said it is a fluid pill that she takes. I have called Mitzi and cancelled the refill of Librax that was sent yesterday.

## 2021-10-19 NOTE — TELEPHONE ENCOUNTER
Leonardo Gould called to request a refill on her medication. Last office visit : 9/7/2021   Next office visit : 1/10/2022     Last UDS:   Amphetamine Screen, Urine   Date Value Ref Range Status   03/30/2021 neg  Final     Barbiturate Screen, Urine   Date Value Ref Range Status   03/30/2021 neg  Final     Benzodiazepine Screen, Urine   Date Value Ref Range Status   03/30/2021 pos  Final     Buprenorphine Urine   Date Value Ref Range Status   03/30/2021 neg  Final     Cocaine Metabolite Screen, Urine   Date Value Ref Range Status   03/30/2021 neg  Final     Gabapentin Screen, Urine   Date Value Ref Range Status   03/30/2021 neg  Final     MDMA, Urine   Date Value Ref Range Status   03/30/2021 neg  Final     Methamphetamine, Urine   Date Value Ref Range Status   03/30/2021 neg  Final     Opiate Scrn, Ur   Date Value Ref Range Status   03/30/2021 pos  Final     Oxycodone Screen, Ur   Date Value Ref Range Status   03/30/2021 neg  Final     PCP Screen, Urine   Date Value Ref Range Status   03/30/2021 neg  Final     Propoxyphene Screen, Urine   Date Value Ref Range Status   03/30/2021 neg  Final     THC Screen, Urine   Date Value Ref Range Status   03/30/2021 neg  Final     Tricyclic Antidepressants, Urine   Date Value Ref Range Status   03/30/2021 neg  Final       Last Keisha Athol: 10/18/2021  Medication Contract: 3/30/2021   Last Fill: 4/5/2021    Requested Prescriptions     Pending Prescriptions Disp Refills    chlordiazePOXIDE-clidinium (LIBRAX) 5-2.5 MG per capsule 360 capsule 1     Sig: TAKE TWO CAPSULES EVERY MORNING AND TAKE 1 TO 2 CAPSULE BY MOUTH AT BEDTIME    metoprolol tartrate (LOPRESSOR) 50 MG tablet 60 tablet 5     Sig: Take 1 tablet by mouth 2 times daily TAKE 1 TABLET BY MOUTH 2 TIMES A DAY    triamterene-hydroCHLOROthiazide (MAXZIDE-25) 37.5-25 MG per tablet 180 tablet 3     Sig: Take 1 tablet by mouth daily TAKE 2 TABLETS BY MOUTH ONE TIME DAILY.  DO NOT RESUME IF BP LESS THAN 100/70         Please approve or refuse this medication.    Nicholas Bowens MA

## 2021-10-22 ENCOUNTER — TELEPHONE (OUTPATIENT)
Dept: FAMILY MEDICINE CLINIC | Age: 61
End: 2021-10-22

## 2021-10-22 NOTE — TELEPHONE ENCOUNTER
Left  for pt to return call to the office to clarify what pharmacy she uses. I called [facility] to call a prescription in fo her and spoke with Jessica Delatorre. I gave him her name  and medication with dosage and instructions. He said we've never filled for her before. I informed him this is what's in her chart and she's had other meds filled there before. He said ok.

## 2021-10-22 NOTE — PROGRESS NOTES
Left  for pt to return call to the office to clarify what pharmacy she uses. I called [facility] to call a prescription in fo her and spoke with Jimbo Nguyễn. I gave him her name RODRIGO and medication with dosage and instructions. He said we've never filled for her before. I informed him this is what's in her chart and she's had other meds filled there before. He said ok.

## 2021-10-27 ENCOUNTER — TELEPHONE (OUTPATIENT)
Dept: FAMILY MEDICINE CLINIC | Age: 61
End: 2021-10-27

## 2021-10-27 NOTE — TELEPHONE ENCOUNTER
I called the patient to speak with her about her Chlordiazepoxide-clidinium not being covered by her insurance. An alternative was requested by the pharmacy but the patient has been on this for years. What does  Nicholas Guerra want to do.

## 2021-11-29 ENCOUNTER — TELEPHONE (OUTPATIENT)
Dept: FAMILY MEDICINE CLINIC | Age: 61
End: 2021-11-29

## 2021-11-29 NOTE — TELEPHONE ENCOUNTER
The patient called and asked if a lower cost medication could be sent in for her to replace the Librax.

## 2021-11-29 NOTE — TELEPHONE ENCOUNTER
Does she have primarily diarrhea or constipation with her IBS that she takes the librax for currently?  That will help me choose a different medicine

## 2021-11-30 NOTE — TELEPHONE ENCOUNTER
I called to speak with the patient. I left a Vm asking her to call the office back so I could ask her questions on this medication change.

## 2021-12-13 NOTE — TELEPHONE ENCOUNTER
Three messages have been left for the patient asking her to call the office so due to no response this matter is being closed.

## 2022-01-10 ENCOUNTER — OFFICE VISIT (OUTPATIENT)
Dept: FAMILY MEDICINE CLINIC | Age: 62
End: 2022-01-10
Payer: MEDICAID

## 2022-01-10 VITALS
TEMPERATURE: 97.6 F | HEIGHT: 68 IN | OXYGEN SATURATION: 98 % | BODY MASS INDEX: 35.54 KG/M2 | SYSTOLIC BLOOD PRESSURE: 147 MMHG | HEART RATE: 73 BPM | WEIGHT: 234.5 LBS | DIASTOLIC BLOOD PRESSURE: 86 MMHG

## 2022-01-10 DIAGNOSIS — G89.29 CHRONIC BILATERAL LOW BACK PAIN WITH SCIATICA, SCIATICA LATERALITY UNSPECIFIED: ICD-10-CM

## 2022-01-10 DIAGNOSIS — I10 ESSENTIAL HYPERTENSION, BENIGN: Primary | ICD-10-CM

## 2022-01-10 DIAGNOSIS — E66.01 CLASS 2 SEVERE OBESITY DUE TO EXCESS CALORIES WITH SERIOUS COMORBIDITY AND BODY MASS INDEX (BMI) OF 35.0 TO 35.9 IN ADULT (HCC): ICD-10-CM

## 2022-01-10 DIAGNOSIS — E78.2 MIXED HYPERLIPIDEMIA: ICD-10-CM

## 2022-01-10 DIAGNOSIS — K58.0 IRRITABLE BOWEL SYNDROME WITH DIARRHEA: ICD-10-CM

## 2022-01-10 DIAGNOSIS — M54.40 CHRONIC BILATERAL LOW BACK PAIN WITH SCIATICA, SCIATICA LATERALITY UNSPECIFIED: ICD-10-CM

## 2022-01-10 DIAGNOSIS — N18.31 STAGE 3A CHRONIC KIDNEY DISEASE (HCC): ICD-10-CM

## 2022-01-10 DIAGNOSIS — R73.9 HYPERGLYCEMIA: ICD-10-CM

## 2022-01-10 DIAGNOSIS — Z23 FLU VACCINE NEED: ICD-10-CM

## 2022-01-10 DIAGNOSIS — F41.1 GAD (GENERALIZED ANXIETY DISORDER): ICD-10-CM

## 2022-01-10 DIAGNOSIS — F32.0 MILD MAJOR DEPRESSION (HCC): ICD-10-CM

## 2022-01-10 PROBLEM — E66.812 CLASS 2 SEVERE OBESITY DUE TO EXCESS CALORIES WITH SERIOUS COMORBIDITY AND BODY MASS INDEX (BMI) OF 35.0 TO 35.9 IN ADULT: Status: ACTIVE | Noted: 2021-03-30

## 2022-01-10 LAB
ALBUMIN SERPL-MCNC: 4.3 G/DL (ref 3.5–5.2)
ALP BLD-CCNC: 80 U/L (ref 35–104)
ALT SERPL-CCNC: 23 U/L (ref 5–33)
ANION GAP SERPL CALCULATED.3IONS-SCNC: 13 MMOL/L (ref 7–19)
AST SERPL-CCNC: 25 U/L (ref 5–32)
BILIRUB SERPL-MCNC: 0.3 MG/DL (ref 0.2–1.2)
BUN BLDV-MCNC: 17 MG/DL (ref 8–23)
CALCIUM SERPL-MCNC: 9.7 MG/DL (ref 8.8–10.2)
CHLORIDE BLD-SCNC: 101 MMOL/L (ref 98–111)
CHOLESTEROL, TOTAL: 196 MG/DL (ref 160–199)
CO2: 28 MMOL/L (ref 22–29)
CREAT SERPL-MCNC: 1 MG/DL (ref 0.5–0.9)
GFR AFRICAN AMERICAN: >59
GFR NON-AFRICAN AMERICAN: 56
GLUCOSE BLD-MCNC: 123 MG/DL (ref 74–109)
HBA1C MFR BLD: 6.2 % (ref 4–6)
HDLC SERPL-MCNC: 57 MG/DL (ref 65–121)
LDL CHOLESTEROL CALCULATED: 98 MG/DL
POTASSIUM SERPL-SCNC: 4.4 MMOL/L (ref 3.5–5)
SODIUM BLD-SCNC: 142 MMOL/L (ref 136–145)
TOTAL PROTEIN: 7.1 G/DL (ref 6.6–8.7)
TRIGL SERPL-MCNC: 206 MG/DL (ref 0–149)

## 2022-01-10 PROCEDURE — 99215 OFFICE O/P EST HI 40 MIN: CPT | Performed by: INTERNAL MEDICINE

## 2022-01-10 PROCEDURE — 90674 CCIIV4 VAC NO PRSV 0.5 ML IM: CPT | Performed by: INTERNAL MEDICINE

## 2022-01-10 PROCEDURE — 90471 IMMUNIZATION ADMIN: CPT | Performed by: INTERNAL MEDICINE

## 2022-01-10 RX ORDER — HYOSCYAMINE SULFATE 0.125 MG
125 TABLET ORAL EVERY 6 HOURS PRN
Qty: 90 TABLET | Refills: 3 | Status: SHIPPED | OUTPATIENT
Start: 2022-01-10 | End: 2022-04-11

## 2022-01-10 RX ORDER — DULOXETIN HYDROCHLORIDE 30 MG/1
30 CAPSULE, DELAYED RELEASE ORAL NIGHTLY
Qty: 30 CAPSULE | Refills: 2 | Status: SHIPPED | OUTPATIENT
Start: 2022-01-10 | End: 2022-01-11 | Stop reason: SDUPTHER

## 2022-01-10 RX ORDER — ATORVASTATIN CALCIUM 20 MG/1
20 TABLET, FILM COATED ORAL DAILY
Qty: 30 TABLET | Refills: 5 | Status: SHIPPED | OUTPATIENT
Start: 2022-01-10 | End: 2022-06-15 | Stop reason: SDUPTHER

## 2022-01-10 ASSESSMENT — ENCOUNTER SYMPTOMS
DIARRHEA: 0
EYE DISCHARGE: 0
EYE REDNESS: 0
BLOOD IN STOOL: 0
ABDOMINAL PAIN: 0
SINUS PRESSURE: 0
WHEEZING: 0
EYE PAIN: 0
RHINORRHEA: 0
COUGH: 0
BACK PAIN: 1
SHORTNESS OF BREATH: 0
VOICE CHANGE: 0
COLOR CHANGE: 0
SORE THROAT: 0
CHEST TIGHTNESS: 0
VOMITING: 0

## 2022-01-10 ASSESSMENT — PATIENT HEALTH QUESTIONNAIRE - PHQ9
SUM OF ALL RESPONSES TO PHQ QUESTIONS 1-9: 10
SUM OF ALL RESPONSES TO PHQ QUESTIONS 1-9: 10
1. LITTLE INTEREST OR PLEASURE IN DOING THINGS: 3
10. IF YOU CHECKED OFF ANY PROBLEMS, HOW DIFFICULT HAVE THESE PROBLEMS MADE IT FOR YOU TO DO YOUR WORK, TAKE CARE OF THINGS AT HOME, OR GET ALONG WITH OTHER PEOPLE: 0
3. TROUBLE FALLING OR STAYING ASLEEP: 3
9. THOUGHTS THAT YOU WOULD BE BETTER OFF DEAD, OR OF HURTING YOURSELF: 0
SUM OF ALL RESPONSES TO PHQ9 QUESTIONS 1 & 2: 3
7. TROUBLE CONCENTRATING ON THINGS, SUCH AS READING THE NEWSPAPER OR WATCHING TELEVISION: 0
SUM OF ALL RESPONSES TO PHQ QUESTIONS 1-9: 10
2. FEELING DOWN, DEPRESSED OR HOPELESS: 0
SUM OF ALL RESPONSES TO PHQ QUESTIONS 1-9: 10
6. FEELING BAD ABOUT YOURSELF - OR THAT YOU ARE A FAILURE OR HAVE LET YOURSELF OR YOUR FAMILY DOWN: 0
5. POOR APPETITE OR OVEREATING: 1
4. FEELING TIRED OR HAVING LITTLE ENERGY: 1
DEPRESSION UNABLE TO ASSESS: FUNCTIONAL CAPACITY MOTIVATION LIMITS ACCURACY
8. MOVING OR SPEAKING SO SLOWLY THAT OTHER PEOPLE COULD HAVE NOTICED. OR THE OPPOSITE, BEING SO FIGETY OR RESTLESS THAT YOU HAVE BEEN MOVING AROUND A LOT MORE THAN USUAL: 2

## 2022-01-10 ASSESSMENT — VISUAL ACUITY: OU: 1

## 2022-01-10 NOTE — PROGRESS NOTES
After obtaining consent, and per orders of Dr. Casa Cai, injection of Flucelvax given in Left deltoid by Leonardo Arnold MA. Patient instructed to remain in clinic for 20 minutes afterwards, and to report any adverse reaction to me immediately.

## 2022-01-10 NOTE — PATIENT INSTRUCTIONS
Patient Education        Recovering From Depression: Care Instructions  Your Care Instructions     Taking good care of yourself is important as you recover from depression. In time, your symptoms will fade as your treatment takes hold. Do not give up. Instead, focus your energy on getting better. Your mood will improve. It just takes some time. Focus on things that can help you feel better, such as being with friends and family, eating well, and getting enough rest. But take things slowly. Do not do too much too soon. You will begin to feel better gradually. Follow-up care is a key part of your treatment and safety. Be sure to make and go to all appointments, and call your doctor if you are having problems. It's also a good idea to know your test results and keep a list of the medicines you take. How can you care for yourself at home? Be realistic  · If you have a large task to do, break it up into smaller steps you can handle, and just do what you can. · You may want to put off important decisions until your depression has lifted. If you have plans that will have a major impact on your life, such as marriage, divorce, or a job change, try to wait a bit. Talk it over with friends and loved ones who can help you look at the overall picture first.  · Reaching out to people for help is important. Do not isolate yourself. Let your family and friends help you. Find someone you can trust and confide in, and talk to that person. · Be patient, and be kind to yourself. Remember that depression is not your fault and is not something you can overcome with willpower alone. Treatment is important for depression, just like for any other illness. Feeling better takes time, and your mood will improve little by little. Stay active  · Stay busy and get outside. Take a walk, or try some other light exercise. · Talk with your doctor about an exercise program. Exercise can help with mild depression.   · Go to a movie or concert. Take part in a [employer] activity or other social gathering. Go to a CyberX game. · Ask a friend to have dinner with you. Take care of yourself  · Eat a balanced diet with plenty of fresh fruits and vegetables, whole grains, and lean protein. If you have lost your appetite, eat small snacks rather than large meals. · Avoid using illegal drugs or marijuana and drinking alcohol. Do not take medicines that have not been prescribed for you. They may interfere with medicines you may be taking for depression, or they may make your depression worse. · Take your medicines exactly as they are prescribed. You may start to feel better within 1 to 3 weeks of taking antidepressant medicine. But it can take as many as 6 to 8 weeks to see more improvement. If you have questions or concerns about your medicines, or if you do not notice any improvement by 3 weeks, talk to your doctor. · Continue to take your medicine after your symptoms improve. Taking your medicine for at least 6 months after you feel better can help keep you from getting depressed again. If this isn't the first time you have been depressed, your doctor may recommend you to take medicine even longer. · If you have any side effects from your medicine, tell your doctor. Many side effects are mild and will go away on their own after you have been taking the medicine for a few weeks. Some may last longer. Talk to your doctor if side effects are bothering you too much. You might be able to try a different medicine. · Continue counseling. It may help prevent depression from returning, especially if you've had multiple episodes of depression. Talk with your counselor if you are having a hard time attending your sessions or you think the sessions aren't working. Don't just stop going. · Get enough sleep. Talk to your doctor if you are having problems sleeping. · Avoid sleeping pills unless they are prescribed by the doctor treating your depression.  Sleeping pills may make you groggy during the day, and they may interact with other medicine you are taking. · If you have any other illnesses, such as diabetes, heart disease, or high blood pressure, make sure to continue with your treatment. Tell your doctor about all of the medicines you take, including those with or without a prescription. · If you or someone you know talks about suicide, self-harm, or feeling hopeless, get help right away. Call the [address] [place] [address] at 1-800-273-talk (5-691.125.5060) or text HOME to 127012 to access the Crisis Text Line. Consider saving these numbers in your phone. When should you call for help? Call 911 anytime you think you may need emergency care. For example, call if:    · You feel like hurting yourself or someone else.     · Someone you know has depression and is about to attempt or is attempting suicide. Call your doctor now or seek immediate medical care if:    · You hear voices.     · Someone you know has depression and:  ? Starts to give away his or her possessions. ? Uses illegal drugs or drinks alcohol heavily. ? Talks or writes about death, including writing suicide notes or talking about guns, knives, or pills. ? Starts to spend a lot of time alone. ? Acts very aggressively or suddenly appears calm. Watch closely for changes in your health, and be sure to contact your doctor if:    · You do not get better as expected. Where can you learn more? Go to https://InteligisticsoriNimble TV.TakWak. org and sign in to your Ginza[employer] account. Enter S321 in the [employer] box to learn more about \"Recovering From Depression: Care Instructions. \"     If you do not have an account, please click on the \"Sign Up Now\" link. Current as of: June 16, 2021               Content Version: 13.1  © 1916-0748 Healthwise, Incorporated. Care instructions adapted under license by [employer].  If you have questions about a medical condition or this instruction, always ask your healthcare professional. [place] [address] any warranty or liability for your use of this information. Patient Education        Learning About Anxiety Disorders  What are anxiety disorders? Anxiety disorders are a type of medical problem. They cause severe anxiety. When you feel anxious, you feel that something bad is about to happen. This feeling interferes with your life. These disorders include:  · Generalized anxiety disorder. You feel worried and stressed about many everyday events and activities. This goes on for several months and disrupts your life on most days. · Panic disorder. You have repeated panic attacks. A panic attack is a sudden, intense fear or anxiety. It may make you feel short of breath. Your heart may pound. · Social anxiety disorder. You feel very anxious about what you will say or do in front of people. For example, you may be scared to talk or eat in public. This problem affects your daily life. · Phobias. You are very scared of a specific object, situation, or activity. For example, you may fear spiders, high places, or small spaces. What are the symptoms? Generalized anxiety disorder  Symptoms may include:  · Feeling worried and stressed about many things almost every day. · Feeling tired or irritable. You may have a hard time concentrating. · Having headaches or muscle aches. · Having a hard time getting to sleep or staying asleep. Panic disorder  You may have repeated panic attacks when there is no reason for feeling afraid. You may change your daily activities because you worry that you will have another attack. Symptoms may include:  · Intense fear, terror, or anxiety. · Trouble breathing or very fast breathing. · Chest pain or tightness. · A heartbeat that races or is not regular.   Social anxiety disorder  Symptoms may include:  · Fear about a social situation, such as eating in front of others or speaking in public. You may worry a lot. Or you may be afraid that something bad will happen. · Anxiety that can cause you to blush, sweat, and feel shaky. · A heartbeat that is faster than normal.  · A hard time focusing. Phobias  Symptoms may include:  · More fear than most people of being around an object, being in a situation, or doing an activity. You might also be stressed about the chance of being around the thing you fear. · Worry about losing control, panicking, fainting, or having physical symptoms like a faster heartbeat when you are around the situation or object. How are these disorders treated? Anxiety disorders can be treated with medicines or counseling. A combination of both may be used. Medicines may include:  · Antidepressants. These may help your symptoms by keeping chemicals in your brain in balance. · Benzodiazepines. These may give you short-term relief of your symptoms. Some people use cognitive-behavioral therapy. A therapist helps you learn to change stressful or bad thoughts into helpful thoughts. Lead a healthy lifestyle  A healthy lifestyle may help you feel better. · Get at least 30 minutes of exercise on most days of the week. Walking is a good choice. · Eat a healthy diet. Include fruits, vegetables, lean proteins, and whole grains in your diet each day. · Try to go to bed at the same time every night. Try for 8 hours of sleep a night. · Find ways to manage stress. Try relaxation exercises. · Avoid alcohol and illegal drugs. Follow-up care is a key part of your treatment and safety. Be sure to make and go to all appointments, and call your doctor if you are having problems. It's also a good idea to know your test results and keep a list of the medicines you take. Where can you learn more? Go to https://keren.ISpottedYou.com. org and sign in to your [employer] account. Enter P185 in the [facility] box to learn more about \"Learning About Anxiety Disorders. \"     If Instructions  Overview     Irritable bowel syndrome, or IBS, is a problem with the intestines. IBS can cause belly pain, bloating, gas, constipation, and diarrhea. Most people can control their symptoms by changing their diet and easing stress. No specific foods cause everyone with IBS to have symptoms. Many people find that they feel better by limiting or eliminating foods that may bring on symptoms. Make sure you don't stop eating all foods from any one food group without talking with a dietitian. You need to make sure you are still getting all the nutrients you need. Follow-up care is a key part of your treatment and safety. Be sure to make and go to all appointments, and call your doctor if you are having problems. It's also a good idea to know your test results and keep a list of the medicines you take. How can you care for yourself at home? To reduce constipation  · Check with your doctor about increasing the amount of fiber you eat. If your doctor recommends more fiber:  ? Slowly increase the amount of fiber you eat. For some people who have IBS, eating more fiber may make some symptoms worse. This includes bloating. Adding fiber slowly may help you avoid these problems. ? Include fruits, vegetables, beans, and whole grains in your diet each day. These foods are high in fiber. ? Take a fiber supplement, such as Citrucel or Metamucil, every day if needed. Read and follow all instructions on the label. · Drink plenty of fluids. If you have kidney, heart, or liver disease and have to limit fluids, talk with your doctor before you increase the amount of fluids you drink. · Get some exercise every day. Build up slowly to 30 to 60 minutes a day on 5 or more days of the week. · Schedule time each day for a bowel movement. Having a daily routine may help. Take your time and do not strain when having a bowel movement.   To reduce diarrhea  You may try giving up foods or drinks one at a time to see whether symptoms improve. Limit or avoid the following:  · Alcohol  · Caffeine, which is found in coffee, tea, cola drinks, and chocolate  · Nicotine, from smoking or chewing tobacco  · Gas-producing foods, such as beans, broccoli, cabbage, and apples  · Dairy products that contain lactose (milk sugar), such as ice cream and milk. · Foods and drinks high in sugar, especially fruit juice, soda, candy, and other packaged sweets (such as cookies)  · Foods high in fat, including leal, sausage, butter, oils, and anything deep-fried  · Sorbitol and xylitol, artificial sweeteners found in some sugarless candies and chewing gum  Keep track of foods  · Some people with IBS use a daily food diary to keep track of what they eat and whether they have any symptoms after eating certain foods. The diary also can be a good way to record what is going on in your life. · Stress plays a role in IBS. So if you are aware that certain stresses bring on symptoms, you can try to reduce those stresses. Keep mealtimes pleasant  · Try to maintain a pleasant environment when you eat. This may reduce stress that can make symptoms likely to occur. · Give yourself plenty of time to eat, rather than eating on the go. Chew your food slowly. Try not to swallow air, which can cause bloating. Where can you learn more? Go to https://SprainGopesarahSocietyOneeb.One2start. org and sign in to your [employer] Warrior account. Enter K391 in the [facility] box to learn more about \"Diet for Irritable Bowel Syndrome: Care Instructions. \"     If you do not have an account, please click on the \"Sign Up Now\" link. Current as of: September 8, 2021               Content Version: 13.1  © 2179-4985 Healthwise, [employer]. Care instructions adapted under license by [facility] ([facility]).  If you have questions about a medical condition or this instruction, always ask your healthcare professional. Wyatt De Leon any warranty or liability for your use of this information. Patient Education        Chronic Pain: Care Instructions  Your Care Instructions     Chronic pain is pain that lasts a long time (months or even years) and may or may not have a clear cause. It is different from acute pain, which usually does have a clear cause--like an injury or illness--and gets better over time. Chronic pain:  · Lasts over time but may vary from day to day. · Does not go away despite efforts to end it. · May disrupt your sleep and lead to fatigue. · May cause depression or anxiety. · May make your muscles tense, causing more pain. · Can disrupt your work, hobbies, home life, and relationships with friends and family. Chronic pain is a very real condition. It is not just in your head. Treatment can help and usually includes several methods used together, such as medicines, physical therapy, exercise, and other treatments. Learning how to relax and changing negative thought patterns can also help you cope. Chronic pain is complex. Taking an active role in your treatment will help you better manage your pain. Tell your doctor if you have trouble dealing with your pain. You may have to try several things before you find what works best for you. Follow-up care is a key part of your treatment and safety. Be sure to make and go to all appointments, and call your doctor if you are having problems. It's also a good idea to know your test results and keep a list of the medicines you take. How can you care for yourself at home? · Pace yourself. Break up large jobs into smaller tasks. Save harder tasks for days when you have less pain, or go back and forth between hard tasks and easier ones. Take rest breaks. · Relax, and reduce stress. Relaxation techniques such as deep breathing or meditation can help. · Keep moving. Gentle, daily exercise can help reduce pain over the long run. Try low- or no-impact exercises such as walking, swimming, and stationary biking.  Do stretches to stay flexible. · Try heat, cold packs, and massage. · Get enough sleep. Chronic pain can make you tired and drain your energy. Talk with your doctor if you have trouble sleeping because of pain. · Think positive. Your thoughts can affect your pain level. Do things that you enjoy to distract yourself when you have pain instead of focusing on the pain. See a movie, read a book, listen to music, or spend time with a friend. · If you think you are depressed, talk to your doctor about treatment. · Keep a daily pain diary. Record how your moods, thoughts, sleep patterns, activities, and medicine affect your pain. You may find that your pain is worse during or after certain activities or when you are feeling a certain emotion. Having a record of your pain can help you and your doctor find the best ways to treat your pain. · Take pain medicines exactly as directed. ? If the doctor gave you a prescription medicine for pain, take it as prescribed. ? If you are not taking a prescription pain medicine, ask your doctor if you can take an over-the-counter medicine. Reducing constipation caused by pain medicine  · Talk to your doctor about a laxative. If a laxative doesn't work, your doctor may suggest a prescription medicine. · Include fruits, vegetables, beans, and whole grains in your diet each day. These foods are high in fiber. · If your doctor recommends it, get more exercise. Walking is a good choice. Bit by bit, increase the amount you walk every day. Try for at least 30 minutes on most days of the week. · Schedule time each day for a bowel movement. A daily routine may help. Take your time and do not strain when having a bowel movement. When should you call for help? Call your doctor now or seek immediate medical care if:    · Your pain gets worse or is out of control.     · You feel down or blue, or you do not enjoy things like you once did.  You may be depressed, which is common in people with chronic pain. Depression can be treated.     · You have vomiting or cramps for more than 2 hours. Watch closely for changes in your health, and be sure to contact your doctor if:    · You cannot sleep because of pain.     · You are very worried or anxious about your pain.     · You have trouble taking your pain medicine.     · You have any concerns about your pain medicine.     · You have trouble with bowel movements, such as:  ? No bowel movement in 3 days. ? Blood in the anal area, in your stool, or on the toilet paper. ? Diarrhea for more than 24 hours. Where can you learn more? Go to https://PlotWattpepiceweb.Playnery. org and sign in to your Tapactive account. Enter N004 in the [employer] box to learn more about \"Chronic Pain: Care Instructions. \"     If you do not have an account, please click on the \"Sign Up Now\" link. Current as of: April 8, 2021               Content Version: 13.1  © 2006-2021 [employer] Care instructions adapted under license by [facility] ([facility]). If you have questions about a medical condition or this instruction, always ask your healthcare professional. [place] [address] any warranty or liability for your use of this information. Patient Education        Learning About Obesity  What is obesity? Obesity means having an unhealthy amount of body fat. This puts your health in danger. It can lead to other health problems, such as type 2 diabetes and high blood pressure. How do you know if your weight is in the obesity range? To know if your weight is in the obesity range, your doctor looks at your body mass index (BMI) and waist size. BMI is a number that is calculated from your weight and your height. To figure out your BMI for yourself, you can use an online tool, such as http://www.barrios.com/ on the [employer].   If your BMI is 30.0 or higher, it falls within the These steps are what keep you going from day to day. Talk with your doctor about other weight-loss options. If you have a BMI in a certain range and have not been able to lose weight with diet and exercise, medicine or surgery may be an option for you. Before your doctor will prescribe medicines or surgery, he or she will probably want you to be more active and follow your healthy eating plan for a period of time. These habits are key lifelong changes for managing your weight, with or without other medical treatment. And these changes can help you avoid weight-related health problems. How can you stay on your plan for change? Be ready. Choose to start during a time when there are few events like holidays, social events, and high-stress periods. These events might trigger slip-ups. Decide on your first few steps. Most people have more success when they make small changes, one step at a time. For example, you might switch a daily candy bar to a piece of fruit, walk 10 minutes more, or add more vegetables to a meal.  Line up your support people. Make sure you're not going to be alone as you make this change. Connect with people who understand how important it is to you. Ask family members and friends for help in keeping with your plan. And think about who could make it harder for you, and how to handle them. Try tracking. People who keep track of what they eat, feel, and do are better at losing weight. Try writing down things like:  · What and how much you eat. · How you feel before and after each meal.  · Details about each meal (like eating out or at home, eating alone, or with friends or family). · What you do to be active. Look and plan. As you track, look for patterns that you may want to change. Take note of:  · When you eat and whether you skip meals. · How often you eat out. · How many fruits and vegetables you eat. · When you eat beyond feeling full.   · When and why you eat for reasons other than being hungry. When you stray from your plan, don't get upset. Figure out what made you slip up and how you can fix it. Can you take medicines or have surgery to lose weight? If you have a BMI in a certain range and have not been able to lose weight with diet and exercise, medicine or surgery may be an option for you. If you have a BMI of at least 30.0 (or a BMI of at least 27.0 and another health problem related to your weight), ask your doctor about weight-loss medicines. They work by making you feel less hungry, making you feel full more quickly, or changing how you digest fat. Medicines are used along with diet changes and more physical activity to help you make lasting changes. If you have a BMI of 40.0 or more (or a BMI of 35.0 or more and another health problem related to your weight), your doctor may talk with you about surgery. Weight-loss surgery has risks, and you will need to work with your doctor to compare the risk of having obesity with the risks of surgery. With any option you choose, you will still need to eat a healthy diet and get regular exercise. Follow-up care is a key part of your treatment and safety. Be sure to make and go to all appointments, and call your doctor if you are having problems. It's also a good idea to know your test results and keep a list of the medicines you take. Where can you learn more? Go to https://chpesaraheweb.Reachpod - Inovaktif Bilisim. org and sign in to your Playtox account. Enter N111 in the [facility] box to learn more about \"Learning About Obesity. \"     If you do not have an account, please click on the \"Sign Up Now\" link. Current as of: March 17, 2021               Content Version: 13.1  © 2846-2290 Healthwise, Incorporated. Care instructions adapted under license by [facility] ([facility]).  If you have questions about a medical condition or this instruction, always ask your healthcare professional. Jorge Alberto Antonio disclaims any warranty or liability for your use of this information.

## 2022-01-10 NOTE — PROGRESS NOTES
Jayne Salcido is a 64 y.o. female who presents today for   Chief Complaint   Patient presents with    Hypertension    Hyperlipidemia    Back Pain       Hypertension  Pertinent negatives include no chest pain, headaches, neck pain, palpitations or shortness of breath. Hyperlipidemia  Associated symptoms include myalgias. Pertinent negatives include no chest pain or shortness of breath. Back Pain  Pertinent negatives include no abdominal pain, chest pain, dysuria, fever, headaches, numbness or weakness. 63 y/o female here for follow up on HTN, mixed hyperlipidemia, IBS with controlled med refill, and obesity. Her librax she had been taking for years for IBS-D and anxiety is now $1000 for 90 days and she cannot afford this medicine. Her IBS has been a little worse so that she has more diarrhea and anxiety is worse also. She is still having moderate to severe LBP with radiculopathy and muscle spasms in her back. She did not feel good when she took the tizanidine for muscle spasms so she is not taking it. She also decided to stop the gabapentin and says she did not feel good when she was taking that medicine either. She is still walking with a cane when she ambulates. She had an EMG last year which showed a mild sensory polyneuropathy RLE and L4-L5 radiculopathy RLE. Hypertension  Patient is here for follow-up of elevated blood pressure. She is not exercising and is adherent to a low-salt diet. Patient is checking blood pressure at home. Blood pressure is controlled. Cardiac symptoms: none. Patient denies chest pain, dyspnea, irregular heart beat, orthopnea, paroxysmal nocturnal dyspnea and syncope. Use of agents associated with hypertension: estrogens. History of target organ damage: elevated creatinine level recently. Jayne Salcido is here for follow up of dyslipidemia. Compliance with treatment has been good. The patient exercises rarely.  Patient denies muscle pain associated with their medications which include simvastatin. BMI Readings from Last 3 Encounters:   01/10/22 35.66 kg/m²   09/07/21 33.92 kg/m²   03/30/21 33.91 kg/m²     Wt Readings from Last 3 Encounters:   01/10/22 234 lb 8 oz (106.4 kg)   09/07/21 226 lb 6 oz (102.7 kg)   03/30/21 223 lb (101.2 kg)         Review of Systems   Constitutional: Positive for fatigue. Negative for appetite change, chills and fever. HENT: Negative for ear pain, rhinorrhea, sinus pressure, sore throat and voice change. Eyes: Negative for pain, discharge and redness. Respiratory: Negative for cough, chest tightness, shortness of breath and wheezing. Cardiovascular: Negative for chest pain and palpitations. Gastrointestinal: Negative for abdominal pain, blood in stool, diarrhea and vomiting.        +history of IBS which is exacerbated somewhat currently   Endocrine: Negative for cold intolerance, heat intolerance and polydipsia. Genitourinary: Negative for dysuria and hematuria. Musculoskeletal: Positive for arthralgias, back pain, gait problem and myalgias. Negative for neck pain and neck stiffness. See HPI   Skin: Negative for color change and rash. Neurological: Negative for dizziness, tremors, syncope, speech difficulty, weakness, numbness and headaches. Hematological: Negative for adenopathy. Does not bruise/bleed easily. Psychiatric/Behavioral: Positive for dysphoric mood and sleep disturbance. Negative for confusion, self-injury and suicidal ideas. The patient is nervous/anxious. See HPI   All other systems reviewed and are negative.       Past Medical History:   Diagnosis Date    Anal fissure     Anxiety 9/15/2017    C. difficile colitis     Cervical radiculopathy 9/15/2017    Chronic back pain     Chronic kidney disease     Class 1 obesity due to excess calories with serious comorbidity and body mass index (BMI) of 32.0 to 32.9 in adult 9/15/2017    Clostridium difficile colitis 2/16/2020    Cyst of kidney, acquired 9/15/2017    Depression 9/15/2017    History of oral surgery     Hyperlipidemia     Hypertension     IBS (irritable bowel syndrome) 9/15/2017    Irregular heartbeat     Obesity 9/15/2017    Osteoarthritis     PONV (postoperative nausea and vomiting)     Retinal detachment        Current Outpatient Medications   Medication Sig Dispense Refill    DULoxetine (CYMBALTA) 30 MG extended release capsule Take 1 capsule by mouth nightly 30 capsule 2    atorvastatin (LIPITOR) 20 MG tablet Take 1 tablet by mouth daily 30 tablet 5    hyoscyamine (ANASPAZ;LEVSIN) 125 MCG tablet Take 1 tablet by mouth every 6 hours as needed for Cramping or Diarrhea 90 tablet 3    metoprolol tartrate (LOPRESSOR) 50 MG tablet Take 1 tablet by mouth 2 times daily TAKE 1 TABLET BY MOUTH 2 TIMES A DAY 60 tablet 5    triamterene-hydroCHLOROthiazide (MAXZIDE-25) 37.5-25 MG per tablet Take 1 tablet by mouth daily TAKE 1-2 TABLETS BY MOUTH ONCE TIME DAILY. 60 tablet 5    dilTIAZem (CARDIZEM CD) 240 MG extended release capsule TAKE 1 CAPSULE BY MOUTH ONE TIME DAILY 90 capsule 3    Probiotic Product (PROBIOTIC-10 PO) Take 1 capsule by mouth daily      Estradiol-Estriol-Progesterone (BIEST/PROGESTERONE TD) Take 1 capsule by mouth nightly      Multiple Vitamins-Minerals (WOMENS MULTIVITAMIN PO) Take 1 tablet by mouth daily      calcium-vitamin D (OSCAL-500) 500-200 MG-UNIT per tablet Take 1 tablet by mouth daily (Patient not taking: Reported on 1/10/2022)       No current facility-administered medications for this visit.        Allergies   Allergen Reactions    Latex Rash    Amoxicillin Shortness Of Breath and Nausea And Vomiting    Anaprox [Naproxen Sodium] Shortness Of Breath and Nausea And Vomiting    Aloe Vera      Other reaction(s): Unknown    Nortriptyline Hcl      Doesn't remember      Augmentin [Amoxicillin-Pot Clavulanate] Nausea And Vomiting           Buspar [Buspirone] Nausea And Vomiting    Vibramycin [Doxycycline Calcium] Nausea And Vomiting       Past Surgical History:   Procedure Laterality Date    APPENDECTOMY       SECTION      EYE SURGERY      HYSTERECTOMY      HYSTERECTOMY, TOTAL ABDOMINAL      LUMBAR FUSION N/A 2020    TLIF OF L4/5. L5-S1 performed by Chucho Lea DO at [address] [place] [address] RETINAL DETACHMENT SURGERY         Social History     Tobacco Use    Smoking status: Never Smoker    Smokeless tobacco: Never Used   Vaping Use    Vaping Use: Never used   Substance Use Topics    Alcohol use: No    Drug use: No       Family History   Problem Relation Age of Onset    Diabetes Mother     High Blood Pressure Mother     Heart Attack Mother     Kidney Disease Father     Stroke Father     Other Father         renal failure    Diabetes Maternal Grandmother     Colon Cancer Maternal Grandmother     Heart Disease Maternal Grandfather     Cancer Paternal Grandmother         colon cancer    Alcohol Abuse Sister     Cirrhosis Sister     Stroke Paternal Aunt     Colon Cancer Maternal Cousin        BP (!) 147/86   Pulse 73   Temp 97.6 °F (36.4 °C)   Ht 5' 8\" (1.727 m)   Wt 234 lb 8 oz (106.4 kg)   SpO2 98%   BMI 35.66 kg/m²     Physical Exam  Vitals and nursing note reviewed. Constitutional:       General: She is not in acute distress. Appearance: Normal appearance. She is well-developed and well-groomed. She is obese. She is not ill-appearing, toxic-appearing or diaphoretic. Comments: Appears tired and uncomfortable due to back pain   HENT:      Head: Normocephalic and atraumatic. Right Ear: Tympanic membrane, ear canal and external ear normal.      Left Ear: Tympanic membrane, ear canal and external ear normal.      Nose: Nose normal.      Mouth/Throat:      Lips: Pink. Mouth: Mucous membranes are moist. No oral lesions. Tongue: No lesions. Palate: No mass and lesions. Pharynx: Oropharynx is clear. Uvula midline.  No pharyngeal swelling, oropharyngeal exudate, posterior oropharyngeal erythema or uvula swelling. Tonsils: 1+ on the right. 1+ on the left. Eyes:      General: Lids are normal. Vision grossly intact. No scleral icterus. Extraocular Movements: Extraocular movements intact. Conjunctiva/sclera: Conjunctivae normal.      Pupils: Pupils are equal, round, and reactive to light. Neck:      Thyroid: No thyroid mass or thyromegaly. Vascular: No carotid bruit or JVD. Trachea: Trachea and phonation normal.   Cardiovascular:      Rate and Rhythm: Normal rate and regular rhythm. Pulses: Normal pulses. Radial pulses are 2+ on the right side and 2+ on the left side. Posterior tibial pulses are 2+ on the right side and 2+ on the left side. Heart sounds: Normal heart sounds. No murmur heard. No friction rub. No gallop. Pulmonary:      Effort: Pulmonary effort is normal. No accessory muscle usage. Breath sounds: Normal breath sounds. No decreased breath sounds, wheezing, rhonchi or rales. Chest:   Breasts:      Right: No supraclavicular adenopathy. Left: No supraclavicular adenopathy. Abdominal:      General: Abdomen is flat. Bowel sounds are normal. There is no distension. Palpations: Abdomen is soft. There is no hepatomegaly, splenomegaly or mass. Tenderness: There is no abdominal tenderness. There is no guarding or rebound. Hernia: No hernia is present. Musculoskeletal:      Right wrist: Normal.      Left wrist: Normal.      Cervical back: Normal range of motion and neck supple. Thoracic back: Decreased range of motion. Lumbar back: Spasms and tenderness present. Decreased range of motion. Right lower leg: No edema. Left lower leg: No edema. Right ankle: Normal.      Left ankle: Normal.   Lymphadenopathy:      Head:      Right side of head: No submandibular adenopathy. Left side of head: No submandibular adenopathy.       Cervical: No cervical adenopathy. Right cervical: No superficial, deep or posterior cervical adenopathy. Left cervical: No superficial, deep or posterior cervical adenopathy. Upper Body:      Right upper body: No supraclavicular adenopathy. Left upper body: No supraclavicular adenopathy. Lower Body: No right inguinal adenopathy. No left inguinal adenopathy. Skin:     General: Skin is warm and dry. Capillary Refill: Capillary refill takes less than 2 seconds. Coloration: Skin is not cyanotic. Findings: No rash. Nails: There is no clubbing. Neurological:      Mental Status: She is alert and oriented to person, place, and time. Cranial Nerves: No cranial nerve deficit, dysarthria or facial asymmetry. Sensory: Sensation is intact. Motor: Motor function is intact. No weakness, tremor, atrophy or abnormal muscle tone. Coordination: Coordination is intact. Romberg sign negative. Coordination normal.      Gait: Gait is intact. Deep Tendon Reflexes: Reflexes are normal and symmetric. Reflex Scores:       Patellar reflexes are 2+ on the right side and 2+ on the left side. Comments: CN II-XII grossly intact, speech clear, MAEW, no focal deficits   Psychiatric:         Attention and Perception: Attention and perception normal.         Mood and Affect: Mood is anxious and depressed. Speech: Speech normal.         Behavior: Behavior normal. Behavior is cooperative. Thought Content:  Thought content normal.         Cognition and Memory: Cognition and memory normal.         Judgment: Judgment normal.      Comments: Affect congruent with mood         Lab Results   Component Value Date    WBC 10.1 09/07/2021    HGB 12.8 09/07/2021    HCT 41.2 09/07/2021    MCV 85.3 09/07/2021     09/07/2021    LABLYMP 3.35 02/22/2014    LYMPHOPCT 31.7 09/07/2021    RBC 4.83 09/07/2021    MCH 26.5 (L) 09/07/2021    MCHC 31.1 (L) 09/07/2021    RDW 13.9 09/07/2021     Lab Results   Component Value Date    IRON 100 09/07/2021    TIBC 338 09/07/2021     Lab Results   Component Value Date     01/10/2022    K 4.4 01/10/2022     01/10/2022    CO2 28 01/10/2022    BUN 17 01/10/2022    CREATININE 1.0 (H) 01/10/2022    GLUCOSE 123 (H) 01/10/2022    CALCIUM 9.7 01/10/2022    PROT 7.1 01/10/2022    LABALBU 4.3 01/10/2022    BILITOT 0.3 01/10/2022    ALKPHOS 80 01/10/2022    AST 25 01/10/2022    ALT 23 01/10/2022    LABGLOM 56 (A) 01/10/2022    GFRAA >59 01/10/2022     Lab Results   Component Value Date    LABA1C 6.2 (H) 01/10/2022     Lab Results   Component Value Date    CHOL 196 01/10/2022    CHOL 179 09/07/2021    CHOL 187 03/04/2021     Lab Results   Component Value Date    TRIG 206 (H) 01/10/2022    TRIG 199 (H) 09/07/2021    TRIG 203 (H) 03/04/2021     Lab Results   Component Value Date    HDL 57 (L) 01/10/2022    HDL 57 (L) 09/07/2021    HDL 53 (L) 03/04/2021     Lab Results   Component Value Date    LDLCALC 98 01/10/2022    LDLCALC 82 09/07/2021    LDLCALC 93 03/04/2021     No results found for: LABVLDL, VLDL  No results found for: CHOLHDLRATIO    No results found for this visit on 01/10/22. Assessment:    ICD-10-CM    1. Essential hypertension, benign  I10    2. Mixed hyperlipidemia  E78.2 atorvastatin (LIPITOR) 20 MG tablet   3. Mild major depression (HCC)  F32.0 DULoxetine (CYMBALTA) 30 MG extended release capsule   4. Hyperglycemia  R73.9    5. GARRETT (generalized anxiety disorder)  F41.1 DULoxetine (CYMBALTA) 30 MG extended release capsule   6. Chronic bilateral low back pain with sciatica, sciatica laterality unspecified  M54.40 DULoxetine (CYMBALTA) 30 MG extended release capsule    G89.29    7. Stage 3a chronic kidney disease (HCC)  N18.31    8. Irritable bowel syndrome with diarrhea  K58.0 hyoscyamine (ANASPAZ;LEVSIN) 125 MCG tablet   9.  Flu vaccine need  Z23 INFLUENZA, MDCK QUADV, 2 YRS AND OLDER, IM, PF, PREFILL SYR OR SDV, 0.5ML (FLUCELVAX QUADV, PF)   10. Class 2 severe obesity due to excess calories with serious comorbidity and body mass index (BMI) of 35.0 to 35.9 in adult [facility])  E66.01     Z68.35        Plan:  Lien Kim was seen today for hypertension, hyperlipidemia and back pain. Diagnoses and all orders for this visit:    Essential hypertension, benign    Mixed hyperlipidemia  -     atorvastatin (LIPITOR) 20 MG tablet; Take 1 tablet by mouth daily    Mild major depression (HCC)  -     DULoxetine (CYMBALTA) 30 MG extended release capsule; Take 1 capsule by mouth nightly    Hyperglycemia    GARRETT (generalized anxiety disorder)  -     DULoxetine (CYMBALTA) 30 MG extended release capsule; Take 1 capsule by mouth nightly    Chronic bilateral low back pain with sciatica, sciatica laterality unspecified  -     DULoxetine (CYMBALTA) 30 MG extended release capsule; Take 1 capsule by mouth nightly    Stage 3a chronic kidney disease (HCC)    Irritable bowel syndrome with diarrhea  -     hyoscyamine (ANASPAZ;LEVSIN) 125 MCG tablet; Take 1 tablet by mouth every 6 hours as needed for Cramping or Diarrhea    Flu vaccine need  -     INFLUENZA, MDCK QUADV, 2 YRS AND OLDER, IM, PF, PREFILL SYR OR SDV, 0.5ML (FLUCELVAX QUADV, PF)    Class 2 severe obesity due to excess calories with serious comorbidity and body mass index (BMI) of 35.0 to 35.9 in adult [facility])    Labs reviewed with patient. Refills Provided. -Hypertension well controlled. Continue current medications. Encouraged efforts at well balanced diet, exercise, and weight loss. -Mixed Hyperlipidemia-inadequately controlled and patient having significant back pain and given she is on simvastatin which can cause more muscle and joint pain in women as they get older, patient may benefit from change to atorvastatin. Low cholesterol diet, exercise, and weight loss encouraged.    -CKD IIIa which is stable, patient has been referred to Nephrology to further evaluate, handout on medications to avoid previously provided.   -Chronic LBP with muscle spasms-inadequately controlled but patient does not want to take controlled medications for pain, needs to avoid oral NSAIDs due to CKD, and she did not tolerate muscle relaxer either. She does not want to try a different muscle relaxer at this time but starting duloxetine for major depressive disorder and generalized anxiety disorder may help with mood and pain control which patient is open to trying today. -IBS-trial of hyoscyamine prn to replace librax  -Hyperglycemia and Obesity due to excess caloric intake-blood sugars stable but no improvement in obesity. Continue/Increase efforts at low carbohydrate diet, exercise, and weight loss/efforts at normalizing BMI. -influenza vaccine updated today, covid-19 booster recommended also   Return in about 6 weeks (around 2/21/2022) for recheck mood. Over 50% of the total visit time of 40 min was spent on counseling and/or coordination of care of:   1. Essential hypertension, benign    2. Mixed hyperlipidemia    3. Mild major depression ([facility] [address]    4. Hyperglycemia    5. GARRETT (generalized anxiety disorder)    6. Chronic bilateral low back pain with sciatica, sciatica laterality unspecified    7. Stage 3a chronic kidney disease ([facility] [address]    8. Irritable bowel syndrome with diarrhea    9. Flu vaccine need    10.  Class 2 severe obesity due to excess calories with serious comorbidity and body mass index (BMI) of 35.0 to 35.9 in adult [facility])         Orders Placed This Encounter   Procedures    INFLUENZA, MDCK QUADV, 2 YRS AND OLDER, IM, PF, PREFILL SYR OR SDV, 0.5ML (FLUCELVAX QUADV, PF)     Orders Placed This Encounter   Medications    DULoxetine (CYMBALTA) 30 MG extended release capsule     Sig: Take 1 capsule by mouth nightly     Dispense:  30 capsule     Refill:  2    atorvastatin (LIPITOR) 20 MG tablet     Sig: Take 1 tablet by mouth daily     Dispense:  30 tablet     Refill:  5    hyoscyamine (ANASPAZ;LEVSIN) 125 MCG tablet     Sig: Take 1 tablet by mouth every 6 hours as needed for Cramping or Diarrhea     Dispense:  90 tablet     Refill:  3     Medications Discontinued During This Encounter   Medication Reason    chlordiazePOXIDE-clidinium (LIBRAX) 5-2.5 MG per capsule Cost of medication    ondansetron (ZOFRAN) 4 MG tablet LIST CLEANUP    tiZANidine (ZANAFLEX) 4 MG tablet Side effects    simvastatin (ZOCOR) 40 MG tablet Side effects     There are no Patient Instructions on file for this visit. Patient voices understanding and agrees to plans along with risks and benefits of plan. Counseling:  Pinky IRVING'E case, medications and options were discussed in detail. patient was instructed to call the office if she   questions regarding her treatment. Should her conditions worsen, she should return to office to be reassessed by Dr. Manuelito Gold. she  Should to go the closest Emergency Department for any emergency. They verbalized understanding the above instructions.

## 2022-01-11 DIAGNOSIS — F41.1 GAD (GENERALIZED ANXIETY DISORDER): ICD-10-CM

## 2022-01-11 DIAGNOSIS — I10 ESSENTIAL HYPERTENSION, BENIGN: ICD-10-CM

## 2022-01-11 DIAGNOSIS — M54.40 CHRONIC BILATERAL LOW BACK PAIN WITH SCIATICA, SCIATICA LATERALITY UNSPECIFIED: ICD-10-CM

## 2022-01-11 DIAGNOSIS — G89.29 CHRONIC BILATERAL LOW BACK PAIN WITH SCIATICA, SCIATICA LATERALITY UNSPECIFIED: ICD-10-CM

## 2022-01-11 DIAGNOSIS — F32.0 MILD MAJOR DEPRESSION (HCC): ICD-10-CM

## 2022-01-11 RX ORDER — DULOXETIN HYDROCHLORIDE 30 MG/1
30 CAPSULE, DELAYED RELEASE ORAL NIGHTLY
Qty: 90 CAPSULE | Refills: 0 | Status: SHIPPED | OUTPATIENT
Start: 2022-01-11 | End: 2022-01-17 | Stop reason: SINTOL

## 2022-01-11 RX ORDER — DILTIAZEM HYDROCHLORIDE 240 MG/1
CAPSULE, COATED, EXTENDED RELEASE ORAL
Qty: 90 CAPSULE | Refills: 3 | Status: SHIPPED | OUTPATIENT
Start: 2022-01-11

## 2022-01-11 NOTE — TELEPHONE ENCOUNTER
The patient called and said she needed a refill of her diltiazem sent to the pharmacy. The pharmacy told her there was an issue with the Duloxetine prescription as well but she wasn't sure exactly what it was. I called [facility] in [place] and spoke with Ameliaρarnold MENDEZοσειtuckerώνarnold Stark, he said the only problem was her insurance will only pay for a 90 day supply of her medications.  I asked Niaφmonikaρarnold MENDEZοσειδώνοkellen Stark to cancel the Duloxetine refill from 1/10/22 and let him know I would send a 90 day refill to Dr. Luigi Wagner for approval.

## 2022-01-17 ENCOUNTER — TELEPHONE (OUTPATIENT)
Dept: FAMILY MEDICINE CLINIC | Age: 62
End: 2022-01-17

## 2022-01-17 DIAGNOSIS — F32.0 MILD MAJOR DEPRESSION (HCC): Primary | ICD-10-CM

## 2022-01-17 DIAGNOSIS — F41.1 GAD (GENERALIZED ANXIETY DISORDER): ICD-10-CM

## 2022-01-17 RX ORDER — FLUOXETINE 10 MG/1
10 CAPSULE ORAL DAILY
Qty: 30 CAPSULE | Refills: 2 | Status: SHIPPED | OUTPATIENT
Start: 2022-01-17 | End: 2022-04-01 | Stop reason: DRUGHIGH

## 2022-01-17 NOTE — TELEPHONE ENCOUNTER
Well, we would need to call her pharmacy and cancel it. I do not write for daily controlled meds like lorazepam for \"low dose daily anxiety medicine\" as I would usually use a medicine like fluoxetine or other similar medicines for depression and anxiety if it will be a daily medicine.

## 2022-01-17 NOTE — TELEPHONE ENCOUNTER
The patient called and said she has been vomiting and had diarrhea since she started the new depression medication. The patient is asking if she can have a mild anxiety medication sent in to [facility] in [facility].

## 2022-01-17 NOTE — TELEPHONE ENCOUNTER
Informed patient to stop taking the duloxetine as well as having the fluoxetine called in. She verbalized understanding and said she did not want to to the fluoxetine because she does not do well with taking antidepressants. She states she wants something low dose for anxiety. I informed her that I would let the doctor know.

## 2022-01-17 NOTE — TELEPHONE ENCOUNTER
Informed patient of this and she agreed to trying the 10mg fluoxetine but still states she just doesn't do well with antidepressants. I asked if did she want to try it or do I need to call the pharmacy to cancel tis medication. She said she will try it.

## 2022-01-18 ENCOUNTER — TELEPHONE (OUTPATIENT)
Dept: FAMILY MEDICINE CLINIC | Age: 62
End: 2022-01-18

## 2022-01-18 PROBLEM — R73.9 HYPERGLYCEMIA: Status: ACTIVE | Noted: 2022-01-18

## 2022-01-18 PROBLEM — F41.1 GAD (GENERALIZED ANXIETY DISORDER): Status: ACTIVE | Noted: 2022-01-18

## 2022-01-18 PROBLEM — F32.0 MILD MAJOR DEPRESSION (HCC): Status: ACTIVE | Noted: 2022-01-18

## 2022-02-09 DIAGNOSIS — I10 ESSENTIAL HYPERTENSION, BENIGN: ICD-10-CM

## 2022-02-09 RX ORDER — METOPROLOL TARTRATE 50 MG/1
TABLET, FILM COATED ORAL
Qty: 180 TABLET | Refills: 1 | Status: SHIPPED | OUTPATIENT
Start: 2022-02-09 | End: 2022-02-21 | Stop reason: DRUGHIGH

## 2022-02-09 NOTE — TELEPHONE ENCOUNTER
Josueyadira Hartmanstefano called to request a refill on her medication.       Last office visit : 1/10/2022   Next office visit : 2/21/2022     Requested Prescriptions     Pending Prescriptions Disp Refills    metoprolol tartrate (LOPRESSOR) 50 MG tablet [Pharmacy Med Name: METOPROLOL TARTRATE 50 MG TAB] 180 tablet 1     Sig: TAKE 1 TABLET BY MOUTH TWICE A DAY            Chayo Cm MA

## 2022-02-11 ENCOUNTER — APPOINTMENT (OUTPATIENT)
Dept: GENERAL RADIOLOGY | Facility: HOSPITAL | Age: 62
End: 2022-02-11

## 2022-02-11 ENCOUNTER — HOSPITAL ENCOUNTER (EMERGENCY)
Facility: HOSPITAL | Age: 62
Discharge: HOME | End: 2022-02-12
Attending: INTERNAL MEDICINE | Admitting: INTERNAL MEDICINE

## 2022-02-11 DIAGNOSIS — R07.2 PRECORDIAL PAIN: Primary | ICD-10-CM

## 2022-02-11 LAB
ALBUMIN SERPL-MCNC: 4.1 G/DL (ref 3.5–5.2)
ALBUMIN/GLOB SERPL: 1.5 G/DL
ALP SERPL-CCNC: 71 U/L (ref 39–117)
ALT SERPL W P-5'-P-CCNC: 23 U/L (ref 1–33)
ANION GAP SERPL CALCULATED.3IONS-SCNC: 11 MMOL/L (ref 5–15)
AST SERPL-CCNC: 22 U/L (ref 1–32)
BASOPHILS # BLD AUTO: 0.05 10*3/MM3 (ref 0–0.2)
BASOPHILS NFR BLD AUTO: 0.4 % (ref 0–1.5)
BILIRUB SERPL-MCNC: <0.2 MG/DL (ref 0–1.2)
BUN SERPL-MCNC: 17 MG/DL (ref 8–23)
BUN/CREAT SERPL: 16 (ref 7–25)
CALCIUM SPEC-SCNC: 9.2 MG/DL (ref 8.6–10.5)
CHLORIDE SERPL-SCNC: 103 MMOL/L (ref 98–107)
CO2 SERPL-SCNC: 28 MMOL/L (ref 22–29)
CREAT SERPL-MCNC: 1.06 MG/DL (ref 0.57–1)
DEPRECATED RDW RBC AUTO: 41.3 FL (ref 37–54)
EOSINOPHIL # BLD AUTO: 0.24 10*3/MM3 (ref 0–0.4)
EOSINOPHIL NFR BLD AUTO: 1.9 % (ref 0.3–6.2)
ERYTHROCYTE [DISTWIDTH] IN BLOOD BY AUTOMATED COUNT: 13.8 % (ref 12.3–15.4)
GFR SERPL CREATININE-BSD FRML MDRD: 53 ML/MIN/1.73
GLOBULIN UR ELPH-MCNC: 2.8 GM/DL
GLUCOSE SERPL-MCNC: 168 MG/DL (ref 65–99)
HCT VFR BLD AUTO: 39.2 % (ref 34–46.6)
HGB BLD-MCNC: 12.2 G/DL (ref 12–15.9)
IMM GRANULOCYTES # BLD AUTO: 0.04 10*3/MM3 (ref 0–0.05)
IMM GRANULOCYTES NFR BLD AUTO: 0.3 % (ref 0–0.5)
LYMPHOCYTES # BLD AUTO: 2.48 10*3/MM3 (ref 0.7–3.1)
LYMPHOCYTES NFR BLD AUTO: 20 % (ref 19.6–45.3)
MCH RBC QN AUTO: 25.8 PG (ref 26.6–33)
MCHC RBC AUTO-ENTMCNC: 31.1 G/DL (ref 31.5–35.7)
MCV RBC AUTO: 82.9 FL (ref 79–97)
MONOCYTES # BLD AUTO: 1.01 10*3/MM3 (ref 0.1–0.9)
MONOCYTES NFR BLD AUTO: 8.2 % (ref 5–12)
NEUTROPHILS NFR BLD AUTO: 69.2 % (ref 42.7–76)
NEUTROPHILS NFR BLD AUTO: 8.55 10*3/MM3 (ref 1.7–7)
NRBC BLD AUTO-RTO: 0 /100 WBC (ref 0–0.2)
NT-PROBNP SERPL-MCNC: 201.4 PG/ML (ref 0–900)
PLATELET # BLD AUTO: 252 10*3/MM3 (ref 140–450)
PMV BLD AUTO: 11.6 FL (ref 6–12)
POTASSIUM SERPL-SCNC: 3.5 MMOL/L (ref 3.5–5.2)
PROT SERPL-MCNC: 6.9 G/DL (ref 6–8.5)
RBC # BLD AUTO: 4.73 10*6/MM3 (ref 3.77–5.28)
SARS-COV-2 RNA PNL SPEC NAA+PROBE: NOT DETECTED
SODIUM SERPL-SCNC: 142 MMOL/L (ref 136–145)
TROPONIN T SERPL-MCNC: <0.01 NG/ML (ref 0–0.03)
WBC NRBC COR # BLD: 12.37 10*3/MM3 (ref 3.4–10.8)

## 2022-02-11 PROCEDURE — 99283 EMERGENCY DEPT VISIT LOW MDM: CPT

## 2022-02-11 PROCEDURE — 87635 SARS-COV-2 COVID-19 AMP PRB: CPT | Performed by: INTERNAL MEDICINE

## 2022-02-11 PROCEDURE — 93005 ELECTROCARDIOGRAM TRACING: CPT | Performed by: INTERNAL MEDICINE

## 2022-02-11 PROCEDURE — 80053 COMPREHEN METABOLIC PANEL: CPT | Performed by: INTERNAL MEDICINE

## 2022-02-11 PROCEDURE — 93010 ELECTROCARDIOGRAM REPORT: CPT | Performed by: INTERNAL MEDICINE

## 2022-02-11 PROCEDURE — 71045 X-RAY EXAM CHEST 1 VIEW: CPT

## 2022-02-11 PROCEDURE — 84484 ASSAY OF TROPONIN QUANT: CPT | Performed by: INTERNAL MEDICINE

## 2022-02-11 PROCEDURE — 83880 ASSAY OF NATRIURETIC PEPTIDE: CPT | Performed by: INTERNAL MEDICINE

## 2022-02-11 PROCEDURE — 85025 COMPLETE CBC W/AUTO DIFF WBC: CPT | Performed by: INTERNAL MEDICINE

## 2022-02-11 RX ORDER — ASPIRIN 81 MG/1
324 TABLET, CHEWABLE ORAL ONCE
Status: COMPLETED | OUTPATIENT
Start: 2022-02-11 | End: 2022-02-12

## 2022-02-12 VITALS
WEIGHT: 205 LBS | OXYGEN SATURATION: 98 % | HEIGHT: 68 IN | DIASTOLIC BLOOD PRESSURE: 84 MMHG | SYSTOLIC BLOOD PRESSURE: 150 MMHG | HEART RATE: 66 BPM | RESPIRATION RATE: 18 BRPM | BODY MASS INDEX: 31.07 KG/M2 | TEMPERATURE: 98 F

## 2022-02-12 LAB — TROPONIN T SERPL-MCNC: <0.01 NG/ML (ref 0–0.03)

## 2022-02-12 RX ADMIN — ASPIRIN 324 MG: 81 TABLET, CHEWABLE ORAL at 00:15

## 2022-02-12 NOTE — ED PROVIDER NOTES
Subjective   Patient is a 61-year-old female who has known kidney disease obesity and has had previous back surgeries he states she is worried about the fact that she has had some left chest pain.  She states been intermittent nothing really seems to make it better or worse.  She states sometimes is a sharp shooting pain sometimes is more of a pressure she states it is irregular but is not bothering her currently.  She denies any fevers or chills.  She denies any traumatic event.          Review of Systems   Constitutional: Negative for chills, fatigue and fever.   HENT: Negative for congestion and facial swelling.    Eyes: Negative for photophobia, discharge and visual disturbance.   Respiratory: Negative for cough, shortness of breath and wheezing.    Cardiovascular: Positive for chest pain. Negative for palpitations and leg swelling.   Gastrointestinal: Negative for abdominal pain, diarrhea, nausea and vomiting.   Endocrine: Negative for cold intolerance and heat intolerance.   Genitourinary: Negative for difficulty urinating and urgency.   Musculoskeletal: Negative for arthralgias, joint swelling and myalgias.   Skin: Negative for color change and pallor.   Neurological: Negative for dizziness and light-headedness.   Hematological: Negative for adenopathy. Does not bruise/bleed easily.   Psychiatric/Behavioral: Negative for agitation, behavioral problems and confusion.       Past Medical History:   Diagnosis Date   • COPD (chronic obstructive pulmonary disease) ([facility])        Allergies   Allergen Reactions   • Amoxicillin Rash     Also N/V   • Augmentin [Amoxicillin-Pot Clavulanate] Rash   • Latex Rash       History reviewed. No pertinent surgical history.    History reviewed. No pertinent family history.    Social History     Socioeconomic History   • Marital status:    Tobacco Use   • Smoking status: Never Smoker   • Smokeless tobacco: Never Used   Vaping Use   • Vaping Use: Never used   Substance and  Sexual Activity   • Alcohol use: Defer   • Drug use: Never   • Sexual activity: Defer           Objective   Physical Exam  Vitals and nursing note reviewed.   Constitutional:       Appearance: Normal appearance. She is well-developed.   HENT:      Head: Normocephalic and atraumatic.   Eyes:      Extraocular Movements: Extraocular movements intact.      Conjunctiva/sclera: Conjunctivae normal.      Pupils: Pupils are equal, round, and reactive to light.   Cardiovascular:      Rate and Rhythm: Normal rate and regular rhythm.      Heart sounds: Normal heart sounds.   Pulmonary:      Effort: Pulmonary effort is normal.      Breath sounds: Normal breath sounds.   Abdominal:      General: Bowel sounds are normal. There is no distension.      Palpations: Abdomen is soft.   Musculoskeletal:         General: Normal range of motion.      Cervical back: Normal range of motion and neck supple.   Skin:     General: Skin is warm and dry.   Neurological:      General: No focal deficit present.      Mental Status: She is alert and oriented to person, place, and time.      Cranial Nerves: No cranial nerve deficit.   Psychiatric:         Behavior: Behavior normal.         Thought Content: Thought content normal.         Procedures           ED Course                                               HEART Score (for prediction of 6-week risk of major adverse cardiac event) reviewed and/or performed as part of the patient evaluation and treatment planning process.  The result associated with this review/performance is: 3       MDM    Final diagnoses:   Precordial pain       ED Disposition  ED Disposition     ED Disposition Condition Comment    Discharge Stable           Brianna English MD  [address] [place] [address] DR GOMEZ [address] [place] [address] [place] [address]  [phone]    In 3 days           Medication List      No changes were made to your prescriptions during this visit.          Thor Porter MD  02/12/22 7369

## 2022-02-13 LAB
QT INTERVAL: 412 MS
QT INTERVAL: 436 MS
QTC INTERVAL: 431 MS
QTC INTERVAL: 444 MS

## 2022-02-14 ENCOUNTER — APPOINTMENT (OUTPATIENT)
Dept: GENERAL RADIOLOGY | Facility: HOSPITAL | Age: 62
End: 2022-02-14

## 2022-02-14 ENCOUNTER — APPOINTMENT (OUTPATIENT)
Dept: CT IMAGING | Facility: HOSPITAL | Age: 62
End: 2022-02-14

## 2022-02-14 ENCOUNTER — HOSPITAL ENCOUNTER (EMERGENCY)
Facility: HOSPITAL | Age: 62
Discharge: HOME | End: 2022-02-14
Attending: EMERGENCY MEDICINE | Admitting: EMERGENCY MEDICINE

## 2022-02-14 VITALS
RESPIRATION RATE: 16 BRPM | TEMPERATURE: 98.7 F | DIASTOLIC BLOOD PRESSURE: 84 MMHG | WEIGHT: 232 LBS | OXYGEN SATURATION: 99 % | SYSTOLIC BLOOD PRESSURE: 168 MMHG | HEIGHT: 68 IN | HEART RATE: 69 BPM | BODY MASS INDEX: 35.16 KG/M2

## 2022-02-14 DIAGNOSIS — R07.2 PRECORDIAL PAIN: Primary | ICD-10-CM

## 2022-02-14 DIAGNOSIS — R11.0 NAUSEA: ICD-10-CM

## 2022-02-14 DIAGNOSIS — R10.13 EPIGASTRIC ABDOMINAL PAIN: ICD-10-CM

## 2022-02-14 DIAGNOSIS — R19.7 DIARRHEA, UNSPECIFIED TYPE: ICD-10-CM

## 2022-02-14 LAB
ALBUMIN SERPL-MCNC: 4.2 G/DL (ref 3.5–5.2)
ALBUMIN/GLOB SERPL: 1.5 G/DL
ALP SERPL-CCNC: 77 U/L (ref 39–117)
ALT SERPL W P-5'-P-CCNC: 23 U/L (ref 1–33)
ANION GAP SERPL CALCULATED.3IONS-SCNC: 10 MMOL/L (ref 5–15)
AST SERPL-CCNC: 24 U/L (ref 1–32)
BASOPHILS # BLD AUTO: 0.05 10*3/MM3 (ref 0–0.2)
BASOPHILS NFR BLD AUTO: 0.5 % (ref 0–1.5)
BILIRUB SERPL-MCNC: 0.3 MG/DL (ref 0–1.2)
BUN SERPL-MCNC: 11 MG/DL (ref 8–23)
BUN/CREAT SERPL: 12.4 (ref 7–25)
CALCIUM SPEC-SCNC: 9.3 MG/DL (ref 8.6–10.5)
CHLORIDE SERPL-SCNC: 104 MMOL/L (ref 98–107)
CO2 SERPL-SCNC: 28 MMOL/L (ref 22–29)
CREAT SERPL-MCNC: 0.89 MG/DL (ref 0.57–1)
D DIMER PPP FEU-MCNC: 0.41 MG/L (FEU) (ref 0–0.5)
DEPRECATED RDW RBC AUTO: 42.1 FL (ref 37–54)
EOSINOPHIL # BLD AUTO: 0.09 10*3/MM3 (ref 0–0.4)
EOSINOPHIL NFR BLD AUTO: 0.8 % (ref 0.3–6.2)
ERYTHROCYTE [DISTWIDTH] IN BLOOD BY AUTOMATED COUNT: 13.8 % (ref 12.3–15.4)
GFR SERPL CREATININE-BSD FRML MDRD: 64 ML/MIN/1.73
GLOBULIN UR ELPH-MCNC: 2.8 GM/DL
GLUCOSE SERPL-MCNC: 124 MG/DL (ref 65–99)
HCT VFR BLD AUTO: 42.3 % (ref 34–46.6)
HGB BLD-MCNC: 13 G/DL (ref 12–15.9)
IMM GRANULOCYTES # BLD AUTO: 0.05 10*3/MM3 (ref 0–0.05)
IMM GRANULOCYTES NFR BLD AUTO: 0.5 % (ref 0–0.5)
LIPASE SERPL-CCNC: 26 U/L (ref 13–60)
LYMPHOCYTES # BLD AUTO: 2.26 10*3/MM3 (ref 0.7–3.1)
LYMPHOCYTES NFR BLD AUTO: 20.8 % (ref 19.6–45.3)
MCH RBC QN AUTO: 25.8 PG (ref 26.6–33)
MCHC RBC AUTO-ENTMCNC: 30.7 G/DL (ref 31.5–35.7)
MCV RBC AUTO: 83.9 FL (ref 79–97)
MONOCYTES # BLD AUTO: 0.68 10*3/MM3 (ref 0.1–0.9)
MONOCYTES NFR BLD AUTO: 6.3 % (ref 5–12)
NEUTROPHILS NFR BLD AUTO: 7.71 10*3/MM3 (ref 1.7–7)
NEUTROPHILS NFR BLD AUTO: 71.1 % (ref 42.7–76)
NRBC BLD AUTO-RTO: 0 /100 WBC (ref 0–0.2)
PLATELET # BLD AUTO: 284 10*3/MM3 (ref 140–450)
PMV BLD AUTO: 11.6 FL (ref 6–12)
POTASSIUM SERPL-SCNC: 3.9 MMOL/L (ref 3.5–5.2)
PROT SERPL-MCNC: 7 G/DL (ref 6–8.5)
RBC # BLD AUTO: 5.04 10*6/MM3 (ref 3.77–5.28)
SARS-COV-2 RNA PNL SPEC NAA+PROBE: NOT DETECTED
SODIUM SERPL-SCNC: 142 MMOL/L (ref 136–145)
TROPONIN T SERPL-MCNC: <0.01 NG/ML (ref 0–0.03)
WBC NRBC COR # BLD: 10.84 10*3/MM3 (ref 3.4–10.8)

## 2022-02-14 PROCEDURE — 71045 X-RAY EXAM CHEST 1 VIEW: CPT

## 2022-02-14 PROCEDURE — 99283 EMERGENCY DEPT VISIT LOW MDM: CPT

## 2022-02-14 PROCEDURE — 93005 ELECTROCARDIOGRAM TRACING: CPT | Performed by: EMERGENCY MEDICINE

## 2022-02-14 PROCEDURE — 87635 SARS-COV-2 COVID-19 AMP PRB: CPT | Performed by: EMERGENCY MEDICINE

## 2022-02-14 PROCEDURE — 83690 ASSAY OF LIPASE: CPT | Performed by: EMERGENCY MEDICINE

## 2022-02-14 PROCEDURE — 25010000002 IOPAMIDOL 61 % SOLUTION: Performed by: EMERGENCY MEDICINE

## 2022-02-14 PROCEDURE — 93010 ELECTROCARDIOGRAM REPORT: CPT | Performed by: INTERNAL MEDICINE

## 2022-02-14 PROCEDURE — 74177 CT ABD & PELVIS W/CONTRAST: CPT

## 2022-02-14 PROCEDURE — 84484 ASSAY OF TROPONIN QUANT: CPT | Performed by: EMERGENCY MEDICINE

## 2022-02-14 PROCEDURE — 80053 COMPREHEN METABOLIC PANEL: CPT | Performed by: EMERGENCY MEDICINE

## 2022-02-14 PROCEDURE — 85025 COMPLETE CBC W/AUTO DIFF WBC: CPT | Performed by: EMERGENCY MEDICINE

## 2022-02-14 PROCEDURE — 85379 FIBRIN DEGRADATION QUANT: CPT | Performed by: EMERGENCY MEDICINE

## 2022-02-14 RX ORDER — ONDANSETRON 2 MG/ML
4 INJECTION INTRAMUSCULAR; INTRAVENOUS ONCE
Status: DISCONTINUED | OUTPATIENT
Start: 2022-02-14 | End: 2022-02-14 | Stop reason: HOSPADM

## 2022-02-14 RX ORDER — DICYCLOMINE HCL 20 MG
20 TABLET ORAL EVERY 8 HOURS PRN
Qty: 15 TABLET | Refills: 0 | Status: SHIPPED | OUTPATIENT
Start: 2022-02-14 | End: 2022-02-19

## 2022-02-14 RX ORDER — SODIUM CHLORIDE 0.9 % (FLUSH) 0.9 %
10 SYRINGE (ML) INJECTION AS NEEDED
Status: DISCONTINUED | OUTPATIENT
Start: 2022-02-14 | End: 2022-02-14 | Stop reason: HOSPADM

## 2022-02-14 RX ORDER — FAMOTIDINE 20 MG/1
20 TABLET, FILM COATED ORAL 2 TIMES DAILY
Qty: 20 TABLET | Refills: 0 | Status: SHIPPED | OUTPATIENT
Start: 2022-02-14 | End: 2022-02-24

## 2022-02-14 RX ADMIN — SODIUM CHLORIDE, POTASSIUM CHLORIDE, SODIUM LACTATE AND CALCIUM CHLORIDE 1000 ML: 600; 310; 30; 20 INJECTION, SOLUTION INTRAVENOUS at 12:35

## 2022-02-14 RX ADMIN — IOPAMIDOL 100 ML: 612 INJECTION, SOLUTION INTRAVENOUS at 13:58

## 2022-02-14 NOTE — ED PROVIDER NOTES
Emergency Medicine Provider Note    Subjective:    HISTORY OF PRESENT ILLNESS     This is a very pleasant 61 y.o. female with a past medical history of COPD who presents to the emergency department today with a chief complaint of nausea, epigastric pain, diarrhea, chest pain.  This is been going on for several days.  Constant.  Moderate.  Worse with eating.  Better with defecation.  Denies any blood in the diarrhea.  She is nauseous but not vomiting.  No fevers or chills.  She has a pain in her left chest as well as her epigastric region.  She was seen in this emergency department and evaluated for chest pain and discharged.  Of note, she had an adult stress echo with contrast in October 2021 which was low risk for induced myocardial ischemia.          History is obtained from the patient and chart review.       Review of Systems: All other systems are reviewed and are negative other than noted in the HPI.    Past Medical History:  Past Medical History:   Diagnosis Date   • Hyperlipidemia    • Hypertension    • Renal disorder        Allergies:  Allergies   Allergen Reactions   • Amoxicillin Rash     Also N/V   • Augmentin [Amoxicillin-Pot Clavulanate] Rash   • Latex Rash       Past Surgical History:  Past Surgical History:   Procedure Laterality Date   • BACK SURGERY         Family History:  History reviewed. No pertinent family history.    Social History:  Social History     Socioeconomic History   • Marital status:    Tobacco Use   • Smoking status: Never Smoker   • Smokeless tobacco: Never Used   Vaping Use   • Vaping Use: Never used   Substance and Sexual Activity   • Alcohol use: Not Currently   • Drug use: Never   • Sexual activity: Defer       Home Medications:  Prior to Admission medications    Not on File         Objective:    PHYSICAL EXAM     Vitals:   Vitals:    02/14/22 1433   BP: 168/84   Pulse: 69   Resp: 16   Temp: 98.7 °F (37.1 °C)   SpO2: 99%     GENERAL: Well appearing, in no acute  distress.   HEENT: Moist mucous membranes, oropharynx clear without lesions, exudates, thrush.   EYES: No scleral icterus, conjunctivae clear.   NECK: No cervical lymphadenopathy, no stiffness.  CARDIAC: Normal rate, regular rhythm, no murmurs, 2+ peripheral pulses in all four extremities, normal capillary refill.   PULMONARY: Normal work of breathing on room air, lungs are clear to auscultation bilaterally without wheezes, crackles, rhonchi.  ABDOMINAL: Normal bowel sounds, abdomen is soft, tender to palpation in the epigastric region with no rebound, no guarding, no tenderness elsewhere, non-distended, no hepatomegaly or splenomegaly.   MUSCULOSKELETAL: Normal range of motion, no lower extremity edema.  NEUROLOGIC: Alert and oriented x 3, EOM grossly intact and moves all four extremities with normal strength.  SKIN: Warm and dry without rashes.   PSYCHIATRIC: Mood and affect are normal.     PROCEDURES     Procedures    LAB AND RADIOLOGY RESULTS     Lab Results (last 24 hours)     Procedure Component Value Units Date/Time    CBC & Differential [[id number]]  (Abnormal) Collected: 02/14/22 1234    Specimen: Blood Updated: 02/14/22 1258    Narrative:      The following orders were created for panel order CBC & Differential.  Procedure                               Abnormality         Status                     ---------                               -----------         ------                     CBC Auto Differential[[id number]]        Abnormal            Final result                 Please view results for these tests on the individual orders.    Comprehensive Metabolic Panel [[id number]]  (Abnormal) Collected: 02/14/22 1234    Specimen: Blood Updated: 02/14/22 1320     Glucose 124 mg/dL      BUN 11 mg/dL      Creatinine 0.89 mg/dL      Sodium 142 mmol/L      Potassium 3.9 mmol/L      Chloride 104 mmol/L      CO2 28.0 mmol/L      Calcium 9.3 mg/dL      Total Protein 7.0 g/dL      Albumin 4.20 g/dL      ALT (SGPT) 23 U/L       AST (SGOT) 24 U/L      Alkaline Phosphatase 77 U/L      Total Bilirubin 0.3 mg/dL      eGFR Non African Amer 64 mL/min/1.73      Globulin 2.8 gm/dL      A/G Ratio 1.5 g/dL      BUN/Creatinine Ratio 12.4     Anion Gap 10.0 mmol/L     Narrative:      GFR Normal >60  Chronic Kidney Disease <60  Kidney Failure <15      Troponin [[id number]]  (Normal) Collected: 02/14/22 1234    Specimen: Blood Updated: 02/14/22 1318     Troponin T <0.010 ng/mL     Narrative:      Troponin T Reference Range:  <= 0.03 ng/mL-   Negative for AMI  >0.03 ng/mL-     Abnormal for myocardial necrosis.  Clinicians would have to utilize clinical acumen, EKG, Troponin and serial changes to determine if it is an Acute Myocardial Infarction or myocardial injury due to an underlying chronic condition.       Results may be falsely decreased if patient taking Biotin.      D-dimer, Quantitative [[id number]]  (Normal) Collected: 02/14/22 1234    Specimen: Blood Updated: 02/14/22 1311     D-Dimer, Quantitative 0.41 mg/L (FEU)     Narrative:      Reference Range is 0-0.50 mg/L FEU. However, results <0.50 mg/L FEU tends to rule out DVT or PE. Results >0.50 mg/L FEU are not useful in predicting absence or presence of DVT or PE.      Lipase [[id number]]  (Normal) Collected: 02/14/22 1234    Specimen: Blood Updated: 02/14/22 1315     Lipase 26 U/L     CBC Auto Differential [[id number]]  (Abnormal) Collected: 02/14/22 1234    Specimen: Blood Updated: 02/14/22 1258     WBC 10.84 10*3/mm3      RBC 5.04 10*6/mm3      Hemoglobin 13.0 g/dL      Hematocrit 42.3 %      MCV 83.9 fL      MCH 25.8 pg      MCHC 30.7 g/dL      RDW 13.8 %      RDW-SD 42.1 fl      MPV 11.6 fL      Platelets 284 10*3/mm3      Neutrophil % 71.1 %      Lymphocyte % 20.8 %      Monocyte % 6.3 %      Eosinophil % 0.8 %      Basophil % 0.5 %      Immature Grans % 0.5 %      Neutrophils, Absolute 7.71 10*3/mm3      Lymphocytes, Absolute 2.26 10*3/mm3      Monocytes, Absolute 0.68 10*3/mm3       Eosinophils, Absolute 0.09 10*3/mm3      Basophils, Absolute 0.05 10*3/mm3      Immature Grans, Absolute 0.05 10*3/mm3      nRBC 0.0 /100 WBC     COVID-19,[facility] IN-HOUSE,Nasal Swab No Transport Media 3-4 HR TAT - Swab, Nasal Cavity [[id number]]  (Normal) Collected: 02/14/22 1240    Specimen: Swab from Nasal Cavity Updated: 02/14/22 1339     COVID19 Not Detected    Narrative:      Fact sheet for providers: https://www.fda.gov/media/493236/download     Fact sheet for patients: https://www.fda.gov/media/263964/download    Test performed by PCR.    Consider negative results in combination with clinical observations, patient history, and epidemiological information.          CT Abdomen Pelvis With Contrast    Result Date: 2/14/2022  Narrative: EXAM: CT ABDOMEN PELVIS W CONTRAST-2/14/2022 2:02 PM CST INDICATION: Abdominal pain, acute, nonlocalized; R07.2-Precordial pain; R10.13-Epigastric pain; R11.0-Nausea; R19.7-Diarrhea, unspecified COMPARISON: None TECHNIQUE: Abdomen and pelvis were scanned utilizing a multidetector helical scanner from the diaphragm to the ischial tuberosities after administration of IV contrast. Coronal and sagittal reformations were obtained. [Routine protocol is performed.]  Radiation dose equals  mGy-cm.  Automated exposure control dose reduction technique was implemented.   FINDINGS:  LINES and TUBES: None.  LOWER THORAX: No focal consolidation, pleural effusion or pneumothorax. Coarse calcification in the right lower lobe.  HEPATOBILIARY:  No focal hepatic lesions. Hepatosteatosis. No liver laceration.   No biliary ductal dilatation.  GALLBLADDER:  No radiopaque stones or sludge.   No wall thickening.  SPLEEN:  No splenomegaly.    No splenic laceration.  PANCREAS:  No focal masses or ductal dilatation.  ADRENALS:  No adrenal nodules.  KIDNEYS/URETERS:  Kidneys enhance symmetrically.   No hydronephrosis.  Bilateral renal cysts, largest measuring 7.5 cm in the right lower pole..  No  stones.  GI TRACT:  No abnormal distention, wall thickening, or evidence of bowel obstruction.   No acute appendicitis..  PELVIC ORGANS/BLADDER:  Unremarkable.  LYMPH NODES:  No lymphadenopathy.  VESSELS:  Unremarkable. The portal vein is patent.  PERITONEUM / RETROPERITONEUM:  No free air or fluid.  BONES:  L4-L5 and L5-S1 posterior and interbody fusion..  SOFT TISSUES:  Unremarkable.      Impression: No acute abdominal pelvic abnormalities. This report was finalized on 02/14/2022 14:06 by Dr. Deepika Miller MD.    XR Chest 1 View    Result Date: 2/14/2022  Narrative: EXAM: XR CHEST 1 VW-  INDICATION: chest pain; R07.2-Precordial pain; R10.13-Epigastric pain; R11.0-Nausea; R19.7-Diarrhea, unspecified  COMPARISON: 2/11/2022  FINDINGS:  Cardiac silhouette is normal size. No pleural effusion, pneumothorax, or focal consolidation. Dense calcified granuloma in the RIGHT lung base. No acute osseous finding.      Impression:  No acute findings. This report was finalized on 02/14/2022 12:22 by Dr. Garrett Pandey MD.    XR Chest 1 View    Result Date: 2/11/2022  Narrative: EXAMINATION: XR CHEST 1 VW- 2/11/2022 9:57 PM CST  HISTORY: Chest pain.  REPORT: A frontal view of the chest was obtained.  COMPARISON: Chest x-ray 10/9/2021.  The lungs are clear, normally expanded. Heart size is normal. No pneumothorax or pleural effusion is identified. Mild prominence of the dane is demonstrated, slightly greater on the right. This may be due to the pulmonary arteries, though mild hilar adenopathy is not excluded and follow-up is recommended. Round calcification projecting over the right lung base/liver is stable and compatible with a granuloma. The osseous structures are unremarkable.      Impression: No acute pulmonary infiltrates. Mild prominence of the dane, greater on the right, mild hilar adenopathy is not excluded though this could be related to the pulmonary arteries. Consider repeat chest x-ray in 4-6 weeks or chest CT. This  report was finalized on 02/11/2022 22:00 by Dr. Shiva Crawford MD.      ED course:    Medications   lactated ringers bolus 1,000 mL (0 mL Intravenous Stopped 2/14/22 1305)   iopamidol (ISOVUE-300) 61 % injection 100 mL (100 mL Intravenous Given 2/14/22 1358)          Amount and/or complexity of data reviewed:    • Clinical lab tests ordered and reviewed.  • Tests in the radiology section ordered and reviewed.  • Independent visualization of imaging, tracing, or specimen is remarkable for an EKG with normal sinus rhythm at a rate of 65 with no ST elevation or depression concerning for acute ischemia, narrow QRS, OH within normal limits, QT within normal limits, no Wellens, no Brugada.        Risk of significant complications, morbidity, and/or mortality.    •  Presenting problem: high  •  Diagnostic procedures: high  •  Management options: high        MEDICAL DECISION MAKING     Patient presents with chest pain, abdominal pain, nausea, diarrhea. Upon arrival to the Emergency Department the patient is in no acute distress, vital signs remarkable for slight hypertension.  IV access is obtained and labs are sent.  She is given fluids, morphine, and Zofran. Labs show a CMP with no gross electrolyte abnormalities, no signs of kidney injury, no elevation anion gap, CBC is unremarkable, D-dimer is negative, lipase normal, troponin is normal, Covid is negative.  Chest x-ray with no acute findings.  CT scan of the abdomen and pelvis with no acute findings. Low concern for acute coronary syndrome as she has a reassuring stress test in the last yearand troponin is within normal limits after more than three hours of persistent symptoms. Low concern for cardiac tamponade with no tachycardia, no hypotension, no narrow pulse pressure, no muffled heart sounds, no elevated JVD, and no known risk factors for pericardial effusion. Low concern for aortic dissection with no thunderclap in onset pain, no neurologic symptoms, no widened  mediastinum on CXR, no tearing or ripping pain, and no pulse deficit and a negative D-dimer.  Low concern for pneumonia with no fevers, chills, or cough, and no findings of pneumonia on CXR or physical exam. Low concern for pulmonary embolism as they are low risk via Well's criteria and D-dimer is negative via age adjusted criteria.  Low concern for myocarditis with no fever, no tachycardia, and no elevated troponin. Low concern for esophageal tear with no recent severe vomiting, no history of recent esophageal instrumentation, no crepitus on exam, or pneumomediastinum on CXR. Low concern for pneumothorax with no signs of this on physical exam or CXR. Low concern for pancreatitis with no elevation in lipase, and no findings of this on CT scan. Low concern for biliary tract pathology with reassuring hepatic function panel, no RUQ tenderness, negative Landry's sign, and no findings of this on CT scan. Low concern for UTI such as pyelonephritis or cystitis with no dysuria, no suprapubic pain, and no flank pain. Low concern for appendicitis with no findings of this on CT, no rebound tenderness, no Rovsing sign, and no fever or elevated WBC. Low concern for bowel perforation or obstruction with reassuring physical exam findings and no findings of this on CT. Low concern for diverticulitis or abscess with no findings of this on CT. Low concern for PID or TOA with no findings of this on imaging, no vaginal bleeding or discharge, no lower abdominal pain or tenderness, and no fever. Low concern for ovarian torsion with no lower abdominal pain or tenderness, no findings of enlarged ovary on CT.  Low concern for mesenteric ischemia with no pain out of proportion to exam,and no findings of this on CT. Low concern for ruptured AAA with strong peripheral pulses in the feet and no findings of this on CT. I discussed all of the findings with the patient and she verbalized understanding. I explained that there is always diagnostic  uncertainty in the ER and this could be an early presentation of a process not detected at this time so she should return for any new or worsening symptoms and should follow up rapidly with her primary care provider for further evaluation and management. she is discharged in good condition with reassuring vitals and is given commonsense return precautions which she verbalizes understanding of.                  Diagnosis:    Final diagnoses:   Precordial pain   Epigastric abdominal pain   Nausea   Diarrhea, unspecified type         ED Disposition:     ED Disposition     ED Disposition Condition Comment    Discharge Stable           Brianna English MD  [address] [place] [address]   [address] [address]  [phone]    Schedule an appointment as soon as possible for a visit in 2 days           Medication List      New Prescriptions    dicyclomine 20 MG tablet  Commonly known as: BENTYL  Take 1 tablet by mouth Every 8 (Eight) Hours As Needed (abdominal pain) for up to 5 days.     famotidine 20 MG tablet  Commonly known as: PEPCID  Take 1 tablet by mouth 2 (Two) Times a Day for 10 days.           Where to Get Your Medications      You can get these medications from any pharmacy    Bring a paper prescription for each of these medications  · dicyclomine 20 MG tablet  · famotidine 20 MG tablet              Miguel Angel Tucker MD  02/15/22 [id number]

## 2022-02-15 ENCOUNTER — OFFICE VISIT (OUTPATIENT)
Dept: FAMILY MEDICINE CLINIC | Age: 62
End: 2022-02-15
Payer: COMMERCIAL

## 2022-02-15 VITALS
TEMPERATURE: 97.1 F | HEART RATE: 78 BPM | OXYGEN SATURATION: 97 % | DIASTOLIC BLOOD PRESSURE: 74 MMHG | RESPIRATION RATE: 18 BRPM | SYSTOLIC BLOOD PRESSURE: 136 MMHG

## 2022-02-15 DIAGNOSIS — R10.10 UPPER ABDOMINAL PAIN: Primary | ICD-10-CM

## 2022-02-15 DIAGNOSIS — I10 ESSENTIAL HYPERTENSION, BENIGN: ICD-10-CM

## 2022-02-15 DIAGNOSIS — K58.0 IRRITABLE BOWEL SYNDROME WITH DIARRHEA: ICD-10-CM

## 2022-02-15 DIAGNOSIS — R11.0 NAUSEA: ICD-10-CM

## 2022-02-15 PROCEDURE — 1036F TOBACCO NON-USER: CPT | Performed by: NURSE PRACTITIONER

## 2022-02-15 PROCEDURE — 3017F COLORECTAL CA SCREEN DOC REV: CPT | Performed by: NURSE PRACTITIONER

## 2022-02-15 PROCEDURE — 99214 OFFICE O/P EST MOD 30 MIN: CPT | Performed by: NURSE PRACTITIONER

## 2022-02-15 PROCEDURE — G8427 DOCREV CUR MEDS BY ELIG CLIN: HCPCS | Performed by: NURSE PRACTITIONER

## 2022-02-15 PROCEDURE — G8482 FLU IMMUNIZE ORDER/ADMIN: HCPCS | Performed by: NURSE PRACTITIONER

## 2022-02-15 PROCEDURE — G8417 CALC BMI ABV UP PARAM F/U: HCPCS | Performed by: NURSE PRACTITIONER

## 2022-02-15 RX ORDER — DICYCLOMINE HCL 20 MG
20 TABLET ORAL
Qty: 90 TABLET | Refills: 3 | Status: SHIPPED | OUTPATIENT
Start: 2022-02-15 | End: 2022-05-16

## 2022-02-15 RX ORDER — FAMOTIDINE 20 MG/1
20 TABLET, FILM COATED ORAL 2 TIMES DAILY
COMMUNITY
Start: 2022-02-14 | End: 2022-02-24

## 2022-02-15 RX ORDER — DICYCLOMINE HCL 20 MG
20 TABLET ORAL EVERY 8 HOURS PRN
COMMUNITY
Start: 2022-02-14 | End: 2022-02-15 | Stop reason: SDUPTHER

## 2022-02-15 ASSESSMENT — ENCOUNTER SYMPTOMS
WHEEZING: 0
ABDOMINAL PAIN: 1
NAUSEA: 1
VOMITING: 0
BACK PAIN: 1
COUGH: 0
SORE THROAT: 0
CHEST TIGHTNESS: 0
DIARRHEA: 1
SHORTNESS OF BREATH: 0

## 2022-02-15 NOTE — PROGRESS NOTES
Ms.Betty Jennifer Bowens is a 64 y.o. female who presents today for  Chief Complaint   Patient presents with    Follow-up       HPI:  She was seen in [facility] ER on 2/11 with chest pain, scapular pain. Records reviewed. Cardiac w/u negative for acute findings. EGK stable from previous in Oct.  She had diarrhea and nausea as well. No fever. BP was up to 158/86 at home. She returned to ER yesterday with similar symptoms, also upper abdominal pain, RUQ pain. CXR negative. No acute lab findings. CT abd/pelvis showed normal gallbladder, no acute findings. She was sent home on dicyclomine and pepcid which are helping some. Food triggers include spicy foods, tomato-based foods. Mild dysphagia at times. BP has been stable, controlled since initial ER visit last week. Review of Systems   Constitutional: Negative for chills and fever. HENT: Negative for congestion, ear pain and sore throat. Respiratory: Negative for cough, chest tightness, shortness of breath and wheezing. Cardiovascular: Negative for chest pain. Gastrointestinal: Positive for abdominal pain, diarrhea and nausea. Negative for vomiting. Musculoskeletal: Positive for back pain (scapular pain). Negative for arthralgias and myalgias. Skin: Negative for rash.        Past Medical History:   Diagnosis Date    Anal fissure     Anxiety 9/15/2017    C. difficile colitis     Cervical radiculopathy 9/15/2017    Chronic back pain     Chronic kidney disease     Class 1 obesity due to excess calories with serious comorbidity and body mass index (BMI) of 32.0 to 32.9 in adult 9/15/2017    Clostridium difficile colitis 2/16/2020    Cyst of kidney, acquired 9/15/2017    Depression 9/15/2017    History of oral surgery     Hyperlipidemia     Hypertension     IBS (irritable bowel syndrome) 9/15/2017    Irregular heartbeat     Obesity 9/15/2017    Osteoarthritis     PONV (postoperative nausea and vomiting)     Retinal detachment Current Outpatient Medications   Medication Sig Dispense Refill    famotidine (PEPCID) 20 MG tablet Take 20 mg by mouth 2 times daily      dicyclomine (BENTYL) 20 MG tablet Take 1 tablet by mouth 3 times daily (before meals) 90 tablet 3    metoprolol tartrate (LOPRESSOR) 50 MG tablet TAKE 1 TABLET BY MOUTH TWICE A  tablet 1    FLUoxetine (PROZAC) 10 MG capsule Take 1 capsule by mouth daily 30 capsule 2    dilTIAZem (CARDIZEM CD) 240 MG extended release capsule TAKE 1 CAPSULE BY MOUTH ONE TIME DAILY 90 capsule 3    atorvastatin (LIPITOR) 20 MG tablet Take 1 tablet by mouth daily 30 tablet 5    hyoscyamine (ANASPAZ;LEVSIN) 125 MCG tablet Take 1 tablet by mouth every 6 hours as needed for Cramping or Diarrhea 90 tablet 3    triamterene-hydroCHLOROthiazide (MAXZIDE-25) 37.5-25 MG per tablet Take 1 tablet by mouth daily TAKE 1-2 TABLETS BY MOUTH ONCE TIME DAILY. 60 tablet 5    Probiotic Product (PROBIOTIC-10 PO) Take 1 capsule by mouth daily      Estradiol-Estriol-Progesterone (BIEST/PROGESTERONE TD) Take 1 capsule by mouth nightly      Multiple Vitamins-Minerals (WOMENS MULTIVITAMIN PO) Take 1 tablet by mouth daily      calcium-vitamin D (OSCAL-500) 500-200 MG-UNIT per tablet Take 1 tablet by mouth daily (Patient not taking: Reported on 1/10/2022)       No current facility-administered medications for this visit.        Allergies   Allergen Reactions    Latex Rash    Amoxicillin Shortness Of Breath and Nausea And Vomiting    Anaprox [Naproxen Sodium] Shortness Of Breath and Nausea And Vomiting    Aloe Vera      Other reaction(s): Unknown    Nortriptyline Hcl      Doesn't remember      Augmentin [Amoxicillin-Pot Clavulanate] Nausea And Vomiting           Buspar [Buspirone] Nausea And Vomiting    Vibramycin [Doxycycline Calcium] Nausea And Vomiting       Past Surgical History:   Procedure Laterality Date    APPENDECTOMY       SECTION      EYE SURGERY      HYSTERECTOMY      HYSTERECTOMY, TOTAL ABDOMINAL      LUMBAR FUSION N/A 12/7/2020    TLIF OF L4/5. L5-S1 performed by Aldo Morin DO at [address] RETINAL DETACHMENT SURGERY         Social History     Tobacco Use    Smoking status: Never Smoker    Smokeless tobacco: Never Used   Vaping Use    Vaping Use: Never used   Substance Use Topics    Alcohol use: No    Drug use: No       Family History   Problem Relation Age of Onset    Diabetes Mother     High Blood Pressure Mother     Heart Attack Mother     Kidney Disease Father     Stroke Father     Other Father         renal failure    Diabetes Maternal Grandmother     Colon Cancer Maternal Grandmother     Heart Disease Maternal Grandfather     Cancer Paternal Grandmother         colon cancer    Alcohol Abuse Sister     Cirrhosis Sister     Stroke Paternal Aunt     Colon Cancer Maternal Cousin        /74   Pulse 78   Temp 97.1 °F (36.2 °C) (Temporal)   Resp 18   SpO2 97%     Physical Exam  Vitals reviewed. Constitutional:       General: She is not in acute distress. Appearance: Normal appearance. She is well-developed. HENT:      Head: Normocephalic. Eyes:      Conjunctiva/sclera: Conjunctivae normal.      Pupils: Pupils are equal, round, and reactive to light. Neck:      Thyroid: No thyromegaly. Vascular: No carotid bruit or JVD. Trachea: No tracheal deviation. Cardiovascular:      Rate and Rhythm: Normal rate and regular rhythm. Heart sounds: Normal heart sounds. No murmur heard. Pulmonary:      Effort: Pulmonary effort is normal. No respiratory distress. Breath sounds: Normal breath sounds. No wheezing or rhonchi. Abdominal:      General: Abdomen is flat. Bowel sounds are normal. There is no distension. Palpations: Abdomen is soft. There is no mass. Tenderness: There is abdominal tenderness (tenderness mid upper abd, RUQ). There is no guarding or rebound. Hernia: No hernia is present. Musculoskeletal:         General: Normal range of motion. Cervical back: Normal range of motion and neck supple. Lymphadenopathy:      Cervical: No cervical adenopathy. Skin:     General: Skin is warm and dry. Findings: No rash. Neurological:      Mental Status: She is alert. Psychiatric:         Mood and Affect: Mood normal.         Behavior: Behavior normal.         Thought Content: Thought content normal.         ASSESSMENT/PLAN:  1. Upper abdominal pain  -HIDA, r/o biliary dyskinesia  -If HIDA negative, refer to GI for possible EGD. -Continue famotidine twice daily  -Avoid food triggers including spicy, tomato-based  - NM HEPATOBILIARY SCAN W EJECTION FRACTION; Future    2. Nausea  -Plan as above  - NM HEPATOBILIARY SCAN W EJECTION FRACTION; Future    3. Essential hypertension, benign  -Stable, controlled. Continue current medication. 4. Irritable bowel syndrome with diarrhea  -Improving, continue dicyclomine 20 mg 3 times daily with meals. Rx sent to pharmacy. Return for as scheduled. Aline Gross was seen today for follow-up. Diagnoses and all orders for this visit:    Upper abdominal pain  -     NM HEPATOBILIARY SCAN W EJECTION FRACTION; Future    Nausea  -     NM HEPATOBILIARY SCAN W EJECTION FRACTION; Future    Essential hypertension, benign    Irritable bowel syndrome with diarrhea    Other orders  -     dicyclomine (BENTYL) 20 MG tablet; Take 1 tablet by mouth 3 times daily (before meals)      Medications Discontinued During This Encounter   Medication Reason    dicyclomine (BENTYL) 20 MG tablet REORDER     There are no Patient Instructions on file for this visit. Patient voicesunderstanding and agrees to plans along with risks and benefits of plan. Counseling:  Luli MARIEE'Q case, medications and options were discussed in detail. Patient was instructed to call the office if she questionsregarding her treatment.  Should her conditions worsen, she should return to office to be reassessed by SORIN Peres. she Should to go the closest Emergency Department for any emergency. They verbalizedunderstanding the above instructions. Return for as scheduled.

## 2022-02-21 ENCOUNTER — OFFICE VISIT (OUTPATIENT)
Dept: FAMILY MEDICINE CLINIC | Age: 62
End: 2022-02-21
Payer: COMMERCIAL

## 2022-02-21 VITALS
HEIGHT: 68 IN | WEIGHT: 232 LBS | OXYGEN SATURATION: 95 % | SYSTOLIC BLOOD PRESSURE: 140 MMHG | HEART RATE: 73 BPM | BODY MASS INDEX: 35.16 KG/M2 | TEMPERATURE: 97.9 F | DIASTOLIC BLOOD PRESSURE: 88 MMHG

## 2022-02-21 DIAGNOSIS — N18.31 STAGE 3A CHRONIC KIDNEY DISEASE (HCC): ICD-10-CM

## 2022-02-21 DIAGNOSIS — I10 ESSENTIAL HYPERTENSION, BENIGN: Primary | ICD-10-CM

## 2022-02-21 DIAGNOSIS — M54.42 CHRONIC BILATERAL LOW BACK PAIN WITH BILATERAL SCIATICA: ICD-10-CM

## 2022-02-21 DIAGNOSIS — K58.0 IRRITABLE BOWEL SYNDROME WITH DIARRHEA: ICD-10-CM

## 2022-02-21 DIAGNOSIS — G89.29 CHRONIC BILATERAL LOW BACK PAIN WITH BILATERAL SCIATICA: ICD-10-CM

## 2022-02-21 DIAGNOSIS — R07.89 ATYPICAL CHEST PAIN: ICD-10-CM

## 2022-02-21 DIAGNOSIS — M54.41 CHRONIC BILATERAL LOW BACK PAIN WITH BILATERAL SCIATICA: ICD-10-CM

## 2022-02-21 DIAGNOSIS — M54.16 LUMBAR RADICULOPATHY: ICD-10-CM

## 2022-02-21 DIAGNOSIS — F41.1 GAD (GENERALIZED ANXIETY DISORDER): ICD-10-CM

## 2022-02-21 DIAGNOSIS — M62.830 MUSCLE SPASM OF BACK: ICD-10-CM

## 2022-02-21 DIAGNOSIS — E66.01 CLASS 2 SEVERE OBESITY DUE TO EXCESS CALORIES WITH SERIOUS COMORBIDITY AND BODY MASS INDEX (BMI) OF 35.0 TO 35.9 IN ADULT (HCC): ICD-10-CM

## 2022-02-21 PROCEDURE — G8417 CALC BMI ABV UP PARAM F/U: HCPCS | Performed by: INTERNAL MEDICINE

## 2022-02-21 PROCEDURE — 99215 OFFICE O/P EST HI 40 MIN: CPT | Performed by: INTERNAL MEDICINE

## 2022-02-21 PROCEDURE — 1036F TOBACCO NON-USER: CPT | Performed by: INTERNAL MEDICINE

## 2022-02-21 PROCEDURE — G8427 DOCREV CUR MEDS BY ELIG CLIN: HCPCS | Performed by: INTERNAL MEDICINE

## 2022-02-21 PROCEDURE — G8482 FLU IMMUNIZE ORDER/ADMIN: HCPCS | Performed by: INTERNAL MEDICINE

## 2022-02-21 PROCEDURE — 3017F COLORECTAL CA SCREEN DOC REV: CPT | Performed by: INTERNAL MEDICINE

## 2022-02-21 RX ORDER — METOPROLOL TARTRATE 100 MG/1
100 TABLET ORAL 2 TIMES DAILY
Qty: 60 TABLET | Refills: 2 | Status: SHIPPED | OUTPATIENT
Start: 2022-02-21 | End: 2022-05-20

## 2022-02-21 RX ORDER — LORAZEPAM 0.5 MG/1
0.5 TABLET ORAL EVERY 12 HOURS PRN
Qty: 45 TABLET | Refills: 0 | Status: SHIPPED | OUTPATIENT
Start: 2022-02-21 | End: 2022-10-17 | Stop reason: ALTCHOICE

## 2022-02-21 ASSESSMENT — ENCOUNTER SYMPTOMS
CHEST TIGHTNESS: 0
EYE REDNESS: 0
VOMITING: 0
SINUS PRESSURE: 0
EYE DISCHARGE: 0
WHEEZING: 0
EYE PAIN: 0
RHINORRHEA: 0
BACK PAIN: 1
SHORTNESS OF BREATH: 0
VOICE CHANGE: 0
BLOOD IN STOOL: 0
SORE THROAT: 0
COUGH: 0
COLOR CHANGE: 0

## 2022-02-21 ASSESSMENT — VISUAL ACUITY: OU: 1

## 2022-02-21 NOTE — PROGRESS NOTES
Ellis Otto is a 64 y.o. female who presents today for   Chief Complaint   Patient presents with    Follow-up    Anxiety    Depression    Other     abdominal pain/atypical chest pain       Hypertension  Associated symptoms include chest pain. Pertinent negatives include no headaches, neck pain, palpitations or shortness of breath. Hyperlipidemia  Associated symptoms include chest pain and myalgias. Pertinent negatives include no shortness of breath. Back Pain  Associated symptoms include abdominal pain and chest pain. Pertinent negatives include no dysuria, fever, headaches, numbness or weakness. 63 y/o female here for follow up on HTN, Mild major depression, generalized anxiety disorder, IBS with controlled med refill, and obesity. She reports continuing her [id number] mg since the last visit. She is still having stomach/GI upset and diarrhea however. She denies any significant changes to her mood. She also c/o dizziness that she thought was due to the fluoxetine but admits to having this sx prior to starting the fluoxetine also and she had to stop librax that she had taken for many years for IBS and anxiety after her insurance stopped paying for it too so stomach symptoms may be related to IBS and not the fluoxetine. Her chronic low back pain with lumbar radiculopathy has not gotten any worse since last visit but it also has not improved. She had decided at previous visit to stop gabapentin that was started to help with chronic back pain not resolved after back surgery. She had gone to the ED for sharp, intermittent, shooting left chest pain on 2/12/22 with negative cardiac work up and was told to come back if precordial CP sx did not resolve. She went back to the ED on 2/14/22 w/nausea, diarrhea, sharp CP, and epigastric pain and CT abdomen/pelvis and chest x-ray were normal in ER other than bilateral renal cysts.  Patient was then seen in office by Matilda Councilman (nurse practitioner) on 2/15/22 who felt that pain was atypical given she also had RUQ/upper abdominal pain and that symptoms may be due to gallbladder issues. She reports she has a scheduled U/S and HIDA on March 8th. Patient admits to pain improving after she has a BM. She has food triggers for the pain like spicy foods and tomato based foods. Dicylomine and pepcid started by ER on 2/14/22 have helped some with pain. Hypertension  Patient is here for follow-up of elevated blood pressure. She is not exercising and is adherent to a low-salt diet. Patient is checking blood pressure at home. Blood pressure is not controlled. Cardiac symptoms: none. Patient denies chest pain, dyspnea, irregular heart beat, orthopnea, paroxysmal nocturnal dyspnea and syncope. Use of agents associated with hypertension: estrogens. History of target organ damage: elevated creatinine level recently. Blood pressure 140s/80s at home with ambulatory blood pressure monitoring. Genoveva Mart is here for follow up of dyslipidemia. Compliance with treatment has been good. The patient exercises rarely. Patient denies muscle pain associated with their medications which include simvastatin. BMI Readings from Last 3 Encounters:   02/21/22 35.28 kg/m²   01/10/22 35.66 kg/m²   09/07/21 33.92 kg/m²     Wt Readings from Last 3 Encounters:   02/21/22 232 lb (105.2 kg)   01/10/22 234 lb 8 oz (106.4 kg)   09/07/21 226 lb 6 oz (102.7 kg)         Review of Systems   Constitutional: Positive for fatigue. Negative for appetite change, chills, fever and unexpected weight change. HENT: Negative for ear pain, rhinorrhea, sinus pressure, sore throat and voice change. Eyes: Negative for pain, discharge and redness. Respiratory: Negative for cough, chest tightness, shortness of breath and wheezing. Cardiovascular: Positive for chest pain. Negative for palpitations and leg swelling. See HPI   Gastrointestinal: Positive for abdominal pain, diarrhea and nausea.  Negative for blood in stool and vomiting.        +history of IBS which is exacerbated somewhat currently   Endocrine: Negative for cold intolerance, heat intolerance and polydipsia. Genitourinary: Negative for dysuria and hematuria. Musculoskeletal: Positive for arthralgias, back pain, gait problem and myalgias. Negative for neck pain and neck stiffness. See HPI   Skin: Negative for color change and rash. Neurological: Negative for dizziness, tremors, syncope, speech difficulty, weakness, numbness and headaches. Hematological: Negative for adenopathy. Does not bruise/bleed easily. Psychiatric/Behavioral: Positive for dysphoric mood and sleep disturbance. Negative for confusion, self-injury and suicidal ideas. The patient is nervous/anxious. See HPI   All other systems reviewed and are negative. Past Medical History:   Diagnosis Date    Anal fissure     Anxiety 9/15/2017    C. difficile colitis     Cervical radiculopathy 9/15/2017    Chronic back pain     Chronic kidney disease     Class 1 obesity due to excess calories with serious comorbidity and body mass index (BMI) of 32.0 to 32.9 in adult 9/15/2017    Clostridium difficile colitis 2/16/2020    Cyst of kidney, acquired 9/15/2017    Depression 9/15/2017    History of oral surgery     Hyperlipidemia     Hypertension     IBS (irritable bowel syndrome) 9/15/2017    Irregular heartbeat     Obesity 9/15/2017    Osteoarthritis     PONV (postoperative nausea and vomiting)     Retinal detachment        Current Outpatient Medications   Medication Sig Dispense Refill    LORazepam (ATIVAN) 0.5 MG tablet Take 1 tablet by mouth every 12 hours as needed for Anxiety for up to 30 days.  45 tablet 0    metoprolol (LOPRESSOR) 100 MG tablet Take 1 tablet by mouth 2 times daily 60 tablet 2    dicyclomine (BENTYL) 20 MG tablet Take 1 tablet by mouth 3 times daily (before meals) 90 tablet 3    FLUoxetine (PROZAC) 10 MG capsule Take 1 capsule by mouth daily 30 capsule 2    dilTIAZem (CARDIZEM CD) 240 MG extended release capsule TAKE 1 CAPSULE BY MOUTH ONE TIME DAILY 90 capsule 3    atorvastatin (LIPITOR) 20 MG tablet Take 1 tablet by mouth daily 30 tablet 5    hyoscyamine (ANASPAZ;LEVSIN) 125 MCG tablet Take 1 tablet by mouth every 6 hours as needed for Cramping or Diarrhea 90 tablet 3    triamterene-hydroCHLOROthiazide (MAXZIDE-25) 37.5-25 MG per tablet Take 1 tablet by mouth daily TAKE 1-2 TABLETS BY MOUTH ONCE TIME DAILY. 60 tablet 5    Probiotic Product (PROBIOTIC-10 PO) Take 1 capsule by mouth daily      Estradiol-Estriol-Progesterone (BIEST/PROGESTERONE TD) Take 1 capsule by mouth nightly      Multiple Vitamins-Minerals (WOMENS MULTIVITAMIN PO) Take 1 tablet by mouth daily      TIADYLT  MG extended release capsule TAKE 1 CAPSULE BY MOUTH EVERY DAY 90 capsule 2     No current facility-administered medications for this visit. Allergies   Allergen Reactions    Latex Rash    Amoxicillin Shortness Of Breath and Nausea And Vomiting    Anaprox [Naproxen Sodium] Shortness Of Breath and Nausea And Vomiting    Aloe Vera      Other reaction(s): Unknown    Nortriptyline Hcl      Doesn't remember      Augmentin [Amoxicillin-Pot Clavulanate] Nausea And Vomiting           Buspar [Buspirone] Nausea And Vomiting    Vibramycin [Doxycycline Calcium] Nausea And Vomiting       Past Surgical History:   Procedure Laterality Date    APPENDECTOMY       SECTION      EYE SURGERY      HYSTERECTOMY      HYSTERECTOMY, TOTAL ABDOMINAL      LUMBAR FUSION N/A 2020    TLIF OF L4/5. L5-S1 performed by Joanne Kent DO at [address] [place] [address] RETINAL DETACHMENT SURGERY         Social History     Tobacco Use    Smoking status: Never Smoker    Smokeless tobacco: Never Used   Vaping Use    Vaping Use: Never used   Substance Use Topics    Alcohol use: No    Drug use: No       Family History   Problem Relation Age of Onset    Diabetes Mother  High Blood Pressure Mother     Heart Attack Mother     Kidney Disease Father     Stroke Father     Other Father         renal failure    Diabetes Maternal Grandmother     Colon Cancer Maternal Grandmother     Heart Disease Maternal Grandfather     Cancer Paternal Grandmother         colon cancer    Alcohol Abuse Sister     Cirrhosis Sister     Stroke Paternal Aunt     Colon Cancer Maternal Cousin        BP (!) 140/88   Pulse 73   Temp 97.9 °F (36.6 °C)   Ht 5' 8\" (1.727 m)   Wt 232 lb (105.2 kg)   SpO2 95%   BMI 35.28 kg/m²     Physical Exam  Vitals and nursing note reviewed. Constitutional:       General: She is not in acute distress. Appearance: Normal appearance. She is well-developed and well-groomed. She is obese. She is not ill-appearing, toxic-appearing or diaphoretic. Comments: Appears tired and uncomfortable due to back pain   HENT:      Head: Normocephalic and atraumatic. Right Ear: Tympanic membrane, ear canal and external ear normal.      Left Ear: Tympanic membrane, ear canal and external ear normal.      Nose: Nose normal.      Mouth/Throat:      Lips: Pink. Mouth: Mucous membranes are moist. No oral lesions. Tongue: No lesions. Palate: No mass and lesions. Pharynx: Oropharynx is clear. Uvula midline. No pharyngeal swelling, oropharyngeal exudate, posterior oropharyngeal erythema or uvula swelling. Tonsils: 1+ on the right. 1+ on the left. Eyes:      General: Lids are normal. Vision grossly intact. No scleral icterus. Extraocular Movements: Extraocular movements intact. Conjunctiva/sclera: Conjunctivae normal.      Pupils: Pupils are equal, round, and reactive to light. Neck:      Thyroid: No thyroid mass or thyromegaly. Vascular: No carotid bruit or JVD. Trachea: Trachea and phonation normal.   Cardiovascular:      Rate and Rhythm: Normal rate and regular rhythm. Pulses: Normal pulses.            Radial pulses are 2+ on the right side and 2+ on the left side. Posterior tibial pulses are 2+ on the right side and 2+ on the left side. Heart sounds: Normal heart sounds. No murmur heard. No friction rub. No gallop. Pulmonary:      Effort: Pulmonary effort is normal. No accessory muscle usage. Breath sounds: Normal breath sounds. No decreased breath sounds, wheezing, rhonchi or rales. Chest:   Breasts:      Right: No supraclavicular adenopathy. Left: No supraclavicular adenopathy. Abdominal:      General: Abdomen is flat. Bowel sounds are normal. There is no distension. Palpations: Abdomen is soft. There is no hepatomegaly, splenomegaly or mass. Tenderness: There is no abdominal tenderness. There is no guarding or rebound. Hernia: No hernia is present. Musculoskeletal:      Right wrist: Normal.      Left wrist: Normal.      Cervical back: Normal range of motion and neck supple. Thoracic back: Decreased range of motion. Lumbar back: Spasms and tenderness present. Decreased range of motion. Right lower leg: No edema. Left lower leg: No edema. Right ankle: Normal.      Left ankle: Normal.   Lymphadenopathy:      Head:      Right side of head: No submandibular adenopathy. Left side of head: No submandibular adenopathy. Cervical: No cervical adenopathy. Right cervical: No superficial, deep or posterior cervical adenopathy. Left cervical: No superficial, deep or posterior cervical adenopathy. Upper Body:      Right upper body: No supraclavicular adenopathy. Left upper body: No supraclavicular adenopathy. Lower Body: No right inguinal adenopathy. No left inguinal adenopathy. Skin:     General: Skin is warm and dry. Capillary Refill: Capillary refill takes less than 2 seconds. Coloration: Skin is not cyanotic. Findings: No rash. Nails: There is no clubbing.    Neurological:      Mental Status: She is alert and oriented to person, place, and time. Cranial Nerves: No cranial nerve deficit, dysarthria or facial asymmetry. Sensory: Sensation is intact. Motor: Motor function is intact. No weakness, tremor, atrophy or abnormal muscle tone. Coordination: Coordination is intact. Romberg sign negative. Coordination normal.      Gait: Gait is intact. Deep Tendon Reflexes: Reflexes are normal and symmetric. Reflex Scores:       Patellar reflexes are 2+ on the right side and 2+ on the left side. Comments: CN II-XII grossly intact, speech clear, MAEW, no focal deficits   Psychiatric:         Attention and Perception: Attention and perception normal.         Mood and Affect: Mood is anxious and depressed. Speech: Speech normal.         Behavior: Behavior normal. Behavior is cooperative. Thought Content:  Thought content normal.         Cognition and Memory: Cognition and memory normal.         Judgment: Judgment normal.      Comments: Affect congruent with mood         Lab Results   Component Value Date     01/10/2022    K 4.4 01/10/2022     01/10/2022    CO2 28 01/10/2022    BUN 17 01/10/2022    CREATININE 1.0 (H) 01/10/2022    GLUCOSE 123 (H) 01/10/2022    CALCIUM 9.7 01/10/2022    PROT 7.1 01/10/2022    LABALBU 4.3 01/10/2022    BILITOT 0.3 01/10/2022    ALKPHOS 80 01/10/2022    AST 25 01/10/2022    ALT 23 01/10/2022    LABGLOM 56 (A) 01/10/2022    GFRAA >59 01/10/2022     Lab Results   Component Value Date    LABA1C 6.2 (H) 01/10/2022     Lab Results   Component Value Date    CHOL 196 01/10/2022    CHOL 179 09/07/2021    CHOL 187 03/04/2021     Lab Results   Component Value Date    TRIG 206 (H) 01/10/2022    TRIG 199 (H) 09/07/2021    TRIG 203 (H) 03/04/2021     Lab Results   Component Value Date    HDL 57 (L) 01/10/2022    HDL 57 (L) 09/07/2021    HDL 53 (L) 03/04/2021     Lab Results   Component Value Date    LDLCALC 98 01/10/2022    LDLCALC 82 09/07/2021    [employer] 93 03/04/2021     No results found for: LABVLDL, VLDL  No results found for: CHOLHDLRATIO    No results found for this visit on 02/21/22. Assessment:    ICD-10-CM    1. Essential hypertension, benign  I10 metoprolol (LOPRESSOR) 100 MG tablet   2. GARRETT (generalized anxiety disorder)  F41.1 LORazepam (ATIVAN) 0.5 MG tablet   3. Irritable bowel syndrome with diarrhea  K58.0    4. Atypical chest pain  R07.89    5. Stage 3a chronic kidney disease (HCC)  N18.31    6. Chronic bilateral low back pain with bilateral sciatica  M54.42     M54.41     G89.29    7. Lumbar radiculopathy  M54.16    8. Muscle spasm of back  M62.830    9. Class 2 severe obesity due to excess calories with serious comorbidity and body mass index (BMI) of 35.0 to 35.9 in [facility])  E66.01     Z68.35        Plan:  Sussy Flowers was seen today for follow-up, anxiety, depression and other. Diagnoses and all orders for this visit:    Essential hypertension, benign  -     metoprolol (LOPRESSOR) 100 MG tablet; Take 1 tablet by mouth 2 times daily    GARRETT (generalized anxiety disorder)  -     LORazepam (ATIVAN) 0.5 MG tablet; Take 1 tablet by mouth every 12 hours as needed for Anxiety for up to 30 days. Irritable bowel syndrome with diarrhea    Atypical chest pain    Stage 3a chronic kidney disease (HCC)    Chronic bilateral low back pain with bilateral sciatica    Lumbar radiculopathy    Muscle spasm of back    Class 2 severe obesity due to excess calories with serious comorbidity and body mass index (BMI) of 35.0 to 35.9 in [facility])    Labs reviewed with patient. Refills Provided. -Hypertension not well controlled. Metoprolol dose adjusted today.  Encouraged efforts at well balanced diet, exercise, and weight loss.   -Atypical CP-work up for possible gallbladder disease in progress, may need gastroenterology referral if gallbladder US and HIDA scan normal. Continue famotidine and dicyclomine for now given symptoms have improved some since medicines started yesterday  -IBS with diarrhea-continue dicyclomine, may consider gastroenterology referral if symptoms do not continue to improve. -Generalized anxiety disorder-continue fluoxetine at current dose to given medication longer to improve symptoms, short term prescription for lorazepam to use prn moderate to severe anxiety in the mean time. Controlled substance contract and urine drug screen are up-to-date currently. No unusual filling on recent [facility] report. Treatment continues to be medically necessary and improves patient quality of life. No signs of misuse of controlled medication.   -Chronic LBP with muscle spasms-inadequately controlled but patient does not want to take controlled medications for pain, needs to avoid oral NSAIDs due to [address], and she did not tolerate muscle relaxer either. She does not want to try a different muscle relaxer at this time and has declined referral to Pain management  -Obesity due to excess caloric intake-no improvement in obesity. Continue/Increase efforts at low carbohydrate diet, exercise, and weight loss/efforts at normalizing BMI. - Return in about 4 weeks (around 3/21/2022) for HTN, recheck mood. Over 50% of the total visit time of 40 min was spent on counseling and/or coordination of care of:   1. Essential hypertension, benign    2. GARRETT (generalized anxiety disorder)    3. Irritable bowel syndrome with diarrhea    4. Atypical chest pain    5. Stage 3a chronic kidney disease ([facility] [address]    6. Chronic bilateral low back pain with bilateral sciatica    7. Lumbar radiculopathy    8. Muscle spasm of back    9. Class 2 severe obesity due to excess calories with serious comorbidity and body mass index (BMI) of 35.0 to 35.9 in adult [facility])         No orders of the defined types were placed in this encounter.     Orders Placed This Encounter   Medications    LORazepam (ATIVAN) 0.5 MG tablet     Sig: Take 1 tablet by mouth every 12 hours as needed for Anxiety for up to 30 days.     Dispense:  45 tablet     Refill:  0    metoprolol (LOPRESSOR) 100 MG tablet     Sig: Take 1 tablet by mouth 2 times daily     Dispense:  60 tablet     Refill:  2     Medications Discontinued During This Encounter   Medication Reason    calcium-vitamin D (OSCAL-500) 500-200 MG-UNIT per tablet LIST CLEANUP    metoprolol tartrate (LOPRESSOR) 50 MG tablet DOSE ADJUSTMENT     There are no Patient Instructions on file for this visit. Patient voices understanding and agrees to plans along with risks and benefits of plan. Counseling:  Allison GODDARDR'I case, medications and options were discussed in detail. patient was instructed to call the office if she   questions regarding her treatment. Should her conditions worsen, she should return to office to be reassessed by Dr. Zackery Espinoza. she  Should to go the closest Emergency Department for any emergency. They verbalized understanding the above instructions.

## 2022-02-22 RX ORDER — DILTIAZEM HYDROCHLORIDE 240 MG/1
CAPSULE, EXTENDED RELEASE ORAL
Qty: 90 CAPSULE | Refills: 2 | Status: SHIPPED | OUTPATIENT
Start: 2022-02-22 | End: 2022-04-01

## 2022-02-22 NOTE — TELEPHONE ENCOUNTER
Kiko Oconnor called to request a refill on her medication.       Last office visit : 2/21/2022   Next office visit : 4/1/2022     Requested Prescriptions     Pending Prescriptions Disp Refills    TIADYLT  MG extended release capsule [Pharmacy Med Name: TIADYLT  MG CAPSULE] 90 capsule 2     Sig: TAKE 1 CAPSULE BY MOUTH EVERY DAY            Karoline Naranjo MA

## 2022-03-04 ENCOUNTER — TELEPHONE (OUTPATIENT)
Dept: FAMILY MEDICINE CLINIC | Age: 62
End: 2022-03-04

## 2022-03-04 DIAGNOSIS — R10.10 UPPER ABDOMINAL PAIN: Primary | ICD-10-CM

## 2022-03-04 NOTE — TELEPHONE ENCOUNTER
Janna with scheduling called and said that the patient needs a GB us put in before they can do the HIDA scan that is scheduled on Tuesday.

## 2022-03-04 NOTE — TELEPHONE ENCOUNTER
Left vm stating results and the order that was not put in and return call to the office with any concerns.

## 2022-03-04 NOTE — TELEPHONE ENCOUNTER
She had a CT at [facility] showing normal gallbladder so I didn't put in the US order. I will put it in.

## 2022-03-08 ENCOUNTER — HOSPITAL ENCOUNTER (OUTPATIENT)
Dept: NUCLEAR MEDICINE | Age: 62
Discharge: HOME | End: 2022-03-10
Payer: MEDICAID

## 2022-03-08 ENCOUNTER — HOSPITAL ENCOUNTER (OUTPATIENT)
Dept: ULTRASOUND IMAGING | Age: 62
Discharge: HOME | End: 2022-03-08
Payer: MEDICAID

## 2022-03-08 DIAGNOSIS — R10.10 UPPER ABDOMINAL PAIN: ICD-10-CM

## 2022-03-08 DIAGNOSIS — R11.0 NAUSEA: ICD-10-CM

## 2022-03-08 PROCEDURE — 78227 HEPATOBIL SYST IMAGE W/DRUG: CPT

## 2022-03-08 PROCEDURE — A9537 TC99M MEBROFENIN: HCPCS | Performed by: NURSE PRACTITIONER

## 2022-03-08 PROCEDURE — 3430000000 HC RX DIAGNOSTIC RADIOPHARMACEUTICAL: Performed by: NURSE PRACTITIONER

## 2022-03-08 PROCEDURE — 76705 ECHO EXAM OF ABDOMEN: CPT

## 2022-03-08 RX ADMIN — Medication 5 MILLICURIE: at 12:53

## 2022-03-13 PROBLEM — F32.A DEPRESSION: Status: RESOLVED | Noted: 2017-09-15 | Resolved: 2022-03-13

## 2022-03-13 PROBLEM — F41.9 ANXIETY: Status: RESOLVED | Noted: 2017-09-15 | Resolved: 2022-03-13

## 2022-03-13 PROBLEM — R07.89 ATYPICAL CHEST PAIN: Status: ACTIVE | Noted: 2022-03-13

## 2022-03-13 ASSESSMENT — ENCOUNTER SYMPTOMS
NAUSEA: 1
DIARRHEA: 1
ABDOMINAL PAIN: 1

## 2022-03-17 ENCOUNTER — TELEPHONE (OUTPATIENT)
Dept: FAMILY MEDICINE CLINIC | Age: 62
End: 2022-03-17

## 2022-03-17 DIAGNOSIS — N18.31 STAGE 3A CHRONIC KIDNEY DISEASE (HCC): Primary | ICD-10-CM

## 2022-03-17 NOTE — TELEPHONE ENCOUNTER
----- Message from [facility] sent at 3/16/2022 10:31 AM CDT -----  Subject: Referral Request    QUESTIONS   Reason for referral request? kidney specialist   Has the physician seen you for this condition before? No   Preferred Specialist (if applicable)? Do you already have an appointment scheduled? No  Additional Information for Provider?   ---------------------------------------------------------------------------  --------------  CALL BACK INFO  What is the best way for the office to contact you?  OK to leave message on   voicemail  Preferred Call Back Phone Number? [phone]

## 2022-04-01 ENCOUNTER — OFFICE VISIT (OUTPATIENT)
Dept: FAMILY MEDICINE CLINIC | Age: 62
End: 2022-04-01
Payer: MEDICAID

## 2022-04-01 VITALS
BODY MASS INDEX: 35.01 KG/M2 | HEART RATE: 60 BPM | SYSTOLIC BLOOD PRESSURE: 126 MMHG | HEIGHT: 68 IN | OXYGEN SATURATION: 97 % | DIASTOLIC BLOOD PRESSURE: 88 MMHG | WEIGHT: 231 LBS | TEMPERATURE: 97.4 F

## 2022-04-01 DIAGNOSIS — M54.41 CHRONIC BILATERAL LOW BACK PAIN WITH BILATERAL SCIATICA: ICD-10-CM

## 2022-04-01 DIAGNOSIS — F32.0 MILD MAJOR DEPRESSION (HCC): Primary | ICD-10-CM

## 2022-04-01 DIAGNOSIS — G89.29 CHRONIC BILATERAL LOW BACK PAIN WITH BILATERAL SCIATICA: ICD-10-CM

## 2022-04-01 DIAGNOSIS — E66.01 CLASS 2 SEVERE OBESITY DUE TO EXCESS CALORIES WITH SERIOUS COMORBIDITY AND BODY MASS INDEX (BMI) OF 35.0 TO 35.9 IN ADULT (HCC): ICD-10-CM

## 2022-04-01 DIAGNOSIS — R73.9 HYPERGLYCEMIA: ICD-10-CM

## 2022-04-01 DIAGNOSIS — N18.31 STAGE 3A CHRONIC KIDNEY DISEASE (HCC): ICD-10-CM

## 2022-04-01 DIAGNOSIS — I10 ESSENTIAL HYPERTENSION, BENIGN: ICD-10-CM

## 2022-04-01 DIAGNOSIS — F41.1 GAD (GENERALIZED ANXIETY DISORDER): ICD-10-CM

## 2022-04-01 DIAGNOSIS — E78.2 MIXED HYPERLIPIDEMIA: ICD-10-CM

## 2022-04-01 DIAGNOSIS — M54.42 CHRONIC BILATERAL LOW BACK PAIN WITH BILATERAL SCIATICA: ICD-10-CM

## 2022-04-01 PROBLEM — R07.89 ATYPICAL CHEST PAIN: Status: RESOLVED | Noted: 2022-03-13 | Resolved: 2022-04-01

## 2022-04-01 PROCEDURE — 99214 OFFICE O/P EST MOD 30 MIN: CPT | Performed by: INTERNAL MEDICINE

## 2022-04-01 RX ORDER — FLUOXETINE 10 MG/1
10 CAPSULE ORAL NIGHTLY
Qty: 30 CAPSULE | Refills: 2 | Status: SHIPPED
Start: 2022-04-01 | End: 2022-04-28

## 2022-04-01 ASSESSMENT — ENCOUNTER SYMPTOMS
BACK PAIN: 1
CHEST TIGHTNESS: 0
SHORTNESS OF BREATH: 0
EYE REDNESS: 0
WHEEZING: 0
VOICE CHANGE: 0
COUGH: 0
COLOR CHANGE: 0
SORE THROAT: 0
VOMITING: 0
BLOOD IN STOOL: 0
DIARRHEA: 1
SINUS PRESSURE: 0
RHINORRHEA: 0
EYE DISCHARGE: 0
EYE PAIN: 0

## 2022-04-01 ASSESSMENT — VISUAL ACUITY: OU: 1

## 2022-04-01 NOTE — PROGRESS NOTES
Louis Bertrand is a 64 y.o. female who presents today for   Chief Complaint   Patient presents with    Follow-up    Hypertension    Depression    Anxiety    Lower Back Pain     chronic LBP       Hypertension  Pertinent negatives include no chest pain, headaches, neck pain, palpitations or shortness of breath. Hyperlipidemia  Associated symptoms include myalgias. Pertinent negatives include no chest pain or shortness of breath. Back Pain  Pertinent negatives include no abdominal pain, chest pain, dysuria, fever, headaches, numbness or weakness. Other  Associated symptoms include arthralgias, fatigue and myalgias. Pertinent negatives include no abdominal pain, chest pain, chills, coughing, fever, headaches, nausea, neck pain, numbness, rash, sore throat, vomiting or weakness. 65 y/o female here for follow up on HTN, Mild major depression, generalized anxiety disorder, IBS with controlled med refill, chronic LBP with radiculopathy, and obesity. Patient takes her fluoxetine in the mornings when she gets up and then she will have dizziness most of the day. She takes the rest of her morning medicines with fluoxetine also however, which include diltiazem, metoprolol, triamterene/HCTZ, hyoscyamine, and a multi-vitamin. Anxiety and depression have improved however with the fluoxetine since last visit. Patient has been taking lorazepam intermittently for more severe anxiety symptoms but she has not had to take it every day and she does not need a refill today. LBP is about the same since last visit however per patient but she declines pain medication to treat at this time and she did not tolerate gabapentin previously. Hypertension  Patient is here for follow-up of elevated blood pressure. She is not exercising and is adherent to a low-salt diet. Patient is checking blood pressure at home. Blood pressure is not controlled. Cardiac symptoms: none.  Patient denies chest pain, dyspnea, irregular heart beat, orthopnea, paroxysmal nocturnal dyspnea and syncope. Use of agents associated with hypertension: estrogens. History of target organ damage: elevated creatinine level recently. Linda Diaz is here for follow up of dyslipidemia. Compliance with treatment has been good. The patient exercises rarely. Patient denies muscle pain associated with their medications which include atorvastatin 20 mg daily. BMI Readings from Last 3 Encounters:   04/01/22 35.12 kg/m²   02/21/22 35.28 kg/m²   01/10/22 35.66 kg/m²     Wt Readings from Last 3 Encounters:   04/01/22 231 lb (104.8 kg)   02/21/22 232 lb (105.2 kg)   01/10/22 234 lb 8 oz (106.4 kg)         Review of Systems   Constitutional: Positive for fatigue. Negative for appetite change, chills, fever and unexpected weight change. HENT: Negative for ear pain, rhinorrhea, sinus pressure, sore throat and voice change. Eyes: Negative for pain, discharge and redness. Respiratory: Negative for cough, chest tightness, shortness of breath and wheezing. Cardiovascular: Negative for chest pain, palpitations and leg swelling. See HPI   Gastrointestinal: Positive for diarrhea. Negative for abdominal pain, blood in stool, nausea and vomiting.        +history of IBS which is exacerbated somewhat currently   Endocrine: Negative for cold intolerance, heat intolerance and polydipsia. Genitourinary: Negative for dysuria and hematuria. Musculoskeletal: Positive for arthralgias, back pain, gait problem and myalgias. Negative for neck pain and neck stiffness. See HPI   Skin: Negative for color change and rash. Neurological: Negative for dizziness, tremors, syncope, speech difficulty, weakness, numbness and headaches. Hematological: Negative for adenopathy. Does not bruise/bleed easily. Psychiatric/Behavioral: Positive for dysphoric mood and sleep disturbance. Negative for agitation, confusion, decreased concentration, self-injury and suicidal ideas. BY MOUTH EVERY 6HRS AS NEEDED FOR CRAMPING OR DIARRHEA 90 tablet 3     No current facility-administered medications for this visit. Allergies   Allergen Reactions    Latex Rash    Amoxicillin Shortness Of Breath and Nausea And Vomiting    Anaprox [Naproxen Sodium] Shortness Of Breath and Nausea And Vomiting    Aloe Vera      Other reaction(s): Unknown    Nortriptyline Hcl      Doesn't remember      Augmentin [Amoxicillin-Pot Clavulanate] Nausea And Vomiting           Buspar [Buspirone] Nausea And Vomiting    Vibramycin [Doxycycline Calcium] Nausea And Vomiting       Past Surgical History:   Procedure Laterality Date    APPENDECTOMY       SECTION      EYE SURGERY      HYSTERECTOMY      HYSTERECTOMY, TOTAL ABDOMINAL      LUMBAR FUSION N/A 2020    TLIF OF L4/5. L5-S1 performed by Kat Oakes DO at [address] [place] [address] RETINAL DETACHMENT SURGERY         Social History     Tobacco Use    Smoking status: Never Smoker    Smokeless tobacco: Never Used   Vaping Use    Vaping Use: Never used   Substance Use Topics    Alcohol use: No    Drug use: No       Family History   Problem Relation Age of Onset    Diabetes Mother     High Blood Pressure Mother     Heart Attack Mother     Kidney Disease Father     Stroke Father     Other Father         renal failure    Diabetes Maternal Grandmother     Colon Cancer Maternal Grandmother     Heart Disease Maternal Grandfather     Cancer Paternal Grandmother         colon cancer    Alcohol Abuse Sister     Cirrhosis Sister     Stroke Paternal Aunt     Colon Cancer Maternal Cousin        /88   Pulse 60   Temp 97.4 °F (36.3 °C)   Ht 5' 8\" (1.727 m)   Wt 231 lb (104.8 kg)   SpO2 97%   BMI 35.12 kg/m²     Physical Exam  Vitals and nursing note reviewed. Constitutional:       General: She is not in acute distress. Appearance: Normal appearance. She is well-developed and well-groomed. She is obese.  She is not ill-appearing, toxic-appearing or diaphoretic. Comments: Appears less tired and uncomfortable due to back pain today compared to previous visit   HENT:      Head: Normocephalic and atraumatic. Right Ear: Tympanic membrane, ear canal and external ear normal.      Left Ear: Tympanic membrane, ear canal and external ear normal.      Nose: Nose normal.      Mouth/Throat:      Lips: Pink. Mouth: Mucous membranes are moist. No oral lesions. Tongue: No lesions. Palate: No mass and lesions. Pharynx: Oropharynx is clear. Uvula midline. No pharyngeal swelling, oropharyngeal exudate, posterior oropharyngeal erythema or uvula swelling. Tonsils: 1+ on the right. 1+ on the left. Eyes:      General: Lids are normal. Vision grossly intact. No scleral icterus. Extraocular Movements: Extraocular movements intact. Conjunctiva/sclera: Conjunctivae normal.      Pupils: Pupils are equal, round, and reactive to light. Neck:      Thyroid: No thyroid mass or thyromegaly. Vascular: No carotid bruit or JVD. Trachea: Trachea and phonation normal.   Cardiovascular:      Rate and Rhythm: Normal rate and regular rhythm. Pulses: Normal pulses. Radial pulses are 2+ on the right side and 2+ on the left side. Posterior tibial pulses are 2+ on the right side and 2+ on the left side. Heart sounds: Normal heart sounds. No murmur heard. No friction rub. No gallop. Pulmonary:      Effort: Pulmonary effort is normal. No accessory muscle usage. Breath sounds: Normal breath sounds. No decreased breath sounds, wheezing, rhonchi or rales. Chest:   Breasts:      Right: No supraclavicular adenopathy. Left: No supraclavicular adenopathy. Abdominal:      General: Abdomen is flat. Bowel sounds are normal. There is no distension. Palpations: Abdomen is soft. There is no hepatomegaly, splenomegaly or mass. Tenderness: There is no abdominal tenderness.  There is no guarding or rebound. Hernia: No hernia is present. Musculoskeletal:      Right wrist: Normal.      Left wrist: Normal.      Cervical back: Normal range of motion and neck supple. Thoracic back: Decreased range of motion. Lumbar back: Spasms and tenderness present. Decreased range of motion. Right lower leg: No edema. Left lower leg: No edema. Right ankle: Normal.      Left ankle: Normal.   Lymphadenopathy:      Head:      Right side of head: No submandibular adenopathy. Left side of head: No submandibular adenopathy. Cervical: No cervical adenopathy. Right cervical: No superficial, deep or posterior cervical adenopathy. Left cervical: No superficial, deep or posterior cervical adenopathy. Upper Body:      Right upper body: No supraclavicular adenopathy. Left upper body: No supraclavicular adenopathy. Lower Body: No right inguinal adenopathy. No left inguinal adenopathy. Skin:     General: Skin is warm and dry. Capillary Refill: Capillary refill takes less than 2 seconds. Coloration: Skin is not cyanotic. Findings: No rash. Nails: There is no clubbing. Neurological:      Mental Status: She is alert and oriented to person, place, and time. Cranial Nerves: No cranial nerve deficit, dysarthria or facial asymmetry. Sensory: Sensation is intact. Motor: Motor function is intact. No weakness, tremor, atrophy or abnormal muscle tone. Coordination: Coordination is intact. Romberg sign negative. Coordination normal.      Gait: Gait is intact. Deep Tendon Reflexes: Reflexes are normal and symmetric. Reflex Scores:       Patellar reflexes are 2+ on the right side and 2+ on the left side. Comments: CN II-XII grossly intact, speech clear, MAEW, no focal deficits   Psychiatric:         Attention and Perception: Attention and perception normal.         Mood and Affect: Mood is anxious and depressed. Speech: Speech normal.         Behavior: Behavior normal. Behavior is cooperative. Thought Content: Thought content normal.         Cognition and Memory: Cognition and memory normal.         Judgment: Judgment normal.      Comments: Affect congruent with mood         Lab Results   Component Value Date     01/10/2022    K 4.4 01/10/2022     01/10/2022    CO2 28 01/10/2022    BUN 17 01/10/2022    CREATININE 1.0 (H) 01/10/2022    GLUCOSE 123 (H) 01/10/2022    CALCIUM 9.7 01/10/2022    PROT 7.1 01/10/2022    LABALBU 4.3 01/10/2022    BILITOT 0.3 01/10/2022    ALKPHOS 80 01/10/2022    AST 25 01/10/2022    ALT 23 01/10/2022    LABGLOM 56 (A) 01/10/2022    GFRAA >59 01/10/2022     Lab Results   Component Value Date    LABA1C 6.2 (H) 01/10/2022     Lab Results   Component Value Date    CHOL 196 01/10/2022    CHOL 179 09/07/2021    CHOL 187 03/04/2021     Lab Results   Component Value Date    TRIG 206 (H) 01/10/2022    TRIG 199 (H) 09/07/2021    TRIG 203 (H) 03/04/2021     Lab Results   Component Value Date    HDL 57 (L) 01/10/2022    HDL 57 (L) 09/07/2021    HDL 53 (L) 03/04/2021     Lab Results   Component Value Date    LDLCALC 98 01/10/2022    LDLCALC 82 09/07/2021    [address] 03/04/2021       No results found for this visit on 04/01/22. Assessment:    ICD-10-CM    1. Mild major depression (HCC)  F32.0 DISCONTINUED: FLUoxetine (PROZAC) 10 MG capsule   2. GARRETT (generalized anxiety disorder)  F41.1 DISCONTINUED: FLUoxetine (PROZAC) 10 MG capsule   3. Essential hypertension, benign  I10    4. Stage 3a chronic kidney disease ([facility] [address]  N18.31 External Referral To Nephrology   5. Mixed hyperlipidemia  E78.2 Comprehensive Metabolic Panel     Lipid Panel   6. Hyperglycemia  R73.9 Hemoglobin A1C   7. Chronic bilateral low back pain with bilateral sciatica  M54.42     M54.41     G89.29    8.  Class 2 severe obesity due to excess calories with serious comorbidity and body mass index (BMI) of 35.0 to 35.9 in adult [facility])  E66.01     Z68.35        Plan:  Marichuy was seen today for follow-up, hypertension, depression, anxiety and lower back pain. Diagnoses and all orders for this visit:    Mild major depression ([facility] [address]  -     Discontinue: FLUoxetine (PROZAC) 10 MG capsule; Take 1 capsule by mouth nightly    GARRETT (generalized anxiety disorder)  -     Discontinue: FLUoxetine (PROZAC) 10 MG capsule; Take 1 capsule by mouth nightly    Essential hypertension, benign    Stage 3a chronic kidney disease ([facility] [address]  -     External Referral To Nephrology    Mixed hyperlipidemia  -     Comprehensive Metabolic Panel; Future  -     Lipid Panel; Future    Hyperglycemia  -     Hemoglobin A1C; Future    Chronic bilateral low back pain with bilateral sciatica    Class 2 severe obesity due to excess calories with serious comorbidity and body mass index (BMI) of 35.0 to 35.9 in adult [facility])    -No new labs today. Previous labs reviewed. -Refills Provided. -Hypertension well controlled. Encouraged efforts at well balanced diet, exercise, and weight loss. -major depressive disorder and Generalized anxiety disorder-improving with fluoxetine which was continued today. Continue to wean use of lorazepam to use prn moderate to severe anxiety. Controlled substance contract and urine drug screen are up-to-date currently. No unusual filling on recent Leodis Mealy report. Treatment continues to be medically necessary and improves patient quality of life. No signs of misuse of controlled medication.   -Chronic LBP with muscle spasms-inadequately controlled but patient does not want to take controlled medications for pain, needs to avoid oral NSAIDs due to [address], and she did not tolerate muscle relaxer either. She does not want to try a different muscle relaxer at this time and has declined referral to Pain management  -referral to Nephrology for evaluation of new onset CKD IIIa  -Obesity due to excess caloric intake-no improvement in obesity.  Continue/Increase efforts at low carbohydrate diet, exercise, and weight loss/efforts at normalizing BMI. - Return in about 2 months (around 6/1/2022) for HTN, recheck mood, high cholesterol. Over 50% of the total visit time of 40 min was spent on counseling and/or coordination of care of:   1. Mild major depression ([facility] [address]    2. GARRETT (generalized anxiety disorder)    3. Essential hypertension, benign    4. Stage 3a chronic kidney disease ([facility] [address]    5. Mixed hyperlipidemia    6. Hyperglycemia    7. Chronic bilateral low back pain with bilateral sciatica    8. Class 2 severe obesity due to excess calories with serious comorbidity and body mass index (BMI) of 35.0 to 35.9 in adult [facility])         Orders Placed This Encounter   Procedures    Comprehensive Metabolic Panel     Standing Status:   Future     Standing Expiration Date:   4/1/2023    Lipid Panel     Standing Status:   Future     Standing Expiration Date:   4/1/2023     Order Specific Question:   Is Patient Fasting?/# of Hours     Answer:   yes/8 hrs    Hemoglobin A1C     Standing Status:   Future     Standing Expiration Date:   4/1/2023    External Referral To Nephrology     Referral Priority:   Routine     Referral Type:   Eval and Treat     Referral Reason:   Specialty Services Required     Requested Specialty:   Nephrology     Number of Visits Requested:   1     Orders Placed This Encounter   Medications    DISCONTD: FLUoxetine (PROZAC) 10 MG capsule     Sig: Take 1 capsule by mouth nightly     Dispense:  30 capsule     Refill:  2     Medications Discontinued During This Encounter   Medication Reason    TIADYLT  MG extended release capsule LIST CLEANUP    FLUoxetine (PROZAC) 10 MG capsule DOSE ADJUSTMENT     There are no Patient Instructions on file for this visit. Patient voices understanding and agrees to plans along with risks and benefits of plan. Counseling:  Sarwat PANTOJA'S case, medications and options were discussed in detail.  patient was instructed to call the office if she   questions regarding her treatment. Should her conditions worsen, she should return to office to be reassessed by Dr. Adam Florentino. she  Should to go the closest Emergency Department for any emergency. They verbalized understanding the above instructions.

## 2022-04-10 DIAGNOSIS — F41.1 GAD (GENERALIZED ANXIETY DISORDER): ICD-10-CM

## 2022-04-10 DIAGNOSIS — F32.0 MILD MAJOR DEPRESSION (HCC): ICD-10-CM

## 2022-04-11 DIAGNOSIS — K58.0 IRRITABLE BOWEL SYNDROME WITH DIARRHEA: ICD-10-CM

## 2022-04-11 RX ORDER — FLUOXETINE 10 MG/1
CAPSULE ORAL
Qty: 30 CAPSULE | Refills: 2 | OUTPATIENT
Start: 2022-04-11

## 2022-04-11 RX ORDER — HYOSCYAMINE SULFATE 0.125 MG
TABLET ORAL
Qty: 90 TABLET | Refills: 3 | Status: SHIPPED | OUTPATIENT
Start: 2022-04-11 | End: 2022-07-21

## 2022-04-28 DIAGNOSIS — F32.0 MILD MAJOR DEPRESSION (HCC): ICD-10-CM

## 2022-04-28 DIAGNOSIS — F41.1 GAD (GENERALIZED ANXIETY DISORDER): ICD-10-CM

## 2022-04-28 RX ORDER — FLUOXETINE 10 MG/1
CAPSULE ORAL
Qty: 30 CAPSULE | Refills: 2 | Status: SHIPPED | OUTPATIENT
Start: 2022-04-28 | End: 2022-05-24 | Stop reason: SDUPTHER

## 2022-04-28 NOTE — TELEPHONE ENCOUNTER
Luz Marina Olea called to request a refill on her medication.       Last office visit : 4/1/2022   Next office visit : 4/28/2022     Requested Prescriptions     Pending Prescriptions Disp Refills    FLUoxetine (PROZAC) 10 MG capsule [Pharmacy Med Name: FLUOXETINE HCL 10 MG CAPSULE] 30 capsule 2     Sig: TAKE 1 CAPSULE BY MOUTH EVERY DAY            [place], [place]

## 2022-04-29 ASSESSMENT — ENCOUNTER SYMPTOMS
NAUSEA: 0
ABDOMINAL PAIN: 0

## 2022-05-16 RX ORDER — DICYCLOMINE HCL 20 MG
20 TABLET ORAL
Qty: 270 TABLET | Refills: 1 | Status: SHIPPED | OUTPATIENT
Start: 2022-05-16 | End: 2022-10-17

## 2022-05-16 NOTE — TELEPHONE ENCOUNTER
Piedad Lombard called requesting a refill of the below medication which has been pended for you:     Requested Prescriptions     Pending Prescriptions Disp Refills    dicyclomine (BENTYL) 20 MG tablet [Pharmacy Med Name: DICYCLOMINE 20 MG TABLET] 270 tablet 1     Sig: TAKE 1 TABLET BY MOUTH 3 TIMES DAILY (BEFORE MEALS).        Last Appointment Date: 2/15/2022  Next Appointment Date: 6/15/2022    Allergies   Allergen Reactions    Latex Rash    Amoxicillin Shortness Of Breath and Nausea And Vomiting    Anaprox [Naproxen Sodium] Shortness Of Breath and Nausea And Vomiting    Aloe Vera      Other reaction(s): Unknown    Nortriptyline Hcl      Doesn't remember      Augmentin [Amoxicillin-Pot Clavulanate] Nausea And Vomiting           Buspar [Buspirone] Nausea And Vomiting    Vibramycin [Doxycycline Calcium] Nausea And Vomiting

## 2022-05-20 DIAGNOSIS — I10 ESSENTIAL HYPERTENSION, BENIGN: ICD-10-CM

## 2022-05-20 RX ORDER — METOPROLOL TARTRATE 100 MG/1
TABLET ORAL
Qty: 180 TABLET | Refills: 1 | Status: SHIPPED | OUTPATIENT
Start: 2022-05-20

## 2022-05-20 NOTE — TELEPHONE ENCOUNTER
Cherene Langton called to request a refill on her medication.       Last office visit : 4/1/2022   Next office visit : 6/15/2022     Requested Prescriptions     Pending Prescriptions Disp Refills    metoprolol (LOPRESSOR) 100 MG tablet [Pharmacy Med Name: METOPROLOL TARTRATE 100 MG TAB] 180 tablet      Sig: TAKE 1 TABLET BY MOUTH TWICE A DAY            Serene Bellamy MA

## 2022-05-24 DIAGNOSIS — F32.0 MILD MAJOR DEPRESSION (HCC): ICD-10-CM

## 2022-05-24 DIAGNOSIS — F41.1 GAD (GENERALIZED ANXIETY DISORDER): ICD-10-CM

## 2022-05-24 RX ORDER — FLUOXETINE 10 MG/1
CAPSULE ORAL
Qty: 90 CAPSULE | Refills: 1 | Status: SHIPPED | OUTPATIENT
Start: 2022-05-24

## 2022-05-24 NOTE — TELEPHONE ENCOUNTER
Louis Bertrand called to request a refill on her medication.       Last office visit : 4/1/2022   Next office visit : 6/15/2022     Requested Prescriptions     Pending Prescriptions Disp Refills    FLUoxetine (PROZAC) 10 MG capsule 90 capsule 1     Sig: TAKE 1 CAPSULE BY MOUTH EVERY DAY            Joslyn Pagan MA

## 2022-06-01 ENCOUNTER — TELEPHONE (OUTPATIENT)
Dept: FAMILY MEDICINE CLINIC | Age: 62
End: 2022-06-01

## 2022-06-01 NOTE — TELEPHONE ENCOUNTER
----- Message from Alicia Kong sent at 6/1/2022 11:36 AM CDT -----  Subject: Message to Provider    QUESTIONS  Information for Provider? Pt needs referral kidney doctor to see/ [facility] kidney specialist / no assigned doctor yet need to discuss   referral.   ---------------------------------------------------------------------------  --------------  CALL BACK INFO  What is the best way for the office to contact you? OK to leave message on   voicemail  Preferred Call Back Phone Number? [phone]  ---------------------------------------------------------------------------  --------------  SCRIPT ANSWERS  Relationship to Patient?  Self

## 2022-06-07 DIAGNOSIS — E78.2 MIXED HYPERLIPIDEMIA: ICD-10-CM

## 2022-06-07 DIAGNOSIS — R73.9 HYPERGLYCEMIA: ICD-10-CM

## 2022-06-07 LAB
ALBUMIN SERPL-MCNC: 4.2 G/DL (ref 3.5–5.2)
ALP BLD-CCNC: 88 U/L (ref 35–104)
ALT SERPL-CCNC: 21 U/L (ref 5–33)
ANION GAP SERPL CALCULATED.3IONS-SCNC: 10 MMOL/L (ref 7–19)
AST SERPL-CCNC: 21 U/L (ref 5–32)
BILIRUB SERPL-MCNC: 0.3 MG/DL (ref 0.2–1.2)
BUN BLDV-MCNC: 20 MG/DL (ref 8–23)
CALCIUM SERPL-MCNC: 9.7 MG/DL (ref 8.8–10.2)
CHLORIDE BLD-SCNC: 102 MMOL/L (ref 98–111)
CHOLESTEROL, TOTAL: 169 MG/DL (ref 160–199)
CO2: 28 MMOL/L (ref 22–29)
CREAT SERPL-MCNC: 1.1 MG/DL (ref 0.5–0.9)
GFR AFRICAN AMERICAN: >59
GFR NON-AFRICAN AMERICAN: 50
GLUCOSE BLD-MCNC: 124 MG/DL (ref 74–109)
HBA1C MFR BLD: 6.3 % (ref 4–6)
HDLC SERPL-MCNC: 51 MG/DL (ref 65–121)
LDL CHOLESTEROL CALCULATED: 85 MG/DL
POTASSIUM SERPL-SCNC: 4.3 MMOL/L (ref 3.5–5)
SODIUM BLD-SCNC: 140 MMOL/L (ref 136–145)
TOTAL PROTEIN: 6.8 G/DL (ref 6.6–8.7)
TRIGL SERPL-MCNC: 165 MG/DL (ref 0–149)

## 2022-06-15 ENCOUNTER — OFFICE VISIT (OUTPATIENT)
Dept: FAMILY MEDICINE CLINIC | Age: 62
End: 2022-06-15
Payer: MEDICAID

## 2022-06-15 VITALS
HEIGHT: 68 IN | TEMPERATURE: 97.6 F | BODY MASS INDEX: 35.35 KG/M2 | HEART RATE: 65 BPM | DIASTOLIC BLOOD PRESSURE: 76 MMHG | SYSTOLIC BLOOD PRESSURE: 127 MMHG | OXYGEN SATURATION: 96 % | WEIGHT: 233.25 LBS

## 2022-06-15 DIAGNOSIS — N18.31 STAGE 3A CHRONIC KIDNEY DISEASE (HCC): ICD-10-CM

## 2022-06-15 DIAGNOSIS — E78.2 MIXED HYPERLIPIDEMIA: ICD-10-CM

## 2022-06-15 DIAGNOSIS — E66.01 CLASS 2 SEVERE OBESITY DUE TO EXCESS CALORIES WITH SERIOUS COMORBIDITY AND BODY MASS INDEX (BMI) OF 35.0 TO 35.9 IN ADULT (HCC): ICD-10-CM

## 2022-06-15 DIAGNOSIS — I10 ESSENTIAL HYPERTENSION, BENIGN: Primary | ICD-10-CM

## 2022-06-15 DIAGNOSIS — Z51.81 THERAPEUTIC DRUG MONITORING: ICD-10-CM

## 2022-06-15 DIAGNOSIS — F41.1 GAD (GENERALIZED ANXIETY DISORDER): ICD-10-CM

## 2022-06-15 DIAGNOSIS — F32.0 MILD MAJOR DEPRESSION (HCC): ICD-10-CM

## 2022-06-15 DIAGNOSIS — R73.9 HYPERGLYCEMIA: ICD-10-CM

## 2022-06-15 DIAGNOSIS — Z12.11 COLON CANCER SCREENING: ICD-10-CM

## 2022-06-15 DIAGNOSIS — Z12.31 ENCOUNTER FOR SCREENING MAMMOGRAM FOR MALIGNANT NEOPLASM OF BREAST: ICD-10-CM

## 2022-06-15 PROCEDURE — 99214 OFFICE O/P EST MOD 30 MIN: CPT | Performed by: INTERNAL MEDICINE

## 2022-06-15 PROCEDURE — 80305 DRUG TEST PRSMV DIR OPT OBS: CPT | Performed by: INTERNAL MEDICINE

## 2022-06-15 RX ORDER — ATORVASTATIN CALCIUM 20 MG/1
20 TABLET, FILM COATED ORAL DAILY
Qty: 90 TABLET | Refills: 3 | Status: SHIPPED | OUTPATIENT
Start: 2022-06-15 | End: 2022-10-17

## 2022-06-15 ASSESSMENT — ENCOUNTER SYMPTOMS
NAUSEA: 0
WHEEZING: 0
RHINORRHEA: 0
ABDOMINAL PAIN: 0
BACK PAIN: 1
COUGH: 0
SHORTNESS OF BREATH: 0
DIARRHEA: 1
VOMITING: 0
EYE PAIN: 0
COLOR CHANGE: 0
SINUS PRESSURE: 0
EYE DISCHARGE: 0
VOICE CHANGE: 0
CHEST TIGHTNESS: 0
EYE REDNESS: 0
SORE THROAT: 0
BLOOD IN STOOL: 0

## 2022-06-15 ASSESSMENT — VISUAL ACUITY: OU: 1

## 2022-06-15 NOTE — PROGRESS NOTES
Meagan Figueroa is a 64 y.o. female who presents today for   Chief Complaint   Patient presents with    Hypertension    Cholesterol Problem    Other     recheck mood       Other  Associated symptoms include arthralgias and myalgias. Pertinent negatives include no abdominal pain, chest pain, chills, coughing, fatigue, fever, headaches, nausea, neck pain, numbness, rash, sore throat, vomiting or weakness. Hypertension  Pertinent negatives include no chest pain, headaches, neck pain, palpitations or shortness of breath. Hyperlipidemia  Associated symptoms include myalgias. Pertinent negatives include no chest pain or shortness of breath. Back Pain  Pertinent negatives include no abdominal pain, chest pain, dysuria, fever, headaches, numbness or weakness. 63 y/o female here for follow up on HTN, Mild major depression, generalized anxiety disorder, IBS with controlled med refill, chronic LBP with radiculopathy, and obesity. She has been walking more/taking more steps for exercise and eating more fish and chicken which is usually baked or grilled which has improved her cholesterol as well as her chronic LBP. Patient feels like her depression and anxiety are much better controlled also with current medications. Hypertension  Patient is here for follow-up of elevated blood pressure. She is not exercising and is adherent to a low-salt diet. Patient is checking blood pressure at home. Blood pressure is not controlled. Cardiac symptoms: none. Patient denies chest pain, dyspnea, irregular heart beat, orthopnea, paroxysmal nocturnal dyspnea and syncope. Use of agents associated with hypertension: estrogens. History of target organ damage: CKD. Meagan Figueroa is here for follow up of dyslipidemia. Compliance with treatment has been good. Patient denies muscle pain associated with their medications which include atorvastatin 20 mg daily.      BMI Readings from Last 3 Encounters:   06/15/22 35.47 kg/m²   04/01/22 35.12 kg/m²   02/21/22 35.28 kg/m²     Wt Readings from Last 3 Encounters:   06/15/22 233 lb 4 oz (105.8 kg)   04/01/22 231 lb (104.8 kg)   02/21/22 232 lb (105.2 kg)         Review of Systems   Constitutional: Negative for appetite change, chills, fatigue, fever and unexpected weight change. HENT: Negative for ear pain, rhinorrhea, sinus pressure, sore throat and voice change. Eyes: Negative for pain, discharge and redness. Respiratory: Negative for cough, chest tightness, shortness of breath and wheezing. Cardiovascular: Negative for chest pain, palpitations and leg swelling. See HPI   Gastrointestinal: Positive for diarrhea. Negative for abdominal pain, blood in stool, nausea and vomiting.        +history of IBS which is improving   Endocrine: Negative for cold intolerance, heat intolerance and polydipsia. Genitourinary: Negative for dysuria and hematuria. Musculoskeletal: Positive for arthralgias, back pain, gait problem and myalgias. Negative for neck pain and neck stiffness. See HPI   Skin: Negative for color change and rash. Neurological: Negative for dizziness, tremors, syncope, speech difficulty, weakness, numbness and headaches. Hematological: Negative for adenopathy. Does not bruise/bleed easily. Psychiatric/Behavioral: Positive for sleep disturbance. Negative for agitation, confusion, decreased concentration, dysphoric mood, self-injury and suicidal ideas. The patient is not nervous/anxious and is not hyperactive. See HPI   All other systems reviewed and are negative.       Past Medical History:   Diagnosis Date    Anal fissure     Anxiety 9/15/2017    C. difficile colitis     Cervical radiculopathy 9/15/2017    Chronic back pain     Chronic kidney disease     Class 1 obesity due to excess calories with serious comorbidity and body mass index (BMI) of 32.0 to 32.9 in adult 9/15/2017    Clostridium difficile colitis 2/16/2020    Cyst of kidney, acquired 9/15/2017    Depression 9/15/2017    History of oral surgery     Hyperlipidemia     Hypertension     IBS (irritable bowel syndrome) 9/15/2017    Irregular heartbeat     Obesity 9/15/2017    Osteoarthritis     PONV (postoperative nausea and vomiting)     Retinal detachment        Current Outpatient Medications   Medication Sig Dispense Refill    atorvastatin (LIPITOR) 20 MG tablet Take 1 tablet by mouth daily 90 tablet 3    FLUoxetine (PROZAC) 10 MG capsule TAKE 1 CAPSULE BY MOUTH EVERY DAY 90 capsule 1    metoprolol (LOPRESSOR) 100 MG tablet TAKE 1 TABLET BY MOUTH TWICE A  tablet 1    dicyclomine (BENTYL) 20 MG tablet TAKE 1 TABLET BY MOUTH 3 TIMES DAILY (BEFORE MEALS). 270 tablet 1    hyoscyamine (ANASPAZ;LEVSIN) 125 MCG tablet TAKE 1 TABLET BY MOUTH EVERY 6HRS AS NEEDED FOR CRAMPING OR DIARRHEA 90 tablet 3    LORazepam (ATIVAN) 0.5 MG tablet Take 1 tablet by mouth every 12 hours as needed for Anxiety for up to 30 days. 45 tablet 0    dilTIAZem (CARDIZEM CD) 240 MG extended release capsule TAKE 1 CAPSULE BY MOUTH ONE TIME DAILY 90 capsule 3    triamterene-hydroCHLOROthiazide (MAXZIDE-25) 37.5-25 MG per tablet Take 1 tablet by mouth daily TAKE 1-2 TABLETS BY MOUTH ONCE TIME DAILY. 60 tablet 5    Probiotic Product (PROBIOTIC-10 PO) Take 1 capsule by mouth daily      Estradiol-Estriol-Progesterone (BIEST/PROGESTERONE TD) Take 1 capsule by mouth nightly      Multiple Vitamins-Minerals (WOMENS MULTIVITAMIN PO) Take 1 tablet by mouth daily       No current facility-administered medications for this visit.        Allergies   Allergen Reactions    Latex Rash    Amoxicillin Shortness Of Breath and Nausea And Vomiting    Anaprox [Naproxen Sodium] Shortness Of Breath and Nausea And Vomiting    Aloe Vera      Other reaction(s): Unknown    Nortriptyline Hcl      Doesn't remember      Augmentin [Amoxicillin-Pot Clavulanate] Nausea And Vomiting           Buspar [Buspirone] Nausea And Vomiting    Vibramycin [Doxycycline Calcium] Nausea And Vomiting       Past Surgical History:   Procedure Laterality Date    APPENDECTOMY       SECTION      EYE SURGERY      HYSTERECTOMY (CERVIX STATUS UNKNOWN)      HYSTERECTOMY, TOTAL ABDOMINAL (CERVIX REMOVED)      LUMBAR FUSION N/A 2020    TLIF OF L4/5. L5-S1 performed by Lux Lucas DO at [address] Bilateral     age 25   [facility] RETINAL DETACHMENT SURGERY         Social History     Tobacco Use    Smoking status: Never Smoker    Smokeless tobacco: Never Used   Vaping Use    Vaping Use: Never used   Substance Use Topics    Alcohol use: No    Drug use: No       Family History   Problem Relation Age of Onset    Diabetes Mother     High Blood Pressure Mother     Heart Attack Mother     Kidney Disease Father     Stroke Father     Other Father         renal failure    Alcohol Abuse Sister     Cirrhosis Sister     Diabetes Maternal Grandmother     Heart Disease Maternal Grandfather     Colon Cancer Paternal Grandmother     Stroke Paternal Aunt     Colon Cancer Maternal Cousin        /76   Pulse 65   Temp 97.6 °F (36.4 °C)   Ht 5' 8\" (1.727 m)   Wt 233 lb 4 oz (105.8 kg)   SpO2 96%   BMI 35.47 kg/m²     Physical Exam  Vitals and nursing note reviewed. Constitutional:       General: She is not in acute distress. Appearance: Normal appearance. She is well-developed and well-groomed. She is obese. She is not ill-appearing, toxic-appearing or diaphoretic. Comments: Appears less tired and uncomfortable due to back pain today compared to previous visit   HENT:      Head: Normocephalic and atraumatic. Right Ear: Tympanic membrane, ear canal and external ear normal.      Left Ear: Tympanic membrane, ear canal and external ear normal.      Nose: Nose normal.      Mouth/Throat:      Lips: Pink. Mouth: Mucous membranes are moist. No oral lesions. Tongue: No lesions. Palate: No mass and lesions. adenopathy. Left side of head: No submandibular adenopathy. Cervical: No cervical adenopathy. Right cervical: No superficial, deep or posterior cervical adenopathy. Left cervical: No superficial, deep or posterior cervical adenopathy. Upper Body:      Right upper body: No supraclavicular adenopathy. Left upper body: No supraclavicular adenopathy. Lower Body: No right inguinal adenopathy. No left inguinal adenopathy. Skin:     General: Skin is warm and dry. Capillary Refill: Capillary refill takes less than 2 seconds. Coloration: Skin is not cyanotic. Findings: No rash. Nails: There is no clubbing. Neurological:      Mental Status: She is alert and oriented to person, place, and time. Cranial Nerves: No cranial nerve deficit, dysarthria or facial asymmetry. Sensory: Sensation is intact. Motor: Motor function is intact. No weakness, tremor, atrophy or abnormal muscle tone. Coordination: Coordination is intact. Romberg sign negative. Coordination normal.      Gait: Gait is intact. Deep Tendon Reflexes: Reflexes are normal and symmetric. Reflex Scores:       Patellar reflexes are 2+ on the right side and 2+ on the left side. Comments: CN II-XII grossly intact, speech clear, MAEW, no focal deficits   Psychiatric:         Attention and Perception: Attention and perception normal.         Mood and Affect: Mood is anxious and depressed. Speech: Speech normal.         Behavior: Behavior normal. Behavior is cooperative. Thought Content:  Thought content normal.         Cognition and Memory: Cognition and memory normal.         Judgment: Judgment normal.      Comments: Affect congruent with mood         Lab Results   Component Value Date     06/07/2022    K 4.3 06/07/2022     06/07/2022    CO2 28 06/07/2022    BUN 20 06/07/2022    CREATININE 1.1 (H) 06/07/2022    GLUCOSE 124 (H) 06/07/2022    CALCIUM 9.7 06/07/2022    PROT 6.8 06/07/2022    LABALBU 4.2 06/07/2022    BILITOT 0.3 06/07/2022    ALKPHOS 88 06/07/2022    AST 21 06/07/2022    ALT 21 06/07/2022    LABGLOM 50 (A) 06/07/2022    GFRAA >59 06/07/2022     Lab Results   Component Value Date    LABA1C 6.3 (H) 06/07/2022     1/10/22  HbA1C 6.2%    Lab Results   Component Value Date    CHOL 169 06/07/2022    CHOL 196 01/10/2022    CHOL 179 09/07/2021     Lab Results   Component Value Date    TRIG 165 (H) 06/07/2022    TRIG 206 (H) 01/10/2022    TRIG 199 (H) 09/07/2021     Lab Results   Component Value Date    HDL 51 (L) 06/07/2022    HDL 57 (L) 01/10/2022    HDL 57 (L) 09/07/2021     Lab Results   Component Value Date    LDLCALC 85 06/07/2022    LDLCALC 98 01/10/2022    LDLCALC 82 09/07/2021       Results for orders placed or performed in visit on 06/15/22   POCT Rapid Drug Screen   Result Value Ref Range    Alcohol, Urine neg     Amphetamine Screen, Urine neg     Barbiturate Screen, Urine neg     Benzodiazepine Screen, Urine neg     Buprenorphine Urine neg     Cocaine Metabolite Screen, Urine neg     FENTANYL SCREEN, URINE neg     Gabapentin Screen, Urine neg     MDMA, Urine neg     Methadone Screen, Urine neg     Methamphetamine, Urine neg     Opiate Scrn, Ur neg     Oxycodone Screen, Ur neg     PCP Screen, Urine neg     Propoxyphene Screen, Urine neg     Synthetic Cannabinoids (K2) Screen, Urine neg     THC Screen, Urine neg     Tramadol Scrn, Ur neg     Tricyclic Antidepressants, Urine neg        Assessment:    ICD-10-CM    1. Essential hypertension, benign  I10    2. Mixed hyperlipidemia  E78.2 atorvastatin (LIPITOR) 20 MG tablet     Comprehensive Metabolic Panel     Lipid Panel   3. Hyperglycemia  R73.9 Hemoglobin A1C   4. Stage 3a chronic kidney disease (HCC)  N18.31    5. Mild major depression (HCC)  F32.0    6. GARRETT (generalized anxiety disorder)  F41.1    7. Colon cancer screening  Z12.11 Fecal DNA Colorectal cancer screening (Cologuard)   8.  Encounter for screening mammogram for malignant neoplasm of breast  Z12.31 SERAFIN DIGITAL SCREEN W OR WO CAD BILATERAL   9. Therapeutic drug monitoring  Z51.81 POCT Rapid Drug Screen   10. Class 2 severe obesity due to excess calories with serious comorbidity and body mass index (BMI) of 35.0 to 35.9 in [facility])  E66.01     Z68.35        Plan:  Angel Alvarado was seen today for hypertension, cholesterol problem and other. Diagnoses and all orders for this visit:    Essential hypertension, benign    Mixed hyperlipidemia  -     atorvastatin (LIPITOR) 20 MG tablet; Take 1 tablet by mouth daily  -     Comprehensive Metabolic Panel; Future  -     Lipid Panel; Future    Hyperglycemia  -     Hemoglobin A1C; Future    Stage 3a chronic kidney disease (HCC)    Mild major depression (HCC)    GARRETT (generalized anxiety disorder)    Colon cancer screening  -     Fecal DNA Colorectal cancer screening (Cologuard)    Encounter for screening mammogram for malignant neoplasm of breast  -     SERAFIN DIGITAL SCREEN W OR WO CAD BILATERAL; Future    Therapeutic drug monitoring  -     POCT Rapid Drug Screen    Class 2 severe obesity due to excess calories with serious comorbidity and body mass index (BMI) of 35.0 to 35.9 in [facility])      Labs reviewed with patient. Refills Provided. -Hypertension well controlled. Encouraged efforts at well balanced diet, exercise, and weight loss. -Hyperlipidemia-Improving but no well controlled. Continue atorvastatin and efforts at low carbohydrate diet, exercise, and weight loss. -Major depressive disorder and Generalized anxiety disorder-improving with fluoxetine which was continued today. Continue to wean use of lorazepam to use prn moderate to severe anxiety. The current medical regimen is effective. Continue present plan and medications. UDS and controlled substance contract updated today. No unusual filling on current [facility] report. Tx continues to be medically necessary and improves patient quality of life.  No signs of misuse of controlled medication.   -Chronic LBP with muscle spasms-improving with exercise and weight loss. Patient does not want to take controlled medications for pain, needs to avoid oral NSAIDs due to [address], and she did not tolerate muscle relaxer either. She does not want to try a different muscle relaxer at this time and has declined referral to Pain management  -referral to Nephrology for evaluation of new onset CKD IIIa  -Obesity due to excess caloric intake-no improvement in obesity. Continue/Increase efforts at low carbohydrate diet, exercise, and weight loss/efforts at normalizing BMI. - Return in about 4 months (around 10/15/2022) for ECPE, HTN, recheck mood, high cholesterol, Controlled med refill. Over 50% of the total visit time of 30 min was spent on counseling and/or coordination of care of:   1. Essential hypertension, benign    2. Mixed hyperlipidemia    3. Hyperglycemia    4. Stage 3a chronic kidney disease ([facility] [address]    5. Mild major depression ([facility] [address]    6. GARRETT (generalized anxiety disorder)    7. Colon cancer screening    8. Encounter for screening mammogram for malignant neoplasm of breast    9. Therapeutic drug monitoring    10.  Class 2 severe obesity due to excess calories with serious comorbidity and body mass index (BMI) of 35.0 to 35.9 in adult [facility])         Orders Placed This Encounter   Procedures    Fecal DNA Colorectal cancer screening ([facility])    SERAFIN DIGITAL SCREEN W OR WO CAD BILATERAL     Standing Status:   Future     Number of Occurrences:   1     Standing Expiration Date:   8/16/2023     Order Specific Question:   Reason for exam:     Answer:   routine breast cancer screening    Comprehensive Metabolic Panel     Standing Status:   Future     Standing Expiration Date:   6/15/2023    Lipid Panel     Standing Status:   Future     Standing Expiration Date:   6/15/2023     Order Specific Question:   Is Patient Fasting?/# of Hours     Answer:   yes/8 hrs    Hemoglobin A1C     Standing Status:   Future     Standing Expiration Date:   6/15/2023    POCT Rapid Drug Screen     Orders Placed This Encounter   Medications    atorvastatin (LIPITOR) 20 MG tablet     Sig: Take 1 tablet by mouth daily     Dispense:  90 tablet     Refill:  3     Medications Discontinued During This Encounter   Medication Reason    atorvastatin (LIPITOR) 20 MG tablet REORDER     There are no Patient Instructions on file for this visit. Patient voices understanding and agrees to plans along with risks and benefits of plan. Counseling:  Moriah NÚÑEZ'H case, medications and options were discussed in detail. patient was instructed to call the office if she   questions regarding her treatment. Should her conditions worsen, she should return to office to be reassessed by Dr. Jenn Chau. she  Should to go the closest Emergency Department for any emergency. They verbalized understanding the above instructions.

## 2022-06-16 ENCOUNTER — TELEPHONE (OUTPATIENT)
Dept: FAMILY MEDICINE CLINIC | Age: 62
End: 2022-06-16

## 2022-06-16 NOTE — TELEPHONE ENCOUNTER
Mammogram scheduled for 6/21 at 11:10 needs to arrive 11am, no deodorant and needs ID and Ins Card.     Left VM with apt info

## 2022-06-21 ENCOUNTER — HOSPITAL ENCOUNTER (OUTPATIENT)
Dept: WOMENS IMAGING | Age: 62
Discharge: HOME | End: 2022-06-21
Payer: MEDICAID

## 2022-06-21 DIAGNOSIS — Z12.31 ENCOUNTER FOR SCREENING MAMMOGRAM FOR MALIGNANT NEOPLASM OF BREAST: ICD-10-CM

## 2022-06-21 PROCEDURE — 77063 BREAST TOMOSYNTHESIS BI: CPT

## 2022-07-05 ENCOUNTER — TRANSCRIBE ORDERS (OUTPATIENT)
Dept: ADMINISTRATIVE | Facility: HOSPITAL | Age: 62
End: 2022-07-05

## 2022-07-05 DIAGNOSIS — N18.31 STAGE 3A CHRONIC KIDNEY DISEASE: Primary | ICD-10-CM

## 2022-07-13 ENCOUNTER — HOSPITAL ENCOUNTER (OUTPATIENT)
Dept: ULTRASOUND IMAGING | Facility: HOSPITAL | Age: 62
Discharge: HOME | End: 2022-07-13
Admitting: INTERNAL MEDICINE

## 2022-07-13 PROCEDURE — 76775 US EXAM ABDO BACK WALL LIM: CPT

## 2022-07-20 DIAGNOSIS — I10 ESSENTIAL HYPERTENSION, BENIGN: ICD-10-CM

## 2022-07-20 RX ORDER — TRIAMTERENE AND HYDROCHLOROTHIAZIDE 37.5; 25 MG/1; MG/1
TABLET ORAL
Qty: 90 TABLET | Refills: 3 | Status: SHIPPED | OUTPATIENT
Start: 2022-07-20

## 2022-07-20 NOTE — TELEPHONE ENCOUNTER
Meagan Figueroa called to request a refill on her medication.       Last office visit : 6/15/2022   Next office visit : 10/17/2022     Requested Prescriptions     Pending Prescriptions Disp Refills    triamterene-hydroCHLOROthiazide (MAXZIDE-25) 37.5-25 MG per tablet [Pharmacy Med Name: [facility] Cancel 37.5-25 MG TB] 90 tablet 3     Sig: TAKE 1 TABLET BY MOUTH EVERY DAY            Melissa Silvestre

## 2022-07-21 DIAGNOSIS — K58.0 IRRITABLE BOWEL SYNDROME WITH DIARRHEA: ICD-10-CM

## 2022-07-21 RX ORDER — HYOSCYAMINE SULFATE 0.125 MG
TABLET ORAL
Qty: 90 TABLET | Refills: 3 | Status: SHIPPED | OUTPATIENT
Start: 2022-07-21 | End: 2022-10-31

## 2022-07-21 NOTE — TELEPHONE ENCOUNTER
Tone Jorge called to request a refill on her medication.       Last office visit : 6/15/2022   Next office visit : 10/17/2022     Requested Prescriptions     Pending Prescriptions Disp Refills    hyoscyamine (ANASPAZ;LEVSIN) 125 MCG tablet [Pharmacy Med Name: HYOSCYAMINE SULF 0.125 MG TAB] 90 tablet 3     Sig: TAKE 1 TABLET BY MOUTH EVERY 6HRS AS NEEDED FOR CRAMPING OR DIARRHEA            Earnest Tomas MA

## 2022-08-13 LAB
QT INTERVAL: 436 MS
QTC INTERVAL: 453 MS

## 2022-10-06 ENCOUNTER — OFFICE VISIT (OUTPATIENT)
Age: 62
End: 2022-10-06
Payer: MEDICAID

## 2022-10-06 VITALS
RESPIRATION RATE: 18 BRPM | HEART RATE: 70 BPM | DIASTOLIC BLOOD PRESSURE: 76 MMHG | HEIGHT: 68 IN | SYSTOLIC BLOOD PRESSURE: 130 MMHG | TEMPERATURE: 98 F | BODY MASS INDEX: 36.68 KG/M2 | OXYGEN SATURATION: 95 % | WEIGHT: 242 LBS

## 2022-10-06 DIAGNOSIS — H61.21 IMPACTED CERUMEN, RIGHT EAR: ICD-10-CM

## 2022-10-06 DIAGNOSIS — J06.9 UPPER RESPIRATORY TRACT INFECTION, UNSPECIFIED TYPE: Primary | ICD-10-CM

## 2022-10-06 PROCEDURE — 69209 REMOVE IMPACTED EAR WAX UNI: CPT | Performed by: NURSE PRACTITIONER

## 2022-10-06 PROCEDURE — 99213 OFFICE O/P EST LOW 20 MIN: CPT | Performed by: NURSE PRACTITIONER

## 2022-10-06 RX ORDER — CEFDINIR 300 MG/1
300 CAPSULE ORAL 2 TIMES DAILY
Qty: 20 CAPSULE | Refills: 0 | Status: SHIPPED | OUTPATIENT
Start: 2022-10-06 | End: 2022-10-16

## 2022-10-06 RX ORDER — FLUOXETINE 10 MG/1
CAPSULE ORAL
COMMUNITY
End: 2022-10-17

## 2022-10-06 ASSESSMENT — ENCOUNTER SYMPTOMS
STRIDOR: 0
ABDOMINAL DISTENTION: 0
COLOR CHANGE: 0
SORE THROAT: 0
ABDOMINAL PAIN: 0
EYE DISCHARGE: 0
WHEEZING: 0
TROUBLE SWALLOWING: 0
COUGH: 1
CHEST TIGHTNESS: 0
SINUS PRESSURE: 0
EYE PAIN: 0
SINUS COMPLAINT: 1
SHORTNESS OF BREATH: 0

## 2022-10-06 NOTE — PATIENT INSTRUCTIONS
Encourage fluids, Tylenol/Ibuprofen, OTC decongestants   Right ear flushed. Antibiotic sent to pharmacy.   If symptoms worsen or fail to improve follow-up with office or PCP  If SOB, chest pain, or high persistent fevers occur, go to ER    Patient verbalized understanding and agrees to plan

## 2022-10-06 NOTE — PROGRESS NOTES
[id number]  [address] [address] [place] [address]  Dept: [phone]  Dept Fax: [phone]  Loc: [phone]    Shauna De Los Santos is a 64 y.o. female who presents today for her medical conditions/complaints as noted below. Shauna De Los Santos is complaining of Cough, Pharyngitis (RIGHT EAR/), Sinus Problem (CONGESTION), and Chest Congestion        HPI:   Cough  Associated symptoms include ear pain. Pertinent negatives include no chest pain, chills, fever, rash, sore throat, shortness of breath or wheezing. Pharyngitis  Associated symptoms include congestion and coughing. Pertinent negatives include no abdominal pain, arthralgias, chest pain, chills, fatigue, fever, neck pain, numbness, rash, sore throat or weakness. Sinus Problem  Associated symptoms include congestion, coughing and ear pain. Pertinent negatives include no chills, neck pain, shortness of breath, sinus pressure or sore throat. Chest Congestion  Associated symptoms include congestion and coughing. Pertinent negatives include no abdominal pain, arthralgias, chest pain, chills, fatigue, fever, neck pain, numbness, rash, sore throat or weakness. Roberto Cain presents with cough, congestion, and right ear pain for 1 week. Denies fever or shortness of breath. States coughing is worse at night.    Past Medical History:   Diagnosis Date    Anal fissure     Anxiety 9/15/2017    C. difficile colitis     Cervical radiculopathy 9/15/2017    Chronic back pain     Chronic kidney disease     Class 1 obesity due to excess calories with serious comorbidity and body mass index (BMI) of 32.0 to 32.9 in adult 9/15/2017    Clostridium difficile colitis 2/16/2020    Cyst of kidney, acquired 9/15/2017    Depression 9/15/2017    History of oral surgery     Hyperlipidemia     Hypertension     IBS (irritable bowel syndrome) 9/15/2017    Irregular heartbeat     Obesity 9/15/2017    Osteoarthritis     PONV (postoperative nausea and vomiting)     Retinal detachment        Past Surgical History:   Procedure Laterality Date    APPENDECTOMY       SECTION      EYE SURGERY      HYSTERECTOMY (CERVIX STATUS UNKNOWN)      HYSTERECTOMY, TOTAL ABDOMINAL (CERVIX REMOVED)      LUMBAR FUSION N/A 2020    TLIF OF L4/5. L5-S1 performed by Carmin Goldmann, DO at [facility] OR    OVARY REMOVAL Bilateral     age 25    RETINAL DETACHMENT SURGERY         Family History   Problem Relation Age of Onset    Diabetes Mother     High Blood Pressure Mother     Heart Attack Mother     Kidney Disease Father     Stroke Father     Other Father         renal failure    Alcohol Abuse Sister     Cirrhosis Sister     Diabetes Maternal Grandmother     Heart Disease Maternal Grandfather     Colon Cancer Paternal Grandmother     Stroke Paternal Aunt     Colon Cancer Maternal Cousin        Social History     Tobacco Use    Smoking status: Never    Smokeless tobacco: Never   Substance Use Topics    Alcohol use: No        Current Outpatient Medications   Medication Sig Dispense Refill    FLUoxetine (PROZAC) 10 MG capsule Take by mouth      hyoscyamine (ANASPAZ;LEVSIN) 125 MCG tablet TAKE 1 TABLET BY MOUTH EVERY 6HRS AS NEEDED FOR CRAMPING OR DIARRHEA 90 tablet 3    triamterene-hydroCHLOROthiazide (MAXZIDE-25) 37.5-25 MG per tablet TAKE 1 TABLET BY MOUTH EVERY DAY 90 tablet 3    FLUoxetine (PROZAC) 10 MG capsule TAKE 1 CAPSULE BY MOUTH EVERY DAY 90 capsule 1    metoprolol (LOPRESSOR) 100 MG tablet TAKE 1 TABLET BY MOUTH TWICE A  tablet 1    dilTIAZem (CARDIZEM CD) 240 MG extended release capsule TAKE 1 CAPSULE BY MOUTH ONE TIME DAILY 90 capsule 3    Probiotic Product (PROBIOTIC-10 PO) Take 1 capsule by mouth daily      Estradiol-Estriol-Progesterone (BIEST/PROGESTERONE TD) Take 1 capsule by mouth nightly      Multiple Vitamins-Minerals (WOMENS MULTIVITAMIN PO) Take 1 tablet by mouth daily      cefdinir (OMNICEF) 300 MG capsule Take 1 capsule by mouth 2 times daily for 10 days 20 capsule 0    atorvastatin (LIPITOR) 20 MG tablet Take 1 tablet by mouth daily 90 tablet 3    dicyclomine (BENTYL) 20 MG tablet TAKE 1 TABLET BY MOUTH 3 TIMES DAILY (BEFORE MEALS). 270 tablet 1    LORazepam (ATIVAN) 0.5 MG tablet Take 1 tablet by mouth every 12 hours as needed for Anxiety for up to 30 days. 45 tablet 0     No current facility-administered medications for this visit. Allergies   Allergen Reactions    Latex Rash    Amoxicillin Shortness Of Breath and Nausea And Vomiting    Anaprox [Naproxen Sodium] Shortness Of Breath and Nausea And Vomiting    Aloe Vera      Other reaction(s): Unknown    Nortriptyline Hcl      Doesn't remember      Augmentin [Amoxicillin-Pot Clavulanate] Nausea And Vomiting           Buspar [Buspirone] Nausea And Vomiting    Vibramycin [Doxycycline Calcium] Nausea And Vomiting       Health Maintenance   Topic Date Due    Cervical cancer screen  Never done    Colorectal Cancer Screen  Never done    COVID-19 Vaccine (3 - Booster for Moderna series) 11/14/2021    Flu vaccine (1) 08/01/2022    Shingles vaccine (1 of 2) 04/29/2023 (Originally 10/12/2010)    Hepatitis C screen  04/29/2023 (Originally 10/12/1978)    HIV screen  04/29/2023 (Originally 10/12/1975)    Depression Monitoring  01/10/2023    A1C test (Diabetic or Prediabetic)  06/07/2023    Breast cancer screen  06/21/2024    DTaP/Tdap/Td vaccine (2 - Td or Tdap) 05/06/2026    Lipids  06/07/2027    Hepatitis A vaccine  Aged Out    Hepatitis B vaccine  Aged Out    Hib vaccine  Aged Out    Meningococcal (ACWY) vaccine  Aged Out    Pneumococcal 0-64 years Vaccine  Aged Out       Subjective:   Review of Systems   Constitutional:  Negative for chills, fatigue and fever. HENT:  Positive for congestion and ear pain. Negative for sinus pressure, sore throat and trouble swallowing. Eyes:  Negative for pain and discharge. Respiratory:  Positive for cough.  Negative for chest tightness, shortness of breath, wheezing and stridor. Cardiovascular:  Negative for chest pain and palpitations. Gastrointestinal:  Negative for abdominal distention and abdominal pain. Genitourinary:  Negative for difficulty urinating, dysuria and hematuria. Musculoskeletal:  Negative for arthralgias, neck pain and neck stiffness. Skin:  Negative for color change and rash. Neurological:  Negative for dizziness, syncope, speech difficulty, weakness and numbness. Psychiatric/Behavioral:  Negative for confusion and suicidal ideas. Objective    Physical Exam  Vitals and nursing note reviewed. Constitutional:       General: She is not in acute distress. Appearance: Normal appearance. HENT:      Head: Normocephalic. Right Ear: External ear normal. There is impacted cerumen. Left Ear: Tympanic membrane, ear canal and external ear normal.      Nose: Nose normal. No congestion. Mouth/Throat:      Mouth: Mucous membranes are moist.      Pharynx: Oropharynx is clear. No posterior oropharyngeal erythema. Eyes:      Conjunctiva/sclera: Conjunctivae normal.      Pupils: Pupils are equal, round, and reactive to light. Cardiovascular:      Rate and Rhythm: Normal rate and regular rhythm. Pulses: Normal pulses. Heart sounds: Normal heart sounds. No murmur heard. Pulmonary:      Effort: Pulmonary effort is normal. No respiratory distress. Breath sounds: Normal breath sounds. No stridor. No wheezing. Abdominal:      General: Abdomen is flat. Bowel sounds are normal. There is no distension. Tenderness: There is no abdominal tenderness. Musculoskeletal:         General: No swelling or deformity. Normal range of motion. Cervical back: Normal range of motion. No rigidity or tenderness. Skin:     General: Skin is warm and dry. Findings: No rash. Neurological:      General: No focal deficit present.       Mental Status: She is alert and oriented to person, place, and time.      Sensory: No sensory deficit. /76   Pulse 70   Temp 98 °F (36.7 °C) (Temporal)   Resp 18   Ht 5' 8\" (1.727 m)   Wt 242 lb (109.8 kg)   SpO2 95%   BMI 36.80 kg/m²     Assessment         Diagnosis Orders   1. Upper respiratory tract infection, unspecified type  cefdinir (OMNICEF) 300 MG capsule      2. Impacted cerumen, right ear            Plan   Encourage fluids, Tylenol/Ibuprofen, OTC decongestants   Right ear flushed. Antibiotic sent to pharmacy. If symptoms worsen or fail to improve follow-up with office or PCP  If SOB, chest pain, or high persistent fevers occur, go to ER    Patient verbalized understanding and agrees to plan     No orders of the defined types were placed in this encounter. No results found for this visit on 10/06/22. Orders Placed This Encounter   Medications    cefdinir (OMNICEF) 300 MG capsule     Sig: Take 1 capsule by mouth 2 times daily for 10 days     Dispense:  20 capsule     Refill:  0      New Prescriptions    CEFDINIR (OMNICEF) 300 MG CAPSULE    Take 1 capsule by mouth 2 times daily for 10 days        Return if symptoms worsen or fail to improve. Discussed use, benefits, and side effects of any prescribed medications. All patient questions were answered. Patient voiced understanding of care plan. Patient was given educational materials - see patient instructions below. Patient Instructions   Encourage fluids, Tylenol/Ibuprofen, OTC decongestants   Right ear flushed. Antibiotic sent to pharmacy.   If symptoms worsen or fail to improve follow-up with office or PCP  If SOB, chest pain, or high persistent fevers occur, go to ER    Patient verbalized understanding and agrees to plan       Electronically signed by SORIN Eli CNP on 10/6/2022 at 8:49 AM

## 2022-10-17 ENCOUNTER — OFFICE VISIT (OUTPATIENT)
Dept: FAMILY MEDICINE CLINIC | Age: 62
End: 2022-10-17
Payer: MEDICAID

## 2022-10-17 VITALS
SYSTOLIC BLOOD PRESSURE: 138 MMHG | OXYGEN SATURATION: 95 % | HEART RATE: 60 BPM | WEIGHT: 241.25 LBS | DIASTOLIC BLOOD PRESSURE: 84 MMHG | TEMPERATURE: 97.7 F | HEIGHT: 68 IN | BODY MASS INDEX: 36.56 KG/M2

## 2022-10-17 DIAGNOSIS — Z23 FLU VACCINE NEED: ICD-10-CM

## 2022-10-17 DIAGNOSIS — M54.41 CHRONIC BILATERAL LOW BACK PAIN WITH RIGHT-SIDED SCIATICA: ICD-10-CM

## 2022-10-17 DIAGNOSIS — M51.36 DDD (DEGENERATIVE DISC DISEASE), LUMBAR: ICD-10-CM

## 2022-10-17 DIAGNOSIS — E66.01 CLASS 2 SEVERE OBESITY DUE TO EXCESS CALORIES WITH SERIOUS COMORBIDITY AND BODY MASS INDEX (BMI) OF 36.0 TO 36.9 IN ADULT (HCC): ICD-10-CM

## 2022-10-17 DIAGNOSIS — G89.29 CHRONIC BILATERAL LOW BACK PAIN WITH RIGHT-SIDED SCIATICA: ICD-10-CM

## 2022-10-17 DIAGNOSIS — I10 ESSENTIAL HYPERTENSION, BENIGN: ICD-10-CM

## 2022-10-17 DIAGNOSIS — M54.12 CERVICAL RADICULOPATHY: ICD-10-CM

## 2022-10-17 DIAGNOSIS — R73.9 HYPERGLYCEMIA: ICD-10-CM

## 2022-10-17 DIAGNOSIS — E78.2 MIXED HYPERLIPIDEMIA: ICD-10-CM

## 2022-10-17 DIAGNOSIS — Z00.00 ENCOUNTER FOR WELL ADULT EXAM WITHOUT ABNORMAL FINDINGS: Primary | ICD-10-CM

## 2022-10-17 DIAGNOSIS — N18.31 STAGE 3A CHRONIC KIDNEY DISEASE (HCC): ICD-10-CM

## 2022-10-17 DIAGNOSIS — F41.1 GAD (GENERALIZED ANXIETY DISORDER): ICD-10-CM

## 2022-10-17 DIAGNOSIS — F32.0 MILD MAJOR DEPRESSION (HCC): ICD-10-CM

## 2022-10-17 LAB
ALBUMIN SERPL-MCNC: 4.1 G/DL (ref 3.5–5.2)
ALP BLD-CCNC: 78 U/L (ref 35–104)
ALT SERPL-CCNC: 17 U/L (ref 5–33)
ANION GAP SERPL CALCULATED.3IONS-SCNC: 11 MMOL/L (ref 7–19)
AST SERPL-CCNC: 18 U/L (ref 5–32)
BILIRUB SERPL-MCNC: 0.3 MG/DL (ref 0.2–1.2)
BUN BLDV-MCNC: 19 MG/DL (ref 8–23)
CALCIUM SERPL-MCNC: 9.5 MG/DL (ref 8.8–10.2)
CHLORIDE BLD-SCNC: 102 MMOL/L (ref 98–111)
CHOLESTEROL, TOTAL: 167 MG/DL (ref 160–199)
CO2: 28 MMOL/L (ref 22–29)
CREAT SERPL-MCNC: 1 MG/DL (ref 0.5–0.9)
GFR AFRICAN AMERICAN: >59
GFR NON-AFRICAN AMERICAN: 56
GLUCOSE BLD-MCNC: 121 MG/DL (ref 74–109)
HBA1C MFR BLD: 6.3 % (ref 4–6)
HDLC SERPL-MCNC: 52 MG/DL (ref 65–121)
LDL CHOLESTEROL CALCULATED: 82 MG/DL
POTASSIUM SERPL-SCNC: 4.3 MMOL/L (ref 3.5–5)
SODIUM BLD-SCNC: 141 MMOL/L (ref 136–145)
TOTAL PROTEIN: 6.3 G/DL (ref 6.6–8.7)
TRIGL SERPL-MCNC: 163 MG/DL (ref 0–149)

## 2022-10-17 PROCEDURE — 90674 CCIIV4 VAC NO PRSV 0.5 ML IM: CPT | Performed by: INTERNAL MEDICINE

## 2022-10-17 PROCEDURE — 90471 IMMUNIZATION ADMIN: CPT | Performed by: INTERNAL MEDICINE

## 2022-10-17 PROCEDURE — 99213 OFFICE O/P EST LOW 20 MIN: CPT | Performed by: INTERNAL MEDICINE

## 2022-10-17 PROCEDURE — 99396 PREV VISIT EST AGE 40-64: CPT | Performed by: INTERNAL MEDICINE

## 2022-10-17 RX ORDER — CEFDINIR 300 MG/1
300 CAPSULE ORAL 2 TIMES DAILY
COMMUNITY

## 2022-10-17 SDOH — ECONOMIC STABILITY: FOOD INSECURITY: WITHIN THE PAST 12 MONTHS, YOU WORRIED THAT YOUR FOOD WOULD RUN OUT BEFORE YOU GOT MONEY TO BUY MORE.: NEVER TRUE

## 2022-10-17 SDOH — ECONOMIC STABILITY: FOOD INSECURITY: WITHIN THE PAST 12 MONTHS, THE FOOD YOU BOUGHT JUST DIDN'T LAST AND YOU DIDN'T HAVE MONEY TO GET MORE.: NEVER TRUE

## 2022-10-17 ASSESSMENT — ENCOUNTER SYMPTOMS
CHEST TIGHTNESS: 0
WHEEZING: 0
VOICE CHANGE: 0
SINUS PRESSURE: 1
EYE REDNESS: 0
BACK PAIN: 1
SORE THROAT: 0
COUGH: 1
STRIDOR: 0
EYE PAIN: 0
BLOOD IN STOOL: 0
VOMITING: 0
SHORTNESS OF BREATH: 0
EYE DISCHARGE: 0
COLOR CHANGE: 0
ABDOMINAL PAIN: 0
DIARRHEA: 0
RHINORRHEA: 0

## 2022-10-17 ASSESSMENT — PATIENT HEALTH QUESTIONNAIRE - PHQ9
9. THOUGHTS THAT YOU WOULD BE BETTER OFF DEAD, OR OF HURTING YOURSELF: 0
SUM OF ALL RESPONSES TO PHQ QUESTIONS 1-9: 0
4. FEELING TIRED OR HAVING LITTLE ENERGY: 0
2. FEELING DOWN, DEPRESSED OR HOPELESS: 0
5. POOR APPETITE OR OVEREATING: 0
SUM OF ALL RESPONSES TO PHQ QUESTIONS 1-9: 0
1. LITTLE INTEREST OR PLEASURE IN DOING THINGS: 0
6. FEELING BAD ABOUT YOURSELF - OR THAT YOU ARE A FAILURE OR HAVE LET YOURSELF OR YOUR FAMILY DOWN: 0
10. IF YOU CHECKED OFF ANY PROBLEMS, HOW DIFFICULT HAVE THESE PROBLEMS MADE IT FOR YOU TO DO YOUR WORK, TAKE CARE OF THINGS AT HOME, OR GET ALONG WITH OTHER PEOPLE: 0
SUM OF ALL RESPONSES TO PHQ QUESTIONS 1-9: 0
3. TROUBLE FALLING OR STAYING ASLEEP: 0
SUM OF ALL RESPONSES TO PHQ9 QUESTIONS 1 & 2: 0
7. TROUBLE CONCENTRATING ON THINGS, SUCH AS READING THE NEWSPAPER OR WATCHING TELEVISION: 0
1. LITTLE INTEREST OR PLEASURE IN DOING THINGS: 0
2. FEELING DOWN, DEPRESSED OR HOPELESS: 0
SUM OF ALL RESPONSES TO PHQ QUESTIONS 1-9: 0
8. MOVING OR SPEAKING SO SLOWLY THAT OTHER PEOPLE COULD HAVE NOTICED. OR THE OPPOSITE, BEING SO FIGETY OR RESTLESS THAT YOU HAVE BEEN MOVING AROUND A LOT MORE THAN USUAL: 0
SUM OF ALL RESPONSES TO PHQ9 QUESTIONS 1 & 2: 0
SUM OF ALL RESPONSES TO PHQ QUESTIONS 1-9: 0

## 2022-10-17 ASSESSMENT — VISUAL ACUITY: OU: 1

## 2022-10-17 ASSESSMENT — SOCIAL DETERMINANTS OF HEALTH (SDOH): HOW HARD IS IT FOR YOU TO PAY FOR THE VERY BASICS LIKE FOOD, HOUSING, MEDICAL CARE, AND HEATING?: NOT HARD AT ALL

## 2022-10-17 NOTE — PROGRESS NOTES
Well Adult Note  Name: Scott Begum Date: 10/17/2022   MRN: [id number] Sex: Female   Age: 58 y.o. Ethnicity: Non- / Non    : 1960 Race: White (non-)      Racheal Frias is here for well adult exam.  History:  59 y/o WF here for annual wellness visit and follow up on HTN, Mild major depression, generalized anxiety disorder, IBS with controlled med refill, chronic LBP with radiculopathy, and obesity. Patient was seen at urgent care on 10/6/22 with respiratory and ear symptoms. She had ear lavage and was given cefdinir for \"URI\". She is still having some congestion and cough but symptoms improving. She was not tested for covid-19. Patient is frustrated that she is unable to exercise due to her chronic LBP due to DDD with lumbar radiculopathy. Her blood sugars and weight have been elevated and not improved. She was able to be more active in her sister's pool this Summer and she felt like it also helped with her mobility. She walks with a cane during ambulation currently. Hypertension  Patient is here for follow-up of elevated blood pressure. She is not exercising and is adherent to a low-salt diet. Patient is checking blood pressure at home. Blood pressure is not controlled. Cardiac symptoms: none. Patient denies chest pain, dyspnea, irregular heart beat, orthopnea, paroxysmal nocturnal dyspnea and syncope. Use of agents associated with hypertension: estrogens. History of target organ damage: CKD. Racheal Frias is here for follow up of dyslipidemia. Compliance with treatment has been good. Patient denies muscle pain associated with their medications which include atorvastatin 20 mg daily. Review of Systems   Constitutional:  Negative for appetite change, chills, fatigue and fever. HENT:  Positive for congestion, sinus pressure and sneezing. Negative for ear pain, rhinorrhea, sore throat and voice change. Eyes:  Negative for pain, discharge and redness.    Respiratory: Positive for cough. Negative for chest tightness, shortness of breath, wheezing and stridor. Cardiovascular:  Negative for chest pain and palpitations. Gastrointestinal:  Negative for abdominal pain, blood in stool, diarrhea and vomiting. Endocrine: Negative for cold intolerance, heat intolerance and polydipsia. Genitourinary:  Negative for dysuria and hematuria. Musculoskeletal:  Positive for arthralgias and back pain. Negative for myalgias, neck pain and neck stiffness. See HPI   Skin:  Negative for color change and rash. Neurological:  Negative for dizziness, tremors, syncope, speech difficulty, weakness, numbness and headaches. Hematological:  Negative for adenopathy. Does not bruise/bleed easily. Psychiatric/Behavioral:  Negative for confusion, dysphoric mood and sleep disturbance. The patient is not nervous/anxious. All other systems reviewed and are negative. Allergies   Allergen Reactions    Latex Rash    Amoxicillin Shortness Of Breath and Nausea And Vomiting    Anaprox [Naproxen Sodium] Shortness Of Breath and Nausea And Vomiting    Aloe Vera      Other reaction(s): Unknown    Nortriptyline Hcl      Doesn't remember      Augmentin [Amoxicillin-Pot Clavulanate] Nausea And Vomiting           Buspar [Buspirone] Nausea And Vomiting    Vibramycin [Doxycycline Calcium] Nausea And Vomiting         Prior to Visit Medications    Medication Sig Taking?  Authorizing Provider   cefdinir (OMNICEF) 300 MG capsule Take 300 mg by mouth 2 times daily Yes Historical Provider, MD   hyoscyamine (ANASPAZ;LEVSIN) 125 MCG tablet TAKE 1 TABLET BY MOUTH EVERY 6HRS AS NEEDED FOR CRAMPING OR DIARRHEA Yes Robert Marquez MD   triamterene-hydroCHLOROthiazide (MAXZIDE-25) 37.5-25 MG per tablet TAKE 1 TABLET BY Manuela Li Yes Robert Marquez MD   atorvastatin (LIPITOR) 20 MG tablet Take 1 tablet by mouth daily Yes Robert Marquez MD   FLUoxetine (PROZAC) 10 MG capsule TAKE 1 Andrewn Otto Yes Gabby Olsen MD   metoprolol (LOPRESSOR) 100 MG tablet TAKE 1 TABLET BY MOUTH TWICE A DAY Yes Gabby Olsen MD   dicyclomine (BENTYL) 20 MG tablet TAKE 1 TABLET BY MOUTH 3 TIMES DAILY (BEFORE MEALS). Yes Gabby Olsen MD   dilTIAZem (CARDIZEM CD) 240 MG extended release capsule TAKE 1 CAPSULE BY MOUTH ONE TIME DAILY Yes Gabby Olsen MD   Probiotic Product (PROBIOTIC-10 PO) Take 1 capsule by mouth daily Yes Historical Provider, MD   Estradiol-Estriol-Progesterone (BIEST/PROGESTERONE TD) Take 1 capsule by mouth nightly Yes Historical Provider, MD   Multiple Vitamins-Minerals (WOMENS MULTIVITAMIN PO) Take 1 tablet by mouth daily Yes Historical Provider, MD         Past Medical History:   Diagnosis Date    Anal fissure     Anxiety 9/15/2017    C. difficile colitis     Cervical radiculopathy 9/15/2017    Chronic back pain     Chronic kidney disease     Class 1 obesity due to excess calories with serious comorbidity and body mass index (BMI) of 32.0 to 32.9 in adult 9/15/2017    Clostridium difficile colitis 2020    Cyst of kidney, acquired 9/15/2017    Depression 9/15/2017    History of oral surgery     Hyperlipidemia     Hypertension     IBS (irritable bowel syndrome) 9/15/2017    Irregular heartbeat     Obesity 9/15/2017    Osteoarthritis     PONV (postoperative nausea and vomiting)     Retinal detachment        Past Surgical History:   Procedure Laterality Date    APPENDECTOMY       SECTION      EYE SURGERY      HYSTERECTOMY (CERVIX STATUS UNKNOWN)      HYSTERECTOMY, TOTAL ABDOMINAL (CERVIX REMOVED)      LUMBAR FUSION N/A 2020    TLIF OF L4/5. L5-S1 performed by Nita Cesar DO at [facility] OR    OVARY REMOVAL Bilateral     age 25    RETINAL DETACHMENT SURGERY           Family History   Problem Relation Age of Onset    Diabetes Mother     High Blood Pressure Mother     Heart Attack Mother     Kidney Disease Father     Stroke Father     Other Father         renal failure    Alcohol Abuse Sister     Cirrhosis Sister     Diabetes Maternal Grandmother     Heart Disease Maternal Grandfather     Colon Cancer Paternal Grandmother     Stroke Paternal Aunt     Colon Cancer Maternal Cousin        Social History     Tobacco Use    Smoking status: Never    Smokeless tobacco: Never   Vaping Use    Vaping Use: Never used   Substance Use Topics    Alcohol use: No    Drug use: No       Objective   /84   Pulse 60   Temp 97.7 °F (36.5 °C)   Ht 5' 8\" (1.727 m)   Wt 241 lb 4 oz (109.4 kg)   SpO2 95%   BMI 36.68 kg/m²   Wt Readings from Last 3 Encounters:   10/17/22 241 lb 4 oz (109.4 kg)   10/06/22 242 lb (109.8 kg)   06/15/22 233 lb 4 oz (105.8 kg)     There were no vitals filed for this visit. Physical Exam  Vitals and nursing note reviewed. Constitutional:       General: She is not in acute distress. Appearance: Normal appearance. She is well-developed and well-groomed. She is obese. She is not ill-appearing, toxic-appearing or diaphoretic. HENT:      Head: Normocephalic and atraumatic. Right Ear: Tympanic membrane, ear canal and external ear normal.      Left Ear: Tympanic membrane, ear canal and external ear normal.      Nose: Nose normal.      Mouth/Throat:      Lips: Pink. Mouth: Mucous membranes are moist. No oral lesions. Tongue: No lesions. Palate: No mass and lesions. Pharynx: Oropharynx is clear. Uvula midline. No pharyngeal swelling, oropharyngeal exudate, posterior oropharyngeal erythema or uvula swelling. Tonsils: 1+ on the right. 1+ on the left. Eyes:      General: Lids are normal. Vision grossly intact. No scleral icterus. Extraocular Movements: Extraocular movements intact. Conjunctiva/sclera: Conjunctivae normal.      Pupils: Pupils are equal, round, and reactive to light. Neck:      Thyroid: No thyroid mass or thyromegaly. Vascular: No carotid bruit or JVD. Trachea: Trachea and phonation normal.   Cardiovascular:      Rate and Rhythm: Normal rate and regular rhythm. Pulses: Normal pulses. Radial pulses are 2+ on the right side and 2+ on the left side. Posterior tibial pulses are 2+ on the right side and 2+ on the left side. Heart sounds: Normal heart sounds. No murmur heard. No friction rub. No gallop. Pulmonary:      Effort: Pulmonary effort is normal. No accessory muscle usage. Breath sounds: Normal breath sounds. No decreased breath sounds, wheezing, rhonchi or rales. Chest:      Chest wall: No mass, deformity or tenderness. Breasts:     Breasts are symmetrical.      Right: Normal.      Left: Normal.   Abdominal:      General: Abdomen is flat. Bowel sounds are normal. There is no distension. Palpations: Abdomen is soft. There is no hepatomegaly, splenomegaly or mass. Tenderness: There is no abdominal tenderness. There is no guarding or rebound. Hernia: No hernia is present. Musculoskeletal:         General: Normal range of motion. Right wrist: Normal.      Left wrist: Normal.      Cervical back: Normal range of motion and neck supple. Right lower leg: No edema. Left lower leg: No edema. Right ankle: Normal.      Left ankle: Normal.   Lymphadenopathy:      Head:      Right side of head: No submandibular adenopathy. Left side of head: No submandibular adenopathy. Cervical: No cervical adenopathy. Right cervical: No superficial, deep or posterior cervical adenopathy. Left cervical: No superficial, deep or posterior cervical adenopathy. Upper Body:      Right upper body: No supraclavicular, axillary or pectoral adenopathy. Left upper body: No supraclavicular, axillary or pectoral adenopathy. Lower Body: No right inguinal adenopathy. No left inguinal adenopathy. Skin:     General: Skin is warm and dry.       Capillary Refill: Capillary refill takes less than 2 seconds. Coloration: Skin is not cyanotic. Findings: No rash. Nails: There is no clubbing. Neurological:      Mental Status: She is alert and oriented to person, place, and time. Cranial Nerves: No cranial nerve deficit, dysarthria or facial asymmetry. Sensory: Sensation is intact. Motor: Motor function is intact. No weakness, tremor, atrophy or abnormal muscle tone. Coordination: Coordination is intact. Romberg sign negative. Coordination normal.      Gait: Gait is intact. Deep Tendon Reflexes: Reflexes are normal and symmetric. Reflex Scores:       Brachioradialis reflexes are 2+ on the right side and 2+ on the left side. Patellar reflexes are 2+ on the right side and 2+ on the left side. Comments: CN II-XII grossly intact, speech clear, MAEW, no focal deficits   Psychiatric:         Attention and Perception: Attention and perception normal.         Mood and Affect: Mood and affect normal.         Speech: Speech normal.         Behavior: Behavior normal. Behavior is cooperative. Thought Content:  Thought content normal.         Cognition and Memory: Cognition and memory normal.         Judgment: Judgment normal.       Lab Results   Component Value Date    WBC 10.1 09/07/2021    HGB 12.8 09/07/2021    HCT 41.2 09/07/2021    MCV 85.3 09/07/2021     09/07/2021    LABLYMP 3.35 02/22/2014    LYMPHOPCT 31.7 09/07/2021    RBC 4.83 09/07/2021    MCH 26.5 (L) 09/07/2021    MCHC 31.1 (L) 09/07/2021    RDW 13.9 09/07/2021     Lab Results   Component Value Date     10/17/2022    K 4.3 10/17/2022     10/17/2022    CO2 28 10/17/2022    BUN 19 10/17/2022    CREATININE 1.0 (H) 10/17/2022    GLUCOSE 121 (H) 10/17/2022    CALCIUM 9.5 10/17/2022    PROT 6.3 (L) 10/17/2022    LABALBU 4.1 10/17/2022    BILITOT 0.3 10/17/2022    ALKPHOS 78 10/17/2022    AST 18 10/17/2022    ALT 17 10/17/2022    LABGLOM 56 (A) 10/17/2022    GFRAA >59 10/17/2022 Hemoglobin A1C   Date Value Ref Range Status   10/17/2022 6.3 (H) 4.0 - 6.0 % Final     Comment:     HbA1c levels >6% are an indication of hyperglycemia during the preceding 2  to 3 months or longer. HbA1c levels may reach 20% or higher in poorly controlled diabetes. Therapeutic action is suggested at levels above 8%. Diabetes patients with HbA1c levels below 7% meet the goal of the American  Diabetes Association. HbA1c levels below the established reference range may indicate recent  episodes of hypoglycemia, the presence of Hb variants, or shortened lifetime  of erythrocytes. Lab Results   Component Value Date    CHOL 167 10/17/2022    CHOL 169 06/07/2022    CHOL 196 01/10/2022     Lab Results   Component Value Date    TRIG 163 (H) 10/17/2022    TRIG 165 (H) 06/07/2022    TRIG 206 (H) 01/10/2022     Lab Results   Component Value Date    HDL 52 (L) 10/17/2022    HDL 51 (L) 06/07/2022    HDL 57 (L) 01/10/2022     Lab Results   Component Value Date    LDLCALC 82 10/17/2022    LDLCALC 85 06/07/2022    LDLCALC 98 01/10/2022             Assessment   Plan   1. Encounter for well adult exam without abnormal findings  2. Essential hypertension, benign  -     Comprehensive Metabolic Panel; Future  3. Mixed hyperlipidemia  -     Comprehensive Metabolic Panel; Future  -     Lipid Panel; Future  4. Hyperglycemia  -     Hemoglobin A1C; Future  5. DDD (degenerative disc disease), lumbar  -     External Referral To Physical Therapy  6. Chronic bilateral low back pain with right-sided sciatica  -     External Referral To Physical Therapy  7. Cervical radiculopathy  -     External Referral To Physical Therapy  8. GARRETT (generalized anxiety disorder)  9. Mild major depression ([address]  10. Stage 3a chronic kidney disease ([facility] [address]  -     Comprehensive Metabolic Panel; Future  11. Class 2 severe obesity due to excess calories with serious comorbidity and body mass index (BMI) of 36.0 to 36.9 in adult ([address]  12.  Flu vaccine need  -     Influenza, FLUCELVAX, (age 10 mo+), IM, Preservative Free, 0.5 mL     Labs reviewed with patient. Refills Provided. Hypertension well controlled. Encouraged efforts at well balanced diet, exercise, and weight loss. Hyperlipidemia well controlled. Encouraged efforts at low carbohydrate diet, exercise, and weight loss. Major Depression well controlled. Encouraged Exercise and relaxation techniques for stress relief. Generalized Anxiety Disorder well controlled. Encouraged Exercise and relaxation techniques for stress relief. Prediabetes stable but not improved. Patient counseled on need for low carbohydrate diet, exercise, and weight loss. Obesity due to excess caloric intake-not improved. Encouraged efforts at low carbohydrate diet, exercise, and weight loss/efforts at normalizing BMI. Health Maintenance reviewed - breast exam completed today (advised of need to have breast exam in addition to mammogram), HbA1C ordered, Flu shot given, Dexa up to date , Colon cancer screening, patient requests cologuard     Over 50% of the total visit time of 30 minutes was spent on counseling and/or coordination of care of:   1. Encounter for well adult exam without abnormal findings    2. Essential hypertension, benign    3. Mixed hyperlipidemia    4. Hyperglycemia    5. DDD (degenerative disc disease), lumbar    6. Chronic bilateral low back pain with right-sided sciatica    7. Cervical radiculopathy    8. GARRETT (generalized anxiety disorder)    9. Mild major depression ([address]    10. Stage 3a chronic kidney disease ([address]    11. Class 2 severe obesity due to excess calories with serious comorbidity and body mass index (BMI) of 36.0 to 36.9 in adult ([address]    12.  Flu vaccine need          Personalized Preventive Plan   Current Health Maintenance Status  Immunization History   Administered Date(s) Administered    COVID-19, MODERNA BLUE border, Primary or Immunocompromised, (age 12y+), IM, 100 mcg/0.5mL 05/17/2021, 06/14/2021    Influenza Vaccine, unspecified formulation 09/03/2018    Influenza Virus Vaccine 11/03/2008, 10/06/2014, 10/10/2015, 10/17/2017, 09/25/2018, 10/01/2018, 09/27/2019    Influenza, FLUCELVAX, (age 10 mo+), MDCK, PF, 0.5mL 01/10/2022    Tdap (Boostrix, Adacel) 05/06/2016        Health Maintenance   Topic Date Due    Colorectal Cancer Screen  Never done    COVID-19 Vaccine (3 - Booster for Moderna series) 11/14/2021    Flu vaccine (1) 08/01/2022    Shingles vaccine (1 of 2) 04/29/2023 (Originally 10/12/2010)    Hepatitis C screen  04/29/2023 (Originally 10/12/1978)    HIV screen  04/29/2023 (Originally 10/12/1975)    Depression Monitoring  01/10/2023    A1C test (Diabetic or Prediabetic)  10/17/2023    Lipids  10/17/2023    Breast cancer screen  06/21/2024    DTaP/Tdap/Td vaccine (2 - Td or Tdap) 05/06/2026    Hepatitis A vaccine  Aged Out    Hib vaccine  Aged Out    Meningococcal (ACWY) vaccine  Aged Out    Pneumococcal 0-64 years Vaccine  Aged Out     Recommendations for [place]ugg Due: see orders and patient instructions/AVS.    Return in 4 months (on 2/17/2023) for HTN, high cholesterol, prediabetes, recheck mood. Advance Care Planning   Advanced Care Planning: Discussed the patients choices for care and treatment in case of a health event that adversely affects decision-making abilities. Also discussed the patients long-term treatment options. Reviewed the process of designating a Health Care Surrogate as defined by the [address]. Reviewed the [facility] Will Directive process and the kinds of life-sustaining treatments the patient would like to receive should they become terminally ill or permanently unconscious. Explained the state requirement to complete the forms in the presence of two eligible witnesses OR in the presence of a . Patient was asked to provide a copy of the signed forms to the practice office.   Time spent (minutes): 3-5 min     Obesity Counseling: Assessed behavioral health risks and factors affecting choice of behavior. Suggested weight control approaches, including dietary changes behavioral modification and follow up plan. Provided educational and support documentation. Time spent (minutes): 10 min    Cardiovascular Disease Risk Counseling: Assessed the patient's risk to develop cardiovascular disease and reviewed main risk factors. Reviewed steps to reduce disease risk including:   Quitting tobacco use, reducing amount smoked, or not starting the habit  Making healthy food choices  Being physically active and gradualy increasing activity levels   Reduce weight and determine a healthy BMI goal  Monitor blood pressure and treat if higher than 140/90 mmHg  Maintain blood total cholesterol levels under 5 mmol/l or 190 mg/dl  Maintain LDL cholesterol levels under 3.0 mmol/l or 115 mg/dl   Control blood glucose levels  Consider taking aspirin (75 mg daily), once blood pressure is controlled   Provided a follow up plan.   Time spent (minutes): 5-7 min

## 2022-10-17 NOTE — PATIENT INSTRUCTIONS
Advance Directives: Care Instructions  Overview  An advance directive is a legal way to state your wishes at the end of your life. It tells your family and your doctor what to do if you can't say what you want. There are two main types of advance directives. You can change them any time your wishes change. Living will. This form tells your family and your doctor your wishes about life support and other treatment. The form is also called a declaration. Medical power of . This form lets you name a person to make treatment decisions for you when you can't speak for yourself. This person is called a health care agent (health care proxy, health care surrogate). The form is also called a durable power of  for health care. If you do not have an advance directive, decisions about your medical care may be made by a family member, or by a doctor or a  who doesn't know you. It may help to think of an advance directive as a gift to the people who care for you. If you have one, they won't have to make tough decisions by themselves. Follow-up care is a key part of your treatment and safety. Be sure to make and go to all appointments, and call your doctor if you are having problems. It's also a good idea to know your test results and keep a list of the medicines you take. What should you include in an advance directive? Many states have a unique advance directive form. (It may ask you to address specific issues.) Or you might use a universal form that's approved by many states. If your form doesn't tell you what to address, it may be hard to know what to include in your advance directive. Use the questions below to help you get started. Who do you want to make decisions about your medical care if you are not able to? What life-support measures do you want if you have a serious illness that gets worse over time or can't be cured? What are you most afraid of that might happen?  (Maybe you're afraid of having pain, losing your independence, or being kept alive by machines.)  Where would you prefer to die? (Your home? A hospital? A nursing home?)  Do you want to donate your organs when you die? Do you want certain Orthodox practices performed before you die? When should you call for help? Be sure to contact your doctor if you have any questions. Where can you learn more? Go to [email or web address] and sign in to your Medigus account. Enter R264 in the [employer] box to learn more about \"Advance Directives: Care Instructions. \"     If you do not have an account, please click on the \"Sign Up Now\" link. Current as of: June 16, 2022               Content Version: 13.4  © 2006-2022 [employer]. Care instructions adapted under license by [facility] ([facility]). If you have questions about a medical condition or this instruction, always ask your healthcare professional. [place] [address] any warranty or liability for your use of this information. Well Visit, Ages 25 to 72: Care Instructions  Well visits can help you stay healthy. Your doctor has checked your overall health and may have suggested ways to take good care of yourself. Your doctor also may have recommended tests. You can help prevent illness with healthy eating, good sleep, vaccinations, regular exercise, and other steps. Get the tests that you and your doctor decide on. Depending on your age and risks, examples might include screening for diabetes; hepatitis C; HIV; and cervical, breast, lung, and colon cancer. Screening helps find diseases before any symptoms appear. Eat healthy foods. Choose fruits, vegetables, whole grains, lean protein, and low-fat dairy foods. Limit saturated fat and reduce salt. Limit alcohol. Men should have no more than 2 drinks a day. Women should have no more than 1. For some people, no alcohol is the best choice. Exercise.  Get at least 30 minutes of exercise on most days of the week. Walking can be a good choice. Reach and stay at your healthy weight. This will lower your risk for many health problems. Take care of your mental health. Try to stay connected with friends, family, and community, and find ways to manage stress. If you're feeling depressed or hopeless, talk to someone. A counselor can help. If you don't have a counselor, talk to your doctor. Talk to your doctor if you think you may have a problem with alcohol or drug use. This includes prescription medicines and illegal drugs. Avoid tobacco and nicotine: Don't smoke, vape, or chew. If you need help quitting, talk to your doctor. Practice safer sex. Getting tested, using condoms or dental dams, and limiting sex partners can help prevent STIs. Use birth control if it's important to you to prevent pregnancy. Talk with your doctor about your choices and what might be best for you. Prevent problems where you can. Protect your skin from too much sun, wash your hands, brush your teeth twice a day, and wear a seat belt in the car. Where can you learn more? Go to [email or web address] org and sign in to your PenBlade account. Enter P072 in the [facility] box to learn more about \"Well Visit, Ages 25 to 72: Care Instructions. \"     If you do not have an account, please click on the \"Sign Up Now\" link. Current as of: March 9, 2022               Content Version: 13.4  © 4030-3942 Healthwise, Incorporated. Care instructions adapted under license by [facility] ([facility]). If you have questions about a medical condition or this instruction, always ask your healthcare professional. [place] [address] any warranty or liability for your use of this information. A Healthy Lifestyle: Care Instructions  A healthy lifestyle can help you feel good, have more energy, and stay at a weight that's healthy for you.  You can share a healthy lifestyle with your friends and family. And you can do it on your own. Eat meals with your friends or family. You could try cooking together. Plan activities with other people. Go for a walk with a friend, try a free online fitness class, or join a sports league. Eat a variety of healthy foods. These include fruits, vegetables, whole grains, low-fat dairy, and lean protein. Choose healthy portions of food. You can use the Nutrition Facts label on food packages as a guide. Eat more fruits and vegetables. You could add vegetables to sandwiches or add fruit to cereal.  Drink water when you are thirsty. Limit soda, juice, and sports drinks. Try to exercise most days. Aim for at least 2½ hours of exercise each week. Keep moving. Work in the garden or take your dog on a walk. Use the stairs instead of the elevator. If you use tobacco or nicotine, try to quit. Ask your doctor about programs and medicines to help you quit. Limit alcohol. Men should have no more than 2 drinks a day. Women should have no more than 1. For some people, no alcohol is the best choice. Follow-up care is a key part of your treatment and safety. Be sure to make and go to all appointments, and call your doctor if you are having problems. It's also a good idea to know your test results and keep a list of the medicines you take. Where can you learn more? Go to [email or web address] and sign in to your Clikthrough account. Enter B111 in the [facility] box to learn more about \"A Healthy Lifestyle: Care Instructions. \"     If you do not have an account, please click on the \"Sign Up Now\" link. Current as of: March 9, 2022               Content Version: 13.4  © 1574-9432 Healthwise, Incorporated. Care instructions adapted under license by [facility] ([facility]).  If you have questions about a medical condition or this instruction, always ask your healthcare professional. [address] any warranty or liability for your use of this information. Heart-Healthy Diet   Sodium, Fat, and Cholesterol Controlled Diet       What Is a Heart Healthy Diet? A heart-healthy diet is one that limits sodium , certain types of fat , and cholesterol . This type of diet is recommended for:   People with any form of cardiovascular disease (eg, coronary heart disease , peripheral vascular disease , previous heart attack , previous stroke )   People with risk factors for cardiovascular disease, such as high blood pressure , high cholesterol , or diabetes   Anyone who wants to lower their risk of developing cardiovascular disease   Sodium    Sodium is a mineral found in many foods. In general, most people consume much more sodium than they need. Diets high in sodium can increase blood pressure and lead to edema (water retention). On a heart-healthy diet, you should consume no more than 2,300 mg (milligrams) of sodium per dayabout the amount in one teaspoon of table salt. The foods highest in sodium include table salt (about 50% sodium), processed foods, convenience foods, and preserved foods. Cholesterol    Cholesterol is a fat-like, waxy substance in your blood. Our bodies make some cholesterol. It is also found in animal products, with the highest amounts in fatty meat, egg yolks, whole milk, cheese, shellfish, and organ meats. On a heart-healthy diet, you should limit your cholesterol intake to less than 200 mg per day. It is normal and important to have some cholesterol in your bloodstream. But too much cholesterol can cause plaque to build up within your arteries, which can eventually lead to a heart attack or stroke. The two types of cholesterol that are most commonly referred to are:   Low-density lipoprotein (LDL) cholesterol  Also known as bad cholesterol, this is the cholesterol that tends to build up along your arteries. Bad cholesterol levels are increased by eating fats that are saturated or hydrogenated.  Optimal level of this cholesterol is less than 100. Over 130 starts to get risky for heart disease. High-density lipoprotein (HDL) cholesterol  Also known as good cholesterol, this type of cholesterol actually carries cholesterol away from your arteries and may, therefore, help lower your risk of having a heart attack. You want this level to be high (ideally greater than 60). It is a risk to have a level less than 40. You can raise this good cholesterol by eating olive oil, canola oil, avocados, or nuts. Exercise raises this level, too. Fat    Fat is calorie dense and packs a lot of calories into a small amount of food. Even though fats should be limited due to their high calorie content, not all fats are bad. In fact, some fats are quite healthful. Fat can be broken down into four main types.    The good-for-you fats are:   Monounsaturated fat  found in oils such as olive and canola, avocados, and nuts and natural nut butters; can decrease cholesterol levels, while keeping levels of HDL cholesterol high   Polyunsaturated fat  found in oils such as safflower, sunflower, soybean, corn, and sesame; can decrease total cholesterol and LDL cholesterol   Omega-3 fatty acids  particularly those found in fatty fish (such as salmon, trout, tuna, mackerel, herring, and sardines); can decrease risk of arrhythmias, decrease triglyceride levels, and slightly lower blood pressure   The fats that you want to limit are:   Saturated fat  found in animal products, many fast foods, and a few vegetables; increases total blood cholesterol, including LDL levels   Animal fats that are saturated include: butter, lard, whole-milk dairy products, meat fat, and poultry skin   Vegetable fats that are saturated include: hydrogenated shortening, palm oil, coconut oil, cocoa butter   Hydrogenated or trans fat  found in margarine and vegetable shortening, most shelf stable snack foods, and fried foods; increases LDL and decreases HDL     It is generally recommended that you limit your total fat for the day to less than 30% of your total calories. If you follow an 1800-calorie heart healthy diet, for example, this would mean 60 grams of fat or less per day. Saturated fat and trans fat in your diet raises your blood cholesterol the most, much more than dietary cholesterol does. For this reason, on a heart-healthy diet, less than 7% of your calories should come from saturated fat and ideally 0% from trans fat. On an 1800-calorie diet, this translates into less than 14 grams of saturated fat per day, leaving 46 grams of fat to come from mono- and polyunsaturated fats.    Food Choices on a Heart Healthy Diet   Food Category   Foods Recommended   Foods to Avoid   Grains   Breads and rolls without salted tops Most dry and cooked cereals Unsalted crackers and breadsticks Low-sodium or homemade breadcrumbs or stuffing All rice and pastas   Breads, rolls, and crackers with salted tops High-fat baked goods (eg, muffins, donuts, pastries) Quick breads, self-rising flour, and biscuit mixes Regular bread crumbs Instant hot cereals Commercially prepared rice, pasta, or stuffing mixes   Vegetables   Most fresh, frozen, and low-sodium canned vegetables Low-sodium and salt-free vegetable juices Canned vegetables if unsalted or rinsed   Regular canned vegetables and juices, including sauerkraut and pickled vegetables Frozen vegetables with sauces Commercially prepared potato and vegetable mixes   Fruits   Most fresh, frozen, and canned fruits All fruit juices   Fruits processed with salt or sodium   Milk   Nonfat or low-fat (1%) milk Nonfat or low-fat yogurt Cottage cheese, low-fat ricotta, cheeses labeled as low-fat and low-sodium   Whole milk Reduced-fat (2%) milk Malted and chocolate milk Full fat yogurt Most cheeses (unless low-fat and low salt) Buttermilk (no more than 1 cup per week)   Meats and Beans   Lean cuts of fresh or frozen beef, veal, lamb, or pork (look for the word loin) Fresh or frozen poultry without the skin Fresh or frozen fish and some shellfish Egg whites and egg substitutes (Limit whole eggs to three per week) Tofu Nuts or seeds (unsalted, dry-roasted), low-sodium peanut butter Dried peas, beans, and lentils   Any smoked, cured, salted, or canned meat, fish, or poultry (including leal, chipped beef, cold cuts, hot dogs, sausages, sardines, and anchovies) Poultry skins Breaded and/or fried fish or meats Canned peas, beans, and lentils Salted nuts   Fats and Oils   Olive oil and canola oil Low-sodium, low-fat salad dressings and mayonnaise   Butter, margarine, coconut and palm oils, leal fat   Snacks, Sweets, and Condiments   Low-sodium or unsalted versions of broths, soups, soy sauce, and condiments Pepper, herbs, and spices; vinegar, lemon, or lime juice Low-fat frozen desserts (yogurt, sherbet, fruit bars) Sugar, cocoa powder, honey, syrup, jam, and preserves Low-fat, trans-fat free cookies, cakes, and pies Kevin and animal crackers, fig bars, maribel snaps   High-fat desserts Broth, soups, gravies, and sauces, made from instant mixes or other high-sodium ingredients Salted snack foods Canned olives Meat tenderizers, seasoning salt, and most flavored vinegars   Beverages   Low-sodium carbonated beverages Tea and coffee in moderation Soy milk   Commercially softened water   Suggestions   Make whole grains, fruits, and vegetables the base of your diet. Choose heart-healthy fats such as canola, olive, and flaxseed oil, and foods high in heart-healthy fats, such as nuts, seeds, soybeans, tofu, and fish. Eat fish at least twice per week; the fish highest in omega-3 fatty acids and lowest in mercury include salmon, herring, mackerel, sardines, and canned chunk light tuna. If you eat fish less than twice per week or have high triglycerides, talk to your doctor about taking fish oil supplements. Read food labels.    For products low in fat and cholesterol, look for fat free, low-fat, cholesterol free, saturated fat free, and trans fat freeAlso scan the Nutrition Facts Label, which lists saturated fat, trans fat, and cholesterol amounts. For products low in sodium, look for sodium free, very low sodium, low sodium, no added salt, and unsalted   Skip the salt when cooking or at the table; if food needs more flavor, get creative and try out different herbs and spices. Garlic and onion also add substantial flavor to foods. Trim any visible fat off meat and poultry before cooking, and drain the fat off after huggins. Use cooking methods that require little or no added fat, such as grilling, boiling, baking, poaching, broiling, roasting, steaming, stir-frying, and sauting. Avoid fast food and convenience food. They tend to be high in saturated and trans fat and have a lot of added salt. Talk to a registered dietitian for individualized diet advice. Last Reviewed: March 2011 Rhoda Rodriguez MS, MPH, RD   Updated: 3/29/2011     Patient information: Weight loss treatments    INTRODUCTION -- Obesity is a major international problem, and Americans are among the heaviest people in the world. The percentage of obese people in the [place] has risen steadily. Many people find that although they initially lose weight by dieting, they quickly regain the weight after the diet ends. Because it so hard to keep weight off over time, it is important to have as much information and support as possible before starting a diet. You are most likely to be successful in losing weight and keeping it off when you believe that your body weight can be controlled. STARTING A WEIGHT LOSS PROGRAM -- Some people like to talk to their doctor or nurse to get help choosing the best plan, monitoring progress, and getting advice and support along the way. To know what treatment (or combination of treatments) will work best, determine your body mass index (BMI) and waist circumference (measurement).  The BMI is calculated from your height and weight. A person with a BMI between 25 and 29.9 is considered overweight   A person with a BMI of 30 or greater is considered to be obese  A waist circumference greater than 35 inches (88 cm) in women and 40 inches (102 cm) in men increases the risk of obesity-related complications, such as heart disease and diabetes. People who are obese and who have a larger waist size may need more aggressive weight loss treatment than others. Talk to your doctor or nurse for advice. Types of treatment -- Based on your measurements and your medical history, your doctor or nurse can determine what combination of weight loss treatments would work best for you. Treatments may include changes in lifestyle, exercise, dieting, and, in some cases, weight loss medicines or weight loss surgery. Weight loss surgery, also called bariatric surgery, is reserved for people with severe obesity who have not responded to other weight loss treatments. SETTING A WEIGHT LOSS GOAL -- It is important to set a realistic weight loss goal. Your first goal should be to avoid gaining more weight and staying at your current weight (or within 5 percent). Many people have a \"dream\" weight that is difficult or impossible to achieve. People at high risk of developing diabetes who are able to lose 5 percent of their body weight and maintain this weight will reduce their risk of developing diabetes by about 50 percent and reduce their blood pressure. This is a success. Losing more than 15 percent of your body weight and staying at this weight is an extremely good result, even if you never reach your \"dream\" or \"ideal\" weight. LIFESTYLE CHANGES -- Programs that help you to change your lifestyle are usually run by psychologists or other professionals.  The goals of lifestyle changes are to help you change your eating habits, become more active, and be more aware of how much you eat and exercise, helping you to make healthier choices. This type of treatment can be broken down into three steps: The triggers that make you want to eat   Eating   What happens after you eat  Triggers to eat -- Determining what triggers you to eat involves figuring out what foods you eat and where and when you eat. To figure out what triggers you to eat, keep a record for a few days of everything you eat, the places where you eat, how often you eat, and the emotions you were feeling when you ate. For some people, the trigger is related to a certain time of day or night. For others, the trigger is related to a certain place, like sitting at a desk working. Eating -- You can change your eating habits by breaking the chain of events between the trigger for eating and eating itself. There are many ways to do this. For instance, you can:  Limit where you eat to a few places (eg, dining room)   Restrict the number of utensils (eg, only a fork) used for eating   Drink a sip of water between each bite   Chew your food a certain number of times   Get up and stop eating every few minutes  What happens after you eat -- Rewarding yourself for good eating behaviors can help you to develop better habits. This is not a reward for weight loss; instead, it is a reward for changing unhealthy behaviors. Do not use food as a reward. Some people find money, clothing, or personal care (eg, a hair cut, manicure, or massage) to be effective rewards. Treat yourself immediately after making better eating choices to reinforce the value of the good behavior. You need to have clear behavior goals, and you must have a time frame for reaching your goals.  Reward small changes along the way to your final goal.  Other factors that contribute to successful weight loss -- Changing your behavior involves more than just changing unhealthy eating habits; it also involves finding people around you to support your weight loss, reducing stress, and learning to be strong when tempted by food.  Establish a \"jackie\" system -- Having a friend or family member available to provide support and reinforce good behavior is very helpful. The support person needs to understand your goals. Learn to be strong -- Learning to be strong when tempted by food is an important part of losing weight. As an example, you will need to learn how to say \"no\" and continue to say no when urged to eat at parties and social gatherings. Develop strategies for events before you go, such as eating before you go or taking low-calorie snacks and drinks with you. Develop a support system -- Having a support system is helpful when losing weight. This is why many commercial groups are successful. Family support is also essential; if your family does not support your efforts to lose weight, this can slow your progress or even keep you from losing weight. Positive thinking -- People often have conversations with themselves in their head; these conversations can be positive or negative. If you eat a piece of cake that was not planned, you may respond by thinking, \"Oh, you stupid idiot, you've blown your diet! \" and as a result, you may eat more cake. A positive thought for the same event could be, \"Well, I ate cake when it was not on my plan. Now I should do something to get back on track. \" A positive approach is much more likely to be successful than a negative one. Reduce stress -- Although stress is a part of everyday life, it can trigger uncontrolled eating in some people. It is important to find a way to get through these difficult times without eating or by eating low-calorie food, like raw vegetables. It may be helpful to imagine a relaxing place that allows you to temporarily escape from stress. With deep breaths and closed eyes, you can imagine this relaxing place for a few minutes.    Self-help programs -- Self-help programs like [employer] Watchers®, Overeaters Anonymous®, and Take Off Candido (TOPS)© work for some people. As with all weight loss programs, you are most likely to be successful with these plans if you make long-term changes in how you eat. CHOOSING A DIET -- A calorie is a unit of energy found in food. Your body needs calories to function. The goal of any diet is to burn up more calories than you eat. How quickly you lose weight depends upon several factors, such as your age, gender, and starting weight. Older people have a slower metabolism than young people, so they lose weight more slowly. Men lose more weight than women of similar height and weight when dieting because they use more energy. People who are extremely overweight lose weight more quickly than those who are only mildly overweight. Try not to drink alcohol or drinks with added sugar, and most sweets (candy, cakes, cookies), since they rarely contain important nutrients. Portion-controlled diets -- One simple way to diet is to buy packaged foods, like frozen low-calorie meals or meal-replacement canned drinks. A typical meal plan for one day may include:  A meal-replacement drink or breakfast bar for breakfast   A meal-replacement drink or a frozen low-calorie (250 to 350 calories) meal for lunch   A frozen low-calorie meal or other prepackaged, calorie-controlled meal, along with extra vegetables for dinner  This would give you 1000 to 1500 calories per day. Low-fat diet -- To reduce the amount of fat in your diet, you can:  Eat low-fat foods. Low-fat foods are those that contain less than 30 percent of calories from fat. Fat is listed on the food facts label (figure 1). Count fat grams. For a 1500 calorie diet, this would mean about 45 g or fewer of fat per day. Low-carbohydrate diet -- Low- and very-low-carbohydrate diets (eg, Atkins diet, [employer]) have become popular ways to lose weight quickly.   With a very-low-carbohydrate diet, you eat between 0 and 60 grams of carbohydrates per day (a standard diet contains 200 to 300 grams of carbohydrates)   With a low-carbohydrate diet, you eat between 60 and 130 grams of carbohydrates per day  Carbohydrates are found in fruits, vegetables, and grains (including breads, rice, pasta, and cereal), alcoholic beverages, and in dairy products. Meat and fish do not contain carbohydrates. Side effects of very-low-carbohydrate diets can include constipation, headache, bad breath, muscle cramps, diarrhea, and weakness. Mediterranean diet -- The term \"Mediterranean diet\" refers to a way of eating that is common in [place] regions around [facility]. Although there is some variation in Mediterranean diets, there are some similarities. Most Mediterranean diets include:  A high level of monounsaturated fats (from olive or canola oil, walnuts, pecans, almonds) and a low level of saturated fats (from butter)   A high amount of vegetables, fruits, legumes, and grains (7 to 10 servings of fruits and vegetables per day)   A moderate amount of milk and dairy products, mostly in the form of cheese. Use low-fat dairy products (skim milk, fat-free yogurt, low-fat cheese). A relatively low amount of red meat and meat products. Substitute fish or poultry for red meat. For those who drink alcohol, a modest amount (mainly as red wine) may help to protect against cardiovascular disease. A modest amount is up to one (4 ounce) glass per day for women and up to two glasses per day for men. Which diet is best? -- Studies have compared different diets, including:  Very-low-carbohydrate (Atkins)   Macronutrient balance controlling glycemic load (Zone®)   Reduced-calorie (Weight Watchers®)   Very-low-fat (Ornish)  No one diet is \"best\" for weight loss. Any diet will help you to lose weight if you stick with the diet. Therefore, it is important to choose a diet that includes foods you like.   Fad diets -- Fad diets often promise quick weight loss (more than 1 to 2 pounds per week) and may claim that you do not need to exercise or give up favorite foods. Some fad diets cost a lot of money, because you have to pay for seminars or pills. Fad diets generally lack any scientific evidence that they are safe and effective, but instead rely on \"before\" and \"after\" photos or testimonials. Diets that sound too good to be true usually are. These plans are a waste of time and money and are not recommended. A doctor, nurse, or nutritionist can help you find a safe and effective way to lose weight and keep it off. WEIGHT LOSS MEDICINES -- Taking a weight loss medicine may be helpful when used in combination with diet, exercise, and lifestyle changes. However, it is important to understand the risks and benefits of these medicines. It is also important to be realistic about your goal weight using a weight loss medicine; you may not reach your \"dream\" weight, but you may be able to reduce your risk of diabetes or heart disease. Weight loss medicines may be recommended for people who have not been able to lose weight with diet and exercise who have a:  BMI of 30 or more    BMI between 27 and 29.9 and have other medical problems, such as diabetes, high cholesterol, or high blood pressure  Two weight loss medicines are approved in the [place] for long-term use. These are sibutramine and orlistat. Other weight loss medicines (phentermine, diethylpropion) are available but are only approved for short-term use (up to 12 weeks). Sibutramine -- Sibutramine (Meridia®, Reductil®) is a medicine that reduces your appetite. In people who take the medicine for one year, the average weight loss is 10 percent of the initial body weight (5 percent more than those who took a placebo treatment). Side effects of sibutramine include insomnia, dry mouth, and constipation. Increases in blood pressure can occur. Therefore, blood pressure is usually monitored during treatment.  There is no evidence that sibutramine causes heart or lung problems (like dexfenfluramine and fenfluramine (Phen/Fen)). However, experts agree that sibutramine should not used by people with coronary heart disease, heart failure, uncontrolled hypertension, stroke, irregular heart rhythms, or peripheral vascular disease (poor circulation in the legs). Orlistat -- Orlistat (Xenical® 120 mg capsules) is a medicine that reduces the amount of fat your body absorbs from the foods you eat. A lower-dose version is now available without a prescription (Mariusz® 60 mg capsules) in many countries, including the [place]. The medicine is recommended three times per day, taken with a meal; you can skip a dose if you skip a meal or if the meal contains no fat. After one year of treatment with orlistat, the average weight loss is approximately 8 to 10 percent of initial body weight (4 percent more than in those who took a placebo). Cholesterol levels often improve, and blood pressure sometimes falls. In people with diabetes, orlistat may help control blood sugar levels. Side effects occur in 15 to 10 percent of people and may include stomach cramps, gas, diarrhea, leakage of stool, or oily stools. These problems are more likely when you take orlistat with a high-fat meal (if more than 30 percent of calories in the meal are from fat). Side effects usually improve as you learn to avoid high-fat foods. Severe liver injury has been reported rarely in patients taking orlistat, but it is not known if orlistat caused the liver problems. Diet supplements -- Diet supplements are widely used by people who are trying to lose weight, although the safety and efficacy of these supplements are often unproven. A few of the more common diet supplements are discussed below; none of these are recommended because they have not been studied carefully, and there is no proof they are safe or effective. Chitosan and wheat dextrin are ineffective for weight loss, and their use is not recommended. Ephedra, a compound related to ephedrine, is no longer available in the [facility] due to safety concerns. Many nonprescription diet pills previously contained ephedra. Although some studies have shown that ephedra helps with weight loss, there can be serious side effects (psychiatric symptoms, palpitations, and stomach upset), including death. There are not enough data about the safety and efficacy of chromium, ginseng, glucomannan, green tea, hydroxycitric acid, L carnitine, psyllium, pyruvate supplements, [place] wort, and conjugated linoleic acid. Two supplements from [facility], [address] [place] (also known as the Ethan Scott 15 pill) and Herbathin dietary supplement, have been shown to contain prescription drugs. Hoodia gordonii is a dietary supplement derived from a plant in [place]. It is not recommended because there is no proof that it is safe or effective. Bitter orange (Citrus aurantium) can increase your heart rate and blood pressure and is not recommended. SHOULD I HAVE SURGERY TO LOSE WEIGHT? -- Weight loss surgery is recommended ONLY for people with one of the following:  Severe obesity (body mass index above 40) (calculator 1 and calculator 2) who have not responded to diet, exercise, or weight loss medicines   Body mass index between 35 and 40, along with a serious medical problem (including diabetes, severe joint pain, or sleep apnea) that would improve with weight loss  You should be sure that you understand the potential risks and benefits of weight loss surgery. You must be motivated and willing to make lifelong changes in how you eat to reach and maintain a healthier weight after surgery. You must also be realistic about weight loss after surgery (see 'Effectiveness of weight loss surgery' below). PREPARING FOR WEIGHT LOSS SURGERY -- Most people who have weight loss surgery will meet with several specialists before surgery is scheduled.  This often includes a dietitian, mental health counselor, a doctor who specializes in care of obese people, and a surgeon who performs weight loss surgery (bariatric surgeon). You may need to work with these providers for several weeks or months before surgery. The nutritionist will explain what and how much you will be able to eat after surgery. You may also need to lose a small amount of weight before surgery. The mental health specialist will help you to cope with stress and other factors that can make it harder to lose weight or trigger you to eat   The medical doctor will determine whether you need other tests, counseling, or treatment before surgery. He or she might also help you begin a medical weight loss program so that you can lose some weight before surgery. The bariatric surgeon will meet with you to discuss the surgeries available to treat obesity. He or she will also make sure you are a good candidate for surgery. TYPES OF WEIGHT LOSS SURGERY -- There are several types of weight loss surgeries, the most common being lap banding, gastric bypass, and gastric sleeve. Lap banding -- Laparoscopic adjustable gastric banding (LAGB), or lap banding, is a surgery that uses an adjustable band around the opening to the stomach (figure 1). This reduces the amount of food that you can eat at one time. Lap banding is done through small incisions, with a laparoscope. The band can be adjusted after surgery, allowing you to eat more or less food. Adjustments to the size and tightness of the band are made by using a needle to add or remove fluid from a port (a small container under the skin that is connected to the band). Adding fluid to the band makes it tighter which restricts the amount of food you can eat and may help you to lose more weight. Lap banding is a popular choice because it is relatively simple to perform, can be adjusted or removed, and has a low risk of serious complications immediately after surgery.  However, weight loss with the lap band depends on your ability to follow the program closely. You will need to prepare nutritious meals that [facility] with\" the band, not against it. For example, the lap band will not work well if you eat or drink a large amount of liquid calories (like ice cream). The band will not help you to feel full when you eat/drink liquid calories. Weight loss ranges from 45 to 75 percent after two years. As an example, a person who is 120 pounds overweight could expect to lose approximately 54 to 90 pounds in the two years after lap banding. Gastric bypass -- Ady-en-Y gastric bypass, also called gastric bypass, helps you to lose weight by reducing the amount of food you can eat and reducing the number of calories and nutrients you absorb from the food you eat. To perform gastric bypass, a surgeon creates a small stomach pouch by dividing the stomach and attaching it to the small intestine. This helps you to lose weight in two ways: The smaller stomach can hold less food than before surgery. This causes you to feel full after eating a very small amount of food or liquid. Over time, the pouch might stretch, allowing you to eat more food. The body absorbs fewer calories, since food bypasses most of the stomach as well as the upper small intestine. This new arrangement seems to decrease your appetite and change how you break down foods by changing the release of various hormones. Gastric bypass can be performed as open surgery (through an incision on the abdomen) or laparoscopically, which uses smaller incisions and smaller instruments. Both the laparoscopic and open techniques have risks and benefits. You and your surgeon should work together to decide which surgery, if any, is right for you. Gastric bypass has a high success rate, and people lose an average of 62 to 68 percent of their excess body weight in the first year.  Weight loss typically levels off after one to two years, with an overall excess weight loss between 48 and 75 percent. For a person who is 120 pounds overweight, an average of 60 to 90 pounds of weight loss would be expected. Gastric sleeve -- Gastric sleeve, also known as sleeve gastrectomy, is a surgery that reduces the size of the stomach and makes it into a narrow tube (figure 3). The new stomach is much smaller and produces less of the hormone (ghrelin) that causes hunger, helping you feel satisfied with less food. Sleeve gastrectomy is safer than gastric bypass because the intestines are not rearranged, and there is less chance of malnutrition. It also appears to control hunger better than lap banding. It might be safer than the lap banding because no foreign materials are used. The gastric sleeve has a good success rate, and people lose an average of 33 percent of their excess body weight in the first year. For a person who is 120 pounds overweight, this would mean losing about 40 pounds in the first year. WEIGHT LOSS SURGERY COMPLICATIONS -- A variety of complications can occur with weight loss surgery. The risks of surgery depend upon which surgery you have and any medical problems you had before surgery. Some of the more common early surgical complications (one to six weeks after surgery) include:  Bleeding   Infection   Blockage or tear in the bowels   Need for further surgery  Important medical complications after surgery can include blood clots in the legs or lungs, heart attack, pneumonia, and urinary tract infection. Complications are less likely when surgery is performed in centers that are experienced in weight loss surgery. In general, centers with experience in weight loss surgery have:  Board-certified doctors and surgeons   A team of support staff (dietitians, counselors, nurses)   Long-term follow-up after surgery   Hospital staff experienced with the care of weight loss patients.  This includes nurses who are trained in the care of patients immediately after surgery and anesthesiologists who are experienced in caring for the morbidly obese. EFFECTIVENESS OF WEIGHT LOSS SURGERY -- The goal of weight loss surgery is to reduce the risk of illness or death associated with obesity. Weight loss surgery can also help you to feel and look better, reduce the amount of money you spend on medicines, and cut down on sick days. As an example, weight loss surgery can improve health problems related to obesity (diabetes, high blood pressure, high cholesterol, sleep apnea) to the point that you need less or no medicine. Finally, weight loss surgery might reduce your risk of developing heart disease, cancer, and certain infections. AFTER WEIGHT LOSS SURGERY -- You will need to stay in the hospital until your team feels that it is safe for you to leave (on average, one to three days). Do not drive if you are taking prescription pain medicine. Begin exercising as soon as possible once you have healed; most weight loss centers will design an exercise program for you. Once you are home, it is important to eat and drink exactly what your doctor and dietitian recommend. You will see your doctor, nurse, and dietitian on a regular basis after surgery to monitor your health, diet, and weight loss. You will be able to slowly increase how much you eat over time, although it will always be important to:  Eat small, frequent meals and not skip meals   Chew your food slowly and completely   Avoid eating while \"distracted\" (such as eating while watching TV)   Stop eating when you feel full   Drink liquids at least 30 minutes before or after eating   Avoid foods high in fat or sugar   Take vitamin supplements, as recommended  It can take several months to learn to listen to your body so that you know when you are hungry and when you are full. You may dislike foods you previously loved, and you may begin to prefer new foods.  This can be a frustrating process for some people, so talk to your dietitian if you are having trouble. It usually takes between one and two years to lose weight after surgery. After reaching their goal weight, some people have plastic surgery (called \"body contouring\") to remove excess skin from the body, particularly in the abdominal area. Before you decide to have weight loss surgery, you must commit to staying healthy for life. This includes following up with your healthcare team, exercising most days of the week, and eating a sensible diet every day. It can be difficult to develop new eating and exercise habits after weight loss surgery, and you will have to work hard to stick to your goals. Recovering from surgery and losing weight can be stressful and emotional, and it is important to have the support of family and friends. Working with a , therapist, or support group can help you through the ups and downs. WHERE TO GET MORE INFORMATION -- Your healthcare provider is the best source of information for questions and concerns related to your medical problem.   This article will be updated as needed every four months on our Web site ([email or web address])

## 2022-10-30 DIAGNOSIS — K58.0 IRRITABLE BOWEL SYNDROME WITH DIARRHEA: ICD-10-CM

## 2022-10-31 RX ORDER — HYOSCYAMINE SULFATE 0.125 MG
TABLET ORAL
Qty: 90 TABLET | Refills: 3 | Status: SHIPPED | OUTPATIENT
Start: 2022-10-31

## 2022-11-16 DIAGNOSIS — I10 ESSENTIAL HYPERTENSION, BENIGN: ICD-10-CM

## 2022-11-16 DIAGNOSIS — F32.0 MILD MAJOR DEPRESSION (HCC): ICD-10-CM

## 2022-11-16 DIAGNOSIS — F41.1 GAD (GENERALIZED ANXIETY DISORDER): ICD-10-CM

## 2022-11-16 RX ORDER — DICYCLOMINE HCL 20 MG
20 TABLET ORAL
Qty: 270 TABLET | Refills: 1 | Status: SHIPPED | OUTPATIENT
Start: 2022-11-16 | End: 2023-11-17

## 2022-11-16 RX ORDER — METOPROLOL TARTRATE 100 MG/1
TABLET ORAL
Qty: 180 TABLET | Refills: 3 | Status: SHIPPED | OUTPATIENT
Start: 2022-11-16

## 2022-11-16 RX ORDER — FLUOXETINE 10 MG/1
CAPSULE ORAL
Qty: 90 CAPSULE | Refills: 3 | Status: SHIPPED | OUTPATIENT
Start: 2022-11-16

## 2022-11-16 RX ORDER — DILTIAZEM HYDROCHLORIDE 240 MG/1
CAPSULE, EXTENDED RELEASE ORAL
Qty: 90 CAPSULE | Refills: 3 | Status: SHIPPED | OUTPATIENT
Start: 2022-11-16

## 2022-11-16 NOTE — TELEPHONE ENCOUNTER
Wyatt Jones called to request a refill on her medication. Last office visit : 10/17/2022   Next office visit : 2/17/2023     Requested Prescriptions     Pending Prescriptions Disp Refills    TIADYLT  MG extended release capsule [Pharmacy Med Name: TIADYLT  MG CAPSULE] 90 capsule 2     Sig: TAKE 1 CAPSULE BY MOUTH EVERY DAY    dicyclomine (BENTYL) 20 MG tablet [Pharmacy Med Name: DICYCLOMINE 20 MG TABLET] 270 tablet 1     Sig: TAKE 1 TABLET BY MOUTH 3 TIMES DAILY (BEFORE MEALS).     metoprolol (LOPRESSOR) 100 MG tablet [Pharmacy Med Name: METOPROLOL TARTRATE 100 MG TAB] 180 tablet 1     Sig: TAKE 1 TABLET BY MOUTH TWICE A DAY    FLUoxetine (PROZAC) 10 MG capsule [Pharmacy Med Name: FLUOXETINE HCL 10 MG CAPSULE] 90 capsule 1     Sig: TAKE 1 CAPSULE BY MOUTH EVERY DAY            Angelia Ramirez MA

## 2022-12-12 ENCOUNTER — OFFICE VISIT (OUTPATIENT)
Dept: FAMILY MEDICINE CLINIC | Age: 62
End: 2022-12-12
Payer: MEDICAID

## 2022-12-12 VITALS
DIASTOLIC BLOOD PRESSURE: 72 MMHG | WEIGHT: 242.4 LBS | BODY MASS INDEX: 36.86 KG/M2 | TEMPERATURE: 98.3 F | SYSTOLIC BLOOD PRESSURE: 122 MMHG | HEART RATE: 68 BPM | OXYGEN SATURATION: 96 %

## 2022-12-12 DIAGNOSIS — M79.89 PAIN AND SWELLING OF RIGHT LOWER EXTREMITY: Primary | ICD-10-CM

## 2022-12-12 DIAGNOSIS — M79.604 PAIN AND SWELLING OF RIGHT LOWER EXTREMITY: Primary | ICD-10-CM

## 2022-12-12 PROCEDURE — 3074F SYST BP LT 130 MM HG: CPT | Performed by: NURSE PRACTITIONER

## 2022-12-12 PROCEDURE — 99214 OFFICE O/P EST MOD 30 MIN: CPT | Performed by: NURSE PRACTITIONER

## 2022-12-12 PROCEDURE — 3078F DIAST BP <80 MM HG: CPT | Performed by: NURSE PRACTITIONER

## 2022-12-12 PROCEDURE — 96372 THER/PROPH/DIAG INJ SC/IM: CPT | Performed by: NURSE PRACTITIONER

## 2022-12-12 RX ORDER — PREDNISONE 10 MG/1
10 TABLET ORAL 2 TIMES DAILY
Qty: 10 TABLET | Refills: 0 | Status: SHIPPED | OUTPATIENT
Start: 2022-12-13 | End: 2022-12-18

## 2022-12-12 RX ORDER — DEXAMETHASONE SODIUM PHOSPHATE 10 MG/ML
5 INJECTION INTRAMUSCULAR; INTRAVENOUS ONCE
Status: COMPLETED | OUTPATIENT
Start: 2022-12-12 | End: 2022-12-12

## 2022-12-12 RX ADMIN — DEXAMETHASONE SODIUM PHOSPHATE 5 MG: 10 INJECTION INTRAMUSCULAR; INTRAVENOUS at 15:06

## 2022-12-12 ASSESSMENT — ENCOUNTER SYMPTOMS
EYES NEGATIVE: 1
GASTROINTESTINAL NEGATIVE: 1
RESPIRATORY NEGATIVE: 1
ALLERGIC/IMMUNOLOGIC NEGATIVE: 1

## 2022-12-12 NOTE — PATIENT INSTRUCTIONS
Increase water intake  Elevate feet and legs  Prednisone 10mg bid x 5 days  Advised to go to ER if she has numbness or tingling that returns.

## 2023-01-17 ENCOUNTER — TELEPHONE (OUTPATIENT)
Dept: PRIMARY CARE CLINIC | Age: 63
End: 2023-01-17

## 2023-01-17 NOTE — TELEPHONE ENCOUNTER
Patient is needing her hormone compound sent to [facility] by school. Biest/Progesterone TD, when I tried to order it only show cream. Please advise.

## 2023-01-18 NOTE — TELEPHONE ENCOUNTER
Left vm for pt to inform her that her pharmacy has to fax the prescription to the office and return call to the office with any questions or concerns

## 2023-01-27 DIAGNOSIS — K58.0 IRRITABLE BOWEL SYNDROME WITH DIARRHEA: ICD-10-CM

## 2023-01-27 RX ORDER — HYOSCYAMINE SULFATE 0.125 MG
TABLET ORAL
Qty: 90 TABLET | Refills: 3 | Status: SHIPPED | OUTPATIENT
Start: 2023-01-27

## 2023-01-27 NOTE — TELEPHONE ENCOUNTER
Herbert Farias called to request a refill on her medication.       Last office visit : 12/12/2022   Next office visit : Visit date not found     Requested Prescriptions     Pending Prescriptions Disp Refills    hyoscyamine (ANASPAZ;LEVSIN) 125 MCG tablet [Pharmacy Med Name: HYOSCYAMINE SULF 0.125 MG TAB] 90 tablet 3     Sig: TAKE 1 TABLET BY MOUTH EVERY 6 HOURS AS NEEDED FOR CRAMPING OR DIARRHEA            Angelia Ramirez MA

## 2023-03-21 DIAGNOSIS — R73.9 HYPERGLYCEMIA: ICD-10-CM

## 2023-03-21 DIAGNOSIS — I10 ESSENTIAL HYPERTENSION, BENIGN: ICD-10-CM

## 2023-03-21 DIAGNOSIS — N18.31 STAGE 3A CHRONIC KIDNEY DISEASE (HCC): ICD-10-CM

## 2023-03-21 DIAGNOSIS — E78.2 MIXED HYPERLIPIDEMIA: ICD-10-CM

## 2023-03-21 LAB
ALBUMIN SERPL-MCNC: 3.9 G/DL (ref 3.5–5.2)
ALP SERPL-CCNC: 77 U/L (ref 35–104)
ALT SERPL-CCNC: 18 U/L (ref 5–33)
ANION GAP SERPL CALCULATED.3IONS-SCNC: 11 MMOL/L (ref 7–19)
AST SERPL-CCNC: 19 U/L (ref 5–32)
BILIRUB SERPL-MCNC: 0.3 MG/DL (ref 0.2–1.2)
BUN SERPL-MCNC: 20 MG/DL (ref 8–23)
CALCIUM SERPL-MCNC: 9.4 MG/DL (ref 8.8–10.2)
CHLORIDE SERPL-SCNC: 102 MMOL/L (ref 98–111)
CHOLEST SERPL-MCNC: 162 MG/DL (ref 160–199)
CO2 SERPL-SCNC: 30 MMOL/L (ref 22–29)
CREAT SERPL-MCNC: 1.1 MG/DL (ref 0.5–0.9)
GLUCOSE SERPL-MCNC: 135 MG/DL (ref 74–109)
HBA1C MFR BLD: 6.4 % (ref 4–6)
HDLC SERPL-MCNC: 51 MG/DL (ref 65–121)
LDLC SERPL CALC-MCNC: 78 MG/DL
POTASSIUM SERPL-SCNC: 4 MMOL/L (ref 3.5–5)
PROT SERPL-MCNC: 6.7 G/DL (ref 6.6–8.7)
SODIUM SERPL-SCNC: 143 MMOL/L (ref 136–145)
TRIGL SERPL-MCNC: 164 MG/DL (ref 0–149)

## 2023-04-07 ENCOUNTER — OFFICE VISIT (OUTPATIENT)
Age: 63
End: 2023-04-07

## 2023-04-07 VITALS
HEIGHT: 68 IN | HEART RATE: 75 BPM | RESPIRATION RATE: 18 BRPM | BODY MASS INDEX: 37.89 KG/M2 | SYSTOLIC BLOOD PRESSURE: 122 MMHG | DIASTOLIC BLOOD PRESSURE: 86 MMHG | WEIGHT: 250 LBS | TEMPERATURE: 98.3 F | OXYGEN SATURATION: 98 %

## 2023-04-07 DIAGNOSIS — J06.9 VIRAL URI WITH COUGH: Primary | ICD-10-CM

## 2023-04-07 DIAGNOSIS — R11.0 NAUSEA: ICD-10-CM

## 2023-04-07 DIAGNOSIS — R50.9 FEVER, UNSPECIFIED FEVER CAUSE: ICD-10-CM

## 2023-04-07 DIAGNOSIS — R19.7 DIARRHEA, UNSPECIFIED TYPE: ICD-10-CM

## 2023-04-07 DIAGNOSIS — J02.9 SORE THROAT: ICD-10-CM

## 2023-04-07 LAB — S PYO AG THROAT QL: NORMAL

## 2023-04-07 ASSESSMENT — ENCOUNTER SYMPTOMS
NAUSEA: 1
SORE THROAT: 1
VOMITING: 0
EYES NEGATIVE: 1
DIARRHEA: 1
SHORTNESS OF BREATH: 0
ALLERGIC/IMMUNOLOGIC NEGATIVE: 1
WHEEZING: 0
COUGH: 1
ABDOMINAL PAIN: 1

## 2023-04-07 NOTE — PATIENT INSTRUCTIONS
Follow-up with your PCP if symptoms do not improve or if worsening; if symptoms suddenly change or worsen, including shortness of breath or difficulty swallowing, go to the emergency department. Patient verbalized understanding. Tylenol or Motrin for fever or pain as needed. Rest and increase fluid intake. Petroleum diet as tolerated. Coolmist humidifier. Continue Loratidine daily. May continue taking Coricidin HBP for cough. Gargle with warm salt water several times daily for sore throat.

## 2023-04-07 NOTE — PROGRESS NOTES
of this encounter note is an electronic transcription/translation of spoken language to printed text. The electronic translation of spoken language may be erroneous, or at times, nonsensical words or phrases may be inadvertently transcribed.   Although I have reviewed the note for such errors, some may still exist.

## 2023-05-11 DIAGNOSIS — E78.2 MIXED HYPERLIPIDEMIA: ICD-10-CM

## 2023-05-11 RX ORDER — DICYCLOMINE HCL 20 MG
20 TABLET ORAL
Qty: 270 TABLET | Refills: 1 | Status: SHIPPED | OUTPATIENT
Start: 2023-05-11 | End: 2024-05-11

## 2023-05-11 RX ORDER — ATORVASTATIN CALCIUM 20 MG/1
TABLET, FILM COATED ORAL
Qty: 90 TABLET | Refills: 3 | Status: SHIPPED | OUTPATIENT
Start: 2023-05-11

## 2023-05-11 NOTE — TELEPHONE ENCOUNTER
Darinel Alpers called to request a refill on her medication. Last office visit : 3/22/2023   Next office visit : 6/22/2023     Requested Prescriptions     Pending Prescriptions Disp Refills    dicyclomine (BENTYL) 20 MG tablet [Pharmacy Med Name: DICYCLOMINE 20 MG TABLET] 270 tablet 1     Sig: TAKE 1 TABLET BY MOUTH 3 TIMES DAILY (BEFORE MEALS).     atorvastatin (LIPITOR) 20 MG tablet [Pharmacy Med Name: ATORVASTATIN 20 MG TABLET] 90 tablet 3     Sig: TAKE 1 TABLET BY MOUTH EVERY DAY            Angelia Ramirez MA

## 2023-06-01 DIAGNOSIS — I10 ESSENTIAL HYPERTENSION, BENIGN: ICD-10-CM

## 2023-06-01 RX ORDER — TRIAMTERENE AND HYDROCHLOROTHIAZIDE 37.5; 25 MG/1; MG/1
TABLET ORAL
Qty: 90 TABLET | Refills: 3 | Status: SHIPPED | OUTPATIENT
Start: 2023-06-01

## 2023-06-01 NOTE — TELEPHONE ENCOUNTER
Karoline Pelton called to request a refill on her medication.       Last office visit : 3/22/2023   Next office visit : 6/22/2023     Requested Prescriptions     Pending Prescriptions Disp Refills    triamterene-hydroCHLOROthiazide (MAXZIDE-25) 37.5-25 MG per tablet [Pharmacy Med Name: [employer] 37.5-25 MG TB] 90 tablet 3     Sig: TAKE 1 TABLET BY MOUTH EVERY DAY            Angelia Ramirez MA

## 2023-06-20 DIAGNOSIS — E78.2 MIXED HYPERLIPIDEMIA: ICD-10-CM

## 2023-06-20 DIAGNOSIS — R73.9 HYPERGLYCEMIA: ICD-10-CM

## 2023-06-20 DIAGNOSIS — N18.31 STAGE 3A CHRONIC KIDNEY DISEASE (HCC): ICD-10-CM

## 2023-06-20 LAB
ALBUMIN SERPL-MCNC: 3.9 G/DL (ref 3.5–5.2)
ALP SERPL-CCNC: 87 U/L (ref 35–104)
ALT SERPL-CCNC: 21 U/L (ref 5–33)
ANION GAP SERPL CALCULATED.3IONS-SCNC: 10 MMOL/L (ref 7–19)
AST SERPL-CCNC: 19 U/L (ref 5–32)
BASOPHILS # BLD: 0.1 K/UL (ref 0–0.2)
BASOPHILS NFR BLD: 0.6 % (ref 0–1)
BILIRUB SERPL-MCNC: 0.3 MG/DL (ref 0.2–1.2)
BUN SERPL-MCNC: 19 MG/DL (ref 8–23)
CALCIUM SERPL-MCNC: 9.5 MG/DL (ref 8.8–10.2)
CHLORIDE SERPL-SCNC: 102 MMOL/L (ref 98–111)
CHOLEST SERPL-MCNC: 165 MG/DL (ref 160–199)
CO2 SERPL-SCNC: 29 MMOL/L (ref 22–29)
CREAT SERPL-MCNC: 1 MG/DL (ref 0.5–0.9)
EOSINOPHIL # BLD: 0.3 K/UL (ref 0–0.6)
EOSINOPHIL NFR BLD: 2.8 % (ref 0–5)
ERYTHROCYTE [DISTWIDTH] IN BLOOD BY AUTOMATED COUNT: 14.2 % (ref 11.5–14.5)
GLUCOSE SERPL-MCNC: 120 MG/DL (ref 74–109)
HBA1C MFR BLD: 6.5 % (ref 4–6)
HCT VFR BLD AUTO: 42.7 % (ref 37–47)
HDLC SERPL-MCNC: 56 MG/DL (ref 65–121)
HGB BLD-MCNC: 13.2 G/DL (ref 12–16)
IMM GRANULOCYTES # BLD: 0 K/UL
LDLC SERPL CALC-MCNC: 78 MG/DL
LYMPHOCYTES # BLD: 3.4 K/UL (ref 1.1–4.5)
LYMPHOCYTES NFR BLD: 30.4 % (ref 20–40)
MCH RBC QN AUTO: 26.5 PG (ref 27–31)
MCHC RBC AUTO-ENTMCNC: 30.9 G/DL (ref 33–37)
MCV RBC AUTO: 85.6 FL (ref 81–99)
MONOCYTES # BLD: 0.8 K/UL (ref 0–0.9)
MONOCYTES NFR BLD: 6.8 % (ref 0–10)
NEUTROPHILS # BLD: 6.6 K/UL (ref 1.5–7.5)
NEUTS SEG NFR BLD: 59 % (ref 50–65)
PLATELET # BLD AUTO: 287 K/UL (ref 130–400)
PMV BLD AUTO: 11.2 FL (ref 9.4–12.3)
POTASSIUM SERPL-SCNC: 4.3 MMOL/L (ref 3.5–5)
PROT SERPL-MCNC: 6.8 G/DL (ref 6.6–8.7)
RBC # BLD AUTO: 4.99 M/UL (ref 4.2–5.4)
SODIUM SERPL-SCNC: 141 MMOL/L (ref 136–145)
TRIGL SERPL-MCNC: 154 MG/DL (ref 0–149)
WBC # BLD AUTO: 11.2 K/UL (ref 4.8–10.8)

## 2023-06-22 ENCOUNTER — OFFICE VISIT (OUTPATIENT)
Dept: PRIMARY CARE CLINIC | Age: 63
End: 2023-06-22
Payer: MEDICARE

## 2023-06-22 VITALS
WEIGHT: 245 LBS | OXYGEN SATURATION: 98 % | HEART RATE: 74 BPM | BODY MASS INDEX: 37.13 KG/M2 | TEMPERATURE: 97.8 F | HEIGHT: 68 IN | SYSTOLIC BLOOD PRESSURE: 124 MMHG | DIASTOLIC BLOOD PRESSURE: 68 MMHG

## 2023-06-22 DIAGNOSIS — E11.9 NEW ONSET TYPE 2 DIABETES MELLITUS (HCC): ICD-10-CM

## 2023-06-22 DIAGNOSIS — N18.31 STAGE 3A CHRONIC KIDNEY DISEASE (HCC): ICD-10-CM

## 2023-06-22 DIAGNOSIS — I10 ESSENTIAL HYPERTENSION, BENIGN: Primary | ICD-10-CM

## 2023-06-22 DIAGNOSIS — E66.01 CLASS 2 SEVERE OBESITY DUE TO EXCESS CALORIES WITH SERIOUS COMORBIDITY AND BODY MASS INDEX (BMI) OF 37.0 TO 37.9 IN ADULT (HCC): ICD-10-CM

## 2023-06-22 DIAGNOSIS — F32.0 MILD MAJOR DEPRESSION (HCC): ICD-10-CM

## 2023-06-22 DIAGNOSIS — E78.2 MIXED HYPERLIPIDEMIA: ICD-10-CM

## 2023-06-22 DIAGNOSIS — K64.4 INFLAMED EXTERNAL HEMORRHOID: ICD-10-CM

## 2023-06-22 DIAGNOSIS — F41.1 GAD (GENERALIZED ANXIETY DISORDER): ICD-10-CM

## 2023-06-22 DIAGNOSIS — Z11.59 ENCOUNTER FOR HCV SCREENING TEST FOR LOW RISK PATIENT: ICD-10-CM

## 2023-06-22 PROBLEM — E11.22 TYPE 2 DIABETES MELLITUS WITH CHRONIC KIDNEY DISEASE (HCC): Status: ACTIVE | Noted: 2023-06-22

## 2023-06-22 PROBLEM — R73.03 PREDIABETES: Status: RESOLVED | Noted: 2017-09-15 | Resolved: 2023-06-22

## 2023-06-22 PROBLEM — E11.22 TYPE 2 DIABETES MELLITUS WITH CHRONIC KIDNEY DISEASE (HCC): Status: RESOLVED | Noted: 2023-06-22 | Resolved: 2023-06-22

## 2023-06-22 PROCEDURE — 1036F TOBACCO NON-USER: CPT | Performed by: INTERNAL MEDICINE

## 2023-06-22 PROCEDURE — 3017F COLORECTAL CA SCREEN DOC REV: CPT | Performed by: INTERNAL MEDICINE

## 2023-06-22 PROCEDURE — 3074F SYST BP LT 130 MM HG: CPT | Performed by: INTERNAL MEDICINE

## 2023-06-22 PROCEDURE — G8417 CALC BMI ABV UP PARAM F/U: HCPCS | Performed by: INTERNAL MEDICINE

## 2023-06-22 PROCEDURE — 99214 OFFICE O/P EST MOD 30 MIN: CPT | Performed by: INTERNAL MEDICINE

## 2023-06-22 PROCEDURE — 3044F HG A1C LEVEL LT 7.0%: CPT | Performed by: INTERNAL MEDICINE

## 2023-06-22 PROCEDURE — G8427 DOCREV CUR MEDS BY ELIG CLIN: HCPCS | Performed by: INTERNAL MEDICINE

## 2023-06-22 PROCEDURE — 3078F DIAST BP <80 MM HG: CPT | Performed by: INTERNAL MEDICINE

## 2023-06-22 PROCEDURE — 2022F DILAT RTA XM EVC RTNOPTHY: CPT | Performed by: INTERNAL MEDICINE

## 2023-06-22 RX ORDER — HYDROCORTISONE ACETATE 25 MG/1
25 SUPPOSITORY RECTAL EVERY 12 HOURS
Qty: 24 SUPPOSITORY | Refills: 5 | Status: SHIPPED | OUTPATIENT
Start: 2023-06-22

## 2023-06-22 ASSESSMENT — ENCOUNTER SYMPTOMS
SHORTNESS OF BREATH: 0
ANAL BLEEDING: 1
WHEEZING: 0
BLOOD IN STOOL: 0
BACK PAIN: 1
EYE REDNESS: 0
ABDOMINAL PAIN: 0
CHEST TIGHTNESS: 0
SORE THROAT: 0
DIARRHEA: 0
COUGH: 0
EYE PAIN: 0
EYE DISCHARGE: 0
SINUS PRESSURE: 0
RHINORRHEA: 0
VOMITING: 0
COLOR CHANGE: 0
VOICE CHANGE: 0

## 2023-07-10 PROBLEM — E11.9 NEW ONSET TYPE 2 DIABETES MELLITUS (HCC): Status: ACTIVE | Noted: 2023-07-10

## 2023-07-10 PROBLEM — K64.4 INFLAMED EXTERNAL HEMORRHOID: Status: ACTIVE | Noted: 2023-07-10

## 2023-07-18 NOTE — TELEPHONE ENCOUNTER
Elizabeth Cummings called to request a refill on her medication.       Last office visit : 6/22/2023   Next office visit : 9/22/2023     Requested Prescriptions     Pending Prescriptions Disp Refills    Estradiol-Estriol-Progesterone (BIEST/PROGESTERONE) ELOINA       Sig: Take by mouth nightly            Stacey Brooks MA

## 2023-08-24 ENCOUNTER — OFFICE VISIT (OUTPATIENT)
Age: 63
End: 2023-08-24

## 2023-08-24 VITALS
WEIGHT: 252 LBS | RESPIRATION RATE: 20 BRPM | OXYGEN SATURATION: 98 % | DIASTOLIC BLOOD PRESSURE: 82 MMHG | BODY MASS INDEX: 46.38 KG/M2 | TEMPERATURE: 97 F | HEART RATE: 69 BPM | SYSTOLIC BLOOD PRESSURE: 130 MMHG | HEIGHT: 62 IN

## 2023-08-24 DIAGNOSIS — R11.0 NAUSEA: ICD-10-CM

## 2023-08-24 DIAGNOSIS — R05.1 ACUTE COUGH: ICD-10-CM

## 2023-08-24 DIAGNOSIS — J06.9 ACUTE UPPER RESPIRATORY INFECTION: Primary | ICD-10-CM

## 2023-08-24 DIAGNOSIS — Z11.52 ENCOUNTER FOR SCREENING FOR COVID-19: ICD-10-CM

## 2023-08-24 DIAGNOSIS — R09.81 SINUS CONGESTION: ICD-10-CM

## 2023-08-24 LAB
INFLUENZA A ANTIBODY: NEGATIVE
INFLUENZA B ANTIBODY: NEGATIVE
SARS-COV-2 N GENE RESP QL NAA+PROBE: NOT DETECTED

## 2023-08-24 RX ORDER — BENZONATATE 100 MG/1
100 CAPSULE ORAL 3 TIMES DAILY PRN
Qty: 15 CAPSULE | Refills: 0 | Status: SHIPPED | OUTPATIENT
Start: 2023-08-24 | End: 2023-08-29

## 2023-08-24 RX ORDER — ONDANSETRON 4 MG/1
4 TABLET, ORALLY DISINTEGRATING ORAL 3 TIMES DAILY PRN
Qty: 21 TABLET | Refills: 0 | Status: SHIPPED | OUTPATIENT
Start: 2023-08-24

## 2023-08-24 ASSESSMENT — ENCOUNTER SYMPTOMS
APNEA: 0
EYE PAIN: 0
COUGH: 1
EYE REDNESS: 0
SHORTNESS OF BREATH: 0
FACIAL SWELLING: 0
CHOKING: 0
CHEST TIGHTNESS: 0
SORE THROAT: 0
COLOR CHANGE: 0
CONSTIPATION: 0
STRIDOR: 0
NAUSEA: 1
ABDOMINAL PAIN: 0
SINUS PAIN: 0
EYE DISCHARGE: 0
WHEEZING: 0
VOMITING: 0
DIARRHEA: 1
TROUBLE SWALLOWING: 0
SINUS PRESSURE: 1
ABDOMINAL DISTENTION: 0

## 2023-08-24 NOTE — PATIENT INSTRUCTIONS
Flu test was negative for type A and B.    COVID test sent to lab; will call with results. Etiology is likely viral; discussed this with pt in office    Zofran prescribed for nausea. Tessalon perles prescribed as needed for cough.     Increase fluid intake    Recommended OTC mucinex     May use OTC claritin/zyrtec and nasal spray such as flonase to help symptoms    If patient is not improving or developing any new/worsening symptoms then return to clinic or f/u with PCP

## 2023-08-24 NOTE — PROGRESS NOTES
Assessment         Diagnosis Orders   1. Acute upper respiratory infection  COVID-19      2. Sinus congestion  POCT Influenza A/B    COVID-19      3. Encounter for screening for COVID-19  COVID-19      4. Acute cough  POCT Influenza A/B    COVID-19    benzonatate (TESSALON) 100 MG capsule      5. Nausea  POCT Influenza A/B    COVID-19    ondansetron (ZOFRAN-ODT) 4 MG disintegrating tablet          Plan   Flu test was negative for type A and B.    COVID test sent to lab; will call with results. Pt is stable, exam is non-revealing. Etiology is likely viral; discussed this with pt in office    Zofran prescribed for nausea. Tessalon perles prescribed as needed for cough. Increase fluid intake    Encouraged rest.    Recommended OTC mucinex     May use OTC claritin/zyrtec and nasal spray such as flonase to help symptoms    If patient is not improving or developing any new/worsening symptoms then return to clinic or f/u with PCP    Pt demonstrates understanding and agrees with treatment plan. Orders Placed This Encounter   Procedures    COVID-19     Scheduling Instructions:      1) Due to current limited availability of the COVID-19 test, tests will be prioritized based on responses to questions above. Testing may be delayed due to volume. 2) Print and instruct patient to adhere to CDC home isolation program. (Link Above)              3) Set up or refer patient for a monitoring program.              4) Have patient sign up for and leverage MyChart (if not previously done). Order Specific Question:   Pregnant? Answer:   No     Order Specific Question:   Previously tested for COVID-19?      Answer:   Yes    COVID-19    COVID-19    POCT Influenza A/B       Results for orders placed or performed in visit on 08/24/23   POCT Influenza A/B   Result Value Ref Range    Influenza A Ab negative     Influenza B Ab negative        Orders Placed This Encounter   Medications    ondansetron (ZOFRAN-ODT)

## 2023-09-11 ENCOUNTER — OFFICE VISIT (OUTPATIENT)
Dept: PRIMARY CARE CLINIC | Age: 63
End: 2023-09-11
Payer: MEDICARE

## 2023-09-11 VITALS
WEIGHT: 247.6 LBS | DIASTOLIC BLOOD PRESSURE: 86 MMHG | TEMPERATURE: 97.5 F | BODY MASS INDEX: 45.29 KG/M2 | HEART RATE: 69 BPM | SYSTOLIC BLOOD PRESSURE: 142 MMHG | OXYGEN SATURATION: 97 %

## 2023-09-11 DIAGNOSIS — J40 BRONCHITIS: Primary | ICD-10-CM

## 2023-09-11 DIAGNOSIS — R09.89 CHEST CONGESTION: ICD-10-CM

## 2023-09-11 PROCEDURE — 96372 THER/PROPH/DIAG INJ SC/IM: CPT | Performed by: NURSE PRACTITIONER

## 2023-09-11 PROCEDURE — 99213 OFFICE O/P EST LOW 20 MIN: CPT | Performed by: NURSE PRACTITIONER

## 2023-09-11 PROCEDURE — 3078F DIAST BP <80 MM HG: CPT | Performed by: NURSE PRACTITIONER

## 2023-09-11 PROCEDURE — 3074F SYST BP LT 130 MM HG: CPT | Performed by: NURSE PRACTITIONER

## 2023-09-11 PROCEDURE — G8417 CALC BMI ABV UP PARAM F/U: HCPCS | Performed by: NURSE PRACTITIONER

## 2023-09-11 PROCEDURE — 3017F COLORECTAL CA SCREEN DOC REV: CPT | Performed by: NURSE PRACTITIONER

## 2023-09-11 PROCEDURE — 1036F TOBACCO NON-USER: CPT | Performed by: NURSE PRACTITIONER

## 2023-09-11 PROCEDURE — G8427 DOCREV CUR MEDS BY ELIG CLIN: HCPCS | Performed by: NURSE PRACTITIONER

## 2023-09-11 RX ORDER — DEXAMETHASONE SODIUM PHOSPHATE 10 MG/ML
5 INJECTION, SOLUTION INTRAMUSCULAR; INTRAVENOUS ONCE
Status: COMPLETED | OUTPATIENT
Start: 2023-09-11 | End: 2023-09-11

## 2023-09-11 RX ORDER — BENZONATATE 100 MG/1
100 CAPSULE ORAL 3 TIMES DAILY PRN
Qty: 30 CAPSULE | Refills: 0 | Status: SHIPPED | OUTPATIENT
Start: 2023-09-11 | End: 2023-09-18

## 2023-09-11 RX ORDER — DEXTROMETHORPHAN HYDROBROMIDE AND PROMETHAZINE HYDROCHLORIDE 15; 6.25 MG/5ML; MG/5ML
5 SYRUP ORAL 2 TIMES DAILY PRN
Qty: 118 ML | Refills: 0 | Status: SHIPPED | OUTPATIENT
Start: 2023-09-11 | End: 2023-09-16

## 2023-09-11 RX ORDER — PREDNISONE 10 MG/1
10 TABLET ORAL 2 TIMES DAILY
Qty: 10 TABLET | Refills: 0 | Status: SHIPPED | OUTPATIENT
Start: 2023-09-12 | End: 2023-09-17

## 2023-09-11 RX ORDER — CEFDINIR 300 MG/1
300 CAPSULE ORAL 2 TIMES DAILY
Qty: 20 CAPSULE | Refills: 0 | Status: SHIPPED | OUTPATIENT
Start: 2023-09-11 | End: 2023-09-21

## 2023-09-11 RX ADMIN — DEXAMETHASONE SODIUM PHOSPHATE 5 MG: 10 INJECTION, SOLUTION INTRAMUSCULAR; INTRAVENOUS at 14:13

## 2023-09-11 ASSESSMENT — ENCOUNTER SYMPTOMS
EYES NEGATIVE: 1
COUGH: 1
ALLERGIC/IMMUNOLOGIC NEGATIVE: 1
GASTROINTESTINAL NEGATIVE: 1

## 2023-10-09 DIAGNOSIS — F32.0 MILD MAJOR DEPRESSION (HCC): ICD-10-CM

## 2023-10-09 DIAGNOSIS — I10 ESSENTIAL HYPERTENSION, BENIGN: ICD-10-CM

## 2023-10-09 DIAGNOSIS — F41.1 GAD (GENERALIZED ANXIETY DISORDER): ICD-10-CM

## 2023-10-09 RX ORDER — DILTIAZEM HYDROCHLORIDE 240 MG/1
CAPSULE, EXTENDED RELEASE ORAL
Qty: 90 CAPSULE | Refills: 3 | Status: SHIPPED | OUTPATIENT
Start: 2023-10-09

## 2023-10-09 RX ORDER — METOPROLOL TARTRATE 100 MG/1
TABLET ORAL
Qty: 180 TABLET | Refills: 0 | Status: SHIPPED | OUTPATIENT
Start: 2023-10-09 | End: 2023-12-08

## 2023-10-09 RX ORDER — FLUOXETINE 10 MG/1
CAPSULE ORAL
Qty: 90 CAPSULE | Refills: 0 | Status: SHIPPED | OUTPATIENT
Start: 2023-10-09 | End: 2023-12-08

## 2023-11-02 DIAGNOSIS — Z11.59 ENCOUNTER FOR HCV SCREENING TEST FOR LOW RISK PATIENT: ICD-10-CM

## 2023-11-02 DIAGNOSIS — E11.9 NEW ONSET TYPE 2 DIABETES MELLITUS (HCC): ICD-10-CM

## 2023-11-02 LAB
CREAT UR-MCNC: 254.6 MG/DL (ref 28–217)
HCV AB SERPL QL IA: NORMAL
MICROALBUMIN UR-MCNC: 16 MG/DL (ref 0–19)
MICROALBUMIN/CREAT UR-RTO: 62.8 MG/G

## 2023-11-08 ENCOUNTER — OFFICE VISIT (OUTPATIENT)
Dept: PRIMARY CARE CLINIC | Age: 63
End: 2023-11-08

## 2023-11-08 VITALS
TEMPERATURE: 97.9 F | OXYGEN SATURATION: 97 % | WEIGHT: 247.38 LBS | DIASTOLIC BLOOD PRESSURE: 88 MMHG | SYSTOLIC BLOOD PRESSURE: 130 MMHG | HEIGHT: 68 IN | BODY MASS INDEX: 37.49 KG/M2 | HEART RATE: 83 BPM

## 2023-11-08 DIAGNOSIS — Z13.6 SCREENING FOR CARDIOVASCULAR CONDITION: ICD-10-CM

## 2023-11-08 DIAGNOSIS — I10 ESSENTIAL HYPERTENSION, BENIGN: ICD-10-CM

## 2023-11-08 DIAGNOSIS — Z23 NEED FOR PROPHYLACTIC VACCINATION AGAINST STREPTOCOCCUS PNEUMONIAE (PNEUMOCOCCUS): ICD-10-CM

## 2023-11-08 DIAGNOSIS — Z00.00 WELCOME TO MEDICARE PREVENTIVE VISIT: Primary | ICD-10-CM

## 2023-11-08 DIAGNOSIS — E11.9 NEW ONSET TYPE 2 DIABETES MELLITUS (HCC): ICD-10-CM

## 2023-11-08 DIAGNOSIS — Z12.11 COLON CANCER SCREENING: ICD-10-CM

## 2023-11-08 DIAGNOSIS — N18.31 STAGE 3A CHRONIC KIDNEY DISEASE (HCC): ICD-10-CM

## 2023-11-08 DIAGNOSIS — E66.01 CLASS 2 SEVERE OBESITY DUE TO EXCESS CALORIES WITH SERIOUS COMORBIDITY AND BODY MASS INDEX (BMI) OF 38.0 TO 38.9 IN ADULT (HCC): ICD-10-CM

## 2023-11-08 DIAGNOSIS — E78.2 MIXED HYPERLIPIDEMIA: ICD-10-CM

## 2023-11-08 LAB — HBA1C MFR BLD: 6.5 %

## 2023-11-08 SDOH — ECONOMIC STABILITY: FOOD INSECURITY: WITHIN THE PAST 12 MONTHS, THE FOOD YOU BOUGHT JUST DIDN'T LAST AND YOU DIDN'T HAVE MONEY TO GET MORE.: NEVER TRUE

## 2023-11-08 SDOH — ECONOMIC STABILITY: FOOD INSECURITY: WITHIN THE PAST 12 MONTHS, YOU WORRIED THAT YOUR FOOD WOULD RUN OUT BEFORE YOU GOT MONEY TO BUY MORE.: NEVER TRUE

## 2023-11-08 SDOH — ECONOMIC STABILITY: INCOME INSECURITY: HOW HARD IS IT FOR YOU TO PAY FOR THE VERY BASICS LIKE FOOD, HOUSING, MEDICAL CARE, AND HEATING?: NOT VERY HARD

## 2023-11-08 SDOH — ECONOMIC STABILITY: HOUSING INSECURITY
IN THE LAST 12 MONTHS, WAS THERE A TIME WHEN YOU DID NOT HAVE A STEADY PLACE TO SLEEP OR SLEPT IN A SHELTER (INCLUDING NOW)?: NO

## 2023-11-08 ASSESSMENT — PATIENT HEALTH QUESTIONNAIRE - PHQ9
SUM OF ALL RESPONSES TO PHQ9 QUESTIONS 1 & 2: 0
7. TROUBLE CONCENTRATING ON THINGS, SUCH AS READING THE NEWSPAPER OR WATCHING TELEVISION: 0
9. THOUGHTS THAT YOU WOULD BE BETTER OFF DEAD, OR OF HURTING YOURSELF: 0
1. LITTLE INTEREST OR PLEASURE IN DOING THINGS: 0
2. FEELING DOWN, DEPRESSED OR HOPELESS: 0
SUM OF ALL RESPONSES TO PHQ QUESTIONS 1-9: 0
SUM OF ALL RESPONSES TO PHQ QUESTIONS 1-9: 0
10. IF YOU CHECKED OFF ANY PROBLEMS, HOW DIFFICULT HAVE THESE PROBLEMS MADE IT FOR YOU TO DO YOUR WORK, TAKE CARE OF THINGS AT HOME, OR GET ALONG WITH OTHER PEOPLE: 0
4. FEELING TIRED OR HAVING LITTLE ENERGY: 0
SUM OF ALL RESPONSES TO PHQ QUESTIONS 1-9: 0
5. POOR APPETITE OR OVEREATING: 0
8. MOVING OR SPEAKING SO SLOWLY THAT OTHER PEOPLE COULD HAVE NOTICED. OR THE OPPOSITE, BEING SO FIGETY OR RESTLESS THAT YOU HAVE BEEN MOVING AROUND A LOT MORE THAN USUAL: 0
SUM OF ALL RESPONSES TO PHQ QUESTIONS 1-9: 0
3. TROUBLE FALLING OR STAYING ASLEEP: 0
6. FEELING BAD ABOUT YOURSELF - OR THAT YOU ARE A FAILURE OR HAVE LET YOURSELF OR YOUR FAMILY DOWN: 0

## 2023-11-08 NOTE — PROGRESS NOTES
Medicare Annual Wellness Visit    Winter Crespo is here for Medicare AWV    Assessment & Plan   Welcome to Medicare preventive visit  -     EKG - Welcome to Medicare (IPPE)  New onset type 2 diabetes mellitus ([address] [facility])  -     POCT glycosylated hemoglobin (Hb A1C)  -     empagliflozin (JARDIANCE) 10 MG tablet; Take 1 tablet by mouth daily, Disp-30 tablet, R-5Normal  -     Hemoglobin A1C; Future  -     TSH with Reflex to FT4; Future  Essential hypertension, benign  -     Comprehensive Metabolic Panel; Future  Mixed hyperlipidemia  -     Comprehensive Metabolic Panel; Future  -     Lipid Panel; Future  Stage 3a chronic kidney disease ([address])  Colon cancer screening  -     [facility] [place]  Need for prophylactic vaccination against Streptococcus pneumoniae (pneumococcus)  -     Pneumococcal polysaccharide vaccine 23-valent PPSV23  Screening for cardiovascular condition  -     [place] [facility] for Cardiovascular Diseases, 8-15 minutes []  Class 2 severe obesity due to excess calories with serious comorbidity and body mass index (BMI) of 38.0 to 38.9 in adult [facility])    Recommendations for Preventive Services Due: see orders and patient instructions/AVS.  Recommended screening schedule for the next 5-10 years is provided to the patient in written form: see Patient Instructions/AVS.     Return in 3 months (on 2/8/2024). Subjective   The following acute and/or chronic problems were also addressed today:    Diabetes Mellitus Type 2: Current symptoms/problems include none. Hypertension:  Home blood pressure monitoring: No.  Patient denies chest pain, shortness of breath, and palpitations. Antihypertensive medication side effects: no medication side effects noted. Use of agents associated with hypertension: none. Mixed Hyperlipidemia/Pure hyperlipidemia/Essential Hypertriglyceridemia: Compliance with treatment has been good. The patient exercises intermittently.  Patient denies muscle

## 2023-12-08 DIAGNOSIS — F41.1 GAD (GENERALIZED ANXIETY DISORDER): ICD-10-CM

## 2023-12-08 DIAGNOSIS — I10 ESSENTIAL HYPERTENSION, BENIGN: ICD-10-CM

## 2023-12-08 DIAGNOSIS — F32.0 MILD MAJOR DEPRESSION (HCC): ICD-10-CM

## 2023-12-08 RX ORDER — METOPROLOL TARTRATE 100 MG/1
TABLET ORAL
Qty: 180 TABLET | Refills: 3 | Status: SHIPPED | OUTPATIENT
Start: 2023-12-08

## 2023-12-08 RX ORDER — FLUOXETINE 10 MG/1
CAPSULE ORAL
Qty: 90 CAPSULE | Refills: 3 | Status: SHIPPED | OUTPATIENT
Start: 2023-12-08

## 2023-12-08 NOTE — TELEPHONE ENCOUNTER
Viviaan Peoples called to request a refill on her medication.      Last office visit : 11/8/2023   Next office visit : 2/8/2024     Requested Prescriptions     Pending Prescriptions Disp Refills    FLUoxetine (PROZAC) 10 MG capsule [Pharmacy Med Name: FLUOXETINE HCL 10 MG CAPSULE] 90 capsule 0     Sig: TAKE 1 CAPSULE BY MOUTH EVERY DAY    metoprolol (LOPRESSOR) 100 MG tablet [Pharmacy Med Name: METOPROLOL TARTRATE 100 MG TAB] 180 tablet 0     Sig: TAKE 1 TABLET BY MOUTH TWICE A DAY            Natalia Rojo MA

## 2024-01-03 ENCOUNTER — TELEPHONE (OUTPATIENT)
Dept: GASTROENTEROLOGY | Age: 64
End: 2024-01-03

## 2024-01-03 NOTE — TELEPHONE ENCOUNTER
Viviana requests that clinic return her call. The best time to reach her is Anytime. Viviana returned a call to schedule her colon screening. Please contact patient to advise.    Thank you.

## 2024-01-24 ENCOUNTER — OFFICE VISIT (OUTPATIENT)
Dept: GASTROENTEROLOGY | Age: 64
End: 2024-01-24
Payer: MEDICARE

## 2024-01-24 VITALS
DIASTOLIC BLOOD PRESSURE: 80 MMHG | OXYGEN SATURATION: 95 % | BODY MASS INDEX: 37.13 KG/M2 | WEIGHT: 245 LBS | SYSTOLIC BLOOD PRESSURE: 138 MMHG | HEART RATE: 70 BPM | HEIGHT: 68 IN

## 2024-01-24 DIAGNOSIS — K62.5 BRBPR (BRIGHT RED BLOOD PER RECTUM): Primary | ICD-10-CM

## 2024-01-24 DIAGNOSIS — K21.9 CHRONIC GERD: ICD-10-CM

## 2024-01-24 DIAGNOSIS — R13.10 DYSPHAGIA, UNSPECIFIED TYPE: ICD-10-CM

## 2024-01-24 PROCEDURE — 3017F COLORECTAL CA SCREEN DOC REV: CPT | Performed by: NURSE PRACTITIONER

## 2024-01-24 PROCEDURE — 1036F TOBACCO NON-USER: CPT | Performed by: NURSE PRACTITIONER

## 2024-01-24 PROCEDURE — G8427 DOCREV CUR MEDS BY ELIG CLIN: HCPCS | Performed by: NURSE PRACTITIONER

## 2024-01-24 PROCEDURE — 99204 OFFICE O/P NEW MOD 45 MIN: CPT | Performed by: NURSE PRACTITIONER

## 2024-01-24 PROCEDURE — G8417 CALC BMI ABV UP PARAM F/U: HCPCS | Performed by: NURSE PRACTITIONER

## 2024-01-24 PROCEDURE — 3079F DIAST BP 80-89 MM HG: CPT | Performed by: NURSE PRACTITIONER

## 2024-01-24 PROCEDURE — G8482 FLU IMMUNIZE ORDER/ADMIN: HCPCS | Performed by: NURSE PRACTITIONER

## 2024-01-24 PROCEDURE — 3075F SYST BP GE 130 - 139MM HG: CPT | Performed by: NURSE PRACTITIONER

## 2024-01-24 RX ORDER — LORAZEPAM 0.5 MG/1
0.5 TABLET ORAL EVERY 6 HOURS PRN
COMMUNITY

## 2024-01-24 ASSESSMENT — ENCOUNTER SYMPTOMS
ABDOMINAL DISTENTION: 0
ABDOMINAL PAIN: 0
VOMITING: 0
CONSTIPATION: 0
CHOKING: 0
SHORTNESS OF BREATH: 0
NAUSEA: 0
TROUBLE SWALLOWING: 1
RECTAL PAIN: 0
COUGH: 0
DIARRHEA: 0
BLOOD IN STOOL: 0
ANAL BLEEDING: 1

## 2024-01-24 NOTE — PROGRESS NOTES
C. difficile colitis     Cervical radiculopathy 9/15/2017    Chronic back pain     Chronic kidney disease     Class 1 obesity due to excess calories with serious comorbidity and body mass index (BMI) of 32.0 to 32.9 in adult 9/15/2017    Clostridium difficile colitis 2020    Cyst of kidney, acquired 9/15/2017    Depression 9/15/2017    History of oral surgery     Hyperlipidemia     Hypertension     IBS (irritable bowel syndrome) 9/15/2017    Irregular heartbeat     Obesity 9/15/2017    Osteoarthritis     PONV (postoperative nausea and vomiting)     Retinal detachment        Past Surgical History:   Procedure Laterality Date    APPENDECTOMY       SECTION      COLONOSCOPY      Dr López - normal per pt    EYE SURGERY Right     cataract removal    HYSTERECTOMY (CERVIX STATUS UNKNOWN)      HYSTERECTOMY, TOTAL ABDOMINAL (CERVIX REMOVED)      LUMBAR FUSION N/A 2020    TLIF OF L4/5.L5-S1 performed by Luiz Viramontes DO at [facility] OR    OVARY REMOVAL Bilateral     age 24    RETINAL DETACHMENT SURGERY         Family History   Problem Relation Age of Onset    Diabetes Mother     High Blood Pressure Mother     Heart Attack Mother     Kidney Disease Father     Stroke Father     Other Father         renal failure    Alcohol Abuse Sister     Cirrhosis Sister     Colon Polyps Sister     Stroke Paternal Aunt     Diabetes Maternal Grandmother     Heart Disease Maternal Grandfather     Colon Cancer Paternal Grandmother     Colon Cancer Maternal Cousin     Esophageal Cancer Neg Hx     Liver Cancer Neg Hx     Stomach Cancer Neg Hx     Rectal Cancer Neg Hx        Social History     Socioeconomic History    Marital status: Single   Tobacco Use    Smoking status: Never    Smokeless tobacco: Never   Vaping Use    Vaping Use: Never used   Substance and Sexual Activity    Alcohol use: No    Drug use: No     Social Determinants of Health     Financial Resource Strain: Low Risk  (2023)    Overall

## 2024-01-24 NOTE — PATIENT INSTRUCTIONS
specific directions regarding restrictions to diet and bowel prep instructions including laxatives. Please read these instructions one week prior to your scheduled procedure to ensure that you are prepared. If you have any questions regarding these instructions please call our office Mon through Fri from 8:00 am to 4:00 pm.     Follow prep instructions provided for bowel prep. Take all of the bowel prep as directed. If you are having problems with nausea, stop your prep for 30-45 min to allow the nausea to subside before resuming your prep. It is important to drink plenty of fluids throughout the day before taking your laxatives. This will help to protect your kidneys, prevent dehydration and maximize the effect of the bowel prep.     Your diet before a colonoscopy bowel preparation is very important to ensure a successful colon exam. It is recommended to consider certain changes to your diet three to four days prior to the procedure.  Remember that your bowels need to be completely empty for the exam.    What foods are good to eat?  Cut down on heavy solid foods three to four days before the procedure and start introducing lighter meals to your diet. The following food suggestions are a good part of your diet before a colonoscopy bowel preparation.  Light meat that is easily digestible such as chicken (without the skin)   Potatoes without skin   Cheese   Eggs   A light meal of steamed white fish   Light clear soups    Foods and drinks to avoid  Avoid foods that contain too much fiber. Stay clear of dark colored beverages. They can stick to the walls of the digestive tract and make it difficult to differentiate from blood. Some of these foods are:  Red meat, rice, nuts and vegetables   Milk, other milk based fluids and cream   Most fruit and puddings   Whole grain pasta   Cereals, bran and seeds   Colored beverages, especially those that are red or purple in color   Red colored Jell-O   On the day before the

## 2024-02-07 DIAGNOSIS — E78.2 MIXED HYPERLIPIDEMIA: ICD-10-CM

## 2024-02-07 DIAGNOSIS — E11.9 NEW ONSET TYPE 2 DIABETES MELLITUS (HCC): ICD-10-CM

## 2024-02-07 DIAGNOSIS — I10 ESSENTIAL HYPERTENSION, BENIGN: ICD-10-CM

## 2024-02-07 LAB
ALBUMIN SERPL-MCNC: 4.2 G/DL (ref 3.5–5.2)
ALP SERPL-CCNC: 89 U/L (ref 35–104)
ALT SERPL-CCNC: 19 U/L (ref 5–33)
ANION GAP SERPL CALCULATED.3IONS-SCNC: 13 MMOL/L (ref 7–19)
AST SERPL-CCNC: 18 U/L (ref 5–32)
BILIRUB SERPL-MCNC: 0.3 MG/DL (ref 0.2–1.2)
BUN SERPL-MCNC: 21 MG/DL (ref 8–23)
CALCIUM SERPL-MCNC: 9.8 MG/DL (ref 8.8–10.2)
CHLORIDE SERPL-SCNC: 101 MMOL/L (ref 98–111)
CHOLEST SERPL-MCNC: 159 MG/DL (ref 160–199)
CO2 SERPL-SCNC: 28 MMOL/L (ref 22–29)
CREAT SERPL-MCNC: 1.1 MG/DL (ref 0.5–0.9)
GLUCOSE SERPL-MCNC: 137 MG/DL (ref 74–109)
HBA1C MFR BLD: 7 % (ref 4–6)
HDLC SERPL-MCNC: 48 MG/DL (ref 65–121)
LDLC SERPL CALC-MCNC: 74 MG/DL
POTASSIUM SERPL-SCNC: 4.7 MMOL/L (ref 3.5–5)
PROT SERPL-MCNC: 6.8 G/DL (ref 6.6–8.7)
SODIUM SERPL-SCNC: 142 MMOL/L (ref 136–145)
TRIGL SERPL-MCNC: 184 MG/DL (ref 0–149)
TSH SERPL DL<=0.005 MIU/L-ACNC: 3.9 UIU/ML (ref 0.35–5.5)

## 2024-02-08 ENCOUNTER — OFFICE VISIT (OUTPATIENT)
Dept: PRIMARY CARE CLINIC | Age: 64
End: 2024-02-08
Payer: MEDICARE

## 2024-02-08 VITALS
TEMPERATURE: 97.1 F | OXYGEN SATURATION: 97 % | SYSTOLIC BLOOD PRESSURE: 132 MMHG | HEART RATE: 78 BPM | WEIGHT: 240 LBS | BODY MASS INDEX: 36.37 KG/M2 | HEIGHT: 68 IN | DIASTOLIC BLOOD PRESSURE: 88 MMHG

## 2024-02-08 DIAGNOSIS — E11.9 NEW ONSET TYPE 2 DIABETES MELLITUS (HCC): Primary | ICD-10-CM

## 2024-02-08 DIAGNOSIS — E78.2 MIXED HYPERLIPIDEMIA: ICD-10-CM

## 2024-02-08 DIAGNOSIS — E55.9 VITAMIN D DEFICIENCY: ICD-10-CM

## 2024-02-08 DIAGNOSIS — R61 NIGHT SWEATS: ICD-10-CM

## 2024-02-08 DIAGNOSIS — I10 ESSENTIAL HYPERTENSION, BENIGN: ICD-10-CM

## 2024-02-08 DIAGNOSIS — N18.31 STAGE 3A CHRONIC KIDNEY DISEASE (HCC): ICD-10-CM

## 2024-02-08 DIAGNOSIS — F32.0 MILD MAJOR DEPRESSION (HCC): ICD-10-CM

## 2024-02-08 DIAGNOSIS — E66.01 SEVERE OBESITY (BMI 35.0-39.9) WITH COMORBIDITY (HCC): ICD-10-CM

## 2024-02-08 PROCEDURE — 99214 OFFICE O/P EST MOD 30 MIN: CPT | Performed by: INTERNAL MEDICINE

## 2024-02-08 PROCEDURE — 1036F TOBACCO NON-USER: CPT | Performed by: INTERNAL MEDICINE

## 2024-02-08 PROCEDURE — 3017F COLORECTAL CA SCREEN DOC REV: CPT | Performed by: INTERNAL MEDICINE

## 2024-02-08 PROCEDURE — 3075F SYST BP GE 130 - 139MM HG: CPT | Performed by: INTERNAL MEDICINE

## 2024-02-08 PROCEDURE — G8427 DOCREV CUR MEDS BY ELIG CLIN: HCPCS | Performed by: INTERNAL MEDICINE

## 2024-02-08 PROCEDURE — 3079F DIAST BP 80-89 MM HG: CPT | Performed by: INTERNAL MEDICINE

## 2024-02-08 PROCEDURE — G8417 CALC BMI ABV UP PARAM F/U: HCPCS | Performed by: INTERNAL MEDICINE

## 2024-02-08 PROCEDURE — 2022F DILAT RTA XM EVC RTNOPTHY: CPT | Performed by: INTERNAL MEDICINE

## 2024-02-08 PROCEDURE — 3051F HG A1C>EQUAL 7.0%<8.0%: CPT | Performed by: INTERNAL MEDICINE

## 2024-02-08 PROCEDURE — G8482 FLU IMMUNIZE ORDER/ADMIN: HCPCS | Performed by: INTERNAL MEDICINE

## 2024-02-08 ASSESSMENT — PATIENT HEALTH QUESTIONNAIRE - PHQ9
10. IF YOU CHECKED OFF ANY PROBLEMS, HOW DIFFICULT HAVE THESE PROBLEMS MADE IT FOR YOU TO DO YOUR WORK, TAKE CARE OF THINGS AT HOME, OR GET ALONG WITH OTHER PEOPLE: 0
7. TROUBLE CONCENTRATING ON THINGS, SUCH AS READING THE NEWSPAPER OR WATCHING TELEVISION: 0
9. THOUGHTS THAT YOU WOULD BE BETTER OFF DEAD, OR OF HURTING YOURSELF: 0
6. FEELING BAD ABOUT YOURSELF - OR THAT YOU ARE A FAILURE OR HAVE LET YOURSELF OR YOUR FAMILY DOWN: 0
SUM OF ALL RESPONSES TO PHQ QUESTIONS 1-9: 5
8. MOVING OR SPEAKING SO SLOWLY THAT OTHER PEOPLE COULD HAVE NOTICED. OR THE OPPOSITE, BEING SO FIGETY OR RESTLESS THAT YOU HAVE BEEN MOVING AROUND A LOT MORE THAN USUAL: 0
SUM OF ALL RESPONSES TO PHQ QUESTIONS 1-9: 5
1. LITTLE INTEREST OR PLEASURE IN DOING THINGS: 0
3. TROUBLE FALLING OR STAYING ASLEEP: 1
5. POOR APPETITE OR OVEREATING: 3
SUM OF ALL RESPONSES TO PHQ QUESTIONS 1-9: 5
SUM OF ALL RESPONSES TO PHQ9 QUESTIONS 1 & 2: 0
4. FEELING TIRED OR HAVING LITTLE ENERGY: 1
2. FEELING DOWN, DEPRESSED OR HOPELESS: 0
SUM OF ALL RESPONSES TO PHQ QUESTIONS 1-9: 5

## 2024-02-08 NOTE — PROGRESS NOTES
Viviana Peoples is a 63 y.o. female who presents today for   Chief Complaint   Patient presents with    3 Month Follow-Up    Hypertension    Cholesterol Problem    Diabetes       HPI  64 y/o WF here for follow up on HTN, prediabetes, Mild major depression, generalized anxiety disorder, IBS-D with controlled med refill, chronic LBP with radiculopathy, and obesity. Patient has EGD and cscope scheduled on 2/26/24 with gastroenterology. She has been having some RLQ pain recently but patient says it seems to be after taking jardiance for type II DM. She has lost about 7 lbs in the past 3 months however, since starting jardiance and she does not think the two issues are related.     BMI Readings from Last 3 Encounters:   02/08/24 36.49 kg/m²   01/24/24 37.25 kg/m²   11/08/23 38.17 kg/m²     Wt Readings from Last 3 Encounters:   02/08/24 108.9 kg (240 lb)   01/24/24 111.1 kg (245 lb)   11/08/23 112.2 kg (247 lb 6 oz)     Diabetes Mellitus Type 2: Current symptoms/problems include none.     Hypertension:  Home blood pressure monitoring: No.  Patient denies chest pain, shortness of breath, and palpitations.  Antihypertensive medication side effects: no medication side effects noted.  Use of agents associated with hypertension: none.      Mixed Hyperlipidemia/Pure hyperlipidemia/Essential Hypertriglyceridemia: Compliance with treatment has been good.  The patient exercises intermittently. Patient denies muscle pain associated with their medications which include atorvastatin 20 mg daily .        Lab Results   Component Value Date    LABA1C 7.0 (H) 02/07/2024    LABA1C 6.5 11/08/2023    LABA1C 6.5 (H) 06/20/2023     Lab Results   Component Value Date    CREATININE 1.1 (H) 02/07/2024     Lab Results   Component Value Date    ALT 19 02/07/2024    AST 18 02/07/2024     Lab Results   Component Value Date    CHOL 159 (L) 02/07/2024    TRIG 184 (H) 02/07/2024    HDL 48 (L) 02/07/2024    LDLCALC 74 02/07/2024           Review of

## 2024-02-20 ASSESSMENT — ENCOUNTER SYMPTOMS
VOMITING: 0
COUGH: 0
ABDOMINAL PAIN: 1
DIARRHEA: 0
EYE PAIN: 0
SORE THROAT: 0
RHINORRHEA: 0
BLOOD IN STOOL: 0
SHORTNESS OF BREATH: 0
COLOR CHANGE: 0
SINUS PRESSURE: 0
VOICE CHANGE: 0
CHEST TIGHTNESS: 0
EYE REDNESS: 0
BACK PAIN: 1
WHEEZING: 0
EYE DISCHARGE: 0

## 2024-02-21 NOTE — TELEPHONE ENCOUNTER
[facility]  Advanced Heart Failure, MCS, Transplant and Pulmonary Hypertension Cardiology  HF Progress Note     Date: 2024   Patient Name: Ben Murray  : 1962  MRN: [id number]   PCP: Juan Long MD    Reason for consult: Cardiogenic shock/STEMI  Physician requesting consult: Juan Long MD    Subjective: VT arrest overnight.  TO OR this AM for Impella 5.5 placement.    History of Present Illness:  Ben Murray is a 61 year old male with Phx of obesity, tobacco usage, and HTN who presented to OSH with syncope and chest pain on 24. Pt reported feeling light headed and dizzy and was found on floor by his wife. At OSH, pt was found to have an NSTEMI. He was [place] [facility] [place] higher level care and NSTEMI management.     Pt was taken to the cath lab this evening and found to have 100% stenosis of Ost LAD to mid LAD and 100% stenosis of 1st diag lesion. Successful PCI and thrombectomy of culprit vessels with 2 stents to the LAD and 1 stent to the 1st diag was placed. Intra-procedure pt became hypotension appearing to be in cardiogenic shock requiring IABP placement and bipap. Pt was started on inotropes and diuretics. Pt admitted to [facility] for cardiogenic shock 2/2 late presenting NSTEMI.       Key Events:  24: HR 80s, MAP 80s, CVP 9, PAP 41/19/28, SvO2 60%, BUN 42, Cr 1.33, Na 139, WBC 16.8, Hgb 11.7, Lasix 5 mg/hr,  2.5 mcg/kg/min   24: HR 80s, MAP 60s-80s, CVP 9, PAP 48/24/33, CO/CI 4.6/2.1, BUN 32, Cr 1.27, Na 139, WBC 16, Hgb 10.4, , Net +244 cc/24h, Dobutamine 2.5 mcg/kg/min, Lasix 5 mg/hr, Heparin gtt, NC 3 L/min.  24: HR 70s-80s, MAP 60s-80s, CVP 12, PAP 48//35, CO/CI 4.7/2.1, BUN 26, Cr 1.17, Na 136, WBC 14.9, Hgb 10.3, , Net -1501 cc/24h, Lasix 20 mg/hr, Heparin gtt, NC 2 L/min. Increase Dobutamine to 5 mcg/kg/min. Start Spironolactone 25 mg QD.   24: HR 80s, MAP 70s-90, CVP 13, PAP 53/30/40, CO/CI 4.9/2.2, BUN 25, Cr 1.32, Na  Reid Casillas called requesting a refill of the below medication which has been pended for you:     Requested Prescriptions     Pending Prescriptions Disp Refills    chlordiazePOXIDE-clidinium (LIBRAX) 5-2.5 MG per capsule [Pharmacy Med Name: CHLORDIAZEPOXIDE-CLIDINI 5-2.5 CAPS] 360 capsule 1     Sig: TAKE TWO CAPSULES EVERY MORNING AND TAKE 1 TO 2 CAPSULE BY MOUTH AT BEDTIME       Last Appointment Date: [unfilled]  Next Appointment Date: Visit date not found    Allergies   Allergen Reactions    Latex      Other reaction(s): Unknown    Amoxicillin Shortness Of Breath and Nausea And Vomiting    Anaprox [Naproxen Sodium] Shortness Of Breath and Nausea And Vomiting    Aloe Vera      Other reaction(s): Unknown    Augmentin [Amoxicillin-Pot Clavulanate]            Buspar [Buspirone]     Nortriptyline Hcl     Vibramycin [Doxycycline Calcium] 132, WBC 15.8, Hgb 10.5, , Net -1612 cc/24h, Dobutamine 5 mcg/kg/min, Lasix 20 mg/hr  2/21/24: HR 80s, MAP 70s-90, CVP 9, PAP 52/22/31, CO/CI 4.6/2.1, BUN 26, Cr 1.58, Na 130, WBC 17.1, Hgb 10.6, , Net -494 cc/24h    Review of Systems:  Gen: No fevers, chills, or weight changes  HEENT: No headaches, visual changes, diplopia, hearing loss, epistaxis, mucus drainage, neck pain or swelling, sore throat, or odynophagia  CV: No palpitations, SOB   Pulm: No cough, hemoptysis, or sputum production  GI: No hematochezia, hematemesis, nausea, or emesis  : No dysuria or hematuria  MK: No weakness, paresthesias, or edema  Neuro: No seizures or loss of vision  Skin: No rashes or easy bruising    Patient Active Problem List    Diagnosis Date Noted    NSTEMI (non-ST elevated myocardial infarction) (CMD) 02/16/2024     Priority: Low    Acute systolic heart failure (CMD) 02/16/2024     Priority: Low    Primary hypertension 02/16/2024     Priority: Low    Acute hypoxemic respiratory failure (CMD) 02/16/2024     Priority: Low    Pneumonia of both lower lobes due to infectious organism 02/16/2024     Priority: Low    Stage 3a chronic kidney disease (CMD) 02/16/2024     Priority: Low    Tobacco abuse 02/16/2024     Priority: Low     Past Medical History:   Diagnosis Date    Fracture      History reviewed. No pertinent surgical history.  Prior to Admission medications    Medication Sig Start Date End Date Taking? Authorizing Provider   ibuprofen (MOTRIN) 600 MG tablet Take 1 tablet by mouth 3 times daily as needed for Pain. 9/4/22  Yes Toy Lam CNP   lidocaine (LIDODERM) 5 % patch Place 1 patch onto the skin every 24 hours. Remove patch 12 hours after applying 9/4/22   Toy Lam CNP   cyclobenzaprine (FLEXERIL) 10 MG tablet Take 1 tablet by mouth 3 times daily as needed for Muscle spasms. 9/4/22 9/6/22  Toy aLm CNP     Current Facility-Administered Medications   Medication Dose Route Frequency Provider Last  Rate Last Admin    MAGNESIUM SULFATE 2 G/50 ML PREMIX(WRAPPED) Pyxis Override             calcium gluconate 2 g in sodium chloride 100 mL IVPB  2 g Intravenous Once Vissepo, Ann N, NP        dextrose 5 % / sodium chloride 0.45% with KCl 40 mEq infusion   Intravenous Continuous Pole, Ann M, NP        sodium chloride 0.9% infusion   Intravenous Continuous Pole, Ann M, NP        sodium chloride 0.9% infusion   Intravenous Continuous Pole, Ann M, NP        sodium chloride 0.9% infusion   Intravenous Continuous Pole, Ann M, NP        SODIUM CHLORIDE 0.9 % IV SOLN Pyxis Override             AMIODarone (CORDARONE/NEXTERONE) 360 mg in dextrose 200 mL infusion  1 mg/min Intravenous Continuous Lourdes Pino NP 33.3 mL/hr at 02/21/24 0900 1 mg/min at 02/21/24 0900    lidocaine (XYLOCAINE) 2,000 mg/500 mL in dextrose 5 % infusion  1 mg/min Intravenous Continuous Roma Son NP 15 mL/hr at 02/21/24 0900 1 mg/min at 02/21/24 0900    potassium CHLORIDE 20 MEQ/50ML IVPB premix 20 mEq  20 mEq Intravenous Once Roma Son NP        sodium chloride 0.9 % injection 2 mL  2 mL Intracatheter 2 times per day Vijay Monson MD   2 mL at 02/21/24 0842    sodium chloride (NORMAL SALINE) 0.9 % bolus 250 mL  250 mL Intravenous PRN Vijay Monson MD        atropine injection 0.5 mg  0.5 mg Intravenous PRN Vijay Monson MD        lidocaine HCl (PF) (XYLOCAINE) 1 % injection 10 mg  1 mL Subcutaneous PRN Vijay Monson MD        nitroGLYCERIN (NITROSTAT) sublingual tablet 0.4 mg  0.4 mg Sublingual Q5 Min PRN Vijay Monson MD        milrinone (PRIMACOR) 20 mg/100 mL in dextrose 5 % infusion premix  0.375 mcg/kg/min (Dosing Weight) Intravenous Continuous Amilcar Manzano MD 13.8 mL/hr at 02/21/24 0900 0.375 mcg/kg/min at 02/21/24 0900    dextrose 5 % infusion   Intravenous Continuous Pole, Ann MARTE NP 1 mL/hr at 02/21/24 0900 Rate Verify at 02/21/24 0900     spironolactone (ALDACTONE) tablet 25 mg  25 mg Oral Daily Ric Uribe MD   25 mg at 02/21/24 0841    loperamide (IMODIUM) capsule 2 mg  2 mg Oral PRN Rene Freitas DO   2 mg at 02/18/24 1402    heparin (porcine) 25,000 units/250 mL in dextrose 5 % infusion  1,800 Units/hr Intravenous Continuous Benedict Anaya MD 18 mL/hr at 02/21/24 0900 1,800 Units/hr at 02/21/24 0900    heparin 2 unit/mL in NaCL 0.9% solution  3 mL/hr Other Continuous Will Santos MD 3 mL/hr at 02/21/24 0900 3 mL/hr at 02/21/24 0900    [Held by provider] AMIODarone (PACERONE) tablet 200 mg  200 mg Oral 2 times per day Benedict Anaya MD   200 mg at 02/20/24 0846    aspirin (ECOTRIN) enteric coated tablet 81 mg  81 mg Oral Daily John Bynum MD   81 mg at 02/21/24 0841    clopidogrel (PLAVIX) tablet 75 mg  75 mg Oral Daily John Bynum MD   75 mg at 02/21/24 0840    [Held by provider] carvedilol (COREG) tablet 6.25 mg  6.25 mg Oral 2 times per day John Bynum MD        [Held by provider] lisinopril (ZESTRIL) tablet 5 mg  5 mg Oral Daily John Bynum MD        ondansetron (ZOFRAN ODT) disintegrating tablet 4 mg  4 mg Oral Q12H PRN John Bynum MD        Or    ondansetron (ZOFRAN) injection 4 mg  4 mg Intravenous Q12H PRN John Bynum MD        polyethylene glycol (MIRALAX) packet 17 g  17 g Oral Daily PRN John Bynum MD        docusate sodium-sennosides (SENOKOT S) 50-8.6 MG 2 tablet  2 tablet Oral BID PRN John Bynum MD        bisacodyl (DULCOLAX) suppository 10 mg  10 mg Rectal Daily PRN John Bynum MD        magnesium hydroxide (MILK OF MAGNESIA) 400 MG/5ML suspension 30 mL  30 mL Oral Daily PRN John Bynum MD        melatonin tablet 3 mg  3 mg Oral Nightly PRN John Bynum MD        Potassium Standard Replacement Protocol (Levels 3.5 and lower)   Does not apply See Admin Instructions John Bynum MD        Potassium  Replacement (Levels 3.6 - 4)   Does not apply See Admin Instructions John Bynum MD        Magnesium Standard Replacement Protocol   Does not apply See Admin Instructions John Bynum MD        Phosphorus Standard Replacement Protocol   Does not apply See Admin Instructions John Bynum MD        nicotine (NICODERM) 7 MG/24HR patch 1 patch  1 patch Transdermal Daily John Bynum MD   1 patch at 02/20/24 0846    acetaminophen (TYLENOL) tablet 650 mg  650 mg Oral Q4H PRN Cecilia Ellis MBBS        Or    acetaminophen (TYLENOL) suppository 650 mg  650 mg Rectal Q4H PRN Cecilia Ellis MBBS        atorvastatin (LIPITOR) tablet 80 mg  80 mg Oral Nightly Cecilia Ellis MBBS   80 mg at 02/19/24 2000    furosemide (LASIX) 250 mg in NaCl 0.9% 125 mL infusion  40 mg/hr Intravenous Continuous Amilcar Manzano MD 20 mL/hr at 02/21/24 0900 40 mg/hr at 02/21/24 0900     ALLERGIES:  No Known Allergies  Social History     Tobacco Use    Smoking status: Every Day     Current packs/day: 1.00     Average packs/day: 1 pack/day for 14.1 years (14.1 ttl pk-yrs)     Types: Cigarettes     Start date: 2010    Smokeless tobacco: Never   Substance Use Topics    Alcohol use: Not Currently     Comment: PT. REPORTS HISTORY OF DRINKING PROBLEM    Drug use: Never     History reviewed. No pertinent family history.  Medications:  Current Facility-Administered Medications   Medication Dose Route Frequency Provider Last Rate Last Admin    MAGNESIUM SULFATE 2 G/50 ML PREMIX(WRAPPED) Pyxis Override             calcium gluconate 2 g in sodium chloride 100 mL IVPB  2 g Intravenous Once Ann Enriquez, NP        SODIUM CHLORIDE 0.9 % IV SOLN Pyxis Override             potassium CHLORIDE 20 MEQ/50ML IVPB premix 20 mEq  20 mEq Intravenous Once Roma Son, NP        sodium chloride 0.9 % injection 2 mL  2 mL Intracatheter 2 times per day Vijay Monson MD   2 mL at 02/21/24 0842     spironolactone (ALDACTONE) tablet 25 mg  25 mg Oral Daily Ric Uribe MD   25 mg at 02/21/24 0841    [Held by provider] AMIODarone (PACERONE) tablet 200 mg  200 mg Oral 2 times per day Benedict Anaya MD   200 mg at 02/20/24 0846    aspirin (ECOTRIN) enteric coated tablet 81 mg  81 mg Oral Daily John Bynum MD   81 mg at 02/21/24 0841    clopidogrel (PLAVIX) tablet 75 mg  75 mg Oral Daily John Bynum MD   75 mg at 02/21/24 0840    [Held by provider] carvedilol (COREG) tablet 6.25 mg  6.25 mg Oral 2 times per day John Bynum MD        [Held by provider] lisinopril (ZESTRIL) tablet 5 mg  5 mg Oral Daily John Bynum MD        Potassium Standard Replacement Protocol (Levels 3.5 and lower)   Does not apply See Admin Instructions John Bynum MD        Potassium Replacement (Levels 3.6 - 4)   Does not apply See Admin Instructions John Bynum MD        Magnesium Standard Replacement Protocol   Does not apply See Admin Instructions John Bynum MD        Phosphorus Standard Replacement Protocol   Does not apply See Admin Instructions John Bynum MD        nicotine (NICODERM) 7 MG/24HR patch 1 patch  1 patch Transdermal Daily John Bynum MD   1 patch at 02/20/24 0846    atorvastatin (LIPITOR) tablet 80 mg  80 mg Oral Nightly Cecilia Ellis MBBS   80 mg at 02/19/24 2000       Objective:  Temp:  [97.9 °F (36.6 °C)-99.7 °F (37.6 °C)] 99 °F (37.2 °C)  Heart Rate:  [] 98  Resp:  [12-30] 21  Arterial Line BP: ()/(32-73) 124/63  FiO2 (%):  [40 %-100 %] 100 %    Intake/Output Summary (Last 24 hours) at 2/21/2024 1034  Last data filed at 2/21/2024 0900  Gross per 24 hour   Intake 3171.23 ml   Output 3170 ml   Net 1.23 ml       Physical exam:  Visit Vitals  BP (!) 49/23   Pulse 98   Temp 99 °F (37.2 °C)   Resp (!) 21   Ht 5' 10\" (1.778 m)   Wt 99.1 kg (218 lb 7.6 oz)   SpO2 94%   BMI 31.35 kg/m²     GENERAL:  NAD, alert and  oriented x 3.   HEENT:  Sclerae were anicteric and oropharynx was clear.  CHEST: The lungs were clear to auscultation. No rales.  CV:  Regular rate and rhythm.    No gallops, murmurs or rubs.     The PMI was not enlarged or displaced. No RV heave.    There was no hepatojugular reflex.  ABDOMEN: Soft, non-distended with active bowel sounds.     No hepatomegaly. No ascites.  EXTREM: Warm, well-perfused. No cyanosis or clubbing.    No peripheral edema.   SKIN:  No ulcerations or rashes.   NEURO: Non-focal.  PSYCH: Normal mood and affect.      I have independently reviewed all labs, imaging, and echocardiograms.    Labs, Imaging and Cardiac Testing:  Lab Results   Component Value Date    WBC 17.1 (H) 02/21/2024    HGB 10.6 (L) 02/21/2024    HCT 31.8 (L) 02/21/2024    MCV 71.0 (L) 02/21/2024     02/21/2024     Lab Results   Component Value Date    CO2 27 02/21/2024    ANIONGAP 9 02/21/2024    BUN 26 (H) 02/21/2024    CREATININE 1.58 (H) 02/21/2024    CALCIUM 8.9 02/21/2024     Magnesium (mg/dL)   Date Value   02/21/2024 3.1 (H)     Lab Results   Component Value Date     (H) 02/21/2024     Lab Results   Component Value Date    INR 1.2 02/21/2024    INR 1.2 02/20/2024    INR 1.2 02/16/2024     EKG:  Encounter Date: 02/16/24   Electrocardiogram 12-Lead   Result Value    Ventricular Rate EKG/Min (BPM) 101    Atrial Rate (BPM) 234    QRS-Interval (MSEC) 88    QT-Interval (MSEC) 382    QTc 495    R Axis (Degrees) -114    T Axis (Degrees) -35    REPORT TEXT      Suspect arm lead reversal, interpretation assumes no reversal  Atrial flutter  with variable AV block  Anterolateral infarct  (cited on or before  21-FEB-2024)  Abnormal ECG  When compared with ECG of  21-FEB-2024 01:41,  Atrial flutter  has replaced  Sinus rhythm  Right bundle branch block  is no longer  present  Questionable change in initial forces of  Lateral leads         Echocardiogram:  Results for orders placed during the hospital encounter of  24    TRANSTHORACIC ECHO (TTE) COMPLETE W/ W/O IMAGING AGENT    Narrative  *[facility]*  [address] [id number] [phone]  Transthoracic Echocardiogram (TTE)    Patient: Ben Murray  Study Date/Time:            2024 2:56PM  MRN:     [id number]     FIN#:                       [id number]  :     1962   Ht/Wt:                      177.8cm 122.5kg  Age:     61           BSA/BMI:                    2.51m^2 38.7kg/m^2  Gender:  M            Baseline BP:                104 / 72  Ordering Physician:   Stanford Del Rio MD    Referring Physician:  Stanford Del Rio Rakesh    Attending Physician:  John Bynum    Diagnostic Physician: Mily Alvarado DO  Sonographer:          Maliha Perdomo    Fellow:               Joanne Ruiz MD    ------------------------------------------------------------------------------  INDICATIONS:   Cardiomyopathy.    ------------------------------------------------------------------------------  STUDY CONCLUSIONS  SUMMARY:    1. Left ventricle: The cavity size is mildly dilated. Wall thickness is mildly  increased. Hypertrophy is noted. Systolic function is severely reduced. The  ejection fraction was measured by biplane method of disks. Akinesis of the  entireanterior wall; consistent with ischemia or infarction. Akinesis of  the anteroseptal and anterolateral walls. Doppler parameters are consistent  with abnormal left ventricular relaxation and increased filling pressure  (grade 2 diastolic dysfunction). The ejection fraction is 15%.  2. Right ventricle: The cavity size is normal. Systolic function is reduced.    ------------------------------------------------------------------------------  STUDY DATA:   Procedure:  A transthoracic echocardiogram was performed. Image  quality was good. Intravenous contrast (Definity 2ml) was administered to  opacify the left ventricle.  M-mode, complete 2D, complete spectral Doppler,  and  color Doppler.  Study status:  Routine.  Study completion:  There were no  complications.    FINDINGS    BASELINE ECG:   Normal sinus rhythm.  LEFT VENTRICLE:  The cavity size is mildly dilated. Wall thickness is mildly  increased. Hypertrophy is noted. Systolic function is severely reduced. There  is severe diffuse hypokinesis.  Akinesis of the entireanterior wall;  consistent with ischemia or infarction.  Akinesis of the anteroseptal and  anterolateral walls.  There is no evidence of a thrombus revealed by acoustic  contrast opacification.    The ejection fraction was measured by biplane  method of disks. The ejection fraction is 15%. The tissue Doppler parameters  are abnormal. Doppler parameters are consistent with abnormal left ventricular  relaxation and increased filling pressure (grade 2 diastolic dysfunction).    AORTIC VALVE:  The annulus is normal-sized and calcified. The valve is  trileaflet. The leaflets are normal thickness.  There is no stenosis.   There  is trivial regurgitation. The mean systolic gradient is 2mm Hg. The peak  systolic gradient is 4mm Hg. The LVOT to aortic valve VTI ratio is 0.9. The  valve area is 2.8cm^2. The valve area index is 1.13cm^2/m^2. The ratio of LVOT  to aortic valve peak velocity is 0.91. The valve area is 2.9cm^2. The valve  area index is 1.14cm^2/m^2.    AORTA:  Aortic root: The root is normal-sized.  Ascending aorta: The vessel is normal-sized.    MITRAL VALVE:  The annulus is normal-sized. The annulus is mildly calcified.  The leaflets are normal thickness.  There is no evidence for stenosis.   There  is trivial regurgitation. The mean diastolic gradient is 1mm Hg. The peak  diastolic gradient is 3mm Hg. The valve area by pressure half-time is 2.7cm^2.  The valve area index by pressure half-time is 1.06cm^2/m^2. The valve area  (LVOT continuity) is 2.3cm^2. The valve area index (LVOT continuity) is  0.91cm^2/m^2.    LEFT ATRIUM:  The atrium is normal in  size.    RIGHT VENTRICLE:  The cavity size is normal. Systolic function is reduced.    PULMONIC VALVE:   The leaflets are normal thickness. There is mild  regurgitation.    TRICUSPID VALVE:  The annulus is normal-sized. The leaflets are normal  thickness.  There is no evidence for stenosis.   There is mild regurgitation.    RIGHT ATRIUM:  The atrium is dilated.    PERICARDIUM:   There is no pericardial effusion.    SYSTEMIC VEINS:  Inferior vena cava: The IVC is normal-sized.    ------------------------------------------------------------------------------  Measurements    Left ventricle            Value             Ref         Left atrium              Value          Ref  CODY, LAX chord        (N) 4.5   cm          4.2 - 5.8   AP dim, ES           (N) 3.5   cm       3.0 - 4.0  ESD, LAX chord        (N) 3.0   cm          2.5 - 4.0   AP dim index, ES     (L) 1.4   cm/m^2   1.5 - 2.3  CODY/bsa, LAX chord    (L) 1.8   cm/m^2      2.2 - 3.0   Area ES, A4C         (H) 22    cm^2     <=20  ESD/bsa, LAX chord    (L) 1.2   cm/m^2      1.3 - 2.1   Area/bsa ES, A4C         8.8   cm^2/m^2 ---------  PW, ED, LAX           (H) 1.2   cm          0.6 - 1.0   Vol, ES, 1-p A4C     (H) 73    ml       18 - 58  CODY major ax, A4C         8.5   cm          ----------  Vol/bsa, ES, 1-p A4C (N) 29    ml/m^2   12 - 37  ESD major ax, A4C         8.0   cm          ----------  FS major axis, A4C        6     %           ----------  Aortic valve             Value          Ref  CODY/bsa major ax, A4C     3.4   cm/m^2      ----------  Peak v, S                1     m/sec    ---------  ESD/bsa major ax, A4C     3.2   cm/m^2      ----------  Mean v, S                0.63  m/sec    ---------  SPRING, A4C                  41.9  cm^2        ----------  Mean grad, S             2     mm Hg    ---------  TRINIDAD, A4C                  37.2  cm^2        ----------  Peak grad, S             4     mm Hg    ---------  FAC, A4C                  11    %            ----------  LVOT/AV, VTI ratio       0.9            ---------  IVS, ED               (H) 1.2   cm          0.6 - 1.0   ARNOLD, VTI                 2.8   cm^2     ---------  PW, ED                (H) 1.2   cm          0.6 - 1.0   ARNOLD/bsa, VTI             1.13  cm^2/m^2 ---------  IVS/PW, ED                1.03              ----------  LVOT/AV, Vpeak ratio     0.91           ---------  EDV                   (N) 89    ml          62 - 150    ARNOLD, Vmax                2.9   cm^2     ---------  ESV                   (N) 28    ml          21 - 61     ARNOLD/bsa, Vmax            1.14  cm^2/m^2 ---------  EF                    (L) 15    %           52 - 72  SV                        55    ml          ----------  Mitral valve             Value          Ref  EDV/bsa               (N) 35    ml/m^2      34 - 74     Mean v, D                0.46  m/sec    ---------  ESV/bsa               (N) 11    ml/m^2      11 - 31     Peak E                   0.88  m/sec    ---------  SV/bsa                    22    ml/m^2      ----------  Peak A                   0.49  m/sec    ---------  SV, 1-p A4C               25    ml          ----------  VTI leaflet coapt        21.7  cm       ---------  SV/bsa, 1-p A4C           10    ml/m^2      ----------  MiV/LVOT VTI             1.4            ---------  ESV, 2-p              (H) 139   ml          21 - 61     Decel time               173   ms       ---------  ESV/bsa, 2-p          (H) 55    ml/m^2      11 - 31     PHT                      83    ms       ---------  E', lat sandy, TDI      (L) 7.9   cm/sec      >=10.0      Mean grad, D             1     mm Hg    ---------  E/e', lat sandy, TDI    (N) 11                <=13        Peak grad, D             3     mm Hg    ---------  E', med sandy, TDI      (L) 4.4   cm/sec      >=7.0       Peak E/A ratio           1.8            ---------  E/e', med sandy, TDI        20                ----------  A-VTI                    21.7  cm       ---------  E', avpaolo, TDI               6.145 cm/sec      ----------  MVA, PHT                 2.7   cm^2     ---------  E/e', avg, TDI        (N) 14                <=14        MVA/bsa, PHT             1.06  cm^2/m^2 ---------  MVA, LVOT cont           2.3   cm^2     ---------  LVOT                      Value             Ref         MVA/bsa, LVOT cont       0.91  cm^2/m^2 ---------  Diam, S                   2.0   cm          ----------  Area                      3.1   cm^2        ----------  Tricuspid valve          Value          Ref  Peak ra, S               0.9   m/sec       ----------  TR peak v            (N) 2.8   m/sec    <=2.8  Peak grad, S              3     mm Hg       ----------  Peak RV-RA grad, S       30    mm Hg    ---------  Qs                        4.4   L/min       ----------  Qs/bsa                    1.8   L/(min-m^2) ----------  Aortic root              Value          Ref  Root diam, ED        (N) 3.3   cm       3.0 - 4.5  Right ventricle           Value             Ref  CODY, LAX                  3.0   cm          ----------  Ascending aorta          Value          Ref  TAPSE, MM             (N) 2.1   cm          >=1.7       AAo AP diam, ED      (N) 3.6   cm       2.2 - 3.8  S' lateral            (H) 17.3  cm/sec      6.0 - 13.4  AAo AP diam/bsa, ED  (N) 1.4   cm/m^2   1.1 - 1.9    Legend:  (L)  and  (H)  rah values outside specified reference range.    (N)  marks values inside specified reference range.    Prepared and electronically signed by  Mily Alvarado DO  02/16/2024 17:18       Cath Report:  Results for orders placed or performed during the hospital encounter of 02/16/24   Cath/PV Case    Narrative    Cardiac Catheterization Laboratory Report    The patient is a 61 year old M with HTN, HLD, and recent late STEMI with   cardiogenic shock s/p PCI to LAD and D1 and IABP placement on 2-   who had a VT/VF arrest this morning, referred for coronary angiogram for   further evaluation.  Of note, patient has an LV  thrombus on his most   recent echocardiogram 2-17 and an EF 17%.       I discussed the risks, benefits, and alternatives of angiography and   revascularization.   The risks include, but are not limited to:  stroke,   heart attack, death, arrhythmia, renal failure, bleeding, transfusion,   site infection, arterial pseudoaneurysm formation, arterio-venous fistula   formation, limb and organ ischemia, peripheral embolization and the like.    I also discussed the possible outcomes including:  normal findings,   disease treated with medical therapy alone, disease amenable to   percutaneous  intervention, and disease needing surgical    revascularization.  The use of bare metal stents and drug eluting   stents/balloons was discussed, including the rationale and length of   treatment with antiplatelet medications.  The patient was informed that   additional, unanticipated procedures may be necessary during the   procedure, based on my clinical judgement.  I discussed the possibility of   using a closure device to close the arteriotomy if a femoral artery   approach is used.  I outlined the risks associated with closure device   use, including, but not limited to:  bleeding, site infection, arterial   pseudoaneurysm formation, arterio-venous fistula formation, arterial   injury, and clot formation.  I provided the patient with an opportunity to   ask questions.  The patient gave informed, written consent.  Based on   procedural findings, I will make further recommendations.      Narrative Procedure:  Witnessed informed consent was obtained, and the patient was transported   to the cardiac catheterization lab where a \"Time Out\" was performed.     Patient was intubated and sedated on arrival to the cath lab. After the   usual sterile prep and drape, the right radial site was locally   anesthetized and a 6F Slender sheath was inserted into the right radial   artery using the Seldinger technique.  Intra-arterial heparin  administered   to prevent radial artery occlusion.      Using 6F JL-3.5 diagnostic catheters, selective coronary angiography was   performed in multiple projections.  All catheter and sheath exchanges were   performed using a guidewire.      The IABP was noted to be low, so we paused it and advanced it under   fluoroscopic guidance to the proximal descending aorta, then turned back   on.     The radial puncture was uneventfully closed with application of band   providing patent hemostasis.      Procedure performed:  Right transradial artery access and closure with transradial band   placement for patent hemostasis  Diagnostic coronary angiography of the LCA  Moderate conscious sedation for 4 minutes, by administration of fentanyl   and midazolam for sedation and patient comfort, with continuous EKG, pulse   oximetry and non-invasive and invasive blood pressure monitoring.  Please   refer to nursing documentation for sedation dose administered.       Findings:  Widely patent stents in LAD and D1, with MARLI 2-3 flow.       Recommendations:  Post cath care per usual protocol.  TR band to stay on for 1 hour.   Patient likely needs more advanced mechanical support such as ECMO, Tandem   Heart (LA-FA), or LVAD evaluation.          Procedure performed by Dr Sandi Monson MD - Interventional Cardiology Fellow      The results were reviewed with the referring physicians and the   patient/family. The patient tolerated the procedure well without immediate   complications.          Impression and Plan:  Ben Murray is a 61 year old male with Phx of obesity, tobacco usage and HTN who presented to [facility] with syncope and chest pain found to have NSTEMI (2/9) and sent to [facility] (2/16) and taken to the cath lab where he successfully received PCI and 2x LAD stents and 1x 1st diag stent which were both 100% stenosed. Pt went into cardiogenic shock intra-procedure requiring IABP placement, pressors and inotropic  support. Pt admitted to [facility] for cardiogenic shock 2/2 late presenting NSTEMI.    #New dx of acute systolic heart failure  #Cardiogenic shock 2/2 late presenting NSTEMI   - Etiology: ICM  - ACC stage unknown , NYHA unknown   - Echo 2/16: severely reduced LV, 15% with akinesis of the anteroseptal wall and anterolateral walls   - Echo at OSH: EF 30-35%; +HK   - LHC 2/16: 100% stenosis to LAD and 1st diag requiring PCI and 2x LAD stents and 1x 1st diag  - RHC 2/16: CVP 20, Wedge 26, CI 1.5 (ANKIT), MV 39   - Trop peak at    - Started on IABP, Levophed, , Lasix   - On DAPT   Current GDMT:  - Diuretic: Lasix 40 mg/hr  - BB: Holding Coreg 6.25 mg BID 2/2 HoTN   - ACEI/ARB/ARNI: Holding Lisinopril 5 mg QD 2/2 HoTN   - MRA: Spironolactone 25 mg QD   - Vasodilator/Nitrate: None  - SGLT2i: None  - Inotrope: Dobutamine 5 mcg/kg/min   - AC: Heparin gtt   - AA: Amiodarone 1 mg/min. Holding  mg BID  - Other: ASA 81 mg, Atorvastatin 80 mg, Plavix 75 mg QD  Device therapy: None  Advanced therapies: Not evaluated for    #CAD s/p STEMI s/p thrombectomy/PCI  - S/p PCI on 2/16/24 with 2 stents in LAD and one in diagonal  - On ASA, Plavix, and Statin    #Pneumonia   - Seen by pulm at OSH and complained of yellow sputum and was satting 90% requiring 7L nasal cannula   - Sx of wheezing and bibasilar crackles   - CT at OSH: B inf; no PE  - PCT at OSH: +ve   - Abx completed    #Acute hypoxic respiratory failure  - From HF, pneumonia   - Intubated (2/20/24) post VT arrest  - D/w Pulm     #HTN  - PTA Coreg, Lisinopril   - Holding HTN meds 2/2 HoTN     #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 Received shock x1, Amio bolus, 2 minutes  CPR, resuscitated, and neurologically intact. Post-code denies new symptoms or complaints. Denies chest discomfort like that which brought him in hospital, adbominal discomfort, nausea, and SOB.   - 2/21/2024: very early am, had another very rapid hemodynamically unstable VT requiring defibrillation.  Did not fully arrest.  - He is inadequately hemodynamically supported on inotrope and IABP with large LAD territory infarct, late revascularization with low liklihood of mycardial recovery, and recurrent VT arrests.  He will require more aggressive mechanical circulatory support to stabilize and be considered for durable mechanical support.  Discussed in multidisciplinary meeting and will go to OR for placement of Impella 5.5 temporary VAD today, 2/21.  - Continue Amiodarone 1 mg/min and Lidocaine 1 mg/min, monitor tele  - Continue milrinone 0.375 mcg/kg/min  - Continue Lasix 40 mg/hr. Accurate I&Os  - Continue Spironolactone 25 mg QD and atorvastatin 40 mg daily for post STEMI management  - Continue holding other GDMT 2/2 HoTN   - Continue Heparin gtt   - Initiate LVAD workup    Charting performed by michael Ayon for Dr. Uribe.     The documentation recorded by the nevaehe accurately and completely reflects the service(s) I personally performed and the decisions made by me.           35 minutes critical care time spent evaluating, managing, and providing care to this patient including direct patient care at bedside as well as time spent reviewing test results, discussing the case with consultants or family members, and documenting in the patient's chart    Thank you for allowing us to participate in the care of your patient. Please do call us should any questions or concerns arise.    Ric Uribe MD  Heart Failure, Transplant, and Mechanical Circulatory Support

## 2024-02-22 ENCOUNTER — TELEPHONE (OUTPATIENT)
Dept: GASTROENTEROLOGY | Age: 64
End: 2024-02-22

## 2024-02-22 NOTE — TELEPHONE ENCOUNTER
Called patient to remind them of their procedure with Dr. PARSONS at [facility]  on 2/26/24  to arrive at 1015     RESPONSE:  ИРИНА

## 2024-02-26 ENCOUNTER — HOSPITAL ENCOUNTER (OUTPATIENT)
Age: 64
Setting detail: OUTPATIENT SURGERY
Discharge: HOME | End: 2024-02-26
Attending: INTERNAL MEDICINE | Admitting: INTERNAL MEDICINE
Payer: MEDICARE

## 2024-02-26 ENCOUNTER — ANESTHESIA EVENT (OUTPATIENT)
Dept: ENDOSCOPY | Age: 64
End: 2024-02-26
Payer: MEDICARE

## 2024-02-26 ENCOUNTER — ANESTHESIA (OUTPATIENT)
Dept: ENDOSCOPY | Age: 64
End: 2024-02-26
Payer: MEDICARE

## 2024-02-26 VITALS
BODY MASS INDEX: 30.01 KG/M2 | DIASTOLIC BLOOD PRESSURE: 72 MMHG | SYSTOLIC BLOOD PRESSURE: 132 MMHG | WEIGHT: 198 LBS | HEIGHT: 68 IN | HEART RATE: 68 BPM | OXYGEN SATURATION: 95 % | TEMPERATURE: 97.8 F | RESPIRATION RATE: 16 BRPM

## 2024-02-26 DIAGNOSIS — K21.9 CHRONIC GERD: ICD-10-CM

## 2024-02-26 DIAGNOSIS — K62.5 BRBPR (BRIGHT RED BLOOD PER RECTUM): ICD-10-CM

## 2024-02-26 DIAGNOSIS — R13.10 DYSPHAGIA, UNSPECIFIED TYPE: ICD-10-CM

## 2024-02-26 LAB
GLUCOSE BLD-MCNC: 122 MG/DL (ref 70–99)
PERFORMED ON: ABNORMAL

## 2024-02-26 PROCEDURE — 7100000011 HC PHASE II RECOVERY - ADDTL 15 MIN: Performed by: INTERNAL MEDICINE

## 2024-02-26 PROCEDURE — 43248 EGD GUIDE WIRE INSERTION: CPT | Performed by: INTERNAL MEDICINE

## 2024-02-26 PROCEDURE — 3609012400 HC EGD TRANSORAL BIOPSY SINGLE/MULTIPLE: Performed by: INTERNAL MEDICINE

## 2024-02-26 PROCEDURE — 45378 DIAGNOSTIC COLONOSCOPY: CPT | Performed by: INTERNAL MEDICINE

## 2024-02-26 PROCEDURE — 2500000003 HC RX 250 WO HCPCS: Performed by: NURSE ANESTHETIST, CERTIFIED REGISTERED

## 2024-02-26 PROCEDURE — 43239 EGD BIOPSY SINGLE/MULTIPLE: CPT | Performed by: INTERNAL MEDICINE

## 2024-02-26 PROCEDURE — 82962 GLUCOSE BLOOD TEST: CPT

## 2024-02-26 PROCEDURE — 3609015300 HC ESOPHAGEAL DILATION MALONEY: Performed by: INTERNAL MEDICINE

## 2024-02-26 PROCEDURE — 2709999900 HC NON-CHARGEABLE SUPPLY: Performed by: INTERNAL MEDICINE

## 2024-02-26 PROCEDURE — 3700000001 HC ADD 15 MINUTES (ANESTHESIA): Performed by: INTERNAL MEDICINE

## 2024-02-26 PROCEDURE — 6360000002 HC RX W HCPCS: Performed by: NURSE ANESTHETIST, CERTIFIED REGISTERED

## 2024-02-26 PROCEDURE — 88305 TISSUE EXAM BY PATHOLOGIST: CPT

## 2024-02-26 PROCEDURE — 7100000010 HC PHASE II RECOVERY - FIRST 15 MIN: Performed by: INTERNAL MEDICINE

## 2024-02-26 PROCEDURE — 3700000000 HC ANESTHESIA ATTENDED CARE: Performed by: INTERNAL MEDICINE

## 2024-02-26 PROCEDURE — 3609027000 HC COLONOSCOPY: Performed by: INTERNAL MEDICINE

## 2024-02-26 PROCEDURE — 2580000003 HC RX 258: Performed by: INTERNAL MEDICINE

## 2024-02-26 PROCEDURE — 88342 IMHCHEM/IMCYTCHM 1ST ANTB: CPT

## 2024-02-26 RX ORDER — EPHEDRINE SULFATE/0.9% NACL/PF 50 MG/5 ML
SYRINGE (ML) INTRAVENOUS PRN
Status: DISCONTINUED | OUTPATIENT
Start: 2024-02-26 | End: 2024-02-26 | Stop reason: SDUPTHER

## 2024-02-26 RX ORDER — FENTANYL CITRATE 50 UG/ML
INJECTION, SOLUTION INTRAMUSCULAR; INTRAVENOUS PRN
Status: DISCONTINUED | OUTPATIENT
Start: 2024-02-26 | End: 2024-02-26 | Stop reason: SDUPTHER

## 2024-02-26 RX ORDER — LIDOCAINE HYDROCHLORIDE 10 MG/ML
INJECTION, SOLUTION EPIDURAL; INFILTRATION; INTRACAUDAL; PERINEURAL PRN
Status: DISCONTINUED | OUTPATIENT
Start: 2024-02-26 | End: 2024-02-26 | Stop reason: SDUPTHER

## 2024-02-26 RX ORDER — SODIUM CHLORIDE, SODIUM LACTATE, POTASSIUM CHLORIDE, CALCIUM CHLORIDE 600; 310; 30; 20 MG/100ML; MG/100ML; MG/100ML; MG/100ML
INJECTION, SOLUTION INTRAVENOUS CONTINUOUS
Status: DISCONTINUED | OUTPATIENT
Start: 2024-02-26 | End: 2024-02-26 | Stop reason: HOSPADM

## 2024-02-26 RX ADMIN — FENTANYL CITRATE 100 MCG: 50 INJECTION INTRAMUSCULAR; INTRAVENOUS at 12:05

## 2024-02-26 RX ADMIN — Medication 10 MG: at 12:26

## 2024-02-26 RX ADMIN — SODIUM CHLORIDE, POTASSIUM CHLORIDE, SODIUM LACTATE AND CALCIUM CHLORIDE: 600; 310; 30; 20 INJECTION, SOLUTION INTRAVENOUS at 11:26

## 2024-02-26 RX ADMIN — Medication 10 MG: at 12:15

## 2024-02-26 RX ADMIN — LIDOCAINE HYDROCHLORIDE 50 MG: 10 INJECTION, SOLUTION EPIDURAL; INFILTRATION; INTRACAUDAL; PERINEURAL at 12:05

## 2024-02-26 ASSESSMENT — PAIN - FUNCTIONAL ASSESSMENT: PAIN_FUNCTIONAL_ASSESSMENT: 0-10

## 2024-02-26 NOTE — OP NOTE
Endoscopic Procedure Note    Patient: Viviana Peoples : 1960  [facility] Rec#: [id number] Acc#: [id number]     Primary Care Provider Nanda Johnson MD    Endoscopist: Pati Birch MD, MD    Date of Procedure:  2024    Procedure:   EGD with     a Robertson bougie dilation of esophagus and   cold biopsies    Indications:     For both EGD and colonoscopy exams today:     1. BRBPR (bright red blood per rectum)    2. Chronic GERD    3. Dysphagia, unspecified type    Last Colonoscopy  - normal per pt   Family hx colon polyps - Sister  Family hx colon cancer - Paternal Grandmother    Anesthesia:  Sedation was administered by anesthesia who monitored the patient during the procedure.    Estimated Blood Loss: minimal    Procedure:   After reviewing the patient's chart and obtaining informed consent, the patient was placed in the left lateral decubitus position.  A forward-viewing Olympus endoscope was lubricated and inserted through the mouth into the oropharynx. Under direct visualization, the upper esophagus was intubated. The scope was advanced to the level of the third portion of duodenum. Scope was slowly withdrawn with careful inspection of the mucosal surfaces. The scope was retroflexed for inspection of the gastric fundus and incisura. Findings and maneuvers are listed in impression below.     Next, a lubricated Robertson weighted Bougie dilator-54 Fr was gently introduced into the patient's mouth and passed into the Esophagus and into the proximal stomach without much resistance and then withdrawn. Repeat EGD was performed to verify dilation and scope tip was passed into the stomach. NO evidence of perforation or excessive bleeding was noted subsequent to the dilation.        The patient tolerated the procedure well. The scope was removed. There were no immediate complications.    Findings/IMPRESSION:  Esophagus: normal and a normal EG junction at 38 cm.    NO erosions or ulcers or

## 2024-02-26 NOTE — ANESTHESIA PRE PROCEDURE
06/20/2023 08:29 AM    RDW 14.2 06/20/2023 08:29 AM     06/20/2023 08:29 AM       CMP:   Lab Results   Component Value Date/Time     02/07/2024 09:25 AM    K 4.7 02/07/2024 09:25 AM    K 5.0 12/08/2020 04:29 AM     02/07/2024 09:25 AM    CO2 28 02/07/2024 09:25 AM    BUN 21 02/07/2024 09:25 AM    CREATININE 1.1 02/07/2024 09:25 AM    GFRAA >59 10/17/2022 07:53 AM    LABGLOM 56 02/07/2024 09:25 AM    GLUCOSE 137 02/07/2024 09:25 AM    PROT 6.8 02/07/2024 09:25 AM    CALCIUM 9.8 02/07/2024 09:25 AM    BILITOT 0.3 02/07/2024 09:25 AM    ALKPHOS 89 02/07/2024 09:25 AM    AST 18 02/07/2024 09:25 AM    ALT 19 02/07/2024 09:25 AM       POC Tests:   Recent Labs     02/26/24  1122   POCGLU 122*       Coags:   Lab Results   Component Value Date/Time    PROTIME 13.2 12/02/2020 10:00 AM    INR 1.01 12/02/2020 10:00 AM    APTT 24.7 12/02/2020 10:00 AM       HCG (If Applicable): No results found for: \"PREGTESTUR\", \"PREGSERUM\", \"HCG\", \"HCGQUANT\"     ABGs: No results found for: \"PHART\", \"PO2ART\", \"NUT2UNM\", \"KQO1CIL\", \"BEART\", \"C3MJJCME\"     Type & Screen (If Applicable):  No results found for: \"LABABO\", \"LABRH\"    Drug/Infectious Status (If Applicable):  No results found for: \"HIV\", \"HEPCAB\"    COVID-19 Screening (If Applicable):   Lab Results   Component Value Date/Time    COVID19 Not Detected 08/24/2023 06:56 AM           Anesthesia Evaluation  Patient summary reviewed and Nursing notes reviewed   no history of anesthetic complications:   Airway: Mallampati: I  TM distance: >3 FB   Neck ROM: full  Mouth opening: > = 3 FB   Dental:    (+) lower dentures and upper dentures      Pulmonary:Negative Pulmonary ROS and normal exam                               Cardiovascular:  Exercise tolerance: good (>4 METS)  (+) hypertension:, dysrhythmias:, hyperlipidemia                  Neuro/Psych:   (+) neuromuscular disease:, psychiatric history:depression/anxiety             GI/Hepatic/Renal:   (+) morbid obesity

## 2024-02-26 NOTE — OP NOTE
Patient: Viviana Peoples : 1960  [facility] Rec#: [id number] Acc#: [id number]   Primary Care Provider Nanda Johnson MD    Date of Procedure:  2024    Endoscopist: Pati Birch MD, MD    Referring Provider: Nanda Jonhson MD,     Operation Performed: Colonoscopy up to cecum    Indications:   For both EGD and colonoscopy exams today:     1. BRBPR (bright red blood per rectum)    2. Chronic GERD    3. Dysphagia, unspecified type    Last Colonoscopy  - normal per pt   Family hx colon polyps - Sister  Family hx colon cancer - Paternal Grandmother    Anesthesia:  Sedation was administered by anesthesia who monitored the patient during the procedure.    I met with Viviana Peoples prior to procedure. We discussed the procedure itself, and I have discussed the risks of endoscopy (including-- but not limited to-- pain, discomfort, bleeding potentially requiring second endoscopic procedure and/or blood transfusion, organ perforation requiring operative repair, damage to organs near the colon, infection, aspiration, cardiopulmonary/allergic reaction), benefits, indications to endoscopy. Additionally, we discussed options other than colonoscopy. The patient expressed understanding. All questions answered. The patient decided to proceed with the procedure.  Signed informed consent was placed on the chart.    Blood Loss: minimal    Withdrawal time: More than 9 minutes  Bowel Prep: Fair  with small amounts of thick solid and semisolid stool and a moderate amount of thick, opaque liquid scattered in patchy segments throughout the colon obscuring the underlying mucosa.Lesions including polyps may have been missed.     Complications: no immediate complications    DESCRIPTION OF PROCEDURE:     A time out was performed. After written informed consent was obtained, the patient was placed in the left lateral position.     The perianal area was inspected, and a digital rectal exam was performed. A

## 2024-02-26 NOTE — H&P
Patient Name: Viviana Peoples  : 1960  MRN: [id number]  DATE: 24    Allergies:   Allergies   Allergen Reactions    Latex Rash    Amoxicillin Shortness Of Breath and Nausea And Vomiting    Anaprox [Naproxen Sodium] Shortness Of Breath and Nausea And Vomiting    Aloe Vera      Other reaction(s): Unknown    Nortriptyline Hcl      Doesn't remember      Augmentin [Amoxicillin-Pot Clavulanate] Nausea And Vomiting           Buspar [Buspirone] Nausea And Vomiting    Vibramycin [Doxycycline Calcium] Nausea And Vomiting        ENDOSCOPY  History and Physical    Procedure:    [x] Diagnostic Colonoscopy       [] Screening Colonoscopy  [x] EGD      [] ERCP      [] EUS       [] Other    [x] Previous office notes/History and Physical reviewed from the patients chart. Please see EMR for further details of HPI. I have examined the patient's status immediately prior to the procedure and:      Indications/HPI:    For both EGD and colonoscopy exams today:     1. BRBPR (bright red blood per rectum)    2. Chronic GERD    3. Dysphagia, unspecified type    Last Colonoscopy  - normal per pt   Family hx colon polyps - Sister  Family hx colon cancer - Paternal Grandmother     []Abdominal Pain   []Cancer- GI/Lung     []Fhx of colon CA/polyps  []History of Polyps  []Barretts            []Melena  []Abnormal Imaging              []Dysphagia              []Persistent Pneumonia   []Anemia                            []Food Impaction        []History of Polyps  [] GI Bleed             []Pulmonary nodule/Mass   []Change in bowel habits []Heartburn/Reflux  []Rectal Bleed (BRBPR)  []Chest Pain - Non Cardiac []Heme (+) Stool []Ulcers  []Constipation  []Hemoptysis  []Varices  []Diarrhea  []Hypoxemia    []Nausea/Vomiting   []Screening   []Crohns/Colitis  []Other:     Anesthesia:   [x] MAC [] Moderate Sedation   [] General   [] None     ROS: 12 pt Review of Symptoms was negative unless mentioned above    Medications:   Prior to Admission

## 2024-02-26 NOTE — ANESTHESIA POSTPROCEDURE EVALUATION
Department of Anesthesiology  Postprocedure Note    Patient: Viviana Peoples  MRN: [id number]  YOB: 1960  Date of evaluation: 2/26/2024    Procedure Summary       Date: 02/26/24 Room / Location: [place] [address] / [facility]    Anesthesia Start: 1202 Anesthesia Stop: 1244    Procedures:       ESOPHAGOGASTRODUODENOSCOPY BIOPSY (Abdomen)      COLONOSCOPY DIAGNOSTIC      ESOPHAGEAL DILATION TRAVIS Diagnosis:       Chronic GERD      Dysphagia, unspecified type      BRBPR (bright red blood per rectum)      (Chronic GERD [K21.9])      (Dysphagia, unspecified type [R13.10])      (BRBPR (bright red blood per rectum) [K62.5])    Surgeons: Pati Birch MD Responsible Provider: Emily Guardado APRN - CRNA    Anesthesia Type: general, TIVA ASA Status: 3            Anesthesia Type: No value filed.    Maurice Phase I: Maurice Score: 10    Maurice Phase II:      Anesthesia Post Evaluation    Patient location during evaluation: bedside  Patient participation: complete - patient participated  Level of consciousness: sleepy but conscious  Pain score: 0  Airway patency: patent  Nausea & Vomiting: no nausea and no vomiting  Cardiovascular status: blood pressure returned to baseline  Respiratory status: acceptable  Hydration status: stable  Pain management: adequate    No notable events documented.

## 2024-02-26 NOTE — DISCHARGE INSTRUCTIONS
1.  Await path results, the patient will be contacted in 7-10 days with biopsy results.   2.  Magic mouthwash 5 ml PO Swish and swallow q3h PRN ONLY IF patient has post-procedural sorethroat or chest pain.  3. Full liquids to soft diet today asad discharge from the surgicenter; may advance diet starting in AM tomorrow.  4. May resume other meds except any ASA/NSAIDs; may use cough drops or lozenges PRN; also continue meds for GERD with anti-GERD measures.  5. NO ASA/NSAIDs x 2 weeks  6. OP f/u in 6-8 weeks with Ms. Russo; will consider an Esophageal manometry later if the patient's dysphagia persists.    7.  Repeat colonoscopy:  -Based on colonoscopy findings today and prep quality as well as her family history, in 3 years with a 2-day clear liquid diet and a 2-day prep using Plenvu or a similar solution; sooner if her personal or family history as pertaining to colorectal cancer risk changes requiring an earlier exam or if the patient were to develop lower GI symptoms such as bleeding, abdominal pain, change in bowel habits or stool caliber or if the patient has anemia or unexplained weight loss in the future.      Upper GI Endoscopy and Colonoscopy: What to Expect at Home  Your Recovery    After an upper GI endoscopy, you may have a sore throat for a day or two after the test.    After a colonoscopy you may be bloated or have gas pains. You may need to pass gas. If a biopsy was done or a polyp was removed, you may have streaks of blood in your stool (feces) for a few days. Problems such as heavy rectal bleeding may not occur until several weeks after the test. This isn't common. But it can happen after polyps are removed.    How can you care for yourself at home?  Activity   Rest as much as you need to after you go home.  You should be able to go back to your usual activities the day after the test.  You should not drive or operate equipment for the remainder of today.    Diet   Follow your doctor's directions

## 2024-03-13 DIAGNOSIS — E78.2 MIXED HYPERLIPIDEMIA: ICD-10-CM

## 2024-03-13 DIAGNOSIS — I10 ESSENTIAL HYPERTENSION, BENIGN: ICD-10-CM

## 2024-03-13 RX ORDER — TRIAMTERENE AND HYDROCHLOROTHIAZIDE 37.5; 25 MG/1; MG/1
TABLET ORAL
Qty: 90 TABLET | Refills: 3 | Status: SHIPPED | OUTPATIENT
Start: 2024-03-13

## 2024-03-13 RX ORDER — ATORVASTATIN CALCIUM 20 MG/1
TABLET, FILM COATED ORAL
Qty: 90 TABLET | Refills: 3 | Status: SHIPPED | OUTPATIENT
Start: 2024-03-13

## 2024-03-13 NOTE — TELEPHONE ENCOUNTER
Viviana MCKEON Shelton called to request a refill on her medication.      Last office visit : 2/8/2024   Next office visit : 5/9/2024     Requested Prescriptions     Pending Prescriptions Disp Refills    atorvastatin (LIPITOR) 20 MG tablet [Pharmacy Med Name: ATORVASTATIN 20 MG TABLET] 90 tablet 3     Sig: TAKE 1 TABLET BY MOUTH EVERY DAY    triamterene-hydroCHLOROthiazide (MAXZIDE-25) 37.5-25 MG per tablet [Pharmacy Med Name: TRIAMTERENE-HCTZ 37.5-25 MG TB] 90 tablet 3     Sig: TAKE 1 TABLET BY MOUTH EVERY DAY            Natalia Rojo MA

## 2024-04-15 ENCOUNTER — OFFICE VISIT (OUTPATIENT)
Dept: GASTROENTEROLOGY | Age: 64
End: 2024-04-15
Payer: MEDICARE

## 2024-04-15 VITALS
BODY MASS INDEX: 36.53 KG/M2 | SYSTOLIC BLOOD PRESSURE: 130 MMHG | DIASTOLIC BLOOD PRESSURE: 80 MMHG | OXYGEN SATURATION: 97 % | WEIGHT: 241 LBS | HEART RATE: 70 BPM | HEIGHT: 68 IN

## 2024-04-15 DIAGNOSIS — K21.9 CHRONIC GERD: Primary | ICD-10-CM

## 2024-04-15 DIAGNOSIS — K64.8 INTERNAL HEMORRHOIDS: ICD-10-CM

## 2024-04-15 PROCEDURE — G8417 CALC BMI ABV UP PARAM F/U: HCPCS | Performed by: NURSE PRACTITIONER

## 2024-04-15 PROCEDURE — G8427 DOCREV CUR MEDS BY ELIG CLIN: HCPCS | Performed by: NURSE PRACTITIONER

## 2024-04-15 PROCEDURE — 1036F TOBACCO NON-USER: CPT | Performed by: NURSE PRACTITIONER

## 2024-04-15 PROCEDURE — 3017F COLORECTAL CA SCREEN DOC REV: CPT | Performed by: NURSE PRACTITIONER

## 2024-04-15 PROCEDURE — 3075F SYST BP GE 130 - 139MM HG: CPT | Performed by: NURSE PRACTITIONER

## 2024-04-15 PROCEDURE — 99213 OFFICE O/P EST LOW 20 MIN: CPT | Performed by: NURSE PRACTITIONER

## 2024-04-15 PROCEDURE — 3079F DIAST BP 80-89 MM HG: CPT | Performed by: NURSE PRACTITIONER

## 2024-04-15 ASSESSMENT — ENCOUNTER SYMPTOMS
COUGH: 0
DIARRHEA: 0
CONSTIPATION: 0
RECTAL PAIN: 0
CHOKING: 0
ABDOMINAL PAIN: 0
BLOOD IN STOOL: 0
TROUBLE SWALLOWING: 0
SHORTNESS OF BREATH: 0
ANAL BLEEDING: 0
NAUSEA: 0
VOMITING: 0
ABDOMINAL DISTENTION: 0

## 2024-04-15 NOTE — PROGRESS NOTES
Activity: Sufficiently Active (11/8/2023)    Exercise Vital Sign     Days of Exercise per Week: 7 days     Minutes of Exercise per Session: 30 min   Housing Stability: Unknown (11/8/2023)    Housing Stability Vital Sign     Unstable Housing in the Last Year: No       Current Outpatient Medications   Medication Sig Dispense Refill    atorvastatin (LIPITOR) 20 MG tablet TAKE 1 TABLET BY MOUTH EVERY DAY 90 tablet 3    triamterene-hydroCHLOROthiazide (MAXZIDE-25) 37.5-25 MG per tablet TAKE 1 TABLET BY MOUTH EVERY DAY 90 tablet 3    empagliflozin (JARDIANCE) 25 MG tablet Take 1 tablet by mouth daily 30 tablet 3    ESTRADIOL-PROGESTERONE PO Take by mouth      LORazepam (ATIVAN) 0.5 MG tablet Take 1 tablet by mouth every 6 hours as needed for Anxiety.      FLUoxetine (PROZAC) 10 MG capsule TAKE 1 CAPSULE BY MOUTH EVERY DAY 90 capsule 3    metoprolol (LOPRESSOR) 100 MG tablet TAKE 1 TABLET BY MOUTH TWICE A  tablet 3    TIADYLT  MG extended release capsule TAKE 1 CAPSULE BY MOUTH EVERY DAY 90 capsule 3    dicyclomine (BENTYL) 20 MG tablet TAKE 1 TABLET BY MOUTH 3 TIMES DAILY (BEFORE MEALS). 270 tablet 1    hyoscyamine (ANASPAZ;LEVSIN) 125 MCG tablet TAKE 1 TABLET BY MOUTH EVERY 6 HOURS AS NEEDED FOR CRAMPING OR DIARRHEA 90 tablet 3    Probiotic Product (PROBIOTIC-10 PO) Take 1 capsule by mouth daily      Multiple Vitamins-Minerals (WOMENS MULTIVITAMIN PO) Take 1 tablet by mouth daily       No current facility-administered medications for this visit.       Allergies   Allergen Reactions    Latex Rash    Amoxicillin Shortness Of Breath and Nausea And Vomiting    Anaprox [Naproxen Sodium] Shortness Of Breath and Nausea And Vomiting    Aloe Vera      Other reaction(s): Unknown    Nortriptyline Hcl      Doesn't remember      Augmentin [Amoxicillin-Pot Clavulanate] Nausea And Vomiting           Buspar [Buspirone] Nausea And Vomiting    Vibramycin [Doxycycline Calcium] Nausea And Vomiting       Review of Systems

## 2024-04-15 NOTE — PATIENT INSTRUCTIONS
Patient Education        Gastroesophageal Reflux Disease (GERD): Care Instructions  Overview     Gastroesophageal reflux disease (GERD) is the backward flow of stomach acid into the esophagus. The esophagus is the tube that leads from your throat to your stomach. A one-way valve prevents the stomach acid from backing up into this tube. But when you have GERD, this valve does not close tightly enough. This can also cause pain and inflammation in your esophagus. (This is called esophagitis.) You may also hear GERD called acid reflux.  If you have mild GERD symptoms including heartburn, you may be able to control the problem with antacids or over-the-counter medicine. You can also make lifestyle changes to help reduce your symptoms. These include changing your diet and eating habits, such as not eating close to bedtime and staying at a weight that's healthy for you.  Follow-up care is a key part of your treatment and safety. Be sure to make and go to all appointments, and call your doctor if you are having problems. It's also a good idea to know your test results and keep a list of the medicines you take.  How can you care for yourself at home?  Take your medicines exactly as prescribed. Call your doctor if you think you are having a problem with your medicine.  Your doctor may recommend over-the-counter medicine. For mild or occasional indigestion, antacids, such as Tums, Mylanta, or Maalox, may help. Your doctor also may recommend over-the-counter acid reducers, such as famotidine (Pepcid AC), cimetidine (Tagamet HB), or omeprazole (Prilosec). Read and follow all instructions on the label. If you use these medicines often, talk with your doctor.  Stay at a weight that's healthy for you. Extra weight puts a lot of pressure on the valve between the stomach and esophagus. Losing even a few pounds can help. Talk to your doctor if you need help losing weight.  Change your eating habits.  Try to eat several small meals

## 2024-05-08 DIAGNOSIS — R61 NIGHT SWEATS: ICD-10-CM

## 2024-05-08 DIAGNOSIS — E11.9 NEW ONSET TYPE 2 DIABETES MELLITUS (HCC): ICD-10-CM

## 2024-05-08 DIAGNOSIS — E55.9 VITAMIN D DEFICIENCY: ICD-10-CM

## 2024-05-08 DIAGNOSIS — I10 ESSENTIAL HYPERTENSION, BENIGN: ICD-10-CM

## 2024-05-08 DIAGNOSIS — E78.2 MIXED HYPERLIPIDEMIA: ICD-10-CM

## 2024-05-08 LAB
25(OH)D3 SERPL-MCNC: 49.7 NG/ML
ALBUMIN SERPL-MCNC: 4.1 G/DL (ref 3.5–5.2)
ALP SERPL-CCNC: 87 U/L (ref 35–104)
ALT SERPL-CCNC: 20 U/L (ref 5–33)
ANION GAP SERPL CALCULATED.3IONS-SCNC: 16 MMOL/L (ref 7–19)
AST SERPL-CCNC: 20 U/L (ref 5–32)
BILIRUB SERPL-MCNC: 0.4 MG/DL (ref 0.2–1.2)
BUN SERPL-MCNC: 19 MG/DL (ref 8–23)
CALCIUM SERPL-MCNC: 9.8 MG/DL (ref 8.8–10.2)
CHLORIDE SERPL-SCNC: 99 MMOL/L (ref 98–111)
CHOLEST SERPL-MCNC: 163 MG/DL (ref 160–199)
CO2 SERPL-SCNC: 26 MMOL/L (ref 22–29)
CREAT SERPL-MCNC: 1 MG/DL (ref 0.5–0.9)
GLUCOSE SERPL-MCNC: 132 MG/DL (ref 74–109)
HBA1C MFR BLD: 7.1 % (ref 4–6)
HDLC SERPL-MCNC: 51 MG/DL (ref 65–121)
LDLC SERPL CALC-MCNC: 71 MG/DL
POTASSIUM SERPL-SCNC: 3.8 MMOL/L (ref 3.5–5)
PROT SERPL-MCNC: 7.1 G/DL (ref 6.6–8.7)
SODIUM SERPL-SCNC: 141 MMOL/L (ref 136–145)
TRIGL SERPL-MCNC: 206 MG/DL (ref 0–149)

## 2024-05-09 ENCOUNTER — OFFICE VISIT (OUTPATIENT)
Dept: PRIMARY CARE CLINIC | Age: 64
End: 2024-05-09

## 2024-05-09 VITALS
WEIGHT: 235 LBS | DIASTOLIC BLOOD PRESSURE: 70 MMHG | HEIGHT: 68 IN | OXYGEN SATURATION: 96 % | HEART RATE: 78 BPM | TEMPERATURE: 98.2 F | BODY MASS INDEX: 35.61 KG/M2 | SYSTOLIC BLOOD PRESSURE: 122 MMHG

## 2024-05-09 DIAGNOSIS — F32.0 MILD MAJOR DEPRESSION (HCC): ICD-10-CM

## 2024-05-09 DIAGNOSIS — I10 ESSENTIAL HYPERTENSION, BENIGN: ICD-10-CM

## 2024-05-09 DIAGNOSIS — N18.31 STAGE 3A CHRONIC KIDNEY DISEASE (HCC): ICD-10-CM

## 2024-05-09 DIAGNOSIS — E78.2 MIXED HYPERLIPIDEMIA: ICD-10-CM

## 2024-05-09 DIAGNOSIS — E66.01 SEVERE OBESITY (BMI 35.0-39.9) WITH COMORBIDITY (HCC): ICD-10-CM

## 2024-05-09 DIAGNOSIS — F41.1 GAD (GENERALIZED ANXIETY DISORDER): ICD-10-CM

## 2024-05-09 DIAGNOSIS — E11.9 NEW ONSET TYPE 2 DIABETES MELLITUS (HCC): Primary | ICD-10-CM

## 2024-05-09 RX ORDER — LANCETS 30 GAUGE
EACH MISCELLANEOUS
Qty: 100 EACH | Refills: 3 | Status: SHIPPED | OUTPATIENT
Start: 2024-05-09 | End: 2024-05-14 | Stop reason: SDUPTHER

## 2024-05-09 RX ORDER — BLOOD-GLUCOSE METER
1 KIT MISCELLANEOUS DAILY
Qty: 1 KIT | Refills: 0 | Status: SHIPPED | OUTPATIENT
Start: 2024-05-09

## 2024-05-09 RX ORDER — GLUCOSAMINE HCL/CHONDROITIN SU 500-400 MG
CAPSULE ORAL
Qty: 50 STRIP | Refills: 3 | Status: SHIPPED | OUTPATIENT
Start: 2024-05-09 | End: 2024-05-14 | Stop reason: SDUPTHER

## 2024-05-09 NOTE — PROGRESS NOTES
carbohydrate diet, exercise, and weight loss/efforts at normalizing BMI.     Return in about 3 months (around 2024) for HTN, high cholesterol, Diabetes.    Over 50% of the total visit time of 30 minutes was spent on counseling and/or coordination of care of:   1. New onset type 2 diabetes mellitus (HCC)    2. Essential hypertension, benign    3. Mixed hyperlipidemia    4. Stage 3a chronic kidney disease (HCC)    5. Mild major depression (HCC)    6. GARRETT (generalized anxiety disorder)    7. Severe obesity (BMI 35.0-39.9) with comorbidity (HCC)         Orders Placed This Encounter   Procedures    Comprehensive Metabolic Panel     Standing Status:   Future     Standing Expiration Date:   2025    Lipid Panel     Standing Status:   Future     Standing Expiration Date:   2025     Order Specific Question:   Is Patient Fasting?/# of Hours     Answer:   yes/8 hrs    Hemoglobin A1C     Standing Status:   Future     Standing Expiration Date:   2025    External Referral To Diabetic Education     Referral Priority:   Routine     Referral Type:   Eval and Treat     Referral Reason:   Specialty Services Required     Referred to Provider:   Carolina Marley RD     Requested Specialty:   Dietitian Registered     Number of Visits Requested:   1     DIABETES FOOT EXAM     Orders Placed This Encounter   Medications    glucose monitoring kit     Si kit by Does not apply route daily     Dispense:  1 kit     Refill:  0    blood glucose monitor strips     Sig: Test 1-2 times a day & as needed for symptoms of irregular blood glucose. Dispense sufficient amount for indicated testing frequency plus additional to accommodate PRN testing needs.     Dispense:  50 strip     Refill:  3     (1) Identify specific brand and product.  (2) Include specific quantity.  (3) Include frequency.    Lancets MISC     Sig: Use 1 lancet 1-2x/day to check blood sugars for type II diabetes     Dispense:  100 each     Refill:  3     There are no

## 2024-05-13 ENCOUNTER — TELEPHONE (OUTPATIENT)
Dept: PRIMARY CARE CLINIC | Age: 64
End: 2024-05-13

## 2024-05-13 NOTE — TELEPHONE ENCOUNTER
Pt called and stated the pharmacy never got a prescription for lancets or test strips. After looking at patients chart I seen there were scripts sent in for both. I called the pharmacy and was told by the pharmacy that per insurance new scripts have to be sent in and you have to  put exactly how often and when you want her to check sugars.

## 2024-05-14 DIAGNOSIS — E11.9 NEW ONSET TYPE 2 DIABETES MELLITUS (HCC): ICD-10-CM

## 2024-05-14 RX ORDER — LANCETS 30 GAUGE
EACH MISCELLANEOUS
Qty: 150 EACH | Refills: 3 | Status: SHIPPED | OUTPATIENT
Start: 2024-05-14

## 2024-05-14 RX ORDER — GLUCOSAMINE HCL/CHONDROITIN SU 500-400 MG
CAPSULE ORAL
Qty: 200 STRIP | Refills: 3 | Status: SHIPPED | OUTPATIENT
Start: 2024-05-14

## 2024-05-14 NOTE — TELEPHONE ENCOUNTER
Pt left  stating that [facility] is still waiting on a prescription for her test strips and lancets.   I called [facility] to inform them the prescription was sent over on 5/9/24. I spoke with Maria Teresa and she said due to billing from medicare they require a new script with the frequency of how often patient checks her blood  sugars. She reccommended that the script says 150 for the quantity for patient to test herself TID. That way she's covered if she test her sugars 1-2 times daily and PRN needs.   I returned call to patient to inform her of this but had to leave a vm.

## 2024-05-17 LAB — ESTROGEN SERPL-MCNC: 550 PG/ML (ref 40–244)

## 2024-05-20 DIAGNOSIS — E11.9 NEW ONSET TYPE 2 DIABETES MELLITUS (HCC): ICD-10-CM

## 2024-05-20 RX ORDER — GLUCOSAMINE HCL/CHONDROITIN SU 500-400 MG
CAPSULE ORAL
Qty: 100 STRIP | Refills: 3 | Status: SHIPPED | OUTPATIENT
Start: 2024-05-20

## 2024-05-20 NOTE — TELEPHONE ENCOUNTER
Patient called saying she went to  her test strips and they told her the insurance only covers for 100 strips. I called the pharmacy and was told her insurance only covers 100 strips every 90 days. If she was on insulin they would cover more up to 150.

## 2024-05-20 NOTE — TELEPHONE ENCOUNTER
Viviana Peoples called to request a refill on her medication.      Last office visit : 5/9/2024   Next office visit : 8/12/2024     Requested Prescriptions     Pending Prescriptions Disp Refills    blood glucose monitor strips 150 strip 3     Sig: Test 3 times a day & as needed for symptoms of irregular blood glucose. Dispense sufficient amount for indicated testing frequency plus additional to accommodate PRN testing needs.            Natalia Rojo MA

## 2024-05-22 DIAGNOSIS — E11.9 NEW ONSET TYPE 2 DIABETES MELLITUS (HCC): ICD-10-CM

## 2024-05-22 RX ORDER — EMPAGLIFLOZIN 25 MG/1
25 TABLET, FILM COATED ORAL DAILY
Qty: 30 TABLET | Refills: 3 | Status: SHIPPED | OUTPATIENT
Start: 2024-05-22

## 2024-07-11 NOTE — TELEPHONE ENCOUNTER
This is compounded hormone replacement medication from [facility] I believe? You will need to call pharmacy to approve refills verbally unless they can fax an order for me to sign for refills. We cannot e-scribe compounded medicines.

## 2024-07-11 NOTE — TELEPHONE ENCOUNTER
Viviana Peoples called to request a refill on her medication.      Last office visit : 5/9/2024   Next office visit : 8/12/2024     Requested Prescriptions     Pending Prescriptions Disp Refills    Estradiol-Progesterone 0.5-100 MG CAPS       Sig: Take by mouth            Quynh Doyle MA

## 2024-07-12 ENCOUNTER — TELEPHONE (OUTPATIENT)
Dept: PRIMARY CARE CLINIC | Age: 64
End: 2024-07-12

## 2024-07-12 NOTE — TELEPHONE ENCOUNTER
Pt left vm stating that she need her compounded mediation sent in because she doesn't have anymore. \"Compounded hormone Biest 1.5mg. I am not seeing this in her med list or hx. Of meds.

## 2024-07-18 NOTE — TELEPHONE ENCOUNTER
Cheryl rodriguez spoke to the pharmacy and was told the patient just had a script filled 7/12 with 11 refills and the patient has already picked up her refill for this month.

## 2024-08-09 DIAGNOSIS — I10 ESSENTIAL HYPERTENSION, BENIGN: ICD-10-CM

## 2024-08-09 DIAGNOSIS — E11.9 NEW ONSET TYPE 2 DIABETES MELLITUS (HCC): ICD-10-CM

## 2024-08-09 DIAGNOSIS — E78.2 MIXED HYPERLIPIDEMIA: ICD-10-CM

## 2024-08-09 DIAGNOSIS — N18.31 STAGE 3A CHRONIC KIDNEY DISEASE (HCC): ICD-10-CM

## 2024-08-09 LAB
ALBUMIN SERPL-MCNC: 4.1 G/DL (ref 3.5–5.2)
ALP SERPL-CCNC: 89 U/L (ref 35–104)
ALT SERPL-CCNC: 21 U/L (ref 5–33)
ANION GAP SERPL CALCULATED.3IONS-SCNC: 13 MMOL/L (ref 7–19)
AST SERPL-CCNC: 21 U/L (ref 5–32)
BILIRUB SERPL-MCNC: 0.4 MG/DL (ref 0.2–1.2)
BUN SERPL-MCNC: 22 MG/DL (ref 8–23)
CALCIUM SERPL-MCNC: 9.3 MG/DL (ref 8.8–10.2)
CHLORIDE SERPL-SCNC: 102 MMOL/L (ref 98–111)
CHOLEST SERPL-MCNC: 156 MG/DL (ref 160–199)
CO2 SERPL-SCNC: 26 MMOL/L (ref 22–29)
CREAT SERPL-MCNC: 1.1 MG/DL (ref 0.5–0.9)
GLUCOSE SERPL-MCNC: 140 MG/DL (ref 74–109)
HBA1C MFR BLD: 6.8 % (ref 4–6)
HDLC SERPL-MCNC: 53 MG/DL (ref 65–121)
LDLC SERPL CALC-MCNC: 69 MG/DL
POTASSIUM SERPL-SCNC: 4 MMOL/L (ref 3.5–5)
PROT SERPL-MCNC: 7 G/DL (ref 6.6–8.7)
SODIUM SERPL-SCNC: 141 MMOL/L (ref 136–145)
TRIGL SERPL-MCNC: 170 MG/DL (ref 0–149)

## 2024-08-12 ENCOUNTER — OFFICE VISIT (OUTPATIENT)
Dept: PRIMARY CARE CLINIC | Age: 64
End: 2024-08-12
Payer: MEDICARE

## 2024-08-12 VITALS
OXYGEN SATURATION: 98 % | DIASTOLIC BLOOD PRESSURE: 80 MMHG | SYSTOLIC BLOOD PRESSURE: 130 MMHG | HEIGHT: 68 IN | TEMPERATURE: 97.6 F | WEIGHT: 235 LBS | HEART RATE: 71 BPM | BODY MASS INDEX: 35.61 KG/M2

## 2024-08-12 DIAGNOSIS — D50.8 OTHER IRON DEFICIENCY ANEMIA: ICD-10-CM

## 2024-08-12 DIAGNOSIS — E11.22 CONTROLLED TYPE 2 DIABETES MELLITUS WITH STAGE 3 CHRONIC KIDNEY DISEASE, WITHOUT LONG-TERM CURRENT USE OF INSULIN (HCC): Primary | ICD-10-CM

## 2024-08-12 DIAGNOSIS — E78.2 MIXED HYPERLIPIDEMIA: ICD-10-CM

## 2024-08-12 DIAGNOSIS — N18.30 CONTROLLED TYPE 2 DIABETES MELLITUS WITH STAGE 3 CHRONIC KIDNEY DISEASE, WITHOUT LONG-TERM CURRENT USE OF INSULIN (HCC): Primary | ICD-10-CM

## 2024-08-12 DIAGNOSIS — F32.0 MILD MAJOR DEPRESSION (HCC): ICD-10-CM

## 2024-08-12 DIAGNOSIS — N18.31 STAGE 3A CHRONIC KIDNEY DISEASE (HCC): ICD-10-CM

## 2024-08-12 DIAGNOSIS — Z12.31 ENCOUNTER FOR SCREENING MAMMOGRAM FOR MALIGNANT NEOPLASM OF BREAST: ICD-10-CM

## 2024-08-12 DIAGNOSIS — E66.01 SEVERE OBESITY (BMI 35.0-39.9) WITH COMORBIDITY (HCC): ICD-10-CM

## 2024-08-12 DIAGNOSIS — I10 ESSENTIAL HYPERTENSION, BENIGN: ICD-10-CM

## 2024-08-12 PROBLEM — E11.9 NEW ONSET TYPE 2 DIABETES MELLITUS (HCC): Status: RESOLVED | Noted: 2023-07-10 | Resolved: 2024-08-12

## 2024-08-12 PROCEDURE — 3017F COLORECTAL CA SCREEN DOC REV: CPT | Performed by: INTERNAL MEDICINE

## 2024-08-12 PROCEDURE — 3075F SYST BP GE 130 - 139MM HG: CPT | Performed by: INTERNAL MEDICINE

## 2024-08-12 PROCEDURE — 3044F HG A1C LEVEL LT 7.0%: CPT | Performed by: INTERNAL MEDICINE

## 2024-08-12 PROCEDURE — 1036F TOBACCO NON-USER: CPT | Performed by: INTERNAL MEDICINE

## 2024-08-12 PROCEDURE — G8417 CALC BMI ABV UP PARAM F/U: HCPCS | Performed by: INTERNAL MEDICINE

## 2024-08-12 PROCEDURE — 2022F DILAT RTA XM EVC RTNOPTHY: CPT | Performed by: INTERNAL MEDICINE

## 2024-08-12 PROCEDURE — G8427 DOCREV CUR MEDS BY ELIG CLIN: HCPCS | Performed by: INTERNAL MEDICINE

## 2024-08-12 PROCEDURE — 99214 OFFICE O/P EST MOD 30 MIN: CPT | Performed by: INTERNAL MEDICINE

## 2024-08-12 PROCEDURE — 3079F DIAST BP 80-89 MM HG: CPT | Performed by: INTERNAL MEDICINE

## 2024-08-12 ASSESSMENT — ENCOUNTER SYMPTOMS
COUGH: 0
EYE PAIN: 0
VOMITING: 0
DIARRHEA: 0
COLOR CHANGE: 0
WHEEZING: 0
RHINORRHEA: 0
SHORTNESS OF BREATH: 0
BLOOD IN STOOL: 0
EYE DISCHARGE: 0
EYE REDNESS: 0
ABDOMINAL PAIN: 0
CHEST TIGHTNESS: 0
SINUS PRESSURE: 0
SORE THROAT: 0
VOICE CHANGE: 0

## 2024-08-12 NOTE — PROGRESS NOTES
Viviana Peoples is a 63 y.o. female who presents today for   Chief Complaint   Patient presents with    Hypertension    Cholesterol Problem    Diabetes       Hypertension  Pertinent negatives include no chest pain, headaches, neck pain, palpitations or shortness of breath.   Diabetes  Pertinent negatives for hypoglycemia include no confusion, dizziness, headaches, nervousness/anxiousness, speech difficulty or tremors. Pertinent negatives for diabetes include no chest pain, no fatigue, no polydipsia and no weakness.     62 y/o WF here for follow up on HTN, type II DM, Mild major depression, generalized anxiety disorder, IBS-D with controlled med refill, chronic LBP with radiculopathy, and obesity. Patient has been doing well since last visit. Weight stable since last visit and fasting lab results improved since May visit.     BMI Readings from Last 3 Encounters:   08/12/24 35.73 kg/m²   05/09/24 35.73 kg/m²   04/15/24 36.64 kg/m²       Wt Readings from Last 3 Encounters:   08/12/24 106.6 kg (235 lb)   05/09/24 106.6 kg (235 lb)   04/15/24 109.3 kg (241 lb)   2/8/24 weight 240 lbs    Diabetes Mellitus Type 2: Current symptoms/problems include none.     Hypertension:  Home blood pressure monitoring: No.  Patient denies chest pain, shortness of breath, and palpitations.  Antihypertensive medication side effects: no medication side effects noted.  Use of agents associated with hypertension: none.      Mixed Hyperlipidemia/Pure hyperlipidemia/Essential Hypertriglyceridemia: Compliance with treatment has been good.  The patient exercises intermittently. Patient denies muscle pain associated with their medications which include atorvastatin 20 mg daily .        Lab Results   Component Value Date    LABA1C 6.8 (H) 08/09/2024    LABA1C 7.1 (H) 05/08/2024    LABA1C 7.0 (H) 02/07/2024     Lab Results   Component Value Date    CREATININE 1.1 (H) 08/09/2024     Lab Results   Component Value Date    ALT 21 08/09/2024    AST 21

## 2024-08-16 RX ORDER — DILTIAZEM HYDROCHLORIDE 240 MG/1
CAPSULE, EXTENDED RELEASE ORAL
Qty: 90 CAPSULE | Refills: 3 | OUTPATIENT
Start: 2024-08-16

## 2024-09-16 RX ORDER — DILTIAZEM HYDROCHLORIDE 240 MG/1
CAPSULE, EXTENDED RELEASE ORAL
Qty: 90 CAPSULE | Refills: 1 | Status: SHIPPED | OUTPATIENT
Start: 2024-09-16

## 2024-09-26 ENCOUNTER — HOSPITAL ENCOUNTER (EMERGENCY)
Facility: HOSPITAL | Age: 64
Discharge: HOME | End: 2024-09-26
Attending: FAMILY MEDICINE
Payer: MEDICARE

## 2024-09-26 ENCOUNTER — APPOINTMENT (OUTPATIENT)
Dept: CT IMAGING | Facility: HOSPITAL | Age: 64
End: 2024-09-26
Payer: MEDICARE

## 2024-09-26 ENCOUNTER — APPOINTMENT (OUTPATIENT)
Dept: ULTRASOUND IMAGING | Facility: HOSPITAL | Age: 64
End: 2024-09-26
Payer: MEDICARE

## 2024-09-26 VITALS
OXYGEN SATURATION: 96 % | HEIGHT: 68 IN | BODY MASS INDEX: 35.46 KG/M2 | HEART RATE: 53 BPM | RESPIRATION RATE: 18 BRPM | WEIGHT: 234 LBS | TEMPERATURE: 98.4 F | SYSTOLIC BLOOD PRESSURE: 156 MMHG | DIASTOLIC BLOOD PRESSURE: 79 MMHG

## 2024-09-26 DIAGNOSIS — N28.9 RENAL LESION: ICD-10-CM

## 2024-09-26 DIAGNOSIS — R10.9 ABDOMINAL PAIN, UNSPECIFIED ABDOMINAL LOCATION: Primary | ICD-10-CM

## 2024-09-26 LAB
ALBUMIN SERPL-MCNC: 4 G/DL (ref 3.5–5.2)
ALBUMIN/GLOB SERPL: 1.3 G/DL
ALP SERPL-CCNC: 91 U/L (ref 39–117)
ALT SERPL W P-5'-P-CCNC: 20 U/L (ref 1–33)
ANION GAP SERPL CALCULATED.3IONS-SCNC: 12 MMOL/L (ref 5–15)
AST SERPL-CCNC: 22 U/L (ref 1–32)
BASOPHILS # BLD AUTO: 0.09 10*3/MM3 (ref 0–0.2)
BASOPHILS NFR BLD AUTO: 0.7 % (ref 0–1.5)
BILIRUB SERPL-MCNC: 0.3 MG/DL (ref 0–1.2)
BILIRUB UR QL STRIP: NEGATIVE
BUN SERPL-MCNC: 22 MG/DL (ref 8–23)
BUN/CREAT SERPL: 22.7 (ref 7–25)
CALCIUM SPEC-SCNC: 9.4 MG/DL (ref 8.6–10.5)
CHLORIDE SERPL-SCNC: 101 MMOL/L (ref 98–107)
CLARITY UR: CLEAR
CO2 SERPL-SCNC: 25 MMOL/L (ref 22–29)
COLOR UR: YELLOW
CREAT SERPL-MCNC: 0.97 MG/DL (ref 0.57–1)
DEPRECATED RDW RBC AUTO: 42.8 FL (ref 37–54)
EGFRCR SERPLBLD CKD-EPI 2021: 65.8 ML/MIN/1.73
EOSINOPHIL # BLD AUTO: 0.3 10*3/MM3 (ref 0–0.4)
EOSINOPHIL NFR BLD AUTO: 2.4 % (ref 0.3–6.2)
ERYTHROCYTE [DISTWIDTH] IN BLOOD BY AUTOMATED COUNT: 14.6 % (ref 12.3–15.4)
GLOBULIN UR ELPH-MCNC: 3 GM/DL
GLUCOSE SERPL-MCNC: 137 MG/DL (ref 65–99)
GLUCOSE UR STRIP-MCNC: ABNORMAL MG/DL
HCT VFR BLD AUTO: 43.4 % (ref 34–46.6)
HGB BLD-MCNC: 13.8 G/DL (ref 12–15.9)
HGB UR QL STRIP.AUTO: NEGATIVE
HOLD SPECIMEN: NORMAL
IMM GRANULOCYTES # BLD AUTO: 0.05 10*3/MM3 (ref 0–0.05)
IMM GRANULOCYTES NFR BLD AUTO: 0.4 % (ref 0–0.5)
KETONES UR QL STRIP: NEGATIVE
LEUKOCYTE ESTERASE UR QL STRIP.AUTO: NEGATIVE
LIPASE SERPL-CCNC: 34 U/L (ref 13–60)
LYMPHOCYTES # BLD AUTO: 3.07 10*3/MM3 (ref 0.7–3.1)
LYMPHOCYTES NFR BLD AUTO: 25 % (ref 19.6–45.3)
MCH RBC QN AUTO: 26.2 PG (ref 26.6–33)
MCHC RBC AUTO-ENTMCNC: 31.8 G/DL (ref 31.5–35.7)
MCV RBC AUTO: 82.4 FL (ref 79–97)
MONOCYTES # BLD AUTO: 0.9 10*3/MM3 (ref 0.1–0.9)
MONOCYTES NFR BLD AUTO: 7.3 % (ref 5–12)
NEUTROPHILS NFR BLD AUTO: 64.2 % (ref 42.7–76)
NEUTROPHILS NFR BLD AUTO: 7.86 10*3/MM3 (ref 1.7–7)
NITRITE UR QL STRIP: NEGATIVE
NRBC BLD AUTO-RTO: 0 /100 WBC (ref 0–0.2)
PH UR STRIP.AUTO: 7 [PH] (ref 5–8)
PLATELET # BLD AUTO: 275 10*3/MM3 (ref 140–450)
PMV BLD AUTO: 11.7 FL (ref 6–12)
POTASSIUM SERPL-SCNC: 4.1 MMOL/L (ref 3.5–5.2)
PROT SERPL-MCNC: 7 G/DL (ref 6–8.5)
PROT UR QL STRIP: NEGATIVE
RBC # BLD AUTO: 5.27 10*6/MM3 (ref 3.77–5.28)
SODIUM SERPL-SCNC: 138 MMOL/L (ref 136–145)
SP GR UR STRIP: 1.02 (ref 1–1.03)
UROBILINOGEN UR QL STRIP: ABNORMAL
WBC NRBC COR # BLD AUTO: 12.27 10*3/MM3 (ref 3.4–10.8)
WHOLE BLOOD HOLD COAG: NORMAL
WHOLE BLOOD HOLD SPECIMEN: NORMAL

## 2024-09-26 PROCEDURE — 80053 COMPREHEN METABOLIC PANEL: CPT | Performed by: FAMILY MEDICINE

## 2024-09-26 PROCEDURE — 96375 TX/PRO/DX INJ NEW DRUG ADDON: CPT

## 2024-09-26 PROCEDURE — 25510000001 IOPAMIDOL 61 % SOLUTION: Performed by: EMERGENCY MEDICINE

## 2024-09-26 PROCEDURE — 83690 ASSAY OF LIPASE: CPT | Performed by: FAMILY MEDICINE

## 2024-09-26 PROCEDURE — 74177 CT ABD & PELVIS W/CONTRAST: CPT

## 2024-09-26 PROCEDURE — 99285 EMERGENCY DEPT VISIT HI MDM: CPT

## 2024-09-26 PROCEDURE — 25010000002 KETOROLAC TROMETHAMINE PER 15 MG: Performed by: FAMILY MEDICINE

## 2024-09-26 PROCEDURE — 85025 COMPLETE CBC W/AUTO DIFF WBC: CPT | Performed by: FAMILY MEDICINE

## 2024-09-26 PROCEDURE — 25810000003 LACTATED RINGERS SOLUTION: Performed by: FAMILY MEDICINE

## 2024-09-26 PROCEDURE — 96374 THER/PROPH/DIAG INJ IV PUSH: CPT

## 2024-09-26 PROCEDURE — 76705 ECHO EXAM OF ABDOMEN: CPT

## 2024-09-26 PROCEDURE — 81003 URINALYSIS AUTO W/O SCOPE: CPT | Performed by: FAMILY MEDICINE

## 2024-09-26 RX ORDER — IOPAMIDOL 612 MG/ML
100 INJECTION, SOLUTION INTRAVASCULAR
Status: COMPLETED | OUTPATIENT
Start: 2024-09-26 | End: 2024-09-26

## 2024-09-26 RX ORDER — FAMOTIDINE 10 MG/ML
20 INJECTION, SOLUTION INTRAVENOUS ONCE
Status: COMPLETED | OUTPATIENT
Start: 2024-09-26 | End: 2024-09-26

## 2024-09-26 RX ORDER — SODIUM CHLORIDE 0.9 % (FLUSH) 0.9 %
10 SYRINGE (ML) INJECTION AS NEEDED
Status: DISCONTINUED | OUTPATIENT
Start: 2024-09-26 | End: 2024-09-26 | Stop reason: HOSPADM

## 2024-09-26 RX ORDER — KETOROLAC TROMETHAMINE 30 MG/ML
30 INJECTION, SOLUTION INTRAMUSCULAR; INTRAVENOUS EVERY 6 HOURS PRN
Status: DISCONTINUED | OUTPATIENT
Start: 2024-09-26 | End: 2024-09-26 | Stop reason: HOSPADM

## 2024-09-26 RX ADMIN — SODIUM CHLORIDE, POTASSIUM CHLORIDE, SODIUM LACTATE AND CALCIUM CHLORIDE 1000 ML: 600; 310; 30; 20 INJECTION, SOLUTION INTRAVENOUS at 05:57

## 2024-09-26 RX ADMIN — KETOROLAC TROMETHAMINE 30 MG: 30 INJECTION, SOLUTION INTRAMUSCULAR; INTRAVENOUS at 05:58

## 2024-09-26 RX ADMIN — FAMOTIDINE 20 MG: 10 INJECTION INTRAVENOUS at 05:58

## 2024-09-26 RX ADMIN — IOPAMIDOL 100 ML: 612 INJECTION, SOLUTION INTRAVENOUS at 08:39

## 2024-09-26 NOTE — ED PROVIDER NOTES
Subjective   History of Present Illness  63-year-old  female presents to [facility] ER chief complaint of abdominal pain.  Discusses for the last 2 or 3 days she has had watery stools with intermittent nausea.  No travel history, no sick contacts, no new or different or bad foods to her knowledge.  Denies constitutional symptomatology.  This morning the pain is located in the right upper quadrant sharp nonradiating.  There is no other pain in the abdomen or chest.  Denies cough shortness of breath dyspnea pleurisy  unremarkable.  Neuro unremarkable.  Denies nausea at this time.        Review of Systems   All other systems reviewed and are negative.      Past Medical History:   Diagnosis Date    Clostridioides difficile diarrhea     COVID     Hyperlipidemia     Hypertension     Renal disorder        Allergies   Allergen Reactions    Amoxicillin Rash     Also N/V    Augmentin [Amoxicillin-Pot Clavulanate] Rash    Latex Rash       Past Surgical History:   Procedure Laterality Date    BACK SURGERY         History reviewed. No pertinent family history.    Social History     Socioeconomic History    Marital status:    Tobacco Use    Smoking status: Never    Smokeless tobacco: Never   Vaping Use    Vaping status: Never Used   Substance and Sexual Activity    Alcohol use: Not Currently    Drug use: Never    Sexual activity: Defer           Objective   Physical Exam  Vitals and nursing note reviewed.   Constitutional:       Comments: Pleasant lady, in mild acute distress   HENT:      Head: Normocephalic and atraumatic.      Mouth/Throat:      Pharynx: Oropharynx is clear.   Eyes:      Extraocular Movements: Extraocular movements intact.      Pupils: Pupils are equal, round, and reactive to light.   Cardiovascular:      Rate and Rhythm: Normal rate and regular rhythm.   Pulmonary:      Effort: Pulmonary effort is normal.      Breath sounds: Normal breath sounds.   Abdominal:      Comments: Examination  of the abdomen reveals it to be a pendulous pannus, I do not appreciate skin color changes, I do not appreciate accentuated vasculature, I do not appreciate masses, nondistended,. Normoactive bowel sounds, positive Landry sign, negative McBurney, negative Rovsing   Skin:     General: Skin is warm and dry.      Capillary Refill: Capillary refill takes less than 2 seconds.   Neurological:      General: No focal deficit present.      Mental Status: She is oriented to person, place, and time.   Psychiatric:         Mood and Affect: Mood normal.         Behavior: Behavior normal.         Procedures           ED Course  ED Course as of 09/27/24 2317   u Sep 26, 2024   1022 Please refer to  report for further details the patient was seen by him for abdominal pain sonogram was obtained which was negative.  Patient CT of the abdomen pelvis showed hepatic steatosis with no other evidence of acute abnormity pathology. [TS]   1023 The renal cysts that we have noted were discussed with the patient the patient was discharged home with a follow-up with the primary MD return if any worsening symptoms.  He she may need a HIDA scan as an outpatient [TS]      ED Course User Index  [TS] Gerber Menendez MD                                             Medical Decision Making  Problems Addressed:  Abdominal pain, unspecified abdominal location: complicated acute illness or injury  Renal lesion: complicated acute illness or injury    Amount and/or Complexity of Data Reviewed  Labs: ordered.     Details: Labs Reviewed  COMPREHENSIVE METABOLIC PANEL - Abnormal; Notable for the following components:     Glucose                       137 (*)             All other components within normal limits         Narrative: GFR Normal >60                  Chronic Kidney Disease <60                  Kidney Failure <15                    URINALYSIS W/ MICROSCOPIC IF INDICATED (NO CULTURE) - Abnormal; Notable for the following components:      Glucose, UA                     (*)               All other components within normal limits         Narrative: Urine microscopic not indicated.  CBC WITH AUTO DIFFERENTIAL - Abnormal; Notable for the following components:     WBC                           12.27 (*)               MCH                           26.2 (*)               Neutrophils, Absolute         7.86 (*)            All other components within normal limits  LIPASE - Normal  RAINBOW DRAW         Narrative: The following orders were created for panel order [facility] Draw.                  Procedure                               Abnormality         Status                                     ---------                               -----------         ------                                     Green Top (Gel)[[id number]]                                  Final result                               Lavender Top[[id number]]                                     Final result                               Red Top[[id number]]                                          Final result                               Little Top[[id number]]                                         Final result                               Light Blue Top[[id number]]                                   Final result                                                 Please view results for these tests on the individual orders.  CBC AND DIFFERENTIAL         Narrative: The following orders were created for panel order CBC & Differential.                  Procedure                               Abnormality         Status                                     ---------                               -----------         ------                                     CBC Auto Differential[[id number]]        Abnormal            Final result                                                 Please view results for these tests on the individual orders.  GREEN TOP  LAVENDER TOP  RED TOP  GRAY TOP  LIGHT BLUE TOP    Radiology:  ordered.    Risk  Prescription drug management.        Final diagnoses:   None       ED Disposition  ED Disposition       None            No follow-up provider specified.       Medication List      No changes were made to your prescriptions during this visit.            Mino Graham MD  09/26/24 0622       Mino Graham MD  09/26/24 0622       Mino Graham MD  09/26/24 0623       Mino Graham MD  09/27/24 [id number]

## 2024-09-26 NOTE — DISCHARGE INSTRUCTIONS
It was very nice to meet you, Polly. Thank you for allowing us to take care of you today at [facility].     Today you were seen in the emergency department for your symptoms. Please understand that an ER evaluation is just the start of your evaluation. We do the best we can, but we are often unable to fully find what is causing your symptoms from one evaluation.  Because of this, the goal is to determine whether you need to be evaluated in the hospital or if it is safe for you to go home and see other doctors provided such as primary care physicians or specialist on an outpatient basis.      Like we discussed, I strongly urge that you follow up with your primary care doctor. Please call their office to set up an appointment as soon as possible so that you can be re-evaluated for improvement in your symptoms or for any other questions.  I have provided the information needed, including phone number, to call to set up an appointment below in these discharge papers.      Educational material has also been provided in the following pages regarding what we have discussed today.  As an outpatient require a HIDA scan and a follow-up with general surgeon for ongoing pain and discomfort.  Please return to the ER for any worsening symptoms.     Please return to the emergency room within 12-48 hours if you experience symptoms such as the following:   Fever, chills, chest pain or shortness of breath, pain with inspiration/expiration, pain that travels to your arms, neck or back, nausea, vomiting, severe headache, tearing pain in your chest, dizziness, feel as though you are about to pass out, OR if you have any worsening symptoms, or any other concerns.

## 2024-09-26 NOTE — ED PROVIDER NOTES
Subjective   History of Present Illness    Review of Systems    Past Medical History:   Diagnosis Date    Clostridioides difficile diarrhea     COVID     Hyperlipidemia     Hypertension     Renal disorder        Allergies   Allergen Reactions    Amoxicillin Rash     Also N/V    Augmentin [Amoxicillin-Pot Clavulanate] Rash    Latex Rash       Past Surgical History:   Procedure Laterality Date    BACK SURGERY         History reviewed. No pertinent family history.    Social History     Socioeconomic History    Marital status:    Tobacco Use    Smoking status: Never    Smokeless tobacco: Never   Vaping Use    Vaping status: Never Used   Substance and Sexual Activity    Alcohol use: Not Currently    Drug use: Never    Sexual activity: Defer           Objective   Physical Exam    Procedures           ED Course  ED Course as of 09/26/24 1025   Thu Sep 26, 2024   1022 Please refer to  report for further details the patient was seen by him for abdominal pain sonogram was obtained which was negative.  Patient CT of the abdomen pelvis showed hepatic steatosis with no other evidence of acute abnormity pathology. [TS]   1023 The renal cysts that we have noted were discussed with the patient the patient was discharged home with a follow-up with the primary MD return if any worsening symptoms.  He she may need a HIDA scan as an outpatient [TS]      ED Course User Index  [TS] Gerber Menendez MD                                             Medical Decision Making  Problems Addressed:  Abdominal pain, unspecified abdominal location: acute illness or injury  Renal lesion: undiagnosed new problem with uncertain prognosis    Amount and/or Complexity of Data Reviewed  Labs: ordered.     Details: Labs were reviewed  Radiology: ordered.     Details: Family    Risk  Prescription drug management.  Risk Details: This patient presents with abdominal pain arrived hemodynamically stable.  Presentation not consistent with other  acute, emergent causes of abdominal pain at this time.  Low suspicion for dissection there is no widened mediastinum, hypotension, pulses deficits, and no pain tearing through to the back.  Low suspicion for nephrolithiasis without flank pain radiating to the groin, hematuria, or any history of kidney stones.  Low suspicion for viscus perforation without evidence of guarding, rebound, or rigidity that would be concerning for an acute abdomen.  Low concern for appendicitis as pain did not radiate from the umbilicus to the right lower quadrant, negative Rovsing's sign, no severe constipation on exam.  Low concern for cholecystitis with negative Landry sign, no history of gallstones, and no recent pale stools or pain after eating.  Low concern for ulcerative disease as pain is not consistent with eating  foods and is not relieved with patient refraining from eatingand there is no hematemesis or melena. Low suspicion for GI bleeding as there is no melena hematemesis or hematochezia and patient's hemodynamics are stable and hemoglobin is at its baseline.  Low suspicion for gastroenteritis without evidence of diarrhea, fevers, or recent uncooked food exposure.  There is low concern for ischemic bowel with no significant abdominal pain normal vitals and no acidosis no history of peripheral vascular disease or cardiac dysrhythmias.                Final diagnoses:   Abdominal pain, unspecified abdominal location   Renal lesion       ED Disposition  ED Disposition       ED Disposition   Discharge    Condition   Stable    Comment   --               Brianna English MD  [address] [place] [address] [address]  [place] [address]  [phone]    Schedule an appointment as soon as possible for a visit in 2 days           Medication List      No changes were made to your prescriptions during this visit.            Gerber Menendez MD  09/26/24 [id number]

## 2024-09-27 ENCOUNTER — TELEPHONE (OUTPATIENT)
Dept: PRIMARY CARE CLINIC | Age: 64
End: 2024-09-27

## 2024-09-27 NOTE — TELEPHONE ENCOUNTER
Tried to call patient left some of a message her voicemail cut me off. Patient needs to be scheduled for a ED Follow up she was discharged for [facility] ED 9/26/24.      The ED Follow Up has been scheduled patient not notified will try to call her back at later time.

## 2024-09-30 ENCOUNTER — OFFICE VISIT (OUTPATIENT)
Dept: PRIMARY CARE CLINIC | Age: 64
End: 2024-09-30
Payer: MEDICARE

## 2024-09-30 VITALS
HEART RATE: 52 BPM | SYSTOLIC BLOOD PRESSURE: 130 MMHG | DIASTOLIC BLOOD PRESSURE: 76 MMHG | HEIGHT: 68 IN | OXYGEN SATURATION: 96 % | TEMPERATURE: 97.6 F | BODY MASS INDEX: 35.5 KG/M2 | WEIGHT: 234.25 LBS

## 2024-09-30 DIAGNOSIS — E66.01 SEVERE OBESITY (BMI 35.0-39.9) WITH COMORBIDITY: ICD-10-CM

## 2024-09-30 DIAGNOSIS — N28.1 COMPLEX RENAL CYST: ICD-10-CM

## 2024-09-30 DIAGNOSIS — N18.31 STAGE 3A CHRONIC KIDNEY DISEASE (HCC): ICD-10-CM

## 2024-09-30 DIAGNOSIS — K76.0 NONALCOHOLIC FATTY LIVER DISEASE WITHOUT NONALCOHOLIC STEATOHEPATITIS (NASH): ICD-10-CM

## 2024-09-30 DIAGNOSIS — K41.20 NON-RECURRENT BILATERAL FEMORAL HERNIA WITHOUT OBSTRUCTION OR GANGRENE: ICD-10-CM

## 2024-09-30 DIAGNOSIS — K58.0 IRRITABLE BOWEL SYNDROME WITH DIARRHEA: ICD-10-CM

## 2024-09-30 DIAGNOSIS — R10.9 RIGHT FLANK PAIN: ICD-10-CM

## 2024-09-30 DIAGNOSIS — R10.11 RIGHT UPPER QUADRANT ABDOMINAL PAIN: Primary | ICD-10-CM

## 2024-09-30 PROCEDURE — 3078F DIAST BP <80 MM HG: CPT | Performed by: INTERNAL MEDICINE

## 2024-09-30 PROCEDURE — G8427 DOCREV CUR MEDS BY ELIG CLIN: HCPCS | Performed by: INTERNAL MEDICINE

## 2024-09-30 PROCEDURE — 3017F COLORECTAL CA SCREEN DOC REV: CPT | Performed by: INTERNAL MEDICINE

## 2024-09-30 PROCEDURE — 1036F TOBACCO NON-USER: CPT | Performed by: INTERNAL MEDICINE

## 2024-09-30 PROCEDURE — 99214 OFFICE O/P EST MOD 30 MIN: CPT | Performed by: INTERNAL MEDICINE

## 2024-09-30 PROCEDURE — 3075F SYST BP GE 130 - 139MM HG: CPT | Performed by: INTERNAL MEDICINE

## 2024-09-30 PROCEDURE — G8417 CALC BMI ABV UP PARAM F/U: HCPCS | Performed by: INTERNAL MEDICINE

## 2024-09-30 RX ORDER — HYOSCYAMINE SULFATE 0.125 MG
0.12 TABLET ORAL EVERY 6 HOURS PRN
Qty: 90 TABLET | Refills: 5 | Status: SHIPPED | OUTPATIENT
Start: 2024-09-30

## 2024-09-30 ASSESSMENT — ENCOUNTER SYMPTOMS
ABDOMINAL DISTENTION: 0
VOMITING: 0
EYE PAIN: 0
DIARRHEA: 1
COLOR CHANGE: 0
VOICE CHANGE: 0
SINUS PRESSURE: 0
BLOOD IN STOOL: 0
RHINORRHEA: 0
COUGH: 0
EYE REDNESS: 0
ANAL BLEEDING: 0
ABDOMINAL PAIN: 1
WHEEZING: 0
BACK PAIN: 1
SHORTNESS OF BREATH: 0
SORE THROAT: 0
EYE DISCHARGE: 0
CONSTIPATION: 0
CHEST TIGHTNESS: 0

## 2024-10-07 ENCOUNTER — OFFICE VISIT (OUTPATIENT)
Dept: UROLOGY | Age: 64
End: 2024-10-07

## 2024-10-07 VITALS — HEIGHT: 68 IN | WEIGHT: 235.6 LBS | TEMPERATURE: 97.2 F | BODY MASS INDEX: 35.71 KG/M2

## 2024-10-07 DIAGNOSIS — N28.1 BILATERAL RENAL CYSTS: Primary | ICD-10-CM

## 2024-10-07 LAB
APPEARANCE FLUID: CLEAR
BILIRUBIN, POC: NORMAL
BLOOD URINE, POC: 1.02
CLARITY, POC: CLEAR
COLOR, POC: YELLOW
GLUCOSE URINE, POC: 1000 MG/DL
KETONES, POC: NORMAL MG/DL
LEUKOCYTE EST, POC: NORMAL
NITRITE, POC: NORMAL
PH, POC: 6
PROTEIN, POC: NORMAL MG/DL
SPECIFIC GRAVITY, POC: 2
UROBILINOGEN, POC: 0.2 MG/DL

## 2024-10-07 NOTE — PROGRESS NOTES
incompletely distended  with no intrinsic abnormality.    Uterus is absent. No adnexal masses.    There are small fat-containing femoral hernias bilaterally.    Stomach is decompressed. No focal abnormality Alamast. Duodenum is  normal. Small bowel is nondistended. No finding to suggest appendicitis.  Cecum lies low in the pelvis. Moderate gas and stool is seen in the  colon more so in the proximal colon. No finding to suggest obstruction.    Mild atheromatous changes of the abdominal aorta. No aneurysmal  dilatation.    No evidence of abdominal or pelvic lymphadenopathy.    Images reviewed in bone window show hardware fusion of the lower lumbar  spine at L4-L5 and S1 with bilateral pedicular screws and disc spacers.  There is significant artifacts in this area.  Exam End: 09/26/24 09:38    Specimen Collected: 09/26/24 10:11 Last Resulted: 09/26/24 10:19   Received From: [facility]  Result Received: 09/27/24 11:14     Unable to review images myself.  I will call and get these pushed over through PACS.  Will call patient if there is any other differences from what I evaluate on her imaging.  Otherwise looking through her previous CTs it looks as if these have been present for several years and essentially unchanged in size.    1. Bilateral renal cysts  Bilateral renal cysts present for some time.  These do appear cystic no solid components.  Essentially cyst are unchanged in size.  We discussed we do not drain cyst as they usually recur and is no longer recommended.  No hydronephrosis or obstructive uropathy noted therefore no intervention needed.  Essentially no change in size over the last several years.  Can follow-up as needed    At least 46 minutes were spent on the day of the visit reviewing the patient's past medical records/imaging, speaking face to face with the patient, and charting in the post visit period.      Orders Placed This Encounter   Procedures    POCT Urinalysis no Micro

## 2024-10-08 ENCOUNTER — HOSPITAL ENCOUNTER (OUTPATIENT)
Dept: WOMENS IMAGING | Age: 64
Discharge: HOME | End: 2024-10-08
Attending: INTERNAL MEDICINE
Payer: MEDICARE

## 2024-10-08 DIAGNOSIS — Z12.31 ENCOUNTER FOR SCREENING MAMMOGRAM FOR MALIGNANT NEOPLASM OF BREAST: ICD-10-CM

## 2024-10-08 PROCEDURE — 77063 BREAST TOMOSYNTHESIS BI: CPT

## 2024-10-08 ASSESSMENT — ENCOUNTER SYMPTOMS
BACK PAIN: 0
ABDOMINAL DISTENTION: 0
VOMITING: 0
NAUSEA: 0
ABDOMINAL PAIN: 0

## 2024-10-13 DIAGNOSIS — E11.9 NEW ONSET TYPE 2 DIABETES MELLITUS (HCC): ICD-10-CM

## 2024-10-14 RX ORDER — EMPAGLIFLOZIN 25 MG/1
25 TABLET, FILM COATED ORAL DAILY
Qty: 30 TABLET | Refills: 5 | Status: SHIPPED | OUTPATIENT
Start: 2024-10-14

## 2024-10-28 ENCOUNTER — TELEPHONE (OUTPATIENT)
Dept: SURGERY | Age: 64
End: 2024-10-28

## 2024-10-29 ENCOUNTER — PREP FOR PROCEDURE (OUTPATIENT)
Dept: SURGERY | Age: 64
End: 2024-10-29

## 2024-10-29 ENCOUNTER — OFFICE VISIT (OUTPATIENT)
Dept: SURGERY | Age: 64
End: 2024-10-29
Payer: MEDICARE

## 2024-10-29 VITALS
TEMPERATURE: 97.1 F | WEIGHT: 231 LBS | BODY MASS INDEX: 35.01 KG/M2 | OXYGEN SATURATION: 96 % | HEART RATE: 72 BPM | HEIGHT: 68 IN

## 2024-10-29 DIAGNOSIS — K41.20 NON-RECURRENT BILATERAL FEMORAL HERNIA WITHOUT OBSTRUCTION OR GANGRENE: Primary | ICD-10-CM

## 2024-10-29 PROCEDURE — G8427 DOCREV CUR MEDS BY ELIG CLIN: HCPCS | Performed by: SURGERY

## 2024-10-29 PROCEDURE — 1036F TOBACCO NON-USER: CPT | Performed by: SURGERY

## 2024-10-29 PROCEDURE — 99203 OFFICE O/P NEW LOW 30 MIN: CPT | Performed by: SURGERY

## 2024-10-29 PROCEDURE — G8484 FLU IMMUNIZE NO ADMIN: HCPCS | Performed by: SURGERY

## 2024-10-29 PROCEDURE — 3017F COLORECTAL CA SCREEN DOC REV: CPT | Performed by: SURGERY

## 2024-10-29 PROCEDURE — G8417 CALC BMI ABV UP PARAM F/U: HCPCS | Performed by: SURGERY

## 2024-10-29 RX ORDER — ACETAMINOPHEN 325 MG/1
1000 TABLET ORAL ONCE
OUTPATIENT
Start: 2024-10-29 | End: 2024-10-29

## 2024-10-29 RX ORDER — SODIUM CHLORIDE 0.9 % (FLUSH) 0.9 %
5-40 SYRINGE (ML) INJECTION PRN
OUTPATIENT
Start: 2024-10-29

## 2024-10-29 RX ORDER — SODIUM CHLORIDE, SODIUM LACTATE, POTASSIUM CHLORIDE, CALCIUM CHLORIDE 600; 310; 30; 20 MG/100ML; MG/100ML; MG/100ML; MG/100ML
INJECTION, SOLUTION INTRAVENOUS CONTINUOUS
OUTPATIENT
Start: 2024-10-29

## 2024-10-29 RX ORDER — SODIUM CHLORIDE 0.9 % (FLUSH) 0.9 %
5-40 SYRINGE (ML) INJECTION EVERY 12 HOURS SCHEDULED
OUTPATIENT
Start: 2024-10-29

## 2024-10-29 RX ORDER — SODIUM CHLORIDE 9 MG/ML
INJECTION, SOLUTION INTRAVENOUS PRN
OUTPATIENT
Start: 2024-10-29

## 2024-10-29 ASSESSMENT — ENCOUNTER SYMPTOMS
NAUSEA: 1
EYE ITCHING: 1
DIARRHEA: 1
ABDOMINAL PAIN: 1
EYE PAIN: 1
EYE DISCHARGE: 1
ABDOMINAL DISTENTION: 1
BACK PAIN: 1
CHEST TIGHTNESS: 1
RHINORRHEA: 1

## 2024-10-29 NOTE — PROGRESS NOTES
personally reviewed by me. Agree with findings of b/l femoral hernias. Patient is tender on exam in this area.     Reviewed femoral hernia pathophysiology with the patient who notes understanding.     The risks, benefits, and alternatives were discussed with the pt. She is willing to accept the risks and proceed with a robotic assisted bilateral femoral hernia repair with mesh. The surgical risks included but not limited to bleeding, infection, perforation, recurrence, risk of needing further surgery, etc. The anesthetic risks included heart attacks, strokes, pneumonia, phlebitis, etc.  She is willing to accept all risks and proceed with surgery. No guarantees have been given.     Return for Post-operative Visit.    Yenifer Díaz DO 10/29/2024 4:21 PM

## 2024-10-29 NOTE — H&P (VIEW-ONLY)
(ANASPAZ;LEVSIN) 0.125 MG tablet Take 1 tablet by mouth every 6 hours as needed for Cramping 90 tablet 5    dilTIAZem (TIADYLT ER) 240 MG extended release capsule TAKE 1 CAPSULE BY MOUTH EVERY DAY 90 capsule 1    blood glucose monitor strips Test 1-2 times a day & as needed for symptoms of irregular blood glucose. Dispense sufficient amount for indicated testing frequency plus additional to accommodate PRN testing needs. 100 strip 3    Lancets MISC Use 1 lancet three times a day to check blood sugars for type II diabetes 150 each 3    glucose monitoring kit 1 kit by Does not apply route daily 1 kit 0    atorvastatin (LIPITOR) 20 MG tablet TAKE 1 TABLET BY MOUTH EVERY DAY 90 tablet 3    triamterene-hydroCHLOROthiazide (MAXZIDE-25) 37.5-25 MG per tablet TAKE 1 TABLET BY MOUTH EVERY DAY 90 tablet 3    ESTRADIOL-PROGESTERONE PO Take by mouth      LORazepam (ATIVAN) 0.5 MG tablet Take 1 tablet by mouth every 6 hours as needed for Anxiety.      FLUoxetine (PROZAC) 10 MG capsule TAKE 1 CAPSULE BY MOUTH EVERY DAY 90 capsule 3    metoprolol (LOPRESSOR) 100 MG tablet TAKE 1 TABLET BY MOUTH TWICE A  tablet 3    Probiotic Product (PROBIOTIC-10 PO) Take 1 capsule by mouth daily      Multiple Vitamins-Minerals (WOMENS MULTIVITAMIN PO) Take 1 tablet by mouth daily       No current facility-administered medications for this visit.     Allergies: Latex, Amoxicillin, Anaprox [naproxen sodium], Aloe vera, Nortriptyline hcl, Augmentin [amoxicillin-pot clavulanate], Buspar [buspirone], and Vibramycin [doxycycline calcium]    Family History   Problem Relation Age of Onset    Diabetes Mother     High Blood Pressure Mother     Heart Attack Mother     Kidney Disease Mother         diabetic kidney disease    Kidney Disease Father         ESRD/HD    Stroke Father     Other Father         renal failure    Alcohol Abuse Sister     Cirrhosis Sister     Colon Polyps Sister     Diabetes Maternal Grandmother     Heart Disease Maternal

## 2024-10-31 ENCOUNTER — HOSPITAL ENCOUNTER (OUTPATIENT)
Dept: PREADMISSION TESTING | Age: 64
Discharge: HOME | End: 2024-11-04
Payer: MEDICARE

## 2024-10-31 VITALS — HEIGHT: 68 IN | WEIGHT: 231.4 LBS | BODY MASS INDEX: 35.07 KG/M2

## 2024-10-31 LAB
EKG P AXIS: 39 DEGREES
EKG P-R INTERVAL: 138 MS
EKG Q-T INTERVAL: 430 MS
EKG QRS DURATION: 94 MS
EKG QTC CALCULATION (BAZETT): 423 MS
EKG T AXIS: 45 DEGREES
MRSA DNA SPEC QL NAA+PROBE: NOT DETECTED

## 2024-10-31 PROCEDURE — 87641 MR-STAPH DNA AMP PROBE: CPT

## 2024-10-31 PROCEDURE — 93005 ELECTROCARDIOGRAM TRACING: CPT | Performed by: ANESTHESIOLOGY

## 2024-10-31 PROCEDURE — 93010 ELECTROCARDIOGRAM REPORT: CPT | Performed by: INTERNAL MEDICINE

## 2024-10-31 NOTE — DISCHARGE INSTRUCTIONS
PREOPERATIVE GUIDELINES WHEN RECEIVING ANESTHESIA    Do not eat anything after midnight the night before your surgery. You may have water up to 2 hours before your arrival time. No gum or candy the morning of surgery.  This is extremely important for your safety.  The morning of surgery take all your medications except: Triamterine, Jardiance, vitamins and supplements    Take a bath (or shower) the night before your surgery and you may brush your teeth the morning of your surgery.    Chlorhexidine Gluconate 4% Solution    Patient should shower with this soap  the night before surgery and the morning of surgery washing from the neck down (avoiding contact with genitalia).      DO NOT WASH YOUR HAIR OR FACE WITH THIS SOAP.  When washing with this soap, apply enough to suds up the body thoroughly, turn the water away from your body and allow the soap suds to remain on the body for 2 full minutes, then rinse body completely.      After using this soap on the body, please do not apply powders or lotions to your body.  After the shower the night before surgery, please dry off with a new towel, sleep in new freshly laundered pj's, and change your bed linen before going to sleep.      IF YOU HAVE A PET IN YOUR HOME, please do not allow your pet to sleep in the bed with you after you have showered with your surgery prep soap.     Please remember that it is not recommended to allow your pet to sleep with you post op, until your incision has healed.  This can increase your risk of post op infection.    You will be scheduled to arrive at the hospital 2 hours before your surgery, or follow your surgeon's instructions.    Dress comfortably.  Wear loose clothing that will be easy to remove and comfortable for your trip home.    You may wear eyeglasses but bring your cases with you as they must be remove before your surgery. If you wear contacts they will have to be removed before your surgery.    Hearing aids and dentures will

## 2024-11-01 NOTE — PROGRESS NOTES
EKG taken to Anesthesia for review with a comparison. Patient ok to proceed with surgery no further testing required.

## 2024-11-07 ENCOUNTER — ANESTHESIA (OUTPATIENT)
Dept: OPERATING ROOM | Age: 64
End: 2024-11-07
Payer: MEDICARE

## 2024-11-07 ENCOUNTER — ANESTHESIA EVENT (OUTPATIENT)
Dept: OPERATING ROOM | Age: 64
End: 2024-11-07
Payer: MEDICARE

## 2024-11-07 ENCOUNTER — HOSPITAL ENCOUNTER (OUTPATIENT)
Age: 64
Setting detail: OUTPATIENT SURGERY
Discharge: HOME | End: 2024-11-07
Attending: SURGERY | Admitting: SURGERY
Payer: MEDICARE

## 2024-11-07 VITALS
HEIGHT: 68 IN | OXYGEN SATURATION: 92 % | HEART RATE: 55 BPM | RESPIRATION RATE: 16 BRPM | TEMPERATURE: 97.3 F | WEIGHT: 231 LBS | SYSTOLIC BLOOD PRESSURE: 145 MMHG | DIASTOLIC BLOOD PRESSURE: 69 MMHG | BODY MASS INDEX: 35.01 KG/M2

## 2024-11-07 LAB
GLUCOSE BLD-MCNC: 120 MG/DL (ref 70–99)
GLUCOSE BLD-MCNC: 151 MG/DL (ref 70–99)
PERFORMED ON: ABNORMAL
PERFORMED ON: ABNORMAL

## 2024-11-07 PROCEDURE — 6370000000 HC RX 637 (ALT 250 FOR IP): Performed by: SURGERY

## 2024-11-07 PROCEDURE — 6370000000 HC RX 637 (ALT 250 FOR IP): Performed by: ANESTHESIOLOGY

## 2024-11-07 PROCEDURE — 7100000010 HC PHASE II RECOVERY - FIRST 15 MIN: Performed by: SURGERY

## 2024-11-07 PROCEDURE — 2500000003 HC RX 250 WO HCPCS

## 2024-11-07 PROCEDURE — 2580000003 HC RX 258: Performed by: SURGERY

## 2024-11-07 PROCEDURE — 7100000000 HC PACU RECOVERY - FIRST 15 MIN: Performed by: SURGERY

## 2024-11-07 PROCEDURE — 6360000002 HC RX W HCPCS

## 2024-11-07 PROCEDURE — C9290 INJ, BUPIVACAINE LIPOSOME: HCPCS | Performed by: SURGERY

## 2024-11-07 PROCEDURE — 3700000001 HC ADD 15 MINUTES (ANESTHESIA): Performed by: SURGERY

## 2024-11-07 PROCEDURE — C1781 MESH (IMPLANTABLE): HCPCS | Performed by: SURGERY

## 2024-11-07 PROCEDURE — 7100000001 HC PACU RECOVERY - ADDTL 15 MIN: Performed by: SURGERY

## 2024-11-07 PROCEDURE — 6360000002 HC RX W HCPCS: Performed by: SURGERY

## 2024-11-07 PROCEDURE — 2580000003 HC RX 258

## 2024-11-07 PROCEDURE — 2709999900 HC NON-CHARGEABLE SUPPLY: Performed by: SURGERY

## 2024-11-07 PROCEDURE — 3600000019 HC SURGERY ROBOT ADDTL 15MIN: Performed by: SURGERY

## 2024-11-07 PROCEDURE — 3700000000 HC ANESTHESIA ATTENDED CARE: Performed by: SURGERY

## 2024-11-07 PROCEDURE — 7100000011 HC PHASE II RECOVERY - ADDTL 15 MIN: Performed by: SURGERY

## 2024-11-07 PROCEDURE — 82962 GLUCOSE BLOOD TEST: CPT

## 2024-11-07 PROCEDURE — S2900 ROBOTIC SURGICAL SYSTEM: HCPCS | Performed by: SURGERY

## 2024-11-07 PROCEDURE — 3600000009 HC SURGERY ROBOT BASE: Performed by: SURGERY

## 2024-11-07 DEVICE — MESH CS LEFT EXTRA-LARGE 12CM X 17CM: Type: IMPLANTABLE DEVICE | Site: PELVIS | Status: FUNCTIONAL

## 2024-11-07 DEVICE — MESH HERN MID ANAT XL 7X5 IN RT 3DMAX: Type: IMPLANTABLE DEVICE | Site: PELVIS | Status: FUNCTIONAL

## 2024-11-07 RX ORDER — NALOXONE HYDROCHLORIDE 0.4 MG/ML
INJECTION, SOLUTION INTRAMUSCULAR; INTRAVENOUS; SUBCUTANEOUS PRN
Status: DISCONTINUED | OUTPATIENT
Start: 2024-11-07 | End: 2024-11-07 | Stop reason: HOSPADM

## 2024-11-07 RX ORDER — SCOLOPAMINE TRANSDERMAL SYSTEM 1 MG/1
1 PATCH, EXTENDED RELEASE TRANSDERMAL
Status: DISCONTINUED | OUTPATIENT
Start: 2024-11-07 | End: 2024-11-07 | Stop reason: HOSPADM

## 2024-11-07 RX ORDER — SODIUM CHLORIDE 0.9 % (FLUSH) 0.9 %
5-40 SYRINGE (ML) INJECTION PRN
Status: DISCONTINUED | OUTPATIENT
Start: 2024-11-07 | End: 2024-11-07 | Stop reason: HOSPADM

## 2024-11-07 RX ORDER — DEXAMETHASONE SODIUM PHOSPHATE 10 MG/ML
INJECTION, SOLUTION INTRAMUSCULAR; INTRAVENOUS
Status: DISCONTINUED | OUTPATIENT
Start: 2024-11-07 | End: 2024-11-07 | Stop reason: SDUPTHER

## 2024-11-07 RX ORDER — SODIUM CHLORIDE 0.9 % (FLUSH) 0.9 %
5-40 SYRINGE (ML) INJECTION EVERY 12 HOURS SCHEDULED
Status: DISCONTINUED | OUTPATIENT
Start: 2024-11-07 | End: 2024-11-07 | Stop reason: HOSPADM

## 2024-11-07 RX ORDER — LIDOCAINE HYDROCHLORIDE 10 MG/ML
1 INJECTION, SOLUTION EPIDURAL; INFILTRATION; INTRACAUDAL; PERINEURAL
Status: DISCONTINUED | OUTPATIENT
Start: 2024-11-07 | End: 2024-11-07 | Stop reason: HOSPADM

## 2024-11-07 RX ORDER — SODIUM CHLORIDE 9 MG/ML
INJECTION, SOLUTION INTRAVENOUS PRN
Status: DISCONTINUED | OUTPATIENT
Start: 2024-11-07 | End: 2024-11-07 | Stop reason: HOSPADM

## 2024-11-07 RX ORDER — HYDROMORPHONE HYDROCHLORIDE 1 MG/ML
0.5 INJECTION, SOLUTION INTRAMUSCULAR; INTRAVENOUS; SUBCUTANEOUS EVERY 5 MIN PRN
Status: DISCONTINUED | OUTPATIENT
Start: 2024-11-07 | End: 2024-11-07 | Stop reason: HOSPADM

## 2024-11-07 RX ORDER — HYDROMORPHONE HYDROCHLORIDE 1 MG/ML
0.25 INJECTION, SOLUTION INTRAMUSCULAR; INTRAVENOUS; SUBCUTANEOUS EVERY 5 MIN PRN
Status: DISCONTINUED | OUTPATIENT
Start: 2024-11-07 | End: 2024-11-07 | Stop reason: HOSPADM

## 2024-11-07 RX ORDER — ONDANSETRON 2 MG/ML
INJECTION INTRAMUSCULAR; INTRAVENOUS
Status: DISCONTINUED | OUTPATIENT
Start: 2024-11-07 | End: 2024-11-07 | Stop reason: SDUPTHER

## 2024-11-07 RX ORDER — SUCCINYLCHOLINE CHLORIDE 20 MG/ML
INJECTION INTRAMUSCULAR; INTRAVENOUS
Status: DISCONTINUED | OUTPATIENT
Start: 2024-11-07 | End: 2024-11-07 | Stop reason: SDUPTHER

## 2024-11-07 RX ORDER — PROPOFOL 10 MG/ML
INJECTION, EMULSION INTRAVENOUS
Status: DISCONTINUED | OUTPATIENT
Start: 2024-11-07 | End: 2024-11-07 | Stop reason: SDUPTHER

## 2024-11-07 RX ORDER — MIDAZOLAM HYDROCHLORIDE 2 MG/2ML
2 INJECTION, SOLUTION INTRAMUSCULAR; INTRAVENOUS
Status: DISCONTINUED | OUTPATIENT
Start: 2024-11-07 | End: 2024-11-07 | Stop reason: HOSPADM

## 2024-11-07 RX ORDER — SODIUM CHLORIDE, SODIUM LACTATE, POTASSIUM CHLORIDE, CALCIUM CHLORIDE 600; 310; 30; 20 MG/100ML; MG/100ML; MG/100ML; MG/100ML
INJECTION, SOLUTION INTRAVENOUS CONTINUOUS
Status: DISCONTINUED | OUTPATIENT
Start: 2024-11-07 | End: 2024-11-07 | Stop reason: HOSPADM

## 2024-11-07 RX ORDER — EPHEDRINE SULFATE/0.9% NACL/PF 25 MG/5 ML
SYRINGE (ML) INTRAVENOUS
Status: DISCONTINUED | OUTPATIENT
Start: 2024-11-07 | End: 2024-11-07 | Stop reason: SDUPTHER

## 2024-11-07 RX ORDER — ROCURONIUM BROMIDE 10 MG/ML
INJECTION, SOLUTION INTRAVENOUS
Status: DISCONTINUED | OUTPATIENT
Start: 2024-11-07 | End: 2024-11-07 | Stop reason: SDUPTHER

## 2024-11-07 RX ORDER — LIDOCAINE HYDROCHLORIDE 10 MG/ML
INJECTION, SOLUTION INFILTRATION; PERINEURAL
Status: DISCONTINUED | OUTPATIENT
Start: 2024-11-07 | End: 2024-11-07 | Stop reason: SDUPTHER

## 2024-11-07 RX ORDER — DEXAMETHASONE SODIUM PHOSPHATE 10 MG/ML
8 INJECTION, SOLUTION INTRAMUSCULAR; INTRAVENOUS ONCE
Status: DISCONTINUED | OUTPATIENT
Start: 2024-11-07 | End: 2024-11-07 | Stop reason: HOSPADM

## 2024-11-07 RX ORDER — BUPIVACAINE HYDROCHLORIDE 2.5 MG/ML
INJECTION, SOLUTION INFILTRATION; PERINEURAL PRN
Status: DISCONTINUED | OUTPATIENT
Start: 2024-11-07 | End: 2024-11-07 | Stop reason: HOSPADM

## 2024-11-07 RX ORDER — FENTANYL CITRATE 50 UG/ML
INJECTION, SOLUTION INTRAMUSCULAR; INTRAVENOUS
Status: DISCONTINUED | OUTPATIENT
Start: 2024-11-07 | End: 2024-11-07 | Stop reason: SDUPTHER

## 2024-11-07 RX ORDER — ACETAMINOPHEN 500 MG
1000 TABLET ORAL ONCE
Status: COMPLETED | OUTPATIENT
Start: 2024-11-07 | End: 2024-11-07

## 2024-11-07 RX ORDER — SODIUM CHLORIDE, SODIUM LACTATE, POTASSIUM CHLORIDE, CALCIUM CHLORIDE 600; 310; 30; 20 MG/100ML; MG/100ML; MG/100ML; MG/100ML
INJECTION, SOLUTION INTRAVENOUS
Status: DISCONTINUED | OUTPATIENT
Start: 2024-11-07 | End: 2024-11-07 | Stop reason: SDUPTHER

## 2024-11-07 RX ORDER — FAMOTIDINE 20 MG/1
20 TABLET, FILM COATED ORAL ONCE
Status: COMPLETED | OUTPATIENT
Start: 2024-11-07 | End: 2024-11-07

## 2024-11-07 RX ADMIN — DEXAMETHASONE SODIUM PHOSPHATE 10 MG: 10 INJECTION, SOLUTION INTRAMUSCULAR; INTRAVENOUS at 11:36

## 2024-11-07 RX ADMIN — FENTANYL CITRATE 100 MCG: 0.05 INJECTION, SOLUTION INTRAMUSCULAR; INTRAVENOUS at 11:25

## 2024-11-07 RX ADMIN — ROCURONIUM BROMIDE 10 MG: 10 INJECTION, SOLUTION INTRAVENOUS at 12:10

## 2024-11-07 RX ADMIN — CEFAZOLIN 2000 MG: 2 INJECTION, POWDER, FOR SOLUTION INTRAMUSCULAR; INTRAVENOUS at 11:33

## 2024-11-07 RX ADMIN — ROCURONIUM BROMIDE 45 MG: 10 INJECTION, SOLUTION INTRAVENOUS at 11:39

## 2024-11-07 RX ADMIN — HYDROMORPHONE HYDROCHLORIDE 1 MG: 1 INJECTION, SOLUTION INTRAMUSCULAR; INTRAVENOUS; SUBCUTANEOUS at 11:39

## 2024-11-07 RX ADMIN — HYDROMORPHONE HYDROCHLORIDE 0.25 MG: 1 INJECTION, SOLUTION INTRAMUSCULAR; INTRAVENOUS; SUBCUTANEOUS at 13:43

## 2024-11-07 RX ADMIN — SUCCINYLCHOLINE CHLORIDE 120 MG: 20 INJECTION, SOLUTION INTRAMUSCULAR; INTRAVENOUS at 11:29

## 2024-11-07 RX ADMIN — LIDOCAINE HYDROCHLORIDE 50 MG: 10 INJECTION, SOLUTION INFILTRATION; PERINEURAL at 11:29

## 2024-11-07 RX ADMIN — PROPOFOL 150 MG: 10 INJECTION, EMULSION INTRAVENOUS at 11:29

## 2024-11-07 RX ADMIN — SODIUM CHLORIDE, POTASSIUM CHLORIDE, SODIUM LACTATE AND CALCIUM CHLORIDE: 600; 310; 30; 20 INJECTION, SOLUTION INTRAVENOUS at 09:44

## 2024-11-07 RX ADMIN — ACETAMINOPHEN 1000 MG: 500 TABLET ORAL at 09:46

## 2024-11-07 RX ADMIN — ROCURONIUM BROMIDE 5 MG: 10 INJECTION, SOLUTION INTRAVENOUS at 11:29

## 2024-11-07 RX ADMIN — SODIUM CHLORIDE, SODIUM LACTATE, POTASSIUM CHLORIDE, AND CALCIUM CHLORIDE: 600; 310; 30; 20 INJECTION, SOLUTION INTRAVENOUS at 11:25

## 2024-11-07 RX ADMIN — SUGAMMADEX 200 MG: 100 INJECTION, SOLUTION INTRAVENOUS at 13:19

## 2024-11-07 RX ADMIN — FAMOTIDINE 20 MG: 20 TABLET ORAL at 10:22

## 2024-11-07 RX ADMIN — EPHEDRINE SULFATE 5 MG: 5 INJECTION INTRAVENOUS at 11:55

## 2024-11-07 RX ADMIN — ONDANSETRON 4 MG: 2 INJECTION INTRAMUSCULAR; INTRAVENOUS at 13:02

## 2024-11-07 RX ADMIN — HYDROMORPHONE HYDROCHLORIDE 0.25 MG: 1 INJECTION, SOLUTION INTRAMUSCULAR; INTRAVENOUS; SUBCUTANEOUS at 13:37

## 2024-11-07 ASSESSMENT — PAIN - FUNCTIONAL ASSESSMENT
PAIN_FUNCTIONAL_ASSESSMENT: 0-10
PAIN_FUNCTIONAL_ASSESSMENT: 0-10
PAIN_FUNCTIONAL_ASSESSMENT: NONE - DENIES PAIN

## 2024-11-07 ASSESSMENT — PAIN DESCRIPTION - LOCATION: LOCATION: ABDOMEN

## 2024-11-07 ASSESSMENT — PAIN SCALES - GENERAL
PAINLEVEL_OUTOF10: 4
PAINLEVEL_OUTOF10: 6
PAINLEVEL_OUTOF10: 3

## 2024-11-07 ASSESSMENT — PAIN DESCRIPTION - DESCRIPTORS
DESCRIPTORS: ACHING
DESCRIPTORS: ACHING

## 2024-11-07 NOTE — OP NOTE
safely inserted. The laparoscope was advanced and inspection undertaken identifying no injury from initial trocar insertion.     Two working ports were then placed under direct vision. Each were 8 mm robotic trocars, both placed 8 cm lateral to the umbilical port. Upon inspection, she was found to have a bilateral inguinal and femoral hernias.  The [employer] Xi operating System was delivered onto the field.  Working instruments were advanced and the right groin was approached first. The lateral peritoneum was grasped and incised toward thepubic tubercle.  Blunt dissection was performed and the pubic tubercle was identified and Artur's ligament was then cleared down to reveal the femoral canal which did have a large defect containing fat. This was gently reduced.  Direct space was inspected with a small defect appreciated that was reduced.  The filmy attachments on the lateral aspect of the round ligament were dissected and the ligament was divided with cautery.  The sac was dissected well back in the peritoneum.    This was then repeated on the left side. The left side defect was femoral and containing fat. There was also a small indirect hernia containing fat. The round ligament was divided with cautery. The peritoneal sac was reduced back into the abdomen nicely.      The selected mesh was an Bard 3-D Max, Mid, Size Extra-Large, right and left sided which was delivered onto the field.  It was then opened and oriented across each groin in standard fashion. The mesh was smooth with good adherence throughout and in excellent position with the direct and indirect spaces nicely covered on each side.  It was positioned slightly lower than normally to adequately cover the femoral spaces completely. Each mesh was secured into place with 000-vicryl sutures medially at the pubic tubercle and lateral to the epigastric vessels.     The peritoneum was then approximated and closed with the Stratifix 6 inch suture, again, taking

## 2024-11-07 NOTE — ANESTHESIA PRE PROCEDURE
06/20/2023 08:29 AM       CMP:   Lab Results   Component Value Date/Time     08/09/2024 09:08 AM    K 4.0 08/09/2024 09:08 AM    K 5.0 12/08/2020 04:29 AM     08/09/2024 09:08 AM    CO2 26 08/09/2024 09:08 AM    BUN 22 08/09/2024 09:08 AM    CREATININE 1.1 08/09/2024 09:08 AM    GFRAA >59 10/17/2022 07:53 AM    LABGLOM 56 08/09/2024 09:08 AM    LABGLOM 56 02/07/2024 09:25 AM    GLUCOSE 140 08/09/2024 09:08 AM    CALCIUM 9.3 08/09/2024 09:08 AM    BILITOT 0.4 08/09/2024 09:08 AM    ALKPHOS 89 08/09/2024 09:08 AM    AST 21 08/09/2024 09:08 AM    ALT 21 08/09/2024 09:08 AM       POC Tests:   Recent Labs     11/07/24  0942   POCGLU 120*       Coags:   Lab Results   Component Value Date/Time    PROTIME 13.2 12/02/2020 10:00 AM    INR 1.01 12/02/2020 10:00 AM    APTT 24.7 12/02/2020 10:00 AM       HCG (If Applicable): No results found for: \"PREGTESTUR\", \"PREGSERUM\", \"HCG\", \"HCGQUANT\"     ABGs: No results found for: \"PHART\", \"PO2ART\", \"NJE6NNJ\", \"IXM6NZM\", \"BEART\", \"I6GGWLPD\"     Type & Screen (If Applicable):  No results found for: \"LABABO\"    Drug/Infectious Status (If Applicable):  No results found for: \"HIV\", \"HEPCAB\"    COVID-19 Screening (If Applicable):   Lab Results   Component Value Date/Time    COVID19 Not Detected 08/24/2023 06:56 AM           Anesthesia Evaluation  Patient summary reviewed and Nursing notes reviewed   no history of anesthetic complications:   Airway: Mallampati: I  TM distance: >3 FB   Neck ROM: full  Mouth opening: > = 3 FB   Dental:    (+) lower dentures and upper dentures      Pulmonary:Negative Pulmonary ROS and normal exam                               Cardiovascular:  Exercise tolerance: good (>4 METS)  (+) hypertension:, dysrhythmias:, hyperlipidemia      ECG reviewed               Beta Blocker:  Dose within 24 Hrs         Neuro/Psych:   (+) neuromuscular disease:, psychiatric history:depression/anxiety             GI/Hepatic/Renal:   (+) GERD: well controlled, renal

## 2024-11-07 NOTE — DISCHARGE INSTR - ACTIVITY
No heavy lifting. No lifting more than 15 pounds for 6 weeks.   No work for 2 weeks.  Wear tight fitting undergarments at all times when up and about.   May shower tomorrow.   Do not soak in tub.  No swimming pools, oceans, lakes, etc for 2 weeks.   Do not drive while on pain medications.      Avoid constipation.   Take colace nightly (up to 300 mg) and miralax daily (up to three doses).   Drink 64 ounces of water and take a powdered fiber supplement daily (ie-Metamucil).    If you have signs of infection, call you doctor. These include:   -Increased pain, swelling, warmth, or redness  -Red streaks leading from the incision  -Pus draining from the incision  -A fever > 100.4

## 2024-11-07 NOTE — ANESTHESIA POSTPROCEDURE EVALUATION
Department of Anesthesiology  Postprocedure Note    Patient: Viviana Peoples  MRN: [id number]  YOB: 1960  Date of evaluation: 11/7/2024    Procedure Summary       Date: 11/07/24 Room / Location: [address]    Anesthesia Start: 1125 Anesthesia Stop:     Procedure: ROBOTIC ASSISTED LAPAROSCOPIC BILATERAL FEMORAL HERNIA REPAIR (Bilateral: Pelvis) Diagnosis:       Femoral hernia, bilateral      (Femoral hernia, bilateral [K41.20])    Surgeons: Yenifer Díaz DO Responsible Provider: Stalin Ruiz APRN - CRNA    Anesthesia Type: General ASA Status: 3            Anesthesia Type: General    Maurice Phase I: Maurice Score: 10    Maurice Phase II:      Anesthesia Post Evaluation    Patient location during evaluation: PACU  Patient participation: complete - patient participated  Level of consciousness: awake and alert  Pain score: 0  Airway patency: patent  Nausea & Vomiting: no vomiting and no nausea  Cardiovascular status: blood pressure returned to baseline and hemodynamically stable  Respiratory status: spontaneous ventilation, room air, nonlabored ventilation and acceptable  Hydration status: euvolemic  Comments: BP (!) 152/80   Pulse 62   Temp 97.4 °F (36.3 °C) (Tympanic)   Resp 16   Ht 1.727 m (5' 8\")   Wt 104.8 kg (231 lb)   SpO2 97%   BMI 35.12 kg/m²     Pain management: adequate    No notable events documented.

## 2024-11-07 NOTE — INTERVAL H&P NOTE
Update History & Physical    The patient's History and Physical of October 29, 2024 was reviewed with the patient and I examined the patient. There was no change. The surgical site was confirmed by the patient and me.     Plan: The risks, benefits, expected outcome, and alternative to the recommended procedure have been discussed with the patient. Patient understands and wants to proceed with the procedure.     Electronically signed by Yenifer Díaz DO on 11/7/2024 at 10:40 AM

## 2024-11-09 DIAGNOSIS — I10 ESSENTIAL HYPERTENSION, BENIGN: ICD-10-CM

## 2024-11-09 DIAGNOSIS — F41.1 GAD (GENERALIZED ANXIETY DISORDER): ICD-10-CM

## 2024-11-09 DIAGNOSIS — F32.0 MILD MAJOR DEPRESSION (HCC): ICD-10-CM

## 2024-11-11 RX ORDER — FLUOXETINE 10 MG/1
CAPSULE ORAL
Qty: 90 CAPSULE | Refills: 3 | Status: SHIPPED | OUTPATIENT
Start: 2024-11-11

## 2024-11-11 RX ORDER — METOPROLOL TARTRATE 100 MG/1
100 TABLET ORAL 2 TIMES DAILY
Qty: 180 TABLET | Refills: 3 | Status: SHIPPED | OUTPATIENT
Start: 2024-11-11

## 2024-11-11 NOTE — TELEPHONE ENCOUNTER
Viviana Peoples called to request a refill on her medication.      Last office visit : 9/30/2024   Next office visit : 11/13/2024     Requested Prescriptions     Pending Prescriptions Disp Refills    FLUoxetine (PROZAC) 10 MG capsule [Pharmacy Med Name: FLUOXETINE HCL 10 MG CAPSULE] 90 capsule 3     Sig: TAKE 1 CAPSULE BY MOUTH EVERY DAY    metoprolol (LOPRESSOR) 100 MG tablet [Pharmacy Med Name: METOPROLOL TARTRATE 100 MG TAB] 180 tablet 3     Sig: TAKE 1 TABLET BY MOUTH TWICE A DAY            Quynh Doyle MA

## 2024-11-12 DIAGNOSIS — D50.8 OTHER IRON DEFICIENCY ANEMIA: ICD-10-CM

## 2024-11-12 DIAGNOSIS — E11.22 CONTROLLED TYPE 2 DIABETES MELLITUS WITH STAGE 3 CHRONIC KIDNEY DISEASE, WITHOUT LONG-TERM CURRENT USE OF INSULIN (HCC): ICD-10-CM

## 2024-11-12 DIAGNOSIS — N18.30 CONTROLLED TYPE 2 DIABETES MELLITUS WITH STAGE 3 CHRONIC KIDNEY DISEASE, WITHOUT LONG-TERM CURRENT USE OF INSULIN (HCC): ICD-10-CM

## 2024-11-12 DIAGNOSIS — I10 ESSENTIAL HYPERTENSION, BENIGN: ICD-10-CM

## 2024-11-12 DIAGNOSIS — E78.2 MIXED HYPERLIPIDEMIA: ICD-10-CM

## 2024-11-12 LAB
ALBUMIN SERPL-MCNC: 4 G/DL (ref 3.5–5.2)
ALP SERPL-CCNC: 91 U/L (ref 35–104)
ALT SERPL-CCNC: 19 U/L (ref 5–33)
ANION GAP SERPL CALCULATED.3IONS-SCNC: 11 MMOL/L (ref 7–19)
AST SERPL-CCNC: 13 U/L (ref 5–32)
BASOPHILS # BLD: 0.1 K/UL (ref 0–0.2)
BASOPHILS NFR BLD: 0.5 % (ref 0–1)
BILIRUB SERPL-MCNC: 0.4 MG/DL (ref 0.2–1.2)
BUN SERPL-MCNC: 23 MG/DL (ref 8–23)
CALCIUM SERPL-MCNC: 9.6 MG/DL (ref 8.8–10.2)
CHLORIDE SERPL-SCNC: 99 MMOL/L (ref 98–111)
CHOLEST SERPL-MCNC: 150 MG/DL (ref 0–199)
CO2 SERPL-SCNC: 30 MMOL/L (ref 22–29)
CREAT SERPL-MCNC: 1.1 MG/DL (ref 0.5–0.9)
CREAT UR-MCNC: 145.5 MG/DL (ref 28–217)
EOSINOPHIL # BLD: 0.3 K/UL (ref 0–0.6)
EOSINOPHIL NFR BLD: 2.2 % (ref 0–5)
ERYTHROCYTE [DISTWIDTH] IN BLOOD BY AUTOMATED COUNT: 14.4 % (ref 11.5–14.5)
GLUCOSE SERPL-MCNC: 120 MG/DL (ref 70–99)
HBA1C MFR BLD: 7.1 % (ref 4–5.6)
HCT VFR BLD AUTO: 46.7 % (ref 37–47)
HDLC SERPL-MCNC: 52 MG/DL (ref 40–60)
HGB BLD-MCNC: 14.4 G/DL (ref 12–16)
IMM GRANULOCYTES # BLD: 0.1 K/UL
LDLC SERPL CALC-MCNC: 63 MG/DL
LYMPHOCYTES # BLD: 3.5 K/UL (ref 1.1–4.5)
LYMPHOCYTES NFR BLD: 24.4 % (ref 20–40)
MCH RBC QN AUTO: 26.1 PG (ref 27–31)
MCHC RBC AUTO-ENTMCNC: 30.8 G/DL (ref 33–37)
MCV RBC AUTO: 84.8 FL (ref 81–99)
MICROALBUMIN UR-MCNC: 1.2 MG/DL (ref 0–1.99)
MICROALBUMIN/CREAT UR-RTO: 8.2 MG/G
MONOCYTES # BLD: 0.9 K/UL (ref 0–0.9)
MONOCYTES NFR BLD: 6.5 % (ref 0–10)
NEUTROPHILS # BLD: 9.4 K/UL (ref 1.5–7.5)
NEUTS SEG NFR BLD: 65.9 % (ref 50–65)
PLATELET # BLD AUTO: 287 K/UL (ref 130–400)
PMV BLD AUTO: 12.2 FL (ref 9.4–12.3)
POTASSIUM SERPL-SCNC: 4.3 MMOL/L (ref 3.5–5)
PROT SERPL-MCNC: 6.9 G/DL (ref 6.4–8.3)
RBC # BLD AUTO: 5.51 M/UL (ref 4.2–5.4)
SODIUM SERPL-SCNC: 140 MMOL/L (ref 136–145)
TRIGL SERPL-MCNC: 173 MG/DL (ref 0–149)
TSH SERPL DL<=0.005 MIU/L-ACNC: 1.95 UIU/ML (ref 0.27–4.2)
WBC # BLD AUTO: 14.2 K/UL (ref 4.8–10.8)

## 2024-11-13 ENCOUNTER — OFFICE VISIT (OUTPATIENT)
Dept: PRIMARY CARE CLINIC | Age: 64
End: 2024-11-13

## 2024-11-13 VITALS
OXYGEN SATURATION: 96 % | TEMPERATURE: 98.2 F | HEART RATE: 68 BPM | WEIGHT: 232 LBS | SYSTOLIC BLOOD PRESSURE: 128 MMHG | BODY MASS INDEX: 35.16 KG/M2 | HEIGHT: 68 IN | DIASTOLIC BLOOD PRESSURE: 78 MMHG

## 2024-11-13 DIAGNOSIS — N18.31 STAGE 3A CHRONIC KIDNEY DISEASE (HCC): ICD-10-CM

## 2024-11-13 DIAGNOSIS — Z00.00 INITIAL MEDICARE ANNUAL WELLNESS VISIT: Primary | ICD-10-CM

## 2024-11-13 DIAGNOSIS — E66.01 CLASS 2 SEVERE OBESITY DUE TO EXCESS CALORIES WITH SERIOUS COMORBIDITY AND BODY MASS INDEX (BMI) OF 35.0 TO 35.9 IN ADULT: ICD-10-CM

## 2024-11-13 DIAGNOSIS — F41.1 GAD (GENERALIZED ANXIETY DISORDER): ICD-10-CM

## 2024-11-13 DIAGNOSIS — R35.0 URINARY FREQUENCY: ICD-10-CM

## 2024-11-13 DIAGNOSIS — E78.2 MIXED HYPERLIPIDEMIA: ICD-10-CM

## 2024-11-13 DIAGNOSIS — R52 ACUTE PAIN: ICD-10-CM

## 2024-11-13 DIAGNOSIS — K59.09 OTHER CONSTIPATION: ICD-10-CM

## 2024-11-13 DIAGNOSIS — E11.9 TYPE 2 DIABETES MELLITUS TREATED WITHOUT INSULIN (HCC): ICD-10-CM

## 2024-11-13 DIAGNOSIS — Z13.6 SCREENING FOR CARDIOVASCULAR CONDITION: ICD-10-CM

## 2024-11-13 DIAGNOSIS — F32.0 MILD MAJOR DEPRESSION (HCC): ICD-10-CM

## 2024-11-13 DIAGNOSIS — E66.812 CLASS 2 SEVERE OBESITY DUE TO EXCESS CALORIES WITH SERIOUS COMORBIDITY AND BODY MASS INDEX (BMI) OF 35.0 TO 35.9 IN ADULT: ICD-10-CM

## 2024-11-13 DIAGNOSIS — G89.18 ACUTE POST-OPERATIVE PAIN: ICD-10-CM

## 2024-11-13 LAB
APPEARANCE FLUID: NORMAL
BILIRUBIN, POC: NORMAL
BLOOD URINE, POC: NORMAL
CLARITY, POC: NORMAL
COLOR, POC: YELLOW
GLUCOSE URINE, POC: >=1000 MG/DL
KETONES, POC: NORMAL MG/DL
LEUKOCYTE EST, POC: NORMAL
NITRITE, POC: NORMAL
PH, POC: 5.5
PROTEIN, POC: NORMAL MG/DL
SPECIFIC GRAVITY, POC: 1.02
UROBILINOGEN, POC: 0.2 MG/DL

## 2024-11-13 RX ORDER — ACETAMINOPHEN 500 MG
1000 TABLET ORAL 3 TIMES DAILY
COMMUNITY
Start: 2024-11-13

## 2024-11-13 RX ORDER — SENNA AND DOCUSATE SODIUM 50; 8.6 MG/1; MG/1
TABLET, FILM COATED ORAL
Qty: 60 TABLET | Refills: 1 | Status: SHIPPED | OUTPATIENT
Start: 2024-11-13

## 2024-11-13 SDOH — ECONOMIC STABILITY: FOOD INSECURITY: WITHIN THE PAST 12 MONTHS, THE FOOD YOU BOUGHT JUST DIDN'T LAST AND YOU DIDN'T HAVE MONEY TO GET MORE.: NEVER TRUE

## 2024-11-13 SDOH — ECONOMIC STABILITY: INCOME INSECURITY: HOW HARD IS IT FOR YOU TO PAY FOR THE VERY BASICS LIKE FOOD, HOUSING, MEDICAL CARE, AND HEATING?: SOMEWHAT HARD

## 2024-11-13 SDOH — ECONOMIC STABILITY: FOOD INSECURITY: WITHIN THE PAST 12 MONTHS, YOU WORRIED THAT YOUR FOOD WOULD RUN OUT BEFORE YOU GOT MONEY TO BUY MORE.: NEVER TRUE

## 2024-11-13 ASSESSMENT — PATIENT HEALTH QUESTIONNAIRE - PHQ9
9. THOUGHTS THAT YOU WOULD BE BETTER OFF DEAD, OR OF HURTING YOURSELF: NOT AT ALL
SUM OF ALL RESPONSES TO PHQ QUESTIONS 1-9: 1
1. LITTLE INTEREST OR PLEASURE IN DOING THINGS: NOT AT ALL
2. FEELING DOWN, DEPRESSED OR HOPELESS: NOT AT ALL
4. FEELING TIRED OR HAVING LITTLE ENERGY: NOT AT ALL
6. FEELING BAD ABOUT YOURSELF - OR THAT YOU ARE A FAILURE OR HAVE LET YOURSELF OR YOUR FAMILY DOWN: NOT AT ALL
8. MOVING OR SPEAKING SO SLOWLY THAT OTHER PEOPLE COULD HAVE NOTICED. OR THE OPPOSITE, BEING SO FIGETY OR RESTLESS THAT YOU HAVE BEEN MOVING AROUND A LOT MORE THAN USUAL: NOT AT ALL
7. TROUBLE CONCENTRATING ON THINGS, SUCH AS READING THE NEWSPAPER OR WATCHING TELEVISION: NOT AT ALL
SUM OF ALL RESPONSES TO PHQ QUESTIONS 1-9: 1
SUM OF ALL RESPONSES TO PHQ QUESTIONS 1-9: 1
3. TROUBLE FALLING OR STAYING ASLEEP: NOT AT ALL
SUM OF ALL RESPONSES TO PHQ QUESTIONS 1-9: 1
SUM OF ALL RESPONSES TO PHQ9 QUESTIONS 1 & 2: 0
5. POOR APPETITE OR OVEREATING: SEVERAL DAYS
10. IF YOU CHECKED OFF ANY PROBLEMS, HOW DIFFICULT HAVE THESE PROBLEMS MADE IT FOR YOU TO DO YOUR WORK, TAKE CARE OF THINGS AT HOME, OR GET ALONG WITH OTHER PEOPLE: NOT DIFFICULT AT ALL

## 2024-11-13 NOTE — PATIENT INSTRUCTIONS
to contact your doctor if you have any problems.  Where can you learn more?  Go to [email or web address] and enter F075 to learn more about \"A Healthy Heart: Care Instructions.\"  Current as of: June 24, 2023  Content Version: 14.2  © 2024 [employer].   Care instructions adapted under license by [employer]. If you have questions about a medical condition or this instruction, always ask your healthcare professional. [employer] disclaims any warranty or liability for your use of this information.      Personalized Preventive Plan for Viviana Peoples - 11/13/2024  Medicare offers a range of preventive health benefits. Some of the tests and screenings are paid in full while other may be subject to a deductible, co-insurance, and/or copay.    Some of these benefits include a comprehensive review of your medical history including lifestyle, illnesses that may run in your family, and various assessments and screenings as appropriate.    After reviewing your medical record and screening and assessments performed today your provider may have ordered immunizations, labs, imaging, and/or referrals for you.  A list of these orders (if applicable) as well as your Preventive Care list are included within your After Visit Summary for your review.    Other Preventive Recommendations:    A preventive eye exam performed by an eye specialist is recommended every 1-2 years to screen for glaucoma; cataracts, macular degeneration, and other eye disorders.  A preventive dental visit is recommended every 6 months.  Try to get at least 150 minutes of exercise per week or 10,000 steps per day on a pedometer .  Order or download the FREE \"Exercise & Physical Activity: Your Everyday Guide\" from [employer]. Call 1-255.370.4509 or search [employer] [place] [employer] online.  You need 7265-5450 mg of calcium and 8827-7204 IU of vitamin D per day. It is possible to meet your calcium

## 2024-11-13 NOTE — PROGRESS NOTES
Medicare Annual Wellness Visit    Viviana Peoples is here for Medicare AWV    Assessment & Plan   Initial Medicare annual wellness visit  Acute pain  Acute post-operative pain  -     acetaminophen (APAP EXTRA STRENGTH) 500 MG tablet; Take 2 tablets by mouth 3 times dailyOTC  Other constipation  -     sennosides-docusate sodium (SENOKOT-S) 8.6-50 MG tablet; 1-2 tablets twice daily as needed for constipation, Disp-60 tablet, R-1Normal  Urinary frequency  -     POCT Urinalysis no Micro  Type 2 diabetes mellitus treated without insulin (HCC)  -     Comprehensive Metabolic Panel; Future  -     Hemoglobin A1C; Future  Mixed hyperlipidemia  -     Comprehensive Metabolic Panel; Future  -     Lipid Panel; Future  Stage 3a chronic kidney disease (HCC)  -     Comprehensive Metabolic Panel; Future  Mild major depression (HCC)  GARRETT (generalized anxiety disorder)  Screening for cardiovascular condition  -     [place] Intensive Behavior Counseling for Cardiovascular Diseases, 8-15 minutes []  Class 2 severe obesity due to excess calories with serious comorbidity and body mass index (BMI) of 35.0 to 35.9 in adult    Recommendations for Preventive Services Due: see orders and patient instructions/AVS.  Recommended screening schedule for the next 5-10 years is provided to the patient in written form: see Patient Instructions/AVS.     Return in 3 months (on 2/13/2025) for Diabetes, high cholesterol, HTN.     Subjective   The following acute and/or chronic problems were also addressed today:  Patient had bilateral femoral hernia repair on 11/7/24 and she has had trouble sleeping since her surgery. She is only taking extra-strength tylenol 500 mg for pain as needed because opiate pain medications just make sleepy and \"out of it\". She has had some constipation also and dulcolax helped last week but still not moving bowels.    Patient's complete Health Risk Assessment and screening values have been reviewed and are found in Flowsheets. The

## 2024-11-19 ENCOUNTER — OFFICE VISIT (OUTPATIENT)
Dept: SURGERY | Age: 64
End: 2024-11-19

## 2024-11-19 VITALS
OXYGEN SATURATION: 96 % | HEIGHT: 68 IN | WEIGHT: 238 LBS | HEART RATE: 64 BPM | TEMPERATURE: 97.5 F | BODY MASS INDEX: 36.07 KG/M2

## 2024-11-19 DIAGNOSIS — Z09 POSTOPERATIVE EXAMINATION: Primary | ICD-10-CM

## 2024-11-19 PROCEDURE — 99024 POSTOP FOLLOW-UP VISIT: CPT | Performed by: SURGERY

## 2024-11-19 NOTE — PROGRESS NOTES
Postop Progress Note    Subjective    Viviana Peoples presents to the office for postop follow up two weeks s/p robotic bilateral femoral hernia repair. She is doing well. She had some hard stool this morning and has had some cramping right lower and periumbilical pan.     Objective    Vitals:    11/19/24 0843   Pulse: 64   Temp: 97.5 °F (36.4 °C)   SpO2: 96%     General: alert, cooperative and no distress  Incision: healing well    Assessment  Doing well postoperatively.    Plan  1. Continue any current medications  2. Wound care discussed  3. Pt is to increase activities as tolerated  4. Usual diet  5. Follow up: as needed    Electronically signed by Yenifer Díaz DO on 11/19/2024 at 9:04 AM

## 2024-12-13 ENCOUNTER — OFFICE VISIT (OUTPATIENT)
Dept: PRIMARY CARE CLINIC | Age: 64
End: 2024-12-13

## 2024-12-13 VITALS
HEART RATE: 65 BPM | BODY MASS INDEX: 35.31 KG/M2 | DIASTOLIC BLOOD PRESSURE: 78 MMHG | OXYGEN SATURATION: 95 % | WEIGHT: 233 LBS | HEIGHT: 68 IN | SYSTOLIC BLOOD PRESSURE: 128 MMHG

## 2024-12-13 DIAGNOSIS — B02.7 DISSEMINATED HERPES ZOSTER: Primary | ICD-10-CM

## 2024-12-13 RX ORDER — VALACYCLOVIR HYDROCHLORIDE 1 G/1
1000 TABLET, FILM COATED ORAL 3 TIMES DAILY
Qty: 30 TABLET | Refills: 0 | Status: SHIPPED | OUTPATIENT
Start: 2024-12-13 | End: 2024-12-23

## 2024-12-13 NOTE — PROGRESS NOTES
& as needed for symptoms of irregular blood glucose. Dispense sufficient amount for indicated testing frequency plus additional to accommodate PRN testing needs. 100 strip 3    Lancets MISC Use 1 lancet three times a day to check blood sugars for type II diabetes 150 each 3    glucose monitoring kit 1 kit by Does not apply route daily 1 kit 0    atorvastatin (LIPITOR) 20 MG tablet TAKE 1 TABLET BY MOUTH EVERY DAY (Patient taking differently: Take 1 tablet by mouth daily) 90 tablet 3    triamterene-hydroCHLOROthiazide (MAXZIDE-25) 37.5-25 MG per tablet TAKE 1 TABLET BY MOUTH EVERY DAY (Patient taking differently: Take 1 tablet by mouth daily) 90 tablet 3    ESTRADIOL-PROGESTERONE PO Take by mouth      LORazepam (ATIVAN) 0.5 MG tablet Take 1 tablet by mouth every 6 hours as needed for Anxiety.      Probiotic Product (PROBIOTIC-10 PO) Take 1 capsule by mouth daily      Multiple Vitamins-Minerals (WOMENS MULTIVITAMIN PO) Take 1 tablet by mouth daily       No current facility-administered medications for this visit.       Allergies   Allergen Reactions    Latex Rash    Amoxicillin Shortness Of Breath and Nausea And Vomiting    Anaprox [Naproxen Sodium] Shortness Of Breath and Nausea And Vomiting    Aloe Vera      Other reaction(s): Unknown    Nortriptyline Hcl      Doesn't remember      Augmentin [Amoxicillin-Pot Clavulanate] Nausea And Vomiting           Buspar [Buspirone] Nausea And Vomiting    Vibramycin [Doxycycline Calcium] Nausea And Vomiting       Family History   Problem Relation Age of Onset    Diabetes Mother     High Blood Pressure Mother     Heart Attack Mother     Kidney Disease Mother         diabetic kidney disease    Kidney Disease Father         ESRD/HD    Stroke Father     Other Father         renal failure    Alcohol Abuse Sister     Cirrhosis Sister     Colon Polyps Sister     Diabetes Maternal Grandmother     Heart Disease Maternal Grandfather     Colon Cancer Paternal Grandmother     Stroke Paternal

## 2024-12-20 ASSESSMENT — ENCOUNTER SYMPTOMS
COLOR CHANGE: 0
SORE THROAT: 0
CHEST TIGHTNESS: 0
COUGH: 0
DIARRHEA: 0
SHORTNESS OF BREATH: 0
NAUSEA: 0
VOMITING: 0
ABDOMINAL PAIN: 0

## 2025-02-03 ENCOUNTER — TELEPHONE (OUTPATIENT)
Dept: PRIMARY CARE CLINIC | Age: 65
End: 2025-02-03

## 2025-02-12 DIAGNOSIS — E11.9 TYPE 2 DIABETES MELLITUS TREATED WITHOUT INSULIN (HCC): ICD-10-CM

## 2025-02-12 DIAGNOSIS — E78.2 MIXED HYPERLIPIDEMIA: ICD-10-CM

## 2025-02-12 DIAGNOSIS — N18.31 STAGE 3A CHRONIC KIDNEY DISEASE (HCC): ICD-10-CM

## 2025-02-12 LAB
ALBUMIN SERPL-MCNC: 4.2 G/DL (ref 3.5–5.2)
ALP SERPL-CCNC: 89 U/L (ref 35–104)
ALT SERPL-CCNC: 18 U/L (ref 5–33)
ANION GAP SERPL CALCULATED.3IONS-SCNC: 13 MMOL/L (ref 8–16)
AST SERPL-CCNC: 21 U/L (ref 5–32)
BILIRUB SERPL-MCNC: 0.4 MG/DL (ref 0.2–1.2)
BUN SERPL-MCNC: 22 MG/DL (ref 8–23)
CALCIUM SERPL-MCNC: 9.7 MG/DL (ref 8.8–10.2)
CHLORIDE SERPL-SCNC: 103 MMOL/L (ref 98–107)
CHOLEST SERPL-MCNC: 156 MG/DL (ref 0–199)
CO2 SERPL-SCNC: 28 MMOL/L (ref 22–29)
CREAT SERPL-MCNC: 1 MG/DL (ref 0.5–0.9)
GLUCOSE SERPL-MCNC: 119 MG/DL (ref 70–99)
HBA1C MFR BLD: 7.1 % (ref 4–5.6)
HDLC SERPL-MCNC: 50 MG/DL (ref 40–60)
LDLC SERPL CALC-MCNC: 69 MG/DL
POTASSIUM SERPL-SCNC: 4.4 MMOL/L (ref 3.5–5.1)
PROT SERPL-MCNC: 7.1 G/DL (ref 6.4–8.3)
SODIUM SERPL-SCNC: 144 MMOL/L (ref 136–145)
TRIGL SERPL-MCNC: 183 MG/DL (ref 0–149)

## 2025-02-13 ENCOUNTER — OFFICE VISIT (OUTPATIENT)
Dept: PRIMARY CARE CLINIC | Age: 65
End: 2025-02-13

## 2025-02-13 VITALS
HEIGHT: 68 IN | WEIGHT: 230 LBS | OXYGEN SATURATION: 97 % | SYSTOLIC BLOOD PRESSURE: 130 MMHG | HEART RATE: 56 BPM | TEMPERATURE: 97.1 F | BODY MASS INDEX: 34.86 KG/M2 | DIASTOLIC BLOOD PRESSURE: 80 MMHG

## 2025-02-13 DIAGNOSIS — I10 ESSENTIAL HYPERTENSION, BENIGN: ICD-10-CM

## 2025-02-13 DIAGNOSIS — K59.09 OTHER CONSTIPATION: ICD-10-CM

## 2025-02-13 DIAGNOSIS — N18.31 STAGE 3A CHRONIC KIDNEY DISEASE (HCC): ICD-10-CM

## 2025-02-13 DIAGNOSIS — F32.0 MILD MAJOR DEPRESSION: ICD-10-CM

## 2025-02-13 DIAGNOSIS — E11.22 CONTROLLED TYPE 2 DIABETES MELLITUS WITH STAGE 3 CHRONIC KIDNEY DISEASE, WITHOUT LONG-TERM CURRENT USE OF INSULIN (HCC): Primary | ICD-10-CM

## 2025-02-13 DIAGNOSIS — E66.09 CLASS 1 OBESITY DUE TO EXCESS CALORIES WITH SERIOUS COMORBIDITY AND BODY MASS INDEX (BMI) OF 34.0 TO 34.9 IN ADULT: ICD-10-CM

## 2025-02-13 DIAGNOSIS — G89.29 CHRONIC BILATERAL LOW BACK PAIN WITH RIGHT-SIDED SCIATICA: ICD-10-CM

## 2025-02-13 DIAGNOSIS — M54.41 CHRONIC BILATERAL LOW BACK PAIN WITH RIGHT-SIDED SCIATICA: ICD-10-CM

## 2025-02-13 DIAGNOSIS — K76.0 NONALCOHOLIC FATTY LIVER DISEASE WITHOUT NONALCOHOLIC STEATOHEPATITIS (NASH): ICD-10-CM

## 2025-02-13 DIAGNOSIS — N18.30 CONTROLLED TYPE 2 DIABETES MELLITUS WITH STAGE 3 CHRONIC KIDNEY DISEASE, WITHOUT LONG-TERM CURRENT USE OF INSULIN (HCC): Primary | ICD-10-CM

## 2025-02-13 DIAGNOSIS — E78.2 MIXED HYPERLIPIDEMIA: ICD-10-CM

## 2025-02-13 DIAGNOSIS — F41.1 GAD (GENERALIZED ANXIETY DISORDER): ICD-10-CM

## 2025-02-13 DIAGNOSIS — E66.811 CLASS 1 OBESITY DUE TO EXCESS CALORIES WITH SERIOUS COMORBIDITY AND BODY MASS INDEX (BMI) OF 34.0 TO 34.9 IN ADULT: ICD-10-CM

## 2025-02-13 PROBLEM — E66.01 CLASS 2 SEVERE OBESITY DUE TO EXCESS CALORIES WITH SERIOUS COMORBIDITY AND BODY MASS INDEX (BMI) OF 35.0 TO 35.9 IN ADULT: Status: RESOLVED | Noted: 2021-03-30 | Resolved: 2025-02-13

## 2025-02-13 PROBLEM — R10.11 RIGHT UPPER QUADRANT ABDOMINAL PAIN: Status: RESOLVED | Noted: 2024-09-30 | Resolved: 2025-02-13

## 2025-02-13 PROBLEM — E66.812 CLASS 2 SEVERE OBESITY DUE TO EXCESS CALORIES WITH SERIOUS COMORBIDITY AND BODY MASS INDEX (BMI) OF 35.0 TO 35.9 IN ADULT: Status: RESOLVED | Noted: 2021-03-30 | Resolved: 2025-02-13

## 2025-02-13 RX ORDER — DILTIAZEM HYDROCHLORIDE 240 MG/1
CAPSULE, EXTENDED RELEASE ORAL
Qty: 90 CAPSULE | Refills: 1 | OUTPATIENT
Start: 2025-02-13

## 2025-02-13 RX ORDER — DILTIAZEM HYDROCHLORIDE 240 MG/1
240 CAPSULE, EXTENDED RELEASE ORAL DAILY
Qty: 90 CAPSULE | Refills: 3 | Status: SHIPPED | OUTPATIENT
Start: 2025-02-13

## 2025-02-13 RX ORDER — ATORVASTATIN CALCIUM 20 MG/1
20 TABLET, FILM COATED ORAL DAILY
Qty: 90 TABLET | Refills: 3 | Status: SHIPPED | OUTPATIENT
Start: 2025-03-06

## 2025-02-13 RX ORDER — TRIAMTERENE AND HYDROCHLOROTHIAZIDE 37.5; 25 MG/1; MG/1
1 TABLET ORAL DAILY
Qty: 90 TABLET | Refills: 3 | Status: SHIPPED | OUTPATIENT
Start: 2025-03-06

## 2025-02-13 RX ORDER — LIDOCAINE 4 G/G
1 PATCH TOPICAL EVERY 12 HOURS PRN
Qty: 60 EACH | Refills: 3 | Status: SHIPPED | OUTPATIENT
Start: 2025-02-13

## 2025-02-13 RX ORDER — AMOXICILLIN 250 MG
CAPSULE ORAL
Qty: 60 TABLET | Refills: 5 | Status: SHIPPED | OUTPATIENT
Start: 2025-02-13

## 2025-02-13 SDOH — ECONOMIC STABILITY: FOOD INSECURITY: WITHIN THE PAST 12 MONTHS, THE FOOD YOU BOUGHT JUST DIDN'T LAST AND YOU DIDN'T HAVE MONEY TO GET MORE.: NEVER TRUE

## 2025-02-13 SDOH — ECONOMIC STABILITY: FOOD INSECURITY: WITHIN THE PAST 12 MONTHS, YOU WORRIED THAT YOUR FOOD WOULD RUN OUT BEFORE YOU GOT MONEY TO BUY MORE.: NEVER TRUE

## 2025-02-13 ASSESSMENT — PATIENT HEALTH QUESTIONNAIRE - PHQ9
6. FEELING BAD ABOUT YOURSELF - OR THAT YOU ARE A FAILURE OR HAVE LET YOURSELF OR YOUR FAMILY DOWN: NOT AT ALL
9. THOUGHTS THAT YOU WOULD BE BETTER OFF DEAD, OR OF HURTING YOURSELF: NOT AT ALL
7. TROUBLE CONCENTRATING ON THINGS, SUCH AS READING THE NEWSPAPER OR WATCHING TELEVISION: NOT AT ALL
SUM OF ALL RESPONSES TO PHQ QUESTIONS 1-9: 2
SUM OF ALL RESPONSES TO PHQ QUESTIONS 1-9: 2
8. MOVING OR SPEAKING SO SLOWLY THAT OTHER PEOPLE COULD HAVE NOTICED. OR THE OPPOSITE, BEING SO FIGETY OR RESTLESS THAT YOU HAVE BEEN MOVING AROUND A LOT MORE THAN USUAL: NOT AT ALL
2. FEELING DOWN, DEPRESSED OR HOPELESS: NOT AT ALL
SUM OF ALL RESPONSES TO PHQ QUESTIONS 1-9: 2
SUM OF ALL RESPONSES TO PHQ QUESTIONS 1-9: 2
1. LITTLE INTEREST OR PLEASURE IN DOING THINGS: NOT AT ALL
10. IF YOU CHECKED OFF ANY PROBLEMS, HOW DIFFICULT HAVE THESE PROBLEMS MADE IT FOR YOU TO DO YOUR WORK, TAKE CARE OF THINGS AT HOME, OR GET ALONG WITH OTHER PEOPLE: NOT DIFFICULT AT ALL
4. FEELING TIRED OR HAVING LITTLE ENERGY: NOT AT ALL
3. TROUBLE FALLING OR STAYING ASLEEP: NOT AT ALL
5. POOR APPETITE OR OVEREATING: MORE THAN HALF THE DAYS
SUM OF ALL RESPONSES TO PHQ9 QUESTIONS 1 & 2: 0

## 2025-02-13 NOTE — TELEPHONE ENCOUNTER
Viviana Peoples called to request a refill on her medication.      Last office visit : 12/13/2024   Next office visit : 2/13/2025     Requested Prescriptions     Pending Prescriptions Disp Refills    senna-docusate (SENEXON-S) 8.6-50 MG per tablet [Pharmacy Med Name: SENEXON-S 50-8.6 MG TABLET] 60 tablet 1     Sig: TAKE 1-2 TABLETS BY MOUTH TWICE DAILY AS NEEDED FOR CONSTIPATION     Refused Prescriptions Disp Refills    TIADYLT  MG extended release capsule [Pharmacy Med Name: TIADYLT  MG CAPSULE] 90 capsule 1     Sig: TAKE 1 CAPSULE BY MOUTH EVERY DAY     Refused By: SHIRA REYES     Reason for Refusal: Duplicate Request            Quynh Doyle MA

## 2025-02-13 NOTE — PROGRESS NOTES
A1c to between 6.5 and 7%. She is encouraged to increase physical activity as the weather improves to help lower blood sugar levels without adding another medication. A 90-day refill for Jardiance has been provided. Fasting labs will be ordered before the next appointment to recheck diabetes and cholesterol levels.    3. Mild major depression.  Her mood is stable with the current regimen of fluoxetine 10 mg daily. She is advised to continue this medication.    4. Generalized anxiety disorder.  Her anxiety is stable with the current regimen of fluoxetine 10 mg daily. She is advised to continue this medication.    5. Chronic low back pain with radiculopathy.  She reports persistent back pain but finds relief with Extra Strength Tylenol at night. She is advised to avoid ibuprofen, Advil, and Aleve. Over-the-counter Salonpas pain patches and Biofreeze lidocaine rub are recommended for back pain as needed. A prescription for lidocaine patches has been provided. Patient has declined controlled pain medications, referral to pain management, and referral to physical therapy.     6. Stage 3a chronic kidney disease.  Her kidney function has shown improvement, with creatinine down to 1 from 1.1 and GFR up to 63 from 56. She is advised to continue monitoring kidney function and avoid NSAIDs like ibuprofen, Advil, and Aleve.    7. Mixed Hyperlipidemia.  Her triglyceride level is slightly elevated at 183, while LDL cholesterol is well-controlled at 69, and total cholesterol is 156. She is advised to continue atorvastatin 20 mg daily. A refill for atorvastatin has been provided, with a 90-day supply and 1-year refills.    8. Obesity due to excess caloric intake-improved. Continue/Increase efforts at low carbohydrate diet, exercise, and weight loss/efforts at normalizing BMI.   Her weight has decreased from 238 to 230 since the last visit. She is encouraged to continue dietary modifications and increase physical activity to further

## 2025-02-13 NOTE — PATIENT INSTRUCTIONS
Salonpas over the counter Pain patches and/or Lidocaine Patches for back as needed and/or Biofreeze Topical Lidocaine Rub prn back pain

## 2025-02-19 ASSESSMENT — ENCOUNTER SYMPTOMS
COUGH: 0
EYE PAIN: 0
COLOR CHANGE: 0
EYE DISCHARGE: 0
ABDOMINAL PAIN: 0
BACK PAIN: 1
CHEST TIGHTNESS: 0
VOMITING: 0
BLOOD IN STOOL: 0
RHINORRHEA: 0
WHEEZING: 0
SORE THROAT: 0
SHORTNESS OF BREATH: 0
VOICE CHANGE: 0
SINUS PRESSURE: 0
EYE REDNESS: 0
DIARRHEA: 0

## 2025-06-16 ENCOUNTER — OFFICE VISIT (OUTPATIENT)
Dept: PRIMARY CARE CLINIC | Age: 65
End: 2025-06-16

## 2025-06-16 VITALS
HEART RATE: 78 BPM | SYSTOLIC BLOOD PRESSURE: 126 MMHG | BODY MASS INDEX: 35.16 KG/M2 | DIASTOLIC BLOOD PRESSURE: 78 MMHG | HEIGHT: 68 IN | TEMPERATURE: 98.3 F | WEIGHT: 232 LBS | OXYGEN SATURATION: 96 %

## 2025-06-16 DIAGNOSIS — E11.22 CONTROLLED TYPE 2 DIABETES MELLITUS WITH STAGE 3 CHRONIC KIDNEY DISEASE, WITHOUT LONG-TERM CURRENT USE OF INSULIN (HCC): ICD-10-CM

## 2025-06-16 DIAGNOSIS — F41.1 GAD (GENERALIZED ANXIETY DISORDER): ICD-10-CM

## 2025-06-16 DIAGNOSIS — F32.0 MILD MAJOR DEPRESSION: ICD-10-CM

## 2025-06-16 DIAGNOSIS — I10 ESSENTIAL HYPERTENSION, BENIGN: ICD-10-CM

## 2025-06-16 DIAGNOSIS — E11.22 CONTROLLED TYPE 2 DIABETES MELLITUS WITH STAGE 3 CHRONIC KIDNEY DISEASE, WITHOUT LONG-TERM CURRENT USE OF INSULIN (HCC): Primary | ICD-10-CM

## 2025-06-16 DIAGNOSIS — N18.31 STAGE 3A CHRONIC KIDNEY DISEASE (HCC): ICD-10-CM

## 2025-06-16 DIAGNOSIS — N18.30 CONTROLLED TYPE 2 DIABETES MELLITUS WITH STAGE 3 CHRONIC KIDNEY DISEASE, WITHOUT LONG-TERM CURRENT USE OF INSULIN (HCC): ICD-10-CM

## 2025-06-16 DIAGNOSIS — E78.2 MIXED HYPERLIPIDEMIA: ICD-10-CM

## 2025-06-16 DIAGNOSIS — N18.30 CONTROLLED TYPE 2 DIABETES MELLITUS WITH STAGE 3 CHRONIC KIDNEY DISEASE, WITHOUT LONG-TERM CURRENT USE OF INSULIN (HCC): Primary | ICD-10-CM

## 2025-06-16 PROBLEM — E66.811 CLASS 1 OBESITY DUE TO EXCESS CALORIES WITH SERIOUS COMORBIDITY AND BODY MASS INDEX (BMI) OF 34.0 TO 34.9 IN ADULT: Status: RESOLVED | Noted: 2025-02-13 | Resolved: 2025-06-16

## 2025-06-16 PROBLEM — E66.09 CLASS 1 OBESITY DUE TO EXCESS CALORIES WITH SERIOUS COMORBIDITY AND BODY MASS INDEX (BMI) OF 34.0 TO 34.9 IN ADULT: Status: RESOLVED | Noted: 2025-02-13 | Resolved: 2025-06-16

## 2025-06-16 LAB
ALBUMIN SERPL-MCNC: 4.1 G/DL (ref 3.5–5.2)
ALP SERPL-CCNC: 78 U/L (ref 35–104)
ALT SERPL-CCNC: 20 U/L (ref 10–35)
ANION GAP SERPL CALCULATED.3IONS-SCNC: 11 MMOL/L (ref 8–16)
AST SERPL-CCNC: 20 U/L (ref 10–35)
BILIRUB SERPL-MCNC: 0.3 MG/DL (ref 0.2–1.2)
BUN SERPL-MCNC: 24 MG/DL (ref 8–23)
CALCIUM SERPL-MCNC: 9.9 MG/DL (ref 8.8–10.2)
CHLORIDE SERPL-SCNC: 103 MMOL/L (ref 98–107)
CHOLEST SERPL-MCNC: 167 MG/DL (ref 0–199)
CO2 SERPL-SCNC: 29 MMOL/L (ref 22–29)
CREAT SERPL-MCNC: 1.2 MG/DL (ref 0.5–0.9)
GLUCOSE SERPL-MCNC: 134 MG/DL (ref 70–99)
HBA1C MFR BLD: 6.9 % (ref 4–5.6)
HDLC SERPL-MCNC: 49 MG/DL (ref 40–60)
LDLC SERPL CALC-MCNC: 84 MG/DL
POTASSIUM SERPL-SCNC: 4.3 MMOL/L (ref 3.5–5.1)
PROT SERPL-MCNC: 6.4 G/DL (ref 6.4–8.3)
SODIUM SERPL-SCNC: 143 MMOL/L (ref 136–145)
TRIGL SERPL-MCNC: 168 MG/DL (ref 0–149)

## 2025-06-16 NOTE — PROGRESS NOTES
calorie foods and snacks.  - Monitor weight at home and aim for gradual weight loss    7. Chronic kidney disease stage IIIa: Stable. Creatinine level of 1.2 and GFR of 50.  - Continue monitoring kidney function through regular labs    8. Mixed Hyperlipidemia: Improved. Triglycerides decreased from 183 to 168 and LDL cholesterol at 84.  - Continue atorvastatin 20 mg daily  - Maintain a heart-healthy diet    9. Health maintenance: Retinal tear without detachment in the left eye on 04/22/2025.  - Report any new symptoms such as flashes of bright light or new floaters    Follow-up  - Follow up in 4 months           Return in about 4 months (around 10/16/2025) for Diabetes, high cholesterol, HTN.      Orders Placed This Encounter   Procedures    Comprehensive Metabolic Panel     Standing Status:   Future     Expected Date:   10/16/2025     Expiration Date:   6/16/2026    Lipid Panel     Standing Status:   Future     Expected Date:   10/16/2025     Expiration Date:   6/16/2026     Is Patient Fasting?/# of Hours:   yes/8 hrs    Hemoglobin A1C     Standing Status:   Future     Expected Date:   10/16/2025     Expiration Date:   6/16/2026     DIABETES FOOT EXAM     No orders of the defined types were placed in this encounter.    There are no discontinued medications.  There are no Patient Instructions on file for this visit.    Patient voices understanding and agrees to plans along with risks and benefits of plan.    Counseling:  Viviana Peoples's case, medications and options were discussed in detail. patient was instructed to call the office if she   questions regarding her treatment. Should her conditions worsen, she should return to office to be reassessed by Dr. Nanda Martin. she  Should to go the closest Emergency Department for any emergency. They verbalized understanding the above instructions.     The patient (or guardian, if applicable) and other individuals in attendance with the patient were advised that

## 2025-07-09 ENCOUNTER — TELEPHONE (OUTPATIENT)
Dept: PRIMARY CARE CLINIC | Age: 65
End: 2025-07-09

## 2025-07-21 DIAGNOSIS — I10 ESSENTIAL HYPERTENSION, BENIGN: ICD-10-CM

## 2025-07-21 RX ORDER — DILTIAZEM HYDROCHLORIDE 240 MG/1
CAPSULE, EXTENDED RELEASE ORAL DAILY
Qty: 90 CAPSULE | Refills: 1 | OUTPATIENT
Start: 2025-07-21

## 2025-07-21 RX ORDER — TRIAMTERENE AND HYDROCHLOROTHIAZIDE 37.5; 25 MG/1; MG/1
1 TABLET ORAL DAILY
Qty: 90 TABLET | Refills: 3 | OUTPATIENT
Start: 2025-07-21

## 2025-07-21 NOTE — TELEPHONE ENCOUNTER
Viviana MIKE Peoples called to request a refill on her medication.      Last office visit : 6/16/2025   Next office visit : 10/16/2025     Requested Prescriptions     Pending Prescriptions Disp Refills    TIADYLT  MG extended release capsule [Pharmacy Med Name: TIADYLT  MG CAPSULE] 90 capsule 1     Sig: TAKE 1 CAPSULE BY MOUTH EVERY DAY    triamterene-hydroCHLOROthiazide (MAXZIDE-25) 37.5-25 MG per tablet [Pharmacy Med Name: TRIAMTERENE-HCTZ 37.5-25 MG TB] 90 tablet 3     Sig: TAKE 1 TABLET BY MOUTH EVERY DAY            Natalia Rojo MA

## (undated) DEVICE — TOWEL,OR,DSP,ST,BLUE,DLX,4/PK,20PK/CS: Brand: MEDLINE

## (undated) DEVICE — GLOVE SURG SZ 7 L12IN FNGR THK79MIL GRN LTX FREE

## (undated) DEVICE — TOOL T12MH25L LGD 12CM 2.5MM MATCH LG: Brand: MIDAS REX®

## (undated) DEVICE — ARM DRAPE

## (undated) DEVICE — GLOVE SURG SZ 7 CRM LTX FREE POLYISOPRENE POLYMER BEAD ANTI

## (undated) DEVICE — STRATAFIX SPRL PDS + 2-0 23CM CT-2

## (undated) DEVICE — BAG BND W36XL36IN TRNSPAR POLY GEN PURP W E BND CLSR TIDI

## (undated) DEVICE — SUTURE VICRYL SZ 3-0 L27IN ABSRB VLT L26MM SH 1/2 CIR J316H

## (undated) DEVICE — COVER,MAYO STAND,STERILE: Brand: MEDLINE

## (undated) DEVICE — DRESSING TRNSPAR W5XL4.5IN FLM SHT SEMIPERMEABLE WIND

## (undated) DEVICE — CURAVIEW LED LARYNGOSCOPE BLADE & HANDLE,DISPOSABLE,MAC 3: Brand: CURAPLEX

## (undated) DEVICE — CATHETER IV 14 GAX2 IN WNG INTROCAN SAFETY

## (undated) DEVICE — UNDERGLOVE SURG SZ 8 FNGR THK0.21MIL GRN LTX BEAD CUF

## (undated) DEVICE — LARYNGOSCOPE BLDE MAC HNDL M SZ 35 ST CURAPLEX CURAVIEW LED

## (undated) DEVICE — STERILE POLYISOPRENE POWDER-FREE SURGICAL GLOVES: Brand: PROTEXIS

## (undated) DEVICE — COLUMN DRAPE

## (undated) DEVICE — E-Z CLEAN, NON-STICK, PTFE COATED, ELECTROSURGICAL BLADE ELECTRODE, MODIFIED EXTENDED INSULATION, 2.5 INCH (6.35 CM): Brand: MEGADYNE

## (undated) DEVICE — SEAL

## (undated) DEVICE — SUTURE VCRL SZ 3-0 L18IN ABSRB UD L26MM SH 1/2 CIR J864D

## (undated) DEVICE — ACCESS KIT TROCAR NDL PK

## (undated) DEVICE — SUTURE VICRYL CTD ANTBCTRL 54 IN TI PLU VLT

## (undated) DEVICE — TUBE ET 7MM NSL ORAL BASIC CUF INTMED MURPHY EYE RADPQ MRK

## (undated) DEVICE — SOLUTION ANTIFOG VIS SYS CLEARIFY LAPSCP

## (undated) DEVICE — INSTRUMENT 8675424 LG DISPOSABLE DILATOR

## (undated) DEVICE — 40595 XL TRENDELENBURG POSITIONING KIT: Brand: 40595 XL TRENDELENBURG POSITIONING KIT

## (undated) DEVICE — TOTAL TRAY, 16FR 10ML SIL FOLEY, URN: Brand: MEDLINE

## (undated) DEVICE — ENDO KIT,LOURDES HOSPITAL: Brand: MEDLINE INDUSTRIES, INC.

## (undated) DEVICE — BLADELESS OBTURATOR: Brand: WECK VISTA

## (undated) DEVICE — 3M™ STERI-DRAPE™ INSTRUMENT POUCH 1018: Brand: STERI-DRAPE™

## (undated) DEVICE — INSERT CUSHION HEAD PRONEVIEW

## (undated) DEVICE — NEEDLE SPNL 22GA L3.5IN BLK HUB S STL REG WALL FIT STYL W/

## (undated) DEVICE — Device

## (undated) DEVICE — SUTURE VCRL SZ 2-0 L18IN ABSRB UD CT-1 L36MM 1/2 CIR J839D

## (undated) DEVICE — SUTURE MONOCRYL SZ 4-0 L18IN ABSRB UD L19MM PS-2 3/8 CIR PRIM Y496G

## (undated) DEVICE — NEURO CDS

## (undated) DEVICE — GOWN,PREVENTION PLUS,L,ST,24/CS: Brand: MEDLINE

## (undated) DEVICE — C-ARMOR C-ARM EQUIPMENT COVERS CLEAR STERILE UNIVERSAL FIT 12 PER CASE: Brand: C-ARMOR

## (undated) DEVICE — COVER LT HNDL BLU PLAS

## (undated) DEVICE — TIBURON LAPAROTOMY DRAPE: Brand: CONVERTORS

## (undated) DEVICE — FORCEPS BX L240CM JAW DIA2.4MM ORNG L CAP W/ NDL DISP RAD

## (undated) DEVICE — ACCESS KIT TROCAR NDL PK BVL

## (undated) DEVICE — E-Z CLEAN, NON-STICK, PTFE COATED, ELECTROSURGICAL BLADE ELECTRODE, BAYONET, MODIFIED EXTENDED INSULATION, 6.5 INCH (16.5 CM): Brand: MEGADYNE

## (undated) DEVICE — 3M™ IOBAN™ 2 ANTIMICROBIAL INCISE DRAPE 6650EZ: Brand: IOBAN™ 2

## (undated) DEVICE — TIP COVER ACCESSORY

## (undated) DEVICE — LIQUIBAND RAPID ADHESIVE 36/CS 0.8ML: Brand: MEDLINE

## (undated) DEVICE — INSTRUMENT 8670001 SXT GUIDEWIRE BLUNT

## (undated) DEVICE — GOWN, ORBIS, XLONG/XLARGE, STERILE: Brand: MEDLINE

## (undated) DEVICE — MICRO KOVER: Brand: UNBRANDED